# Patient Record
Sex: FEMALE | Race: BLACK OR AFRICAN AMERICAN | Employment: FULL TIME | URBAN - METROPOLITAN AREA
[De-identification: names, ages, dates, MRNs, and addresses within clinical notes are randomized per-mention and may not be internally consistent; named-entity substitution may affect disease eponyms.]

---

## 2017-01-15 ENCOUNTER — TRANSFERRED RECORDS (OUTPATIENT)
Dept: HEALTH INFORMATION MANAGEMENT | Facility: CLINIC | Age: 52
End: 2017-01-15

## 2017-01-23 ENCOUNTER — OFFICE VISIT (OUTPATIENT)
Dept: ORTHOPEDICS | Facility: CLINIC | Age: 52
End: 2017-01-23
Payer: COMMERCIAL

## 2017-01-23 VITALS — DIASTOLIC BLOOD PRESSURE: 62 MMHG | OXYGEN SATURATION: 98 % | SYSTOLIC BLOOD PRESSURE: 104 MMHG | HEART RATE: 68 BPM

## 2017-01-23 DIAGNOSIS — S06.0X0A CONCUSSION, WITHOUT LOSS OF CONSCIOUSNESS, INITIAL ENCOUNTER: Primary | ICD-10-CM

## 2017-01-23 PROCEDURE — 99203 OFFICE O/P NEW LOW 30 MIN: CPT | Performed by: FAMILY MEDICINE

## 2017-01-23 ASSESSMENT — PAIN SCALES - GENERAL: PAINLEVEL: SEVERE PAIN (7)

## 2017-01-23 NOTE — PATIENT INSTRUCTIONS
Thanks for coming today.  Ortho[facility] Medicine Clinic  [address] [place] [address]    To schedule future appointments in Ortho Clinic, you may call [phone].    To schedule ordered imaging by your provider:   Call Central Imaging [phone]    To schedule an injection ordered by your provider:  Call Central Imaging Injection scheduling line: [phone]  [employer] available online at:  [email or web address]    Please call if any further questions or concerns ([phone]).  Clinic hours 8 am to 5 pm.    Return to clinic (call) if symptoms worsen or fail to improve.
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
## 2017-01-23 NOTE — Clinical Note
January 23, 2017      RE: Katie Aponte  [address] [place] [address]       To whom it may concern:    Katie Aponte was seen in our clinic today. She is under my professional care for a concussion.  Please allow her to remain off of work until next Monday, January 30.    Please call with any questions or concerns.          Wade Mendoza, DO CAQSM
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
## 2017-01-30 ENCOUNTER — OFFICE VISIT (OUTPATIENT)
Dept: ORTHOPEDICS | Facility: CLINIC | Age: 52
End: 2017-01-30
Payer: COMMERCIAL

## 2017-01-30 VITALS — HEART RATE: 67 BPM | OXYGEN SATURATION: 100 % | SYSTOLIC BLOOD PRESSURE: 128 MMHG | DIASTOLIC BLOOD PRESSURE: 60 MMHG

## 2017-01-30 DIAGNOSIS — M54.12 CERVICAL RADICULOPATHY: ICD-10-CM

## 2017-01-30 DIAGNOSIS — F40.240 CLAUSTROPHOBIA: Primary | ICD-10-CM

## 2017-01-30 PROCEDURE — 99213 OFFICE O/P EST LOW 20 MIN: CPT | Performed by: FAMILY MEDICINE

## 2017-01-30 RX ORDER — ALPRAZOLAM 0.5 MG
0.5 TABLET ORAL PRN
Qty: 2 TABLET | Refills: 0 | Status: SHIPPED | OUTPATIENT
Start: 2017-01-30 | End: 2017-02-16

## 2017-01-30 ASSESSMENT — PAIN SCALES - GENERAL: PAINLEVEL: SEVERE PAIN (7)

## 2017-01-30 NOTE — Clinical Note
January 30, 2017      RE: Katie Aponte  [address] [place] [facility] [address]       To whom it may concern:    Katie Aponte was seen in our clinic today. She's under my professional care for a concussion.  Please allow her to modify her work schedule to promote recovery.  Please allow her to remain in the office for approximately 2 to 2.5 hours a day and, if possible, to work the remainder of a work day at home.    Please let these restrictions be in effect until February 15, or until further notice.    Please call with any questions or concerns.        Sincerely,              Wade Mendoza, DO CAQSM
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
## 2017-01-30 NOTE — PROGRESS NOTES
HISTORY OF PRESENT ILLNESS  Ms. Aponte is a pleasant 51 year old year old female who presents to clinic today for follow up of her concussion symptoms.  Katie explains that she feels slightly better, although went back in to work this morning and had re-exacerbation of her symptoms.  Light is most bothersome to her.  Still having left sided shoulder symptoms, now noticed shooting symptoms down her left shoulder.  Trouble with all range of motion of her neck.  Some weakness in her left arm.  Additional history: as documented      REVIEW OF SYSTEMS (1/30/2017)  10 point ROS of systems including Constitutional, Eyes, Respiratory, Cardiovascular, Gastroenterology, Genitourinary, Integumentary, Musculoskeletal, Psychiatric were all negative except for pertinent positives noted in my HPI.     PHYSICAL EXAM  Filed Vitals:    01/30/17 1108   BP: 128/60   Pulse: 67   SpO2: 100%     General  - normal appearance, in no obvious distress  CV  - normal peripheral perfusion  Pulm  - normal respiratory pattern, non-labored  Musculoskeletal - cervical spine  - inspection: normal bone and joint alignment, no obvious kyphosis  - palpation: no paravertebral or bony tenderness  - ROM: pain with bilateral rotation and sidebending, flexion, extension  - strength: upper extremities 5/5 in all planes, although objectively weaker in left arm in flexion, extension,   Neuro  - C5-7 DTRs 2+ bilaterally, no sensory or motor deficit, grossly normal coordination, normal muscle tone  Skin  - no ecchymosis, erythema, warmth, or induration, no obvious rash  Psych  - interactive, appropriate, normal mood and affect    Concussion Severity Score:  - see scanned symptom rating form        ASSESSMENT & PLAN  Ms. Aponte is a 51 year old year old female who is in the office today for follow up of her concussion.    She also has signs and symptoms of cervical radiculopathy.    For her concussion, we again discussed the importance of mental and physical  rest.  Avoid exacerbating activities as these may prolong recovery.  Limit screen time, especially if symptom provoking.  I wrote a note for her to work from home, when possible.    I'm also ordering an MR of Katie's c-spine.  This has to be an open MR.  I prescribed her 2 pills of Xanax to pre-medicate with.  She'll need a .    I recommend that Katie return to my office for a re-examination in 2 weeks.  She should follow up sooner if the condition worsens or if other problems arise.    It was a pleasure taking care of Katie.        Wade Mendoza DO, CAQSM

## 2017-01-30 NOTE — PATIENT INSTRUCTIONS
Thanks for coming today.  Ortho/Sports Medicine Clinic  [address] [place] [address]    To schedule future appointments in Ortho Clinic, you may call [phone].    To schedule ordered imaging by your provider:   Call Central Imaging [phone]    To schedule an injection ordered by your provider:  Call Central Imaging Injection scheduling line: [phone]  [email or web address] available online at:  [email or web address]    Please call if any further questions or concerns ([phone]).  Clinic hours 8 am to 5 pm.    Return to clinic (call) if symptoms worsen or fail to improve.

## 2017-01-31 ENCOUNTER — TRANSFERRED RECORDS (OUTPATIENT)
Dept: HEALTH INFORMATION MANAGEMENT | Facility: CLINIC | Age: 52
End: 2017-01-31

## 2017-02-09 ENCOUNTER — TRANSFERRED RECORDS (OUTPATIENT)
Dept: HEALTH INFORMATION MANAGEMENT | Facility: CLINIC | Age: 52
End: 2017-02-09

## 2017-02-09 ENCOUNTER — RADIANT APPOINTMENT (OUTPATIENT)
Dept: GENERAL RADIOLOGY | Facility: CLINIC | Age: 52
End: 2017-02-09
Payer: COMMERCIAL

## 2017-02-09 ENCOUNTER — OFFICE VISIT (OUTPATIENT)
Dept: FAMILY MEDICINE | Facility: CLINIC | Age: 52
End: 2017-02-09
Payer: COMMERCIAL

## 2017-02-09 ENCOUNTER — TELEPHONE (OUTPATIENT)
Dept: ORTHOPEDICS | Facility: CLINIC | Age: 52
End: 2017-02-09

## 2017-02-09 VITALS
BODY MASS INDEX: 31.18 KG/M2 | OXYGEN SATURATION: 98 % | DIASTOLIC BLOOD PRESSURE: 85 MMHG | HEIGHT: 66 IN | WEIGHT: 194 LBS | SYSTOLIC BLOOD PRESSURE: 121 MMHG | HEART RATE: 78 BPM | TEMPERATURE: 97.9 F

## 2017-02-09 DIAGNOSIS — J20.9 ACUTE BRONCHITIS WITH SYMPTOMS > 10 DAYS: ICD-10-CM

## 2017-02-09 DIAGNOSIS — J20.9 ACUTE BRONCHITIS WITH SYMPTOMS > 10 DAYS: Primary | ICD-10-CM

## 2017-02-09 DIAGNOSIS — J45.40: ICD-10-CM

## 2017-02-09 PROCEDURE — 71020 XR CHEST 2 VW: CPT

## 2017-02-09 PROCEDURE — 99214 OFFICE O/P EST MOD 30 MIN: CPT | Performed by: PREVENTIVE MEDICINE

## 2017-02-09 RX ORDER — DOXYCYCLINE 100 MG/1
100 CAPSULE ORAL 2 TIMES DAILY
Qty: 14 CAPSULE | Refills: 0 | Status: SHIPPED | OUTPATIENT
Start: 2017-02-09 | End: 2017-02-16

## 2017-02-09 RX ORDER — PREDNISONE 20 MG/1
20 TABLET ORAL 2 TIMES DAILY
Qty: 10 TABLET | Refills: 0 | Status: SHIPPED | OUTPATIENT
Start: 2017-02-09 | End: 2017-02-16

## 2017-02-09 ASSESSMENT — PAIN SCALES - GENERAL: PAINLEVEL: NO PAIN (0)

## 2017-02-09 NOTE — PROGRESS NOTES
Quick Note:    Please send a letter:    Dear Katie Aponte,    Chest X ray did not show any pneumonias. Plan of care and follow up as discussed in clinic. Please let me know if you have any questions and thank you for choosing [facility].    Regards,    Susan Neves MD MPH    ______

## 2017-02-09 NOTE — MR AVS SNAPSHOT
After Visit Summary   2/9/2017    Katie Aponte    MRN: [id number]           Patient Information     Date Of Birth          1965        Visit Information        Provider Department      2/9/2017 8:00 AM Susan Neves MD [facility]        Today's Diagnoses     Acute bronchitis with symptoms > 10 days    -  1     Allergic bronchitis, moderate persistent, uncomplicated           Care Instructions      Bronchitis, Antibiotic Treatment (Adult)    Bronchitis is an infection of the air passages (bronchial tubes) in your lungs. It often occurs when you have a cold. This illness is contagious during the first few days and is spread through the air by coughing and sneezing, or by direct contact (touching the sick person and then touching your own eyes, nose, or mouth).  Symptoms of bronchitis include cough with mucus (phlegm) and low-grade fever. Bronchitis usually lasts 7 to 14 days. Mild cases can be treated with simple home remedies. More severe infection is treated with an antibiotic.  Home care  Follow these guidelines when caring for yourself at home:    If your symptoms are severe, rest at home for the first 2 to 3 days. When you go back to your usual activities, don't let yourself get too tired.    Do not smoke. Also avoid being exposed to secondhand smoke.    You may use over-the-counter medicines to control fever or pain, unless another medicine was prescribed. (Note: If you have chronic liver or kidney disease or have ever had a stomach ulcer or gastrointestinal bleeding, talk with your healthcare provider before using these medicines. Also talk to your provider if you are taking medicine to prevent blood clots.) Aspirin should never be given to anyone younger than 18 years of age who is ill with a viral infection or fever. It may cause severe liver or brain damage.    Your appetite may be poor, so a light diet is fine. Avoid dehydration by drinking 6 to 8 glasses of  fluids per day (such as water, soft drinks, sports drinks, juices, tea, or soup). Extra fluids will help loosen secretions in the nose and lungs.    Over-the-counter cough, cold, and sore-throat medicines will not shorten the length of the illness, but they may be helpful to reduce symptoms. (Note: Do not use decongestants if you have high blood pressure.)    Finish all antibiotic medicine. Do this even if you are feeling better after only a few days.  Follow-up care  Follow up with your healthcare provider, or as advised. If you had an X-ray or ECG (electrocardiogram), a specialist will review it. You will be notified of any new findings that may affect your care.  Note: If you are age 65 or older, or if you have a chronic lung disease or condition that affects your immune system, or you smoke, talk to your healthcare provider about having pneumococcal vaccinations and a yearly influenza vaccination (flu shot).  When to seek medical advice  Call your healthcare provider right away if any of these occur:    Fever of 100.4 F (38 C) or higher    Coughing up increased amounts of colored sputum    Weakness, drowsiness, headache, facial pain, ear pain, or a stiff neck   Call 911, or get immediate medical care  Contact emergency services right away if any of these occur.    Coughing up blood    Worsening weakness, drowsiness, headache, or stiff neck    Trouble breathing, wheezing, or pain with breathing    6977-1818 [employer]. [address] [place] [address]. All rights reserved. This information is not intended as a substitute for professional medical care. Always follow your healthcare professional's instructions.              Follow-ups after your visit        Your next 10 appointments already scheduled     Feb 13, 2017  9:25 AM   MA SCREENING DIGITAL BILATERAL with MGMA1, MG MA TECH   [facility]    [address] [address] [place] [address]    [phone]           Do not use any powder, lotion or deodorant under your arms or on your breast. If you do, we will ask you to remove it before your exam.  Wear comfortable, two-piece clothing.  If you have any allergies, tell your care team.  Bring any previous mammograms from other facilities or have them mailed to the breast center. This mammogram location, [facility], now offers 3D mammography. It doesn't replace a screening mammogram and can be done with a regular screening mammogram. It is optional and not all insurances will pay for it. 3D mammography is a special kind of mammogram that produces a three-dimensional image of the breast by using low dose-xrays. 3D allows the radiologist to see the breast tissue differently from 2D, which reduces the chance of repeat testing due to overlapping breast tissue. If you are interested in have a 3D mammogram, please check with your insurance before you arrive for your exam. 3D mammography is offered to all patients. On the day of your exam you will be asked to sign a form stating yes, you wish to have 3D imaging or, no, you decline.            Apr 20, 2017   Procedure with Jett Montes MD   [facility] (--)    [address]  [facility] [address] [phone]              Future tests that were ordered for you today     Open Future Orders        Priority Expected Expires Ordered    XR Chest 2 Views Routine 2/9/2017 2/9/2018 2/9/2017    MA Screening Digital Bilateral Routine  2/8/2018 2/8/2017            Who to contact     If you have questions or need follow up information about today's clinic visit or your schedule please contact [facility] [place] directly at [phone].  Normal or non-critical lab and imaging results will be communicated to you by MyChart, letter or phone within 4 business days after the clinic has received the results. If you do not hear from us within 7 days, please contact the  "clinic through [employer] or phone. If you have a critical or abnormal lab result, we will notify you by phone as soon as possible.  Submit refill requests through [employer]Later or call your pharmacy and they will forward the refill request to us. Please allow 3 business days for your refill to be completed.          Additional Information About Your Visit        [employer] Information     1DayLater lets you send messages to your doctor, view your test results, renew your prescriptions, schedule appointments and more. To sign up, go to [email or web address] . Click on \"Log in\" on the left side of the screen, which will take you to the Welcome page. Then click on \"Sign up Now\" on the right side of the page.     You will be asked to enter the access code listed below, as well as some personal information. Please follow the directions to create your username and password.     Your access code is: [email or web address]  Expires: 5/10/2017  8:40 AM     Your access code will  in 90 days. If you need help or a new code, please call your [facility] clinic or [phone].        Care EveryWhere ID     This is your Care EveryWhere ID. This could be used by other organizations to access your [facility] medical records  [id number]        Your Vitals Were     Pulse Temperature Height BMI (Body Mass Index) Pulse Oximetry Last Period    78 97.9  F (36.6  C) (Oral) 5' 6\" (1.676 m) 31.33 kg/m2 98% 2014    Breastfeeding?                   No            Blood Pressure from Last 3 Encounters:   17 121/85   17 128/60   17 104/62    Weight from Last 3 Encounters:   17 194 lb (87.998 kg)   16 185 lb 8 oz (84.142 kg)   16 183 lb 9.6 oz (83.28 kg)                 Today's Medication Changes          These changes are accurate as of: 17  8:40 AM.  If you have any questions, ask your nurse or doctor.               Start taking these medicines.        Dose/Directions    doxycycline 100 MG capsule   Commonly known " as:  VIBRAMYCIN   Used for:  Acute bronchitis with symptoms > 10 days   Started by:  Susan Neves MD        Dose:  100 mg   Take 1 capsule (100 mg) by mouth 2 times daily   Quantity:  14 capsule   Refills:  0       predniSONE 20 MG tablet   Commonly known as:  DELTASONE   Used for:  Acute bronchitis with symptoms > 10 days   Started by:  Susan Neves MD        Dose:  20 mg   Take 1 tablet (20 mg) by mouth 2 times daily   Quantity:  10 tablet   Refills:  0            Where to get your medicines      These medications were sent to [facility] [address] [facility]  [address] [facility] [address]    Hours:  24-hours Phone:  [phone]    - doxycycline 100 MG capsule  - predniSONE 20 MG tablet             Primary Care Provider    [facility]       No address on file        Thank you!     Thank you for choosing [facility]  for your care. Our goal is always to provide you with excellent care. Hearing back from our patients is one way we can continue to improve our services. Please take a few minutes to complete the written survey that you may receive in the mail after your visit with us. Thank you!             Your Updated Medication List - Protect others around you: Learn how to safely use, store and throw away your medicines at www.disposemymeds.org.          This list is accurate as of: 2/9/17  8:40 AM.  Always use your most recent med list.                   Brand Name Dispense Instructions for use    albuterol 108 (90 BASE) MCG/ACT Inhaler    PROAIR HFA/PROVENTIL HFA/VENTOLIN HFA    1 Inhaler    Inhale 2 puffs into the lungs every 6 hours as needed for shortness of breath / dyspnea or wheezing       ALPRAZolam 0.5 MG tablet    XANAX    2 tablet    Take 1 tablet (0.5 mg) by mouth as needed for anxiety (take 30 minutes prior to MRI, can take 2 if needed)       cyclobenzaprine 10 MG tablet     FLEXERIL    60 tablet    Take 1 tablet (10 mg) by mouth 2 times daily as needed for muscle spasms       desonide 0.05 % ointment    DESOWEN    60 g    Apply topically 2 times daily       diclofenac 75 MG EC tablet    VOLTAREN    30 tablet    Take 1 tablet (75 mg) by mouth 2 times daily as needed for moderate pain       doxycycline 100 MG capsule    VIBRAMYCIN    14 capsule    Take 1 capsule (100 mg) by mouth 2 times daily       fluocinolone 0.01 % external oil    DERMA-SMOOTHE/FS SCALP    118 mL    To dry and itchy areas of scalp as needed one to two times a day.       fluticasone 110 MCG/ACT Inhaler    FLOVENT HFA    1 Inhaler    Inhale 2 puffs into the lungs 2 times daily       ibuprofen 600 MG tablet    ADVIL/MOTRIN     Take 600 mg by mouth every 6 hours as needed       metroNIDAZOLE 0.75 % topical gel    METROGEL    45 g    Apply topically daily To entire face for rosacea.       montelukast 10 MG tablet    SINGULAIR    90 tablet    Take 1 tablet (10 mg) by mouth At Bedtime       order for DME     1 Units    Equipment being ordered: TENS       order for DME     1 Device    Equipment being ordered: [employer] Bed Buddy- heated neck roll       predniSONE 20 MG tablet    DELTASONE    10 tablet    Take 1 tablet (20 mg) by mouth 2 times daily       PREMARIN 0.9 MG Tabs tablet   Generic drug:  estrogens conjugated      Take 0.3 mg by mouth daily       VALTREX 500 MG tablet   Generic drug:  valACYclovir      Take 500 mg by mouth as needed

## 2017-02-09 NOTE — Clinical Note
[address] [address]  [phone]  Dept: [phone]      2/9/2017    Re: Katie Aponte      TO WHOM IT MAY CONCERN:    Katie Aponte  was seen on 2/9/17.  Please excuse her  until 2/10/17 due to illness.    Cordially,        Susan Neves MD MPH    [facility]

## 2017-02-09 NOTE — NURSING NOTE
"Chief Complaint   Patient presents with     URI       Initial /85 mmHg  Pulse 78  Temp(Src) 97.9  F (36.6  C) (Oral)  Ht 5' 6\" (1.676 m)  Wt 194 lb (87.998 kg)  BMI 31.33 kg/m2  SpO2 98%  LMP 03/30/2014  Breastfeeding? No Estimated body mass index is 31.33 kg/(m^2) as calculated from the following:    Height as of this encounter: 5' 6\" (1.676 m).    Weight as of this encounter: 194 lb (87.998 kg).  Medication Reconciliation: complete   Julisa ARELLANO        "

## 2017-02-09 NOTE — Clinical Note
[address] [address]  [phone]      February 9, 2017      Katie Aponte  [address] [facility] [address]            Dear Katie Aponte,    Chest X ray did not show any pneumonias. Plan of care and follow up as discussed in clinic.  Enclosed is your result.     Results for orders placed or performed in visit on 02/09/17   XR Chest 2 Views    Narrative    CHEST TWO VIEWS  2/9/2017 8:48 AM     COMPARISON: None.    HISTORY: Acute bronchitis, unspecified.    FINDINGS: The cardiac silhouette, pulmonary vasculature, lungs and  pleural spaces are within normal limits.      Impression    IMPRESSION: Clear lungs.     JUVENAL GRIJALVA MD        Please let me know if you have any questions and thank you for choosing [facility].    Regards,    Susan Neves MD MPH/kb

## 2017-02-09 NOTE — PROGRESS NOTES
SUBJECTIVE:                                                    Katie Aponte is a 51 year old female who presents to clinic today for the following health issues:    I have reviewed and agree with the documentation by the MA. I updated the history as indicated.  Susan Neves MD MPH    RESPIRATORY SYMPTOMS      Duration: x 3 weeks    Description  nasal congestion, sore throat, facial pain/pressure, cough, wheezing, ear pain right, headache, fatigue/malaise and hoarse voice    Severity: severe    Accompanying signs and symptoms: None    History (predisposing factors):  asthma    Precipitating or alleviating factors: None    Therapies tried and outcome:  OTC cold and flu     Symptoms started with nasal congestion and sore throat. No rash, no emesis, no diarrhea.       Problem list and histories reviewed & adjusted, as indicated.  Additional history: as documented    Patient Active Problem List   Diagnosis     Knee pain     Abnormal uterine bleeding     CARDIOVASCULAR SCREENING; LDL GOAL LESS THAN 160     Post-operative pain     Acne rosacea     Dermatitis     Thrombocytopenia (H)     Leukopenia     Allergic bronchitis, moderate persistent, uncomplicated     Neck pain     Right shoulder pain, unspecified chronicity     Lumbago     Past Surgical History   Procedure Laterality Date     Orthopedic surgery       L KNEE MENISCUS      section       Colonoscopy       Dilation and curettage, hysteroscopy, ablate endometrium novasure, combined  4/10/2014     Procedure: COMBINED DILATION AND CURETTAGE, HYSTEROSCOPY, ABLATE ENDOMETRIUM NOVASURE;  HYSTEROSCOPY, DILATION AND CURETTAGE, NOVASURE ABLATION ATTEMPTED, THERMACHOICE ABLATION ATTEMPTED;  Surgeon: Jett Montes MD;  Location: [facility]     Dilate cervix, ablate endometrium thermachoice, combined  4/10/2014     Procedure: COMBINED DILATE CERVIX, ABLATE ENDOMETRIUM THERMACHOICE;;  Surgeon: Jett Montes MD;  Location: [facility]     Laparoscopic assisted hysterectomy  vaginal, bilateral salpingo-oophorectomy, combined  7/31/2014     Procedure: COMBINED LAPAROSCOPIC ASSISTED HYSTERECTOMY VAGINAL, SALPINGO-OOPHORECTOMY;  Surgeon: Jett Montes MD;  Location: [facility]       Social History   Substance Use Topics     Smoking status: Never Smoker      Smokeless tobacco: Never Used     Alcohol Use: 0.0 oz/week     0 Standard drinks or equivalent per week      Comment: SOCIAL - 1 glass of wine twice a week; 2 drinks on the weekend     Family History   Problem Relation Age of Onset     CANCER Mother      patient is unsure of what kind         Current Outpatient Prescriptions   Medication Sig Dispense Refill     predniSONE (DELTASONE) 20 MG tablet Take 1 tablet (20 mg) by mouth 2 times daily 10 tablet 0     doxycycline (VIBRAMYCIN) 100 MG capsule Take 1 capsule (100 mg) by mouth 2 times daily 14 capsule 0     ALPRAZolam (XANAX) 0.5 MG tablet Take 1 tablet (0.5 mg) by mouth as needed for anxiety (take 30 minutes prior to MRI, can take 2 if needed) 2 tablet 0     order for DME Equipment being ordered: [employer] Bed Buddy- heated neck roll 1 Device 0     cyclobenzaprine (FLEXERIL) 10 MG tablet Take 1 tablet (10 mg) by mouth 2 times daily as needed for muscle spasms 60 tablet 1     order for DME Equipment being ordered: TENS 1 Units 0     montelukast (SINGULAIR) 10 MG tablet Take 1 tablet (10 mg) by mouth At Bedtime 90 tablet 1     ibuprofen (ADVIL,MOTRIN) 600 MG tablet Take 600 mg by mouth every 6 hours as needed       diclofenac (VOLTAREN) 75 MG EC tablet Take 1 tablet (75 mg) by mouth 2 times daily as needed for moderate pain 30 tablet 0     albuterol (PROAIR HFA, PROVENTIL HFA, VENTOLIN HFA) 108 (90 BASE) MCG/ACT inhaler Inhale 2 puffs into the lungs every 6 hours as needed for shortness of breath / dyspnea or wheezing 1 Inhaler 2     fluticasone (FLOVENT HFA) 110 MCG/ACT inhaler Inhale 2 puffs into the lungs 2 times daily 1 Inhaler 2     desonide (DESOWEN) 0.05 % ointment Apply topically 2  "times daily 60 g 0     metroNIDAZOLE (METROGEL) 0.75 % gel Apply topically daily To entire face for rosacea. 45 g 11     valACYclovir (VALTREX) 500 MG tablet Take 500 mg by mouth as needed       fluocinolone (DERMA-SMOOTHE/FS SCALP) 0.01 % external oil To dry and itchy areas of scalp as needed one to two times a day. 118 mL 5     estrogens conjugated (PREMARIN) 0.9 MG TABS Take 0.3 mg by mouth daily       Allergies   Allergen Reactions     Droperidol Itching     Feels jumpy     Percocet [Oxycodone-Acetaminophen] GI Disturbance     BP Readings from Last 3 Encounters:   02/09/17 121/85   01/30/17 128/60   01/23/17 104/62    Wt Readings from Last 3 Encounters:   02/09/17 194 lb (87.998 kg)   09/12/16 185 lb 8 oz (84.142 kg)   09/08/16 183 lb 9.6 oz (83.28 kg)                    ROS:  Constitutional, HEENT, cardiovascular, pulmonary, gi and gu systems are negative, except as otherwise noted.    OBJECTIVE:                                                    /85 mmHg  Pulse 78  Temp(Src) 97.9  F (36.6  C) (Oral)  Ht 5' 6\" (1.676 m)  Wt 194 lb (87.998 kg)  BMI 31.33 kg/m2  SpO2 98%  LMP 03/30/2014  Breastfeeding? No  Body mass index is 31.33 kg/(m^2).  GENERAL APPEARANCE: healthy, alert and no distress  EYES: Eyes grossly normal to inspection and conjunctivae and sclerae normal  HENT: ear canals and TM's normal, nose and mouth without ulcers or lesions and no pharyngeal exudates or pus points, no uvular deviation   NECK: no adenopathy  RESP: Scattered rhonchi bilateral lung fields   CV: regular rates and rhythm and normal S1 S2, no S3 or S4  ABDOMEN: soft, non-tender  MS: extremities normal- no gross deformities noted  SKIN: no suspicious lesions or rashes  NEURO: Normal strength and tone, mentation intact and speech normal  PSYCH: mentation appears normal    Diagnostic test results:  Diagnostic Test Results:  No results found for this or any previous visit (from the past 24 hour(s)).     Chest X ray:  My " independent review of the images does not show any infiltrates or pleural effusions. Final radiology reading is pending.  Susan Neves MD MPH       ASSESSMENT/PLAN:                                                    1. Acute bronchitis with symptoms > 10 days  -Hydration and monitor temperature  - predniSONE (DELTASONE) 20 MG tablet; Take 1 tablet (20 mg) by mouth 2 times daily  Dispense: 10 tablet; Refill: 0  - doxycycline (VIBRAMYCIN) 100 MG capsule; Take 1 capsule (100 mg) by mouth 2 times daily  Dispense: 14 capsule; Refill: 0  - XR Chest 2 Views; Future  -Home care information provided   -Work note provided     2. Allergic bronchitis, moderate persistent, uncomplicated  -Continue Albuterol and Flovent HFA  -ACT is low at 9, expected secondary to acute illness.     I ended our visit today by discussing the patient's diagnoses and recommended treatment. Please refer to today's diagnoses and orders for further details. I briefly discussed the pathophysiology of these conditions and outlined their expected course. I discussed the warning symptoms and signs that indicate an atypical course that would need urgent or emergent care. I also discussed self care strategies for symptom relief.  Patient voiced complete understanding of plan of care and was in full agreement to proceed. After visit summary discussed and handed to patient.    Common side effects of medications prescribed at this visit were discussed with the patient. Severe side effects, including current applicable black box warnings, were discussed.     Follow up with Provider - If not improving in 5 days or fever over 101 F  Has mammogram scheduled next week  Will discuss colon cancer screening with PCP Dr. Montes     See Patient Instructions    Susan Neves MD MPH    [facility]

## 2017-02-09 NOTE — TELEPHONE ENCOUNTER
Received disability paperwork as well as FMLA paperwork from patient. Paperwork has been started and has been left for Dr. Mendoza to review. Dr. Mendoza is out of town until Monday 2/13/17 and will be completed by then.

## 2017-02-09 NOTE — PATIENT INSTRUCTIONS
Bronchitis, Antibiotic Treatment (Adult)    Bronchitis is an infection of the air passages (bronchial tubes) in your lungs. It often occurs when you have a cold. This illness is contagious during the first few days and is spread through the air by coughing and sneezing, or by direct contact (touching the sick person and then touching your own eyes, nose, or mouth).  Symptoms of bronchitis include cough with mucus (phlegm) and low-grade fever. Bronchitis usually lasts 7 to 14 days. Mild cases can be treated with simple home remedies. More severe infection is treated with an antibiotic.  Home care  Follow these guidelines when caring for yourself at home:    If your symptoms are severe, rest at home for the first 2 to 3 days. When you go back to your usual activities, don't let yourself get too tired.    Do not smoke. Also avoid being exposed to secondhand smoke.    You may use over-the-counter medicines to control fever or pain, unless another medicine was prescribed. (Note: If you have chronic liver or kidney disease or have ever had a stomach ulcer or gastrointestinal bleeding, talk with your healthcare provider before using these medicines. Also talk to your provider if you are taking medicine to prevent blood clots.) Aspirin should never be given to anyone younger than 18 years of age who is ill with a viral infection or fever. It may cause severe liver or brain damage.    Your appetite may be poor, so a light diet is fine. Avoid dehydration by drinking 6 to 8 glasses of fluids per day (such as water, soft drinks, sports drinks, juices, tea, or soup). Extra fluids will help loosen secretions in the nose and lungs.    Over-the-counter cough, cold, and sore-throat medicines will not shorten the length of the illness, but they may be helpful to reduce symptoms. (Note: Do not use decongestants if you have high blood pressure.)    Finish all antibiotic medicine. Do this even if you are feeling better after only a  few days.  Follow-up care  Follow up with your healthcare provider, or as advised. If you had an X-ray or ECG (electrocardiogram), a specialist will review it. You will be notified of any new findings that may affect your care.  Note: If you are age 65 or older, or if you have a chronic lung disease or condition that affects your immune system, or you smoke, talk to your healthcare provider about having pneumococcal vaccinations and a yearly influenza vaccination (flu shot).  When to seek medical advice  Call your healthcare provider right away if any of these occur:    Fever of 100.4 F (38 C) or higher    Coughing up increased amounts of colored sputum    Weakness, drowsiness, headache, facial pain, ear pain, or a stiff neck   Call 911, or get immediate medical care  Contact emergency services right away if any of these occur.    Coughing up blood    Worsening weakness, drowsiness, headache, or stiff neck    Trouble breathing, wheezing, or pain with breathing    7404-1752 [employer]. [address] [place] [address], [place] [address]. All rights reserved. This information is not intended as a substitute for professional medical care. Always follow your healthcare professional's instructions.

## 2017-02-13 ENCOUNTER — RADIANT APPOINTMENT (OUTPATIENT)
Dept: MAMMOGRAPHY | Facility: CLINIC | Age: 52
End: 2017-02-13
Attending: OBSTETRICS & GYNECOLOGY
Payer: COMMERCIAL

## 2017-02-13 DIAGNOSIS — Z12.39 SCREENING BREAST EXAMINATION: ICD-10-CM

## 2017-02-13 PROCEDURE — G0202 SCR MAMMO BI INCL CAD: HCPCS

## 2017-02-13 PROCEDURE — 77063 BREAST TOMOSYNTHESIS BI: CPT

## 2017-02-16 ENCOUNTER — OFFICE VISIT (OUTPATIENT)
Dept: ORTHOPEDICS | Facility: CLINIC | Age: 52
End: 2017-02-16
Payer: COMMERCIAL

## 2017-02-16 VITALS — DIASTOLIC BLOOD PRESSURE: 72 MMHG | SYSTOLIC BLOOD PRESSURE: 111 MMHG | HEART RATE: 73 BPM

## 2017-02-16 DIAGNOSIS — M54.12 CERVICAL RADICULOPATHY: Primary | ICD-10-CM

## 2017-02-16 PROCEDURE — 99213 OFFICE O/P EST LOW 20 MIN: CPT | Performed by: FAMILY MEDICINE

## 2017-02-16 NOTE — MR AVS SNAPSHOT
After Visit Summary   2/16/2017    Katie Aponte    MRN: [id number]           Patient Information     Date Of Birth          1965        Visit Information        Provider Department      2/16/2017 2:40 PM Wade Mendoza,  [facility]        Today's Diagnoses     Cervical radiculopathy    -  1       Follow-ups after your visit        Additional Services     PHYSICAL THERAPY REFERRAL       Please eval and treat for cervical radiculopathy                  Your next 10 appointments already scheduled     Apr 20, 2017   Procedure with Jett Montes MD   [facility] (--)    [address]  [facility] [address] [phone]              Who to contact     If you have questions or need follow up information about today's clinic visit or your schedule please contact [facility] directly at [phone].  Normal or non-critical lab and imaging results will be communicated to you by MyChart, letter or phone within 4 business days after the clinic has received the results. If you do not hear from us within 7 days, please contact the clinic through MyChart or phone. If you have a critical or abnormal lab result, we will notify you by phone as soon as possible.  Submit refill requests through [employer] or call your pharmacy and they will forward the refill request to us. Please allow 3 business days for your refill to be completed.          Additional Information About Your Visit        MyChart Information     [employer] is an electronic gateway that provides easy, online access to your medical records. With [employer], you can request a clinic appointment, read your test results, renew a prescription or communicate with your care team.     To sign up for [employer] visit the website at [email or web address]   You will be asked to enter the access code listed below, as well as some personal information. Please follow the directions to  create your username and password.     Your access code is: [email or web address]  Expires: 5/10/2017  8:40 AM     Your access code will  in 90 days. If you need help or a new code, please contact your [facility] or call [phone] for assistance.        Care EveryWhere ID     This is your Care EveryWhere ID. This could be used by other organizations to access your [facility] medical records  [id number]        Your Vitals Were     Pulse Last Period                73 2014           Blood Pressure from Last 3 Encounters:   17 111/72   17 121/85   17 128/60    Weight from Last 3 Encounters:   17 88 kg (194 lb)   16 84.1 kg (185 lb 8 oz)   16 83.3 kg (183 lb 9.6 oz)              We Performed the Following     PHYSICAL THERAPY REFERRAL          Today's Medication Changes          These changes are accurate as of: 17  4:17 PM.  If you have any questions, ask your nurse or doctor.               Start taking these medicines.        Dose/Directions    tiZANidine 4 MG tablet   Commonly known as:  ZANAFLEX   Used for:  Cervical radiculopathy   Started by:  Wade Mendoza, DO        Dose:  4 mg   Take 1 tablet (4 mg) by mouth nightly as needed for muscle spasms   Quantity:  30 tablet   Refills:  0            Where to get your medicines      [facility] [address] [place] [address] [address] [place] [address]  [address] [place] [id number]    Hours:  24-hours Phone:  [phone]     tiZANidine 4 MG tablet                Primary Care Provider    [facility]       No address on file        Thank you!     Thank you for choosing [facility]  for your care. Our goal is always to provide you with excellent care. Hearing back from our patients is one way we can continue to improve our services. Please take a few minutes to complete the  written survey that you may receive in the mail after your visit with us. Thank you!             Your Updated Medication List - Protect others around you: Learn how to safely use, store and throw away your medicines at www.disposemymeds.org.          This list is accurate as of: 2/16/17  4:17 PM.  Always use your most recent med list.                   Brand Name Dispense Instructions for use    albuterol 108 (90 BASE) MCG/ACT Inhaler    PROAIR HFA/PROVENTIL HFA/VENTOLIN HFA    1 Inhaler    Inhale 2 puffs into the lungs every 6 hours as needed for shortness of breath / dyspnea or wheezing       cyclobenzaprine 10 MG tablet    FLEXERIL    60 tablet    Take 1 tablet (10 mg) by mouth 2 times daily as needed for muscle spasms       desonide 0.05 % ointment    DESOWEN    60 g    Apply topically 2 times daily       diclofenac 75 MG EC tablet    VOLTAREN    30 tablet    Take 1 tablet (75 mg) by mouth 2 times daily as needed for moderate pain       fluocinolone 0.01 % external oil    DERMA-SMOOTHE/FS SCALP    118 mL    To dry and itchy areas of scalp as needed one to two times a day.       fluticasone 110 MCG/ACT Inhaler    FLOVENT HFA    1 Inhaler    Inhale 2 puffs into the lungs 2 times daily       ibuprofen 600 MG tablet    ADVIL/MOTRIN     Take 600 mg by mouth every 6 hours as needed Reported on 2/16/2017       metroNIDAZOLE 0.75 % topical gel    METROGEL    45 g    Apply topically daily To entire face for rosacea.       montelukast 10 MG tablet    SINGULAIR    90 tablet    Take 1 tablet (10 mg) by mouth At Bedtime       order for DME     1 Units    Equipment being ordered: TENS       order for DME     1 Device    Equipment being ordered: [employer] Bed Buddy- heated neck roll       PREMARIN 0.9 MG Tabs tablet   Generic drug:  estrogens conjugated      Take 0.3 mg by mouth daily       tiZANidine 4 MG tablet    ZANAFLEX    30 tablet    Take 1 tablet (4 mg) by mouth nightly as needed for muscle spasms       VALTREX 500 MG  tablet   Generic drug:  valACYclovir      Take 500 mg by mouth as needed Reported on 2/16/2017

## 2017-02-16 NOTE — PROGRESS NOTES
HISTORY OF PRESENT ILLNESS  Ms. Aponte is a pleasant 51 year old year old female who presents to clinic today for follow up of her neck pain.  She's here to review her MR. Wilson feels nearly recovered from her concussion.  Additional history: as documented      REVIEW OF SYSTEMS (2/16/2017)  10 point ROS of systems including Constitutional, Eyes, Respiratory, Cardiovascular, Gastroenterology, Genitourinary, Integumentary, Musculoskeletal, Psychiatric were all negative except for pertinent positives noted in my HPI.     PHYSICAL EXAM  Vitals:    02/16/17 1445   BP: 111/72   BP Location: Right arm   Patient Position: Chair   Cuff Size: Adult Large   Pulse: 73     General  - normal appearance, in no obvious distress  CV  - normal peripheral perfusion  Pulm  - normal respiratory pattern, non-labored  Musculoskeletal - cervical spine  - inspection: normal bone and joint alignment, no obvious kyphosis  - palpation: bilateral trapezius rigidity, L > R  - ROM: pain with rotation and sidebending to each side  - strength: upper extremities 5/5 in all planes  Neuro  - grossly normal coordination, normal muscle tone  Skin  - no ecchymosis, erythema, warmth, or induration, no obvious rash  Psych  - interactive, appropriate, normal mood and affect        ASSESSMENT & PLAN  Ms. Aponte is a 51 year old year old female who is in the office today following up with cervical radiculopathy.    I reviewed Katie's MR in the room with her which shows disc bulges at C4-5 and C5-6 with narrowing of the left C5-6 foramen without nerve impingement.    I prescribed Katie a short course of zanaflex for nighttime symptoms.  She can also take melatonin for sleep, as sleep is an issue for her.    I gave Katie a referral for physical therapy.  She should refrain from activities that are too painful or not tolerable.    I recommend that Katie return to my office for a re-examination in 4-6 weeks.  She should follow up sooner if the condition  worsens or if other problems arise.    It was a pleasure taking care of Katie.        Wade Mendoza DO, MOIZM

## 2017-02-16 NOTE — NURSING NOTE
"Katie Aponte's goals for this visit include:   Chief Complaint   Patient presents with     RECHECK     results MRI       She requests these members of her care team be copied on today's visit information: [facility]      PCP: [facility]    Referring Provider:  ESTABLISHED PATIENT  No address on file    Chief Complaint   Patient presents with     RECHECK     results MRI       Initial /72 (BP Location: Right arm, Patient Position: Chair, Cuff Size: Adult Large)  Pulse 73  LMP 03/30/2014 Estimated body mass index is 31.31 kg/(m^2) as calculated from the following:    Height as of 2/9/17: 1.676 m (5' 6\").    Weight as of 2/9/17: 88 kg (194 lb).  Medication Reconciliation: complete    Do you need any medication refills at today's visit? No    Diandra Ralph LPN      "

## 2017-03-02 ENCOUNTER — THERAPY VISIT (OUTPATIENT)
Dept: PHYSICAL THERAPY | Facility: CLINIC | Age: 52
End: 2017-03-02
Payer: COMMERCIAL

## 2017-03-02 DIAGNOSIS — M54.12 CERVICAL RADICULOPATHY: Primary | ICD-10-CM

## 2017-03-02 PROCEDURE — 97161 PT EVAL LOW COMPLEX 20 MIN: CPT | Mod: GP | Performed by: PHYSICAL THERAPIST

## 2017-03-02 PROCEDURE — 97112 NEUROMUSCULAR REEDUCATION: CPT | Mod: GP | Performed by: PHYSICAL THERAPIST

## 2017-03-02 PROCEDURE — 97110 THERAPEUTIC EXERCISES: CPT | Mod: GP | Performed by: PHYSICAL THERAPIST
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
## 2017-03-02 NOTE — MR AVS SNAPSHOT
After Visit Summary   3/2/2017    Katie Aponte    MRN: [id number]           Patient Information     Date Of Birth          1965        Visit Information        Provider Department      3/2/2017 1:20 PM Jerome Kramer PT [facility]        Today's Diagnoses     Cervical radiculopathy    -  1       Follow-ups after your visit        Your next 10 appointments already scheduled     Mar 09, 2017  1:20 PM CST   GREG Spine with Margo Velazquez PTA   [facility] ([facility] [place]  )    [address]  [facility] [address]   [phone]            Mar 16, 2017  1:20 PM CDT   GREG Spine with Margo Velazquez PTA   [facility] ([facility] [place]  )    [address] [facility] [address]   [phone]            Apr 20, 2017   Procedure with Jett Montes MD   [facility] (--)    [address]  [facility] [address] [phone]              Who to contact     If you have questions or need follow up information about today's clinic visit or your schedule please contact [facility] directly at [phone].  Normal or non-critical lab and imaging results will be communicated to you by MyChart, letter or phone within 4 business days after the clinic has received the results. If you do not hear from us within 7 days, please contact the clinic through MyChart or phone. If you have a critical or abnormal lab result, we will notify you by phone as soon as possible.  Submit refill requests through [employer] or call your pharmacy and they will forward the refill request to us. Please allow 3 business days for your refill to be completed.          Additional Information About Your Visit        [employer] My Items Information     [employer] lets you send messages to your doctor, view your test results, renew your prescriptions, schedule  "appointments and more. To sign up, go to [email or web address] . Click on \"Log in\" on the left side of the screen, which will take you to the Welcome page. Then click on \"Sign up Now\" on the right side of the page.     You will be asked to enter the access code listed below, as well as some personal information. Please follow the directions to create your username and password.     Your access code is: [email or web address]  Expires: 5/10/2017  8:40 AM     Your access code will  in 90 days. If you need help or a new code, please call your [place] clinic or [phone].        Care EveryWhere ID     This is your Care EveryWhere ID. This could be used by other organizations to access your [place] medical records  [id number]        Your Vitals Were     Last Period                   2014            Blood Pressure from Last 3 Encounters:   17 111/72   17 121/85   17 128/60    Weight from Last 3 Encounters:   17 88 kg (194 lb)   16 84.1 kg (185 lb 8 oz)   16 83.3 kg (183 lb 9.6 oz)              We Performed the Following     GREG Inital Eval Report     Neuromuscular Re-Education     PT Eval, Low Complexity (89304)     Therapeutic Exercises        Primary Care Provider    [facility]       No address on file        Thank you!     Thank you for choosing [facility]  for your care. Our goal is always to provide you with excellent care. Hearing back from our patients is one way we can continue to improve our services. Please take a few minutes to complete the written survey that you may receive in the mail after your visit with us. Thank you!             Your Updated Medication List - Protect others around you: Learn how to safely use, store and throw away your medicines at www.disposemymeds.org.          This list is accurate as of: 3/2/17 11:59 PM.  Always use your most recent med list.                   Brand Name " Dispense Instructions for use    albuterol 108 (90 BASE) MCG/ACT Inhaler    PROAIR HFA/PROVENTIL HFA/VENTOLIN HFA    1 Inhaler    Inhale 2 puffs into the lungs every 6 hours as needed for shortness of breath / dyspnea or wheezing       cyclobenzaprine 10 MG tablet    FLEXERIL    60 tablet    Take 1 tablet (10 mg) by mouth 2 times daily as needed for muscle spasms       desonide 0.05 % ointment    DESOWEN    60 g    Apply topically 2 times daily       diclofenac 75 MG EC tablet    VOLTAREN    30 tablet    Take 1 tablet (75 mg) by mouth 2 times daily as needed for moderate pain       fluocinolone 0.01 % external oil    DERMA-SMOOTHE/FS SCALP    118 mL    To dry and itchy areas of scalp as needed one to two times a day.       fluticasone 110 MCG/ACT Inhaler    FLOVENT HFA    1 Inhaler    Inhale 2 puffs into the lungs 2 times daily       ibuprofen 600 MG tablet    ADVIL/MOTRIN     Take 600 mg by mouth every 6 hours as needed Reported on 2/16/2017       metroNIDAZOLE 0.75 % topical gel    METROGEL    45 g    Apply topically daily To entire face for rosacea.       montelukast 10 MG tablet    SINGULAIR    90 tablet    Take 1 tablet (10 mg) by mouth At Bedtime       order for DME     1 Units    Equipment being ordered: TENS       order for DME     1 Device    Equipment being ordered: [employer] Bed Buddy- heated neck roll       PREMARIN 0.9 MG Tabs tablet   Generic drug:  estrogens conjugated      Take 0.3 mg by mouth daily       tiZANidine 4 MG tablet    ZANAFLEX    30 tablet    Take 1 tablet (4 mg) by mouth nightly as needed for muscle spasms       VALTREX 500 MG tablet   Generic drug:  valACYclovir      Take 500 mg by mouth as needed Reported on 2/16/2017

## 2017-03-04 PROBLEM — M54.12 CERVICAL RADICULOPATHY: Status: ACTIVE | Noted: 2017-03-04

## 2017-03-09 ENCOUNTER — THERAPY VISIT (OUTPATIENT)
Dept: PHYSICAL THERAPY | Facility: CLINIC | Age: 52
End: 2017-03-09
Payer: COMMERCIAL

## 2017-03-09 DIAGNOSIS — M54.12 CERVICAL RADICULOPATHY: ICD-10-CM

## 2017-03-09 PROCEDURE — 97110 THERAPEUTIC EXERCISES: CPT | Mod: GP

## 2017-03-09 PROCEDURE — 97140 MANUAL THERAPY 1/> REGIONS: CPT | Mod: GP

## 2017-03-15 ENCOUNTER — THERAPY VISIT (OUTPATIENT)
Dept: PHYSICAL THERAPY | Facility: CLINIC | Age: 52
End: 2017-03-15
Payer: COMMERCIAL

## 2017-03-15 DIAGNOSIS — M54.12 CERVICAL RADICULOPATHY: ICD-10-CM

## 2017-03-15 PROCEDURE — 97110 THERAPEUTIC EXERCISES: CPT | Mod: GP

## 2017-03-15 PROCEDURE — 97140 MANUAL THERAPY 1/> REGIONS: CPT | Mod: GP

## 2017-03-15 PROCEDURE — 97035 APP MDLTY 1+ULTRASOUND EA 15: CPT | Mod: GP

## 2017-03-23 ENCOUNTER — THERAPY VISIT (OUTPATIENT)
Dept: PHYSICAL THERAPY | Facility: CLINIC | Age: 52
End: 2017-03-23
Payer: COMMERCIAL

## 2017-03-23 DIAGNOSIS — M54.12 CERVICAL RADICULOPATHY: ICD-10-CM

## 2017-03-23 PROCEDURE — 97012 MECHANICAL TRACTION THERAPY: CPT | Mod: GP | Performed by: PHYSICAL THERAPIST

## 2017-03-23 PROCEDURE — 97530 THERAPEUTIC ACTIVITIES: CPT | Mod: GP | Performed by: PHYSICAL THERAPIST

## 2017-03-31 ENCOUNTER — THERAPY VISIT (OUTPATIENT)
Dept: PHYSICAL THERAPY | Facility: CLINIC | Age: 52
End: 2017-03-31
Payer: COMMERCIAL

## 2017-03-31 DIAGNOSIS — M54.12 CERVICAL RADICULOPATHY: ICD-10-CM

## 2017-03-31 PROCEDURE — 97110 THERAPEUTIC EXERCISES: CPT | Mod: GP | Performed by: PHYSICAL THERAPIST

## 2017-03-31 PROCEDURE — 97012 MECHANICAL TRACTION THERAPY: CPT | Mod: GP | Performed by: PHYSICAL THERAPIST

## 2017-03-31 PROCEDURE — 97140 MANUAL THERAPY 1/> REGIONS: CPT | Mod: GP | Performed by: PHYSICAL THERAPIST

## 2017-04-06 ENCOUNTER — OFFICE VISIT (OUTPATIENT)
Dept: ORTHOPEDICS | Facility: CLINIC | Age: 52
End: 2017-04-06
Payer: COMMERCIAL

## 2017-04-06 ENCOUNTER — THERAPY VISIT (OUTPATIENT)
Dept: PHYSICAL THERAPY | Facility: CLINIC | Age: 52
End: 2017-04-06
Payer: COMMERCIAL

## 2017-04-06 VITALS — DIASTOLIC BLOOD PRESSURE: 70 MMHG | HEART RATE: 71 BPM | OXYGEN SATURATION: 98 % | SYSTOLIC BLOOD PRESSURE: 112 MMHG

## 2017-04-06 DIAGNOSIS — F07.81 POST CONCUSSION SYNDROME: Primary | ICD-10-CM

## 2017-04-06 DIAGNOSIS — M54.12 CERVICAL RADICULOPATHY: ICD-10-CM

## 2017-04-06 PROCEDURE — 97012 MECHANICAL TRACTION THERAPY: CPT | Mod: GP | Performed by: PHYSICAL THERAPIST

## 2017-04-06 PROCEDURE — 97110 THERAPEUTIC EXERCISES: CPT | Mod: GP | Performed by: PHYSICAL THERAPIST

## 2017-04-06 PROCEDURE — 99213 OFFICE O/P EST LOW 20 MIN: CPT | Performed by: FAMILY MEDICINE

## 2017-04-06 ASSESSMENT — PAIN SCALES - GENERAL: PAINLEVEL: MODERATE PAIN (5)
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
## 2017-04-06 NOTE — PATIENT INSTRUCTIONS
Thanks for coming today.  Ortho/Sports Medicine Clinic  [address] [place] [address]    To schedule future appointments in Ortho Clinic, you may call [phone].    To schedule ordered imaging by your provider:   Call [facility] [phone]    To schedule an injection ordered by your provider:  Call [facility] Injection scheduling line: [phone]  [employer] available online at:  [email or web address]    Please call if any further questions or concerns ([phone]).  Clinic hours 8 am to 5 pm.    Return to clinic (call) if symptoms worsen or fail to improve.
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
## 2017-04-06 NOTE — MR AVS SNAPSHOT
After Visit Summary   2017    Katie Aponte    MRN: [id number]           Patient Information     Date Of Birth          1965        Visit Information        Provider Department      2017 8:00 AM Wade Mendoza DO [facility]        Today's Diagnoses     Post concussion syndrome    -  1    Cervical radiculopathy          Care Instructions    Thanks for coming today.  Ortho[facility]Sports Medicine Clinic  [address] [place] [address]    To schedule future appointments in Ortho Clinic, you may call [phone].    To schedule ordered imaging by your provider:   Call Central [facility] [phone]    To schedule an injection ordered by your provider:  Call [facility] Injection scheduling line: [phone]  [email or web address] available online at:  [email or web address]    Please call if any further questions or concerns ([phone]).  Clinic hours 8 am to 5 pm.    Return to clinic (call) if symptoms worsen or fail to improve.          Follow-ups after your visit        Your next 10 appointments already scheduled     2017  1:20 PM CDT   GREG Spine with Jerome Kramer, PT   [facility] [place] ([facility]  )    [address] [facility] [id number] [phone]            2017  1:20 PM CDT   GREG Spine with Jerome Kramer PT   [facility] ([facility]  )    [address] [facility] [address]   [phone]            2017   Procedure with Jett Montes MD   [facility] (--)    [address]  [facility] [address]   [phone]            May 18, 2017  9:00 AM CDT   Return Concussion with DO RADHA Olivares [facility] ([facility])    [address] [place] [id number] [phone]              Who to contact     If you have questions or need follow  up information about today's clinic visit or your schedule please contact [facility] directly at [phone].  Normal or non-critical lab and imaging results will be communicated to you by MyChart, letter or phone within 4 business days after the clinic has received the results. If you do not hear from us within 7 days, please contact the clinic through MyChart or phone. If you have a critical or abnormal lab result, we will notify you by phone as soon as possible.  Submit refill requests through [employer] or call your pharmacy and they will forward the refill request to us. Please allow 3 business days for your refill to be completed.          Additional Information About Your Visit        Care EveryWhere ID     This is your Care EveryWhere ID. This could be used by other organizations to access your [place] medical records  [id number]        Your Vitals Were     Pulse Last Period Pulse Oximetry             71 03/30/2014 98%          Blood Pressure from Last 3 Encounters:   04/06/17 112/70   02/16/17 111/72   02/09/17 121/85    Weight from Last 3 Encounters:   02/09/17 88 kg (194 lb)   09/12/16 84.1 kg (185 lb 8 oz)   09/08/16 83.3 kg (183 lb 9.6 oz)              Today, you had the following     No orders found for display       Primary Care Provider    [facility]       No address on file        Thank you!     Thank you for choosing [facility]  for your care. Our goal is always to provide you with excellent care. Hearing back from our patients is one way we can continue to improve our services. Please take a few minutes to complete the written survey that you may receive in the mail after your visit with us. Thank you!             Your Updated Medication List - Protect others around you: Learn how to safely use, store and throw away your medicines at www.disposemymeds.org.          This list is accurate as of: 4/6/17  8:40 AM.  Always use your most  recent med list.                   Brand Name Dispense Instructions for use    albuterol 108 (90 BASE) MCG/ACT Inhaler    PROAIR HFA/PROVENTIL HFA/VENTOLIN HFA    1 Inhaler    Inhale 2 puffs into the lungs every 6 hours as needed for shortness of breath / dyspnea or wheezing       cyclobenzaprine 10 MG tablet    FLEXERIL    60 tablet    Take 1 tablet (10 mg) by mouth 2 times daily as needed for muscle spasms       desonide 0.05 % ointment    DESOWEN    60 g    Apply topically 2 times daily       diclofenac 75 MG EC tablet    VOLTAREN    30 tablet    Take 1 tablet (75 mg) by mouth 2 times daily as needed for moderate pain       fluocinolone 0.01 % external oil    DERMA-SMOOTHE/FS SCALP    118 mL    To dry and itchy areas of scalp as needed one to two times a day.       fluticasone 110 MCG/ACT Inhaler    FLOVENT HFA    1 Inhaler    Inhale 2 puffs into the lungs 2 times daily       ibuprofen 600 MG tablet    ADVIL/MOTRIN     Take 600 mg by mouth every 6 hours as needed Reported on 2/16/2017       metroNIDAZOLE 0.75 % topical gel    METROGEL    45 g    Apply topically daily To entire face for rosacea.       montelukast 10 MG tablet    SINGULAIR    90 tablet    Take 1 tablet (10 mg) by mouth At Bedtime       order for DME     1 Units    Equipment being ordered: TENS       order for DME     1 Device    Equipment being ordered: [employer] Bed Buddy- heated neck roll       PREMARIN 0.9 MG Tabs tablet   Generic drug:  estrogens conjugated      Take 0.3 mg by mouth daily       tiZANidine 4 MG tablet    ZANAFLEX    30 tablet    Take 1 tablet (4 mg) by mouth nightly as needed for muscle spasms       VALTREX 500 MG tablet   Generic drug:  valACYclovir      Take 500 mg by mouth as needed Reported on 2/16/2017

## 2017-04-06 NOTE — PROGRESS NOTES
HISTORY OF PRESENT ILLNESS  Ms. Aponte is a pleasant 51 year old year old female who presents to clinic today for follow up of her concussion and cervical symptoms.  Katie suffered a concussion about 3 months ago and during the course of treatment was also seen for cervical radicular symptoms.  I last saw her on February 16 and referred her to PT after MR of her cervical spine revealed bulging discs at C4-5 and C5-6.  Katie's been in PT for her neck which has been helping quite a bit, especially traction.  Her concussion symptoms are still present.  Still having photophobia that brings on concentration issues.  Modifying her work day to accommodate.  She has good days and bad days but feels better overall.  About 35% better.  Additional history: as documented      REVIEW OF SYSTEMS (4/6/2017)  10 point ROS of systems including Constitutional, Eyes, Respiratory, Cardiovascular, Gastroenterology, Genitourinary, Integumentary, Musculoskeletal, Psychiatric were all negative except for pertinent positives noted in my HPI.    MEDICAL HISTORY  Patient Active Problem List   Diagnosis     Knee pain     Abnormal uterine bleeding     CARDIOVASCULAR SCREENING; LDL GOAL LESS THAN 160     Post-operative pain     Acne rosacea     Dermatitis     Thrombocytopenia (H)     Leukopenia     Allergic bronchitis, moderate persistent, uncomplicated     Neck pain     Right shoulder pain, unspecified chronicity     Lumbago     Cervical radiculopathy       Current Outpatient Prescriptions   Medication Sig Dispense Refill     tiZANidine (ZANAFLEX) 4 MG tablet Take 1 tablet (4 mg) by mouth nightly as needed for muscle spasms 30 tablet 0     order for DME Equipment being ordered: Carex Bed Buddy- heated neck roll 1 Device 0     order for DME Equipment being ordered: TENS 1 Units 0     montelukast (SINGULAIR) 10 MG tablet Take 1 tablet (10 mg) by mouth At Bedtime 90 tablet 1     ibuprofen (ADVIL,MOTRIN) 600 MG tablet Take 600 mg by mouth every 6  hours as needed Reported on 2/16/2017       diclofenac (VOLTAREN) 75 MG EC tablet Take 1 tablet (75 mg) by mouth 2 times daily as needed for moderate pain 30 tablet 0     fluticasone (FLOVENT HFA) 110 MCG/ACT inhaler Inhale 2 puffs into the lungs 2 times daily 1 Inhaler 2     desonide (DESOWEN) 0.05 % ointment Apply topically 2 times daily 60 g 0     metroNIDAZOLE (METROGEL) 0.75 % gel Apply topically daily To entire face for rosacea. 45 g 11     valACYclovir (VALTREX) 500 MG tablet Take 500 mg by mouth as needed Reported on 2/16/2017       fluocinolone (DERMA-SMOOTHE/FS SCALP) 0.01 % external oil To dry and itchy areas of scalp as needed one to two times a day. 118 mL 5     estrogens conjugated (PREMARIN) 0.9 MG TABS Take 0.3 mg by mouth daily       cyclobenzaprine (FLEXERIL) 10 MG tablet Take 1 tablet (10 mg) by mouth 2 times daily as needed for muscle spasms (Patient not taking: Reported on 2/16/2017) 60 tablet 1     albuterol (PROAIR HFA, PROVENTIL HFA, VENTOLIN HFA) 108 (90 BASE) MCG/ACT inhaler Inhale 2 puffs into the lungs every 6 hours as needed for shortness of breath / dyspnea or wheezing (Patient not taking: Reported on 2/16/2017) 1 Inhaler 2       Allergies   Allergen Reactions     Droperidol Itching     Feels jumpy     Percocet [Oxycodone-Acetaminophen] GI Disturbance       Family History   Problem Relation Age of Onset     CANCER Mother      patient is unsure of what kind       Additional medical/Social/Surgical histories reviewed in [facility] and updated as appropriate.     REVIEW OF SYSTEMS (4/6/2017)  10 point ROS of systems including Constitutional, Eyes, Respiratory, Cardiovascular, Gastroenterology, Genitourinary, Integumentary, Musculoskeletal, Psychiatric were all negative except for pertinent positives noted in my HPI.     PHYSICAL EXAM  Vitals:    04/06/17 0814   BP: 112/70   BP Location: Right arm   Patient Position: Chair   Cuff Size: Adult Regular   Pulse: 71   SpO2: 98%     General  - normal  appearance, in no obvious distress  CV  - normal peripheral perfusion  Pulm  - normal respiratory pattern, non-labored  Musculoskeletal - cervical spine  - inspection: normal bone and joint alignment  - palpation: no paravertebral or bony tenderness  - ROM: normal, painless rotation, sidebending  - strength: upper extremities 5/5 in all planes  Neuro  - C5-7 DTRs 2+ bilaterally, no sensory or motor deficit, grossly normal coordination, normal muscle tone  Skin  - no ecchymosis, erythema, warmth, or induration, no obvious rash  Psych  - interactive, appropriate, normal mood and affect          ASSESSMENT & PLAN  Katie is a 51 year old year old female who is in the office today following up with cervical radiculopathy and concussion related issues.    I'm happy that Katie is improving overall.  She does have post-concussion syndrome and is improving, albeit slowly.  At this point I do think it's advisable to start controlled exercise.  She should start at 65% of her max heart rate for 10-15 minutes on a bike or elliptical.    She should continue her PT for her neck as helpful.    I recommend that Katie return to my office for a re-examination in 4-6 weeks.  She should follow up sooner if the condition worsens or if other problems arise.    It was a pleasure taking care of Katie.        Wade Mendoza, DO, CAQSM

## 2017-04-10 ENCOUNTER — TELEPHONE (OUTPATIENT)
Dept: FAMILY MEDICINE | Facility: CLINIC | Age: 52
End: 2017-04-10

## 2017-04-10 NOTE — TELEPHONE ENCOUNTER
Reason for Call:  Medication or medication refill:    Do you use a [facility]?  Name of the pharmacy and phone number for the current request:    [email or web address] [address] [place] [address] [address] [place] [address] AT [facility]    Name of the medication requested: predniSONE (DELTASONE) 20 MG tablet     Other request: The patient has been having Headaches, ringing in her ears and coughing up yellowish green mucous  For the past 5 days    Can we leave a detailed message on this number? YES    Phone number patient can be reached at: Home number on file [phone] (home)    Best Time: anytime    Call taken on 4/10/2017 at 2:14 PM by Millie Odom

## 2017-04-10 NOTE — TELEPHONE ENCOUNTER
"Call to patient.  Patient has no established PCP here or at any other clinic.  Patient reporting sinus symptoms x5 days including cough(heard on phone), and fever per patient.  Patient reports gets sinus infections frequently, requesting medication.  She states she has been seen at [facility] for frequent infections and told \"there's nothing wrong with them, I just get a lot of infections.\"  Advised patient that she needs to be evaluated for medications to be prescribed.  Also encouraged her to schedule an appointment to establish care, noting Katie Pierre is unable to be her PCP.  She verbalizes understanding, has no further questions and will come into [facility].  Anabela Blanton RN     "

## 2017-04-11 ENCOUNTER — OFFICE VISIT (OUTPATIENT)
Dept: FAMILY MEDICINE | Facility: CLINIC | Age: 52
End: 2017-04-11
Payer: COMMERCIAL

## 2017-04-11 VITALS
SYSTOLIC BLOOD PRESSURE: 129 MMHG | WEIGHT: 201 LBS | OXYGEN SATURATION: 100 % | BODY MASS INDEX: 32.3 KG/M2 | HEART RATE: 64 BPM | TEMPERATURE: 97 F | DIASTOLIC BLOOD PRESSURE: 75 MMHG | HEIGHT: 66 IN

## 2017-04-11 DIAGNOSIS — J98.01 ACUTE BRONCHOSPASM: ICD-10-CM

## 2017-04-11 DIAGNOSIS — J30.89 SEASONAL ALLERGIC RHINITIS DUE TO OTHER ALLERGIC TRIGGER: ICD-10-CM

## 2017-04-11 DIAGNOSIS — J45.31 MILD PERSISTENT ASTHMA WITH ACUTE EXACERBATION: Primary | ICD-10-CM

## 2017-04-11 PROCEDURE — 99214 OFFICE O/P EST MOD 30 MIN: CPT | Performed by: PHYSICIAN ASSISTANT

## 2017-04-11 RX ORDER — FLUTICASONE PROPIONATE 110 UG/1
2 AEROSOL, METERED RESPIRATORY (INHALATION) 2 TIMES DAILY
Qty: 1 INHALER | Refills: 2 | Status: SHIPPED | OUTPATIENT
Start: 2017-04-11 | End: 2018-06-13

## 2017-04-11 RX ORDER — PREDNISONE 20 MG/1
40 TABLET ORAL DAILY
Qty: 10 TABLET | Refills: 0 | Status: SHIPPED | OUTPATIENT
Start: 2017-04-11 | End: 2017-04-16

## 2017-04-11 RX ORDER — MONTELUKAST SODIUM 10 MG/1
10 TABLET ORAL AT BEDTIME
Qty: 90 TABLET | Refills: 3 | Status: SHIPPED | OUTPATIENT
Start: 2017-04-11 | End: 2017-08-07

## 2017-04-11 RX ORDER — ALBUTEROL SULFATE 90 UG/1
2 AEROSOL, METERED RESPIRATORY (INHALATION) EVERY 6 HOURS PRN
Qty: 1 INHALER | Refills: 2 | Status: SHIPPED | OUTPATIENT
Start: 2017-04-11 | End: 2019-06-26

## 2017-04-11 RX ORDER — AZITHROMYCIN 250 MG/1
TABLET, FILM COATED ORAL
Qty: 6 TABLET | Refills: 1 | Status: ON HOLD | OUTPATIENT
Start: 2017-04-11 | End: 2017-04-20

## 2017-04-11 NOTE — MR AVS SNAPSHOT
After Visit Summary   4/11/2017    Katie Aponte    MRN: [id number]           Patient Information     Date Of Birth          1965        Visit Information        Provider Department      4/11/2017 11:20 AM Mandy Zapata PA-C [facility]        Today's Diagnoses     Mild persistent asthma with acute exacerbation    -  1    Acute bronchospasm        Seasonal allergic rhinitis due to other allergic trigger          Care Instructions    Singular 10 daily   Zpack with refill  Prednisone 40 mg daily for 5 days  Continue inhaler   Follow up in 5 days if not better        Follow-ups after your visit        Your next 10 appointments already scheduled     Apr 13, 2017  1:20 PM CDT   [facility] Spine with Jerome Kramer PT   [facility] ([facility]  )    [address]  [facility] [id number] [phone]            Apr 20, 2017   Procedure with Jett Montes MD   [facility] (--)    [address]  [facility] [id number] [phone]            May 18, 2017  9:00 AM CDT   Return Concussion with Wade Mendoza DO   [facility]    [address] [place] [address] [phone]              Who to contact     If you have questions or need follow up information about today's clinic visit or your schedule please contact [facility] directly at [phone].  Normal or non-critical lab and imaging results will be communicated to you by MyChart, letter or phone within 4 business days after the clinic has received the results. If you do not hear from us within 7 days, please contact the clinic through MyChart or phone. If you have a critical or abnormal lab result, we will notify you by phone as soon as possible.  Submit refill requests through [employer] or call your pharmacy and they will forward the refill request  "to us. Please allow 3 business days for your refill to be completed.          Additional Information About Your Visit        Care EveryWhere ID     This is your Care EveryWhere ID. This could be used by other organizations to access your [place] medical records  [id number]        Your Vitals Were     Pulse Temperature Height Last Period Pulse Oximetry BMI (Body Mass Index)    64 97  F (36.1  C) (Oral) 5' 6\" (1.676 m) 03/30/2014 100% 32.44 kg/m2       Blood Pressure from Last 3 Encounters:   04/11/17 129/75   04/06/17 112/70   02/16/17 111/72    Weight from Last 3 Encounters:   04/11/17 201 lb (91.2 kg)   02/09/17 194 lb (88 kg)   09/12/16 185 lb 8 oz (84.1 kg)              We Performed the Following     Asthma Action Plan (AAP)          Today's Medication Changes          These changes are accurate as of: 4/11/17 11:46 AM.  If you have any questions, ask your nurse or doctor.               Start taking these medicines.        Dose/Directions    azithromycin 250 MG tablet   Commonly known as:  ZITHROMAX   Used for:  Mild persistent asthma with acute exacerbation   Started by:  Mandy Zapata PA-C        Two tablets first day, then one tablet daily for four days.   Quantity:  6 tablet   Refills:  1       predniSONE 20 MG tablet   Commonly known as:  DELTASONE   Used for:  Mild persistent asthma with acute exacerbation   Started by:  Mandy Zapata PA-C        Dose:  40 mg   Take 2 tablets (40 mg) by mouth daily for 5 days   Quantity:  10 tablet   Refills:  0            Where to get your medicines      These medications were sent to [id number] - [facility] [address] [facility] AT [facility]  [address] [facility] [id number]    Hours:  24-hours Phone:  [phone]     albuterol 108 (90 BASE) MCG/ACT Inhaler    azithromycin 250 MG tablet    fluticasone 110 MCG/ACT Inhaler    montelukast 10 MG tablet    predniSONE 20 MG tablet       "          Primary Care Provider    [facility]       No address on file        Thank you!     Thank you for choosing [facility]  for your care. Our goal is always to provide you with excellent care. Hearing back from our patients is one way we can continue to improve our services. Please take a few minutes to complete the written survey that you may receive in the mail after your visit with us. Thank you!             Your Updated Medication List - Protect others around you: Learn how to safely use, store and throw away your medicines at www.disposemymeds.org.          This list is accurate as of: 4/11/17 11:46 AM.  Always use your most recent med list.                   Brand Name Dispense Instructions for use    albuterol 108 (90 BASE) MCG/ACT Inhaler    PROAIR HFA/PROVENTIL HFA/VENTOLIN HFA    1 Inhaler    Inhale 2 puffs into the lungs every 6 hours as needed for shortness of breath / dyspnea or wheezing       azithromycin 250 MG tablet    ZITHROMAX    6 tablet    Two tablets first day, then one tablet daily for four days.       cyclobenzaprine 10 MG tablet    FLEXERIL    60 tablet    Take 1 tablet (10 mg) by mouth 2 times daily as needed for muscle spasms       desonide 0.05 % ointment    DESOWEN    60 g    Apply topically 2 times daily       diclofenac 75 MG EC tablet    VOLTAREN    30 tablet    Take 1 tablet (75 mg) by mouth 2 times daily as needed for moderate pain       fluocinolone 0.01 % external oil    DERMA-SMOOTHE/FS SCALP    118 mL    To dry and itchy areas of scalp as needed one to two times a day.       fluticasone 110 MCG/ACT Inhaler    FLOVENT HFA    1 Inhaler    Inhale 2 puffs into the lungs 2 times daily       ibuprofen 600 MG tablet    ADVIL/MOTRIN     Take 600 mg by mouth every 6 hours as needed Reported on 4/11/2017       metroNIDAZOLE 0.75 % topical gel    METROGEL    45 g    Apply topically daily To entire face for rosacea.       montelukast 10 MG  tablet    SINGULAIR    90 tablet    Take 1 tablet (10 mg) by mouth At Bedtime       order for DME     1 Units    Equipment being ordered: TENS       order for DME     1 Device    Equipment being ordered: Carex Bed Buddy- heated neck roll       predniSONE 20 MG tablet    DELTASONE    10 tablet    Take 2 tablets (40 mg) by mouth daily for 5 days       PREMARIN 0.9 MG Tabs tablet   Generic drug:  estrogens conjugated      Take 0.3 mg by mouth daily       tiZANidine 4 MG tablet    ZANAFLEX    30 tablet    Take 1 tablet (4 mg) by mouth nightly as needed for muscle spasms       VALTREX 500 MG tablet   Generic drug:  valACYclovir      Take 500 mg by mouth as needed Reported on 4/11/2017

## 2017-04-11 NOTE — NURSING NOTE
"Chief Complaint   Patient presents with     URI       Initial /75  Pulse 64  Temp 97  F (36.1  C) (Oral)  Ht 5' 6\" (1.676 m)  Wt 201 lb (91.2 kg)  LMP 03/30/2014  SpO2 100%  BMI 32.44 kg/m2 Estimated body mass index is 32.44 kg/(m^2) as calculated from the following:    Height as of this encounter: 5' 6\" (1.676 m).    Weight as of this encounter: 201 lb (91.2 kg).  Medication Reconciliation: complete     Yessi Issa CMA      "

## 2017-04-11 NOTE — LETTER
My Asthma Action Plan  Name: Katie Aponte   YOB: 1965  Date: 4/11/2017   My doctor: Mandy Zapata PA-C   My clinic: [facility]        My Control Medicine: Fluticasone propionate (Flovent) -  Diskus 100 mcg 2 puffs bid   Montelukast (Singulair) -  10 mg qd   My Rescue Medicine: Albuterol (Proair/Ventolin/Proventil) inhaler 2 puffs q 4-6 hrs prn   My Oral Steroid Medicine: Prednisone 40 mg daily for 5 days My Asthma Severity: mild persistent  Avoid your asthma triggers: upper respiratory infections, pollens, animal dander and mold               GREEN ZONE     Good Control    I feel good    No cough or wheeze    Can work, sleep and play without asthma symptoms       Take your asthma control medicine every day.     1. If exercise triggers your asthma, take your rescue medication    15 minutes before exercise or sports, and    During exercise if you have asthma symptoms  2. Spacer to use with inhaler: If you have a spacer, make sure to use it with your inhaler             YELLOW ZONE     Getting Worse  I have ANY of these:    I do not feel good    Cough or wheeze    Chest feels tight    Wake up at night   1. Keep taking your Green Zone medications  2. Start taking your rescue medicine:    every 20 minutes for up to 1 hour. Then every 4 hours for 24-48 hours.  3. If you stay in the Yellow Zone for more than 12-24 hours, contact your doctor.  4. If you do not return to the Green Zone in 12-24 hours or you get worse, start taking your oral steroid medicine if prescribed by your provider.           RED ZONE     Medical Alert - Get Help  I have ANY of these:    I feel awful    Medicine is not helping    Breathing getting harder    Trouble walking or talking    Nose opens wide to breathe       1. Take your rescue medicine NOW  2. If your provider has prescribed an oral steroid medicine, start taking it NOW  3. Call your doctor NOW  4. If you are still in the Red Zone  after 20 minutes and you have not reached your doctor:    Take your rescue medicine again and    Call 911 or go to the emergency room right away    See your regular doctor within 2 weeks of an Emergency Room or Urgent Care visit for follow-up treatment.        Electronically signed by: Mandy Zapata, April 11, 2017    Annual Reminders:  Meet with Asthma Educator,  Flu Shot in the Fall, consider Pneumonia Vaccination for patients with asthma (aged 19 and older).    Pharmacy: [facility] [address] [place] [address] AT [facility]                    Asthma Triggers  How To Control Things That Make Your Asthma Worse    Triggers are things that make your asthma worse.  Look at the list below to help you find your triggers and what you can do about them.  You can help prevent asthma flare-ups by staying away from your triggers.      Trigger                                                          What you can do   Cigarette Smoke  Tobacco smoke can make asthma worse. Do not allow smoking in your home, car or around you.  Be sure no one smokes at a child s day care or school.  If you smoke, ask your health care provider for ways to help you quit.  Ask family members to quit too.  Ask your health care provider for a referral to Quit Plan to help you quit smoking, or call 6-531-525-PLAN.     Colds, Flu, Bronchitis  These are common triggers of asthma. Wash your hands often.  Don t touch your eyes, nose or mouth.  Get a flu shot every year.     Dust Mites  These are tiny bugs that live in cloth or carpet. They are too small to see. Wash sheets and blankets in hot water every week.   Encase pillows and mattress in dust mite proof covers.  Avoid having carpet if you can. If you have carpet, vacuum weekly.   Use a dust mask and HEPA vacuum.   Pollen and Outdoor Mold  Some people are allergic to trees, grass, or weed pollen, or molds. Try to keep your windows closed.  Limit  time out doors when pollen count is high.   Ask you health care provider about taking medicine during allergy season.     Animal Dander  Some people are allergic to skin flakes, urine or saliva from pets with fur or feathers. Keep pets with fur or feathers out of your home.    If you can t keep the pet outdoors, then keep the pet out of your bedroom.  Keep the bedroom door closed.  Keep pets off cloth furniture and away from stuffed toys.     Mice, Rats, and Cockroaches  Some people are allergic to the waste from these pests.   Cover food and garbage.  Clean up spills and food crumbs.  Store grease in the refrigerator.   Keep food out of the bedroom.   Indoor Mold  This can be a trigger if your home has high moisture. Fix leaking faucets, pipes, or other sources of water.   Clean moldy surfaces.  Dehumidify basement if it is damp and smelly.   Smoke, Strong Odors, and Sprays  These can reduce air quality. Stay away from strong odors and sprays, such as perfume, powder, hair spray, paints, smoke incense, paint, cleaning products, candles and new carpet.   Exercise or Sports  Some people with asthma have this trigger. Be active!  Ask your doctor about taking medicine before sports or exercise to prevent symptoms.    Warm up for 5-10 minutes before and after sports or exercise.     Other Triggers of Asthma  Cold air:  Cover your nose and mouth with a scarf.  Sometimes laughing or crying can be a trigger.  Some medicines and food can trigger asthma.

## 2017-04-11 NOTE — Clinical Note
Please abstract the following data from this visit with this patient into the appropriate field in Epic:  Colonoscopy done on this date: 4/2012 (approximately), by this group: [facility] in [place] , results were Normal.

## 2017-04-11 NOTE — PATIENT INSTRUCTIONS
Singular 10 daily   Zpack with refill  Prednisone 40 mg daily for 5 days  Continue inhaler   Follow up in 5 days if not better

## 2017-04-11 NOTE — PROGRESS NOTES
SUBJECTIVE:                                                    Katie Aponte is a 51 year old female who presents to clinic today for the following health issues:    Acute Illness   Acute illness concerns: sinus   Onset: 1 week ago     Fever: no    Chills/Sweats: YES- chills     Headache (location?): YES    Sinus Pressure:YES- tender, post-nasal drainage and facial pain    Conjunctivitis:  no    Ear Pain: YES: both    Rhinorrhea: YES    Congestion: YES    Sore Throat: no     Cough: YES-non-productive    Wheeze: yes    Decreased Appetite: no    Nausea: no    Vomiting: no    Diarrhea:  no    Dysuria/Freq.: no    Fatigue/Achiness: YES- fatigue     Sick/Strep Exposure: YES- boss     Therapies Tried and outcome: nedi pot, thera flu, mucinex, vics          Problem list and histories reviewed & adjusted, as indicated.  Additional history: as documented    Patient Active Problem List   Diagnosis     Knee pain     Abnormal uterine bleeding     CARDIOVASCULAR SCREENING; LDL GOAL LESS THAN 160     Post-operative pain     Acne rosacea     Dermatitis     Thrombocytopenia (H)     Leukopenia     Allergic bronchitis, moderate persistent, uncomplicated     Neck pain     Right shoulder pain, unspecified chronicity     Lumbago     Cervical radiculopathy     Mild persistent asthma with acute exacerbation     Past Surgical History:   Procedure Laterality Date      SECTION       COLONOSCOPY       DILATE CERVIX, ABLATE ENDOMETRIUM THERMACHOICE, COMBINED  4/10/2014    Procedure: COMBINED DILATE CERVIX, ABLATE ENDOMETRIUM THERMACHOICE;;  Surgeon: Jett Montes MD;  Location: [facility]     DILATION AND CURETTAGE, HYSTEROSCOPY, ABLATE ENDOMETRIUM NOVASURE, COMBINED  4/10/2014    Procedure: COMBINED DILATION AND CURETTAGE, HYSTEROSCOPY, ABLATE ENDOMETRIUM NOVASURE;  HYSTEROSCOPY, DILATION AND CURETTAGE, NOVASURE ABLATION ATTEMPTED, THERMACHOICE ABLATION ATTEMPTED;  Surgeon: Jtet Montes MD;  Location: [facility]     LAPAROSCOPIC  ASSISTED HYSTERECTOMY VAGINAL, BILATERAL SALPINGO-OOPHORECTOMY, COMBINED  7/31/2014    Procedure: COMBINED LAPAROSCOPIC ASSISTED HYSTERECTOMY VAGINAL, SALPINGO-OOPHORECTOMY;  Surgeon: Jett Montes MD;  Location: [facility]     ORTHOPEDIC SURGERY      L KNEE MENISCUS       Social History   Substance Use Topics     Smoking status: Never Smoker     Smokeless tobacco: Never Used     Alcohol use 0.0 oz/week     0 Standard drinks or equivalent per week      Comment: SOCIAL - 1 glass of wine twice a week; 2 drinks on the weekend     Family History   Problem Relation Age of Onset     CANCER Mother      patient is unsure of what kind         Current Outpatient Prescriptions   Medication Sig Dispense Refill     fluticasone (FLOVENT HFA) 110 MCG/ACT Inhaler Inhale 2 puffs into the lungs 2 times daily 1 Inhaler 2     albuterol (PROAIR HFA/PROVENTIL HFA/VENTOLIN HFA) 108 (90 BASE) MCG/ACT Inhaler Inhale 2 puffs into the lungs every 6 hours as needed for shortness of breath / dyspnea or wheezing 1 Inhaler 2     montelukast (SINGULAIR) 10 MG tablet Take 1 tablet (10 mg) by mouth At Bedtime 90 tablet 3     azithromycin (ZITHROMAX) 250 MG tablet Two tablets first day, then one tablet daily for four days. 6 tablet 1     predniSONE (DELTASONE) 20 MG tablet Take 2 tablets (40 mg) by mouth daily for 5 days 10 tablet 0     order for DME Equipment being ordered: [employer] Bed Buddy- heated neck roll 1 Device 0     order for DME Equipment being ordered: TENS 1 Units 0     estrogens conjugated (PREMARIN) 0.9 MG TABS Take 0.3 mg by mouth daily       tiZANidine (ZANAFLEX) 4 MG tablet Take 1 tablet (4 mg) by mouth nightly as needed for muscle spasms (Patient not taking: Reported on 4/11/2017) 30 tablet 0     cyclobenzaprine (FLEXERIL) 10 MG tablet Take 1 tablet (10 mg) by mouth 2 times daily as needed for muscle spasms (Patient not taking: Reported on 2/16/2017) 60 tablet 1     [DISCONTINUED] montelukast (SINGULAIR) 10 MG tablet Take 1 tablet (10  "mg) by mouth At Bedtime 90 tablet 1     ibuprofen (ADVIL,MOTRIN) 600 MG tablet Take 600 mg by mouth every 6 hours as needed Reported on 4/11/2017       diclofenac (VOLTAREN) 75 MG EC tablet Take 1 tablet (75 mg) by mouth 2 times daily as needed for moderate pain (Patient not taking: Reported on 4/11/2017) 30 tablet 0     [DISCONTINUED] albuterol (PROAIR HFA, PROVENTIL HFA, VENTOLIN HFA) 108 (90 BASE) MCG/ACT inhaler Inhale 2 puffs into the lungs every 6 hours as needed for shortness of breath / dyspnea or wheezing 1 Inhaler 2     [DISCONTINUED] fluticasone (FLOVENT HFA) 110 MCG/ACT inhaler Inhale 2 puffs into the lungs 2 times daily (Patient not taking: Reported on 4/11/2017) 1 Inhaler 2     desonide (DESOWEN) 0.05 % ointment Apply topically 2 times daily 60 g 0     metroNIDAZOLE (METROGEL) 0.75 % gel Apply topically daily To entire face for rosacea. (Patient not taking: Reported on 4/11/2017) 45 g 11     valACYclovir (VALTREX) 500 MG tablet Take 500 mg by mouth as needed Reported on 4/11/2017       fluocinolone (DERMA-SMOOTHE/FS SCALP) 0.01 % external oil To dry and itchy areas of scalp as needed one to two times a day. (Patient not taking: Reported on 4/11/2017) 118 mL 5     Allergies   Allergen Reactions     Droperidol Itching     Feels jumpy     Percocet [Oxycodone-Acetaminophen] GI Disturbance       Reviewed and updated as needed this visit by clinical staff       Reviewed and updated as needed this visit by Provider         ROS:  Constitutional, HEENT, cardiovascular, pulmonary, gi and gu systems are negative, except as otherwise noted.    OBJECTIVE:                                                    /75  Pulse 64  Temp 97  F (36.1  C) (Oral)  Ht 5' 6\" (1.676 m)  Wt 201 lb (91.2 kg)  LMP 03/30/2014  SpO2 100%  BMI 32.44 kg/m2  Body mass index is 32.44 kg/(m^2).  GENERAL: healthy, alert and no distress  EYES: Eyes grossly normal to inspection, PERRL and conjunctivae and sclerae normal  HENT: ear " canals and TM's normal, nose and mouth without ulcers or lesions  NECK: no adenopathy, no asymmetry, masses, or scars and thyroid normal to palpation  RESP: no rales , no rhonchi and inspiratory wheezes throughout  CV: regular rate and rhythm, normal S1 S2, no S3 or S4, no murmur, click or rub, no peripheral edema and peripheral pulses strong  ABDOMEN: soft, nontender, no hepatosplenomegaly, no masses and bowel sounds normal  MS: no gross musculoskeletal defects noted, no edema    Diagnostic Test Results:  none      ASSESSMENT/PLAN:                                                        ICD-10-CM    1. Mild persistent asthma with acute exacerbation J45.31 montelukast (SINGULAIR) 10 MG tablet     azithromycin (ZITHROMAX) 250 MG tablet     predniSONE (DELTASONE) 20 MG tablet   2. Acute bronchospasm J98.01 fluticasone (FLOVENT HFA) 110 MCG/ACT Inhaler     albuterol (PROAIR HFA/PROVENTIL HFA/VENTOLIN HFA) 108 (90 BASE) MCG/ACT Inhaler   3. Seasonal allergic rhinitis due to other allergic trigger J30.89 montelukast (SINGULAIR) 10 MG tablet     Singular 10 daily   Zpack with refill  Prednisone 40 mg daily for 5 days  Continue inhaler   Follow up in 5 days if not better      Mandy Zapata PA-C  [facility]

## 2017-04-12 ASSESSMENT — ASTHMA QUESTIONNAIRES: ACT_TOTALSCORE: 13

## 2017-04-13 ENCOUNTER — THERAPY VISIT (OUTPATIENT)
Dept: PHYSICAL THERAPY | Facility: CLINIC | Age: 52
End: 2017-04-13
Payer: COMMERCIAL

## 2017-04-13 DIAGNOSIS — M54.12 CERVICAL RADICULOPATHY: ICD-10-CM

## 2017-04-13 PROCEDURE — 97012 MECHANICAL TRACTION THERAPY: CPT | Mod: GP | Performed by: PHYSICAL THERAPIST

## 2017-04-13 PROCEDURE — 97110 THERAPEUTIC EXERCISES: CPT | Mod: GP | Performed by: PHYSICAL THERAPIST

## 2017-04-19 ENCOUNTER — THERAPY VISIT (OUTPATIENT)
Dept: PHYSICAL THERAPY | Facility: CLINIC | Age: 52
End: 2017-04-19
Payer: COMMERCIAL

## 2017-04-19 DIAGNOSIS — M54.12 CERVICAL RADICULOPATHY: ICD-10-CM

## 2017-04-19 PROBLEM — N39.3 SUI (STRESS URINARY INCONTINENCE, FEMALE): Chronic | Status: ACTIVE | Noted: 2017-04-19

## 2017-04-19 PROCEDURE — 97012 MECHANICAL TRACTION THERAPY: CPT | Mod: GP

## 2017-04-19 PROCEDURE — 97140 MANUAL THERAPY 1/> REGIONS: CPT | Mod: GP

## 2017-04-19 RX ORDER — MULTIPLE VITAMINS W/ MINERALS TAB 9MG-400MCG
1 TAB ORAL DAILY
COMMUNITY
End: 2021-11-21

## 2017-04-20 ENCOUNTER — ANESTHESIA (OUTPATIENT)
Dept: SURGERY | Facility: CLINIC | Age: 52
End: 2017-04-20
Payer: COMMERCIAL

## 2017-04-20 ENCOUNTER — HOSPITAL ENCOUNTER (OUTPATIENT)
Facility: CLINIC | Age: 52
Discharge: HOME | End: 2017-04-20
Attending: OBSTETRICS & GYNECOLOGY | Admitting: OBSTETRICS & GYNECOLOGY
Payer: COMMERCIAL

## 2017-04-20 ENCOUNTER — ANESTHESIA EVENT (OUTPATIENT)
Dept: SURGERY | Facility: CLINIC | Age: 52
End: 2017-04-20
Payer: COMMERCIAL

## 2017-04-20 VITALS
SYSTOLIC BLOOD PRESSURE: 144 MMHG | DIASTOLIC BLOOD PRESSURE: 81 MMHG | OXYGEN SATURATION: 98 % | TEMPERATURE: 97.9 F | BODY MASS INDEX: 30.84 KG/M2 | HEIGHT: 67 IN | RESPIRATION RATE: 14 BRPM | WEIGHT: 196.5 LBS

## 2017-04-20 DIAGNOSIS — N39.3 SUI (STRESS URINARY INCONTINENCE, FEMALE): Primary | Chronic | ICD-10-CM

## 2017-04-20 PROCEDURE — C1771 REP DEV, URINARY, W/SLING: HCPCS | Performed by: OBSTETRICS & GYNECOLOGY

## 2017-04-20 PROCEDURE — 71000027 ZZH RECOVERY PHASE 2 EACH 15 MINS: Performed by: OBSTETRICS & GYNECOLOGY

## 2017-04-20 PROCEDURE — 25000125 ZZHC RX 250: Performed by: ANESTHESIOLOGY

## 2017-04-20 PROCEDURE — 25000128 H RX IP 250 OP 636: Performed by: OBSTETRICS & GYNECOLOGY

## 2017-04-20 PROCEDURE — 27210794 ZZH OR GENERAL SUPPLY STERILE: Performed by: OBSTETRICS & GYNECOLOGY

## 2017-04-20 PROCEDURE — 27210995 ZZH RX 272: Performed by: OBSTETRICS & GYNECOLOGY

## 2017-04-20 PROCEDURE — 37000008 ZZH ANESTHESIA TECHNICAL FEE, 1ST 30 MIN: Performed by: OBSTETRICS & GYNECOLOGY

## 2017-04-20 PROCEDURE — 36000056 ZZH SURGERY LEVEL 3 1ST 30 MIN: Performed by: OBSTETRICS & GYNECOLOGY

## 2017-04-20 PROCEDURE — 36000058 ZZH SURGERY LEVEL 3 EA 15 ADDTL MIN: Performed by: OBSTETRICS & GYNECOLOGY

## 2017-04-20 PROCEDURE — 25000125 ZZHC RX 250: Performed by: NURSE ANESTHETIST, CERTIFIED REGISTERED

## 2017-04-20 PROCEDURE — 40000170 ZZH STATISTIC PRE-PROCEDURE ASSESSMENT II: Performed by: OBSTETRICS & GYNECOLOGY

## 2017-04-20 PROCEDURE — 71000012 ZZH RECOVERY PHASE 1 LEVEL 1 FIRST HR: Performed by: OBSTETRICS & GYNECOLOGY

## 2017-04-20 PROCEDURE — 25000566 ZZH SEVOFLURANE, EA 15 MIN: Performed by: OBSTETRICS & GYNECOLOGY

## 2017-04-20 PROCEDURE — 25000128 H RX IP 250 OP 636: Performed by: ANESTHESIOLOGY

## 2017-04-20 PROCEDURE — 25800025 ZZH RX 258: Performed by: ANESTHESIOLOGY

## 2017-04-20 PROCEDURE — 25000128 H RX IP 250 OP 636: Performed by: NURSE ANESTHETIST, CERTIFIED REGISTERED

## 2017-04-20 PROCEDURE — 37000009 ZZH ANESTHESIA TECHNICAL FEE, EACH ADDTL 15 MIN: Performed by: OBSTETRICS & GYNECOLOGY

## 2017-04-20 PROCEDURE — 71000013 ZZH RECOVERY PHASE 1 LEVEL 1 EA ADDTL HR: Performed by: OBSTETRICS & GYNECOLOGY

## 2017-04-20 PROCEDURE — 25000132 ZZH RX MED GY IP 250 OP 250 PS 637: Performed by: OBSTETRICS & GYNECOLOGY

## 2017-04-20 PROCEDURE — 25800025 ZZH RX 258: Performed by: OBSTETRICS & GYNECOLOGY

## 2017-04-20 DEVICE — MESH SLING ADVANTAGE FIT SYSTEM M0068502110: Type: IMPLANTABLE DEVICE | Site: VAGINA | Status: FUNCTIONAL

## 2017-04-20 RX ORDER — ALBUTEROL SULFATE 0.83 MG/ML
2.5 SOLUTION RESPIRATORY (INHALATION) EVERY 4 HOURS PRN
Status: DISCONTINUED | OUTPATIENT
Start: 2017-04-20 | End: 2017-04-20 | Stop reason: HOSPADM

## 2017-04-20 RX ORDER — PROPOFOL 10 MG/ML
INJECTION, EMULSION INTRAVENOUS CONTINUOUS PRN
Status: DISCONTINUED | OUTPATIENT
Start: 2017-04-20 | End: 2017-04-20

## 2017-04-20 RX ORDER — SODIUM CHLORIDE, SODIUM LACTATE, POTASSIUM CHLORIDE, CALCIUM CHLORIDE 600; 310; 30; 20 MG/100ML; MG/100ML; MG/100ML; MG/100ML
INJECTION, SOLUTION INTRAVENOUS CONTINUOUS PRN
Status: DISCONTINUED | OUTPATIENT
Start: 2017-04-20 | End: 2017-04-20

## 2017-04-20 RX ORDER — FENTANYL CITRATE 0.05 MG/ML
25-50 INJECTION, SOLUTION INTRAMUSCULAR; INTRAVENOUS
Status: DISCONTINUED | OUTPATIENT
Start: 2017-04-20 | End: 2017-04-20 | Stop reason: HOSPADM

## 2017-04-20 RX ORDER — ONDANSETRON 4 MG/1
4 TABLET, ORALLY DISINTEGRATING ORAL EVERY 30 MIN PRN
Status: DISCONTINUED | OUTPATIENT
Start: 2017-04-20 | End: 2017-04-20 | Stop reason: HOSPADM

## 2017-04-20 RX ORDER — MAGNESIUM HYDROXIDE 1200 MG/15ML
LIQUID ORAL PRN
Status: DISCONTINUED | OUTPATIENT
Start: 2017-04-20 | End: 2017-04-20 | Stop reason: HOSPADM

## 2017-04-20 RX ORDER — CEFAZOLIN SODIUM 1 G/3ML
1 INJECTION, POWDER, FOR SOLUTION INTRAMUSCULAR; INTRAVENOUS SEE ADMIN INSTRUCTIONS
Status: DISCONTINUED | OUTPATIENT
Start: 2017-04-20 | End: 2017-04-20 | Stop reason: HOSPADM

## 2017-04-20 RX ORDER — NALOXONE HYDROCHLORIDE 0.4 MG/ML
.1-.4 INJECTION, SOLUTION INTRAMUSCULAR; INTRAVENOUS; SUBCUTANEOUS
Status: DISCONTINUED | OUTPATIENT
Start: 2017-04-20 | End: 2017-04-20 | Stop reason: HOSPADM

## 2017-04-20 RX ORDER — CEFAZOLIN SODIUM 2 G/100ML
2 INJECTION, SOLUTION INTRAVENOUS
Status: COMPLETED | OUTPATIENT
Start: 2017-04-20 | End: 2017-04-20

## 2017-04-20 RX ORDER — LABETALOL HYDROCHLORIDE 5 MG/ML
10 INJECTION, SOLUTION INTRAVENOUS
Status: DISCONTINUED | OUTPATIENT
Start: 2017-04-20 | End: 2017-04-20 | Stop reason: HOSPADM

## 2017-04-20 RX ORDER — MEPERIDINE HYDROCHLORIDE 25 MG/ML
12.5 INJECTION INTRAMUSCULAR; INTRAVENOUS; SUBCUTANEOUS
Status: DISCONTINUED | OUTPATIENT
Start: 2017-04-20 | End: 2017-04-20 | Stop reason: HOSPADM

## 2017-04-20 RX ORDER — PROPOFOL 10 MG/ML
INJECTION, EMULSION INTRAVENOUS PRN
Status: DISCONTINUED | OUTPATIENT
Start: 2017-04-20 | End: 2017-04-20

## 2017-04-20 RX ORDER — LIDOCAINE HYDROCHLORIDE 20 MG/ML
INJECTION, SOLUTION INFILTRATION; PERINEURAL PRN
Status: DISCONTINUED | OUTPATIENT
Start: 2017-04-20 | End: 2017-04-20

## 2017-04-20 RX ORDER — DEXAMETHASONE SODIUM PHOSPHATE 4 MG/ML
INJECTION, SOLUTION INTRA-ARTICULAR; INTRALESIONAL; INTRAMUSCULAR; INTRAVENOUS; SOFT TISSUE PRN
Status: DISCONTINUED | OUTPATIENT
Start: 2017-04-20 | End: 2017-04-20

## 2017-04-20 RX ORDER — HYDROCODONE BITARTRATE AND ACETAMINOPHEN 5; 325 MG/1; MG/1
1-2 TABLET ORAL
Status: COMPLETED | OUTPATIENT
Start: 2017-04-20 | End: 2017-04-20

## 2017-04-20 RX ORDER — ONDANSETRON 2 MG/ML
INJECTION INTRAMUSCULAR; INTRAVENOUS PRN
Status: DISCONTINUED | OUTPATIENT
Start: 2017-04-20 | End: 2017-04-20

## 2017-04-20 RX ORDER — FENTANYL CITRATE 50 UG/ML
INJECTION, SOLUTION INTRAMUSCULAR; INTRAVENOUS PRN
Status: DISCONTINUED | OUTPATIENT
Start: 2017-04-20 | End: 2017-04-20

## 2017-04-20 RX ORDER — HYDROCODONE BITARTRATE AND ACETAMINOPHEN 5; 325 MG/1; MG/1
1-2 TABLET ORAL EVERY 4 HOURS PRN
Qty: 15 TABLET | Refills: 0 | Status: SHIPPED | OUTPATIENT
Start: 2017-04-20 | End: 2017-07-14

## 2017-04-20 RX ORDER — HYDRALAZINE HYDROCHLORIDE 20 MG/ML
2.5-5 INJECTION INTRAMUSCULAR; INTRAVENOUS EVERY 10 MIN PRN
Status: DISCONTINUED | OUTPATIENT
Start: 2017-04-20 | End: 2017-04-20 | Stop reason: HOSPADM

## 2017-04-20 RX ORDER — ONDANSETRON 2 MG/ML
4 INJECTION INTRAMUSCULAR; INTRAVENOUS EVERY 30 MIN PRN
Status: DISCONTINUED | OUTPATIENT
Start: 2017-04-20 | End: 2017-04-20 | Stop reason: HOSPADM

## 2017-04-20 RX ORDER — IBUPROFEN 600 MG/1
600 TABLET, FILM COATED ORAL
Status: DISCONTINUED | OUTPATIENT
Start: 2017-04-20 | End: 2017-04-20 | Stop reason: HOSPADM

## 2017-04-20 RX ORDER — KETOROLAC TROMETHAMINE 30 MG/ML
30 INJECTION, SOLUTION INTRAMUSCULAR; INTRAVENOUS EVERY 6 HOURS PRN
Status: DISCONTINUED | OUTPATIENT
Start: 2017-04-20 | End: 2017-04-20 | Stop reason: HOSPADM

## 2017-04-20 RX ORDER — SODIUM CHLORIDE, SODIUM LACTATE, POTASSIUM CHLORIDE, CALCIUM CHLORIDE 600; 310; 30; 20 MG/100ML; MG/100ML; MG/100ML; MG/100ML
INJECTION, SOLUTION INTRAVENOUS CONTINUOUS
Status: DISCONTINUED | OUTPATIENT
Start: 2017-04-20 | End: 2017-04-20 | Stop reason: HOSPADM

## 2017-04-20 RX ADMIN — MIDAZOLAM HYDROCHLORIDE 2 MG: 1 INJECTION, SOLUTION INTRAMUSCULAR; INTRAVENOUS at 12:53

## 2017-04-20 RX ADMIN — FENTANYL CITRATE 25 MCG: 50 INJECTION, SOLUTION INTRAMUSCULAR; INTRAVENOUS at 13:25

## 2017-04-20 RX ADMIN — DEXAMETHASONE SODIUM PHOSPHATE 4 MG: 4 INJECTION, SOLUTION INTRA-ARTICULAR; INTRALESIONAL; INTRAMUSCULAR; INTRAVENOUS; SOFT TISSUE at 12:57

## 2017-04-20 RX ADMIN — PROPOFOL 200 MCG/KG/MIN: 10 INJECTION, EMULSION INTRAVENOUS at 13:05

## 2017-04-20 RX ADMIN — CEFAZOLIN SODIUM 2 G: 2 INJECTION, SOLUTION INTRAVENOUS at 13:08

## 2017-04-20 RX ADMIN — HYDROCODONE BITARTRATE AND ACETAMINOPHEN 1 TABLET: 5; 325 TABLET ORAL at 15:39

## 2017-04-20 RX ADMIN — FENTANYL CITRATE 50 MCG: 50 INJECTION, SOLUTION INTRAMUSCULAR; INTRAVENOUS at 14:35

## 2017-04-20 RX ADMIN — FENTANYL CITRATE 50 MCG: 50 INJECTION, SOLUTION INTRAMUSCULAR; INTRAVENOUS at 12:57

## 2017-04-20 RX ADMIN — SODIUM CHLORIDE, POTASSIUM CHLORIDE, SODIUM LACTATE AND CALCIUM CHLORIDE: 600; 310; 30; 20 INJECTION, SOLUTION INTRAVENOUS at 12:19

## 2017-04-20 RX ADMIN — PROPOFOL 200 MG: 10 INJECTION, EMULSION INTRAVENOUS at 12:53

## 2017-04-20 RX ADMIN — LIDOCAINE HYDROCHLORIDE 80 MG: 20 INJECTION, SOLUTION INFILTRATION; PERINEURAL at 13:02

## 2017-04-20 RX ADMIN — ONDANSETRON 4 MG: 2 INJECTION INTRAMUSCULAR; INTRAVENOUS at 13:32

## 2017-04-20 RX ADMIN — FENTANYL CITRATE 25 MCG: 50 INJECTION, SOLUTION INTRAMUSCULAR; INTRAVENOUS at 13:45

## 2017-04-20 ASSESSMENT — LIFESTYLE VARIABLES: TOBACCO_USE: 0

## 2017-04-20 ASSESSMENT — COPD QUESTIONNAIRES: COPD: 0

## 2017-04-20 ASSESSMENT — ENCOUNTER SYMPTOMS
SEIZURES: 0
DYSRHYTHMIAS: 0

## 2017-04-20 NOTE — ANESTHESIA POSTPROCEDURE EVALUATION
Patient: Katie Aponte    Procedure(s):  TRANSVAGINAL TAPING, CYSTOSCOPY, MID URETHRAL SLING - Wound Class: II-Clean Contaminated    Diagnosis:URINARY INCONTINENCE   Diagnosis Additional Information: No value filed.    Anesthesia Type:  General, ETT    Note:  Anesthesia Post Evaluation    Patient location during evaluation: PACU  Patient participation: Able to fully participate in evaluation  Level of consciousness: awake  Pain management: adequate  Airway patency: patent  Cardiovascular status: acceptable  Respiratory status: acceptable  Hydration status: acceptable  PONV: controlled     Anesthetic complications: None          Last vitals:  Vitals:    04/20/17 1500 04/20/17 1515 04/20/17 1530   BP: 118/74 126/79 129/78   Resp: 11 15 14   Temp: 36.6  C (97.9  F)     SpO2: 100% 99% 100%         Electronically Signed By: Saray Banks MD  April 20, 2017  3:45 PM

## 2017-04-20 NOTE — OP NOTE
Gynecology Brief Operative Note    Pre-operative diagnosis: URINARY INCONTINENCE    Post-operative diagnosis Same   Procedure: Procedure(s):  CYSTOSCOPY, SLING TRANSVAGINAL---TVT   Surgeon: Jett Montes MD   Assistants(s): DR MONTIEL   Anesthesia: General    Estimated blood loss:    30 CC    Total IV fluids: (See anesthesia record)   Blood transfusion: No transfusion was given during surgery   Total urine output: (See anesthesia record)   Drains: None   Specimens: None   Implants: TVT SLING   Findings: DROP OF VESICAL NECK   Complications: None   Condition: Stable   Comments: See dictated operative report for full details        VAGINAL PACK AND REBOLLEDO

## 2017-04-20 NOTE — OR NURSING
250 ml sterile saline instilled through bowman catheter into bladder.  Bowman catheter and vaginal packing removed intact.  Mrs. Aponte then voided in restroom.  Bladder then scanned for residual volume of 0 ml; confirmed with 4 scans.  PNDS met, po per I&O sheet. Pt dressed, up in recliner and transported to Phase 2.

## 2017-04-20 NOTE — IP AVS SNAPSHOT
[address]    [facility] [address]    Phone:  [phone]                                       After Visit Summary   4/20/2017    Katie Aponte    MRN: [id number]           After Visit Summary Signature Page     I have received my discharge instructions, and my questions have been answered. I have discussed any challenges I see with this plan with the nurse or doctor.    ..........................................................................................................................................  Patient/Patient Representative Signature      ..........................................................................................................................................  Patient Representative Print Name and Relationship to Patient    ..................................................               ................................................  Date                                            Time    ..........................................................................................................................................  Reviewed by Signature/Title    ...................................................              ..............................................  Date                                                            Time

## 2017-04-20 NOTE — ANESTHESIA CARE TRANSFER NOTE
Patient: Katie Aponte    Procedure(s):  TRANSVAGINAL TAPING, CYSTOSCOPY, MID URETHRAL SLING - Wound Class: II-Clean Contaminated    Diagnosis: URINARY INCONTINENCE   Diagnosis Additional Information: No value filed.    Anesthesia Type:   General, ETT     Note:  Airway :Nasal Cannula  Patient transferred to:PACU  Comments: Spontaneous respirations, tidal volume > 350, oxygen saturation > 97%, follows commands.  Suctioned and LMA removed.  Exchanging well. To PACU, monitors on, report to RN.      Vitals: (Last set prior to Anesthesia Care Transfer)    CRNA VITALS  4/20/2017 1342 - 4/20/2017 1412      4/20/2017             Resp Rate (set): 10                Electronically Signed By: TREVER Ware CRNA  April 20, 2017  2:12 PM
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
## 2017-04-20 NOTE — DISCHARGE INSTRUCTIONS
Same Day Surgery Discharge Instructions for  Sedation and General Anesthesia       It's not unusual to feel dizzy, light-headed or faint for up to 24 hours after surgery or while taking pain medication.  If you have these symptoms: sit for a few minutes before standing and have someone assist you when you get up to walk or use the bathroom.      You should rest and relax for the next 24 hours. We recommend you make arrangements to have an adult stay with you for at least 24 hours after your discharge.  Avoid hazardous and strenuous activity.      DO NOT DRIVE any vehicle or operate mechanical equipment for 24 hours following the end of your surgery.  Even though you may feel normal, your reactions may be affected by the medication you have received.      Do not drink alcoholic beverages for 24 hours following surgery.       Slowly progress to your regular diet as you feel able. It's not unusual to feel nauseated and/or vomit after receiving anesthesia.  If you develop these symptoms, drink clear liquids (apple juice, ginger ale, broth, 7-up, etc. ) until you feel better.  If your nausea and vomiting persists for 24 hours, please notify your surgeon.        All narcotic pain medications, along with inactivity and anesthesia, can cause constipation. Drinking plenty of liquids and increasing fiber intake will help.      For any questions of a medical nature, call your surgeon.      Do not make important decisions for 24 hours.      If you had general anesthesia, you may have a sore throat for a couple of days related to the breathing tube used during surgery.  You may use Cepacol lozenges to help with this discomfort.  If it worsens or if you develop a fever, contact your surgeon.       If you feel your pain is not well managed with the pain medications prescribed by your surgeon, please contact your surgeon's office to let them know so they can address your concerns.         Post Bladder Sling Instructions     For  pain you may take a narcotic/acetaminophen combination prescription for pain relief. The most common pain is in the upper thighs. This usually lasts 2-3 days. You may use cold packs to this area as needed to reduce pain and bruising. Generally over-the-counter medicines such as ibuprofen, 3-4 tablets every 6 to 8 hours as needed or acetaminophen (Tylenol) as prescribed on the bottle. If you have pain unrelieved by these measures, call the office.    Activity: no lifting more than 10 lbs for 6-8 weeks; limit strenuous activity including intercourse for 6 weeks minimum but 8 weeks is better. Gradually increase your activity as tolerated. Plan on not driving for a couple of days after surgery, though you may feel like doing so sooner.     The incisions in the upper thigh area will be covered with Dermabond which will wear off. The small incision in your vagina will be closed with sutures which will dissolve automatically. If your doctor prescribed premarin cream/estrace cream apply it to the vagina using your fingertip once a day (at bedtime) for one month after the surgery. That helps expedite the vaginal healing and absorption of the vaginal sutures.     Ok to shower 24 hours after surgery but should avoid tub baths for up to 2 months after the surgery.    Bleeding: Depending on your procedure you may have a small amount of vaginal bleeding (like a period) which may last 1-3 weeks. If it is heavier than your menstrual period call the office.     Call Dr Montes' office if you have:    A fever greater than 101 F    Difficulty/painful urination    Problems with nausea/vomiting    Large amount of vaginal drainage.               **If you have questions or concerns about your procedure,  call Dr. Montes at [phone]**

## 2017-04-21 NOTE — OP NOTE
DATE OF PROCEDURE:  04/20/2017       PREOPERATIVE DIAGNOSIS:  Stress urinary incontinence.      POSTOPERATIVE DIAGNOSIS:  Stress urinary incontinence.      PROCEDURE:  Mid urethral transvaginal taping sling and cystoscopy.      ASSISTANT:  Meghan Crooks MD        ANESTHESIA:  General.      FINDINGS:  Drop of the vesical neck consistent with stress urinary incontinence.  A small cystocele was noted.      DESCRIPTION OF PROCEDURE:  Katie Aponte was prepped and draped in a dorsal lithotomy position with femurs at right angles to the pelvic girdle.  Exam under anesthesia was performed, showing a drop of the vesical neck consistent with stress incontinence.  The mid urethra was identified, and the vaginal mucosa over the mid urethra was injected with a Pitressin/saline solution of 1:5.  A vertical incision of approximately 2 cm was made over the mid urethra.  Mid urethra was then  sharply from the vaginal mucosa.  A tunnel was developed just lateral to the urethra, going to the pubic ramus on both sides without problem.  With the mid urethra mobilized, the bladder was emptied.  A stiff introducing Harris was placed in the catheter.  We moved the catheters to the opposite side of the needle placement.  The TVT needles were then placed without problem, coming through that tunnel and going through the suprapubic area that was marked previously for guidance.  This was done on both sides without problem.  The cystoscopy was then performed, expanding the bladder to 300 mL of sterile water with a 30-degree scope.  Visualization was excellent.  The ureteral orifices were noted, and there was no damage to the bladder.  There was no bleeding.  There was no punctures.  All appeared normal.  After careful inspection, the bladder was then emptied, and the TVT sling was pulled into place over a #10 Hegar.  The TVT sling was then cut at the suprapubic area, and 5-0 Vicryl interrupted sutures were placed for those 2 small  incisions.  The vaginal mucosa was then approximated with 3-0 Vicryl running suture.  A vaginal pack was placed and a catheter was placed.  The patient tolerated the procedure well and left the operating room in good condition.        SPONGE AND NEEDLE COUNT:  Reported correct x3.        ESTIMATED BLOOD LOSS:  30 mL.         MARISELA CRAWFORD MD             D: 2017 14:16   T: 2017 10:11   MT: [id number]      Name:     LARS ARIZMENDI   MRN:      [id number]        Account:        [id number]   :      1965           Procedure Date: 2017      Document: [id number]

## 2017-04-27 ENCOUNTER — THERAPY VISIT (OUTPATIENT)
Dept: PHYSICAL THERAPY | Facility: CLINIC | Age: 52
End: 2017-04-27
Payer: COMMERCIAL

## 2017-04-27 DIAGNOSIS — M54.12 CERVICAL RADICULOPATHY: ICD-10-CM

## 2017-04-27 PROCEDURE — 97110 THERAPEUTIC EXERCISES: CPT | Mod: GP

## 2017-04-27 PROCEDURE — 97140 MANUAL THERAPY 1/> REGIONS: CPT | Mod: GP

## 2017-04-27 PROCEDURE — 97112 NEUROMUSCULAR REEDUCATION: CPT | Mod: GP

## 2017-04-27 PROCEDURE — 97012 MECHANICAL TRACTION THERAPY: CPT | Mod: GP

## 2017-05-02 ENCOUNTER — TELEPHONE (OUTPATIENT)
Dept: FAMILY MEDICINE | Facility: CLINIC | Age: 52
End: 2017-05-02

## 2017-05-02 NOTE — TELEPHONE ENCOUNTER
----- Message from Mandy Zapata PA-C sent at 4/11/2017 11:50 AM CDT -----  Regarding: ACT  Please call patient to renew ACT. Patient was given a copy at her office visit on 4/11/17 and she was notified that she will be called by  staff to do ACT.     Thank you  Denise Zapata PAC

## 2017-05-04 ENCOUNTER — THERAPY VISIT (OUTPATIENT)
Dept: PHYSICAL THERAPY | Facility: CLINIC | Age: 52
End: 2017-05-04
Payer: COMMERCIAL

## 2017-05-04 ENCOUNTER — OFFICE VISIT (OUTPATIENT)
Dept: FAMILY MEDICINE | Facility: CLINIC | Age: 52
End: 2017-05-04
Payer: COMMERCIAL

## 2017-05-04 VITALS
DIASTOLIC BLOOD PRESSURE: 84 MMHG | OXYGEN SATURATION: 100 % | TEMPERATURE: 96.8 F | HEART RATE: 68 BPM | SYSTOLIC BLOOD PRESSURE: 122 MMHG | BODY MASS INDEX: 31.48 KG/M2 | WEIGHT: 201 LBS

## 2017-05-04 DIAGNOSIS — L21.9 DERMATITIS, SEBORRHEIC: ICD-10-CM

## 2017-05-04 DIAGNOSIS — M54.12 CERVICAL RADICULOPATHY: ICD-10-CM

## 2017-05-04 DIAGNOSIS — L71.9 ROSACEA: ICD-10-CM

## 2017-05-04 DIAGNOSIS — L30.0 NUMMULAR DERMATITIS: Primary | ICD-10-CM

## 2017-05-04 DIAGNOSIS — L30.9 DERMATITIS: ICD-10-CM

## 2017-05-04 DIAGNOSIS — J45.30 MILD PERSISTENT ASTHMA WITHOUT COMPLICATION: ICD-10-CM

## 2017-05-04 LAB
KOH PREP SPEC: NORMAL
MICRO REPORT STATUS: NORMAL
SPECIMEN SOURCE: NORMAL

## 2017-05-04 PROCEDURE — 87220 TISSUE EXAM FOR FUNGI: CPT | Performed by: PREVENTIVE MEDICINE

## 2017-05-04 PROCEDURE — 97012 MECHANICAL TRACTION THERAPY: CPT | Mod: GP | Performed by: PHYSICAL THERAPIST

## 2017-05-04 PROCEDURE — 99214 OFFICE O/P EST MOD 30 MIN: CPT | Performed by: PREVENTIVE MEDICINE

## 2017-05-04 PROCEDURE — 97112 NEUROMUSCULAR REEDUCATION: CPT | Mod: GP | Performed by: PHYSICAL THERAPIST

## 2017-05-04 RX ORDER — METRONIDAZOLE 7.5 MG/G
GEL TOPICAL DAILY
Qty: 45 G | Refills: 3 | Status: SHIPPED | OUTPATIENT
Start: 2017-05-04 | End: 2017-08-07

## 2017-05-04 RX ORDER — DESONIDE 0.5 MG/G
OINTMENT TOPICAL 2 TIMES DAILY
Qty: 30 G | Refills: 0 | Status: SHIPPED | OUTPATIENT
Start: 2017-05-04 | End: 2024-04-18

## 2017-05-04 RX ORDER — FLUOCINOLONE ACETONIDE 0.11 MG/ML
OIL TOPICAL 2 TIMES DAILY PRN
Qty: 118 ML | Refills: 0 | Status: SHIPPED | OUTPATIENT
Start: 2017-05-04 | End: 2019-06-26

## 2017-05-04 RX ORDER — TRIAMCINOLONE ACETONIDE 5 MG/G
CREAM TOPICAL
Qty: 30 G | Refills: 0 | Status: SHIPPED | OUTPATIENT
Start: 2017-05-04 | End: 2017-06-30

## 2017-05-04 ASSESSMENT — PAIN SCALES - GENERAL: PAINLEVEL: NO PAIN (0)
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
## 2017-05-04 NOTE — MR AVS SNAPSHOT
After Visit Summary   5/4/2017    Katie Aponte    MRN: [id number]           Patient Information     Date Of Birth          1965        Visit Information        Provider Department      5/4/2017 1:20 PM Jerome Kramer PT [facility]        Today's Diagnoses     Cervical radiculopathy           Follow-ups after your visit        Your next 10 appointments already scheduled     May 18, 2017  9:00 AM CDT   Return Concussion with Wade Mendoza DO   [facility]    [id number] [place] [id number] [phone]              Who to contact     If you have questions or need follow up information about today's clinic visit or your schedule please contact [facility] directly at [phone].  Normal or non-critical lab and imaging results will be communicated to you by MyChart, letter or phone within 4 business days after the clinic has received the results. If you do not hear from us within 7 days, please contact the clinic through MyChart or phone. If you have a critical or abnormal lab result, we will notify you by phone as soon as possible.  Submit refill requests through [employer] or call your pharmacy and they will forward the refill request to us. Please allow 3 business days for your refill to be completed.          Additional Information About Your Visit        Care EveryWhere ID     This is your Care EveryWhere ID. This could be used by other organizations to access your [facility] medical records  [id number]        Your Vitals Were     Last Period                   03/30/2014            Blood Pressure from Last 3 Encounters:   05/04/17 122/84   04/20/17 144/81   04/11/17 129/75    Weight from Last 3 Encounters:   05/04/17 91.2 kg (201 lb)   04/20/17 89.1 kg (196 lb 8 oz)   04/11/17 91.2 kg (201 lb)              We Performed the Following     GREG Progress Notes  Report     Mechanical Traction Therapy     Neuromuscular Re-Education          Today's Medication Changes          These changes are accurate as of: 5/4/17  2:03 PM.  If you have any questions, ask your nurse or doctor.               Start taking these medicines.        Dose/Directions    FLUOCINOLONE ACETONIDE SCALP 0.01 % Oil oil   Used for:  Dermatitis, seborrheic   Replaces:  fluocinolone 0.01 % external oil   Started by:  Susan Neves MD        Apply topically 2 times daily as needed   Quantity:  118 mL   Refills:  0       triamcinolone 0.5 % cream   Commonly known as:  KENALOG   Used for:  Nummular dermatitis   Started by:  Susan Neves MD        Apply sparingly to affected area three times daily.   Quantity:  30 g   Refills:  0         Stop taking these medicines if you haven't already. Please contact your care team if you have questions.     fluocinolone 0.01 % external oil   Commonly known as:  DERMA-SMOOTHE/FS SCALP   Replaced by:  FLUOCINOLONE ACETONIDE SCALP 0.01 % Oil oil   Stopped by:  Susan Neves MD                Where to get your medicines      These medications were sent to [id number] - [facility] [address] [facility]  [address] [address]    Hours:  24-hours Phone:  [phone]     desonide 0.05 % ointment    FLUOCINOLONE ACETONIDE SCALP 0.01 % Oil oil    metroNIDAZOLE 0.75 % topical gel    triamcinolone 0.5 % cream                Primary Care Provider Office Phone # Fax #    Mandy Zapata PA-C [phone]       [facility] [address]  [place] [address]        Thank you!     Thank you for choosing [facility]  for your care. Our goal is always to provide you with excellent care. Hearing back from our patients is one way we can continue to improve our services. Please take a few minutes to complete the written survey  that you may receive in the mail after your visit with us. Thank you!             Your Updated Medication List - Protect others around you: Learn how to safely use, store and throw away your medicines at www.disposemymeds.org.          This list is accurate as of: 5/4/17  2:03 PM.  Always use your most recent med list.                   Brand Name Dispense Instructions for use    albuterol 108 (90 BASE) MCG/ACT Inhaler    PROAIR HFA/PROVENTIL HFA/VENTOLIN HFA    1 Inhaler    Inhale 2 puffs into the lungs every 6 hours as needed for shortness of breath / dyspnea or wheezing       cyclobenzaprine 10 MG tablet    FLEXERIL    60 tablet    Take 1 tablet (10 mg) by mouth 2 times daily as needed for muscle spasms       desonide 0.05 % ointment    DESOWEN    30 g    Apply topically 2 times daily       FLUOCINOLONE ACETONIDE SCALP 0.01 % Oil oil     118 mL    Apply topically 2 times daily as needed       fluticasone 110 MCG/ACT Inhaler    FLOVENT HFA    1 Inhaler    Inhale 2 puffs into the lungs 2 times daily       HYDROcodone-acetaminophen 5-325 MG per tablet    NORCO    15 tablet    Take 1-2 tablets by mouth every 4 hours as needed for other (Moderate to Severe Pain)       metroNIDAZOLE 0.75 % topical gel    METROGEL    45 g    Apply topically daily To entire face for rosacea.       montelukast 10 MG tablet    SINGULAIR    90 tablet    Take 1 tablet (10 mg) by mouth At Bedtime       Multi-vitamin Tabs tablet      Take 1 tablet by mouth daily       order for DME     1 Units    Equipment being ordered: TENS       order for DME     1 Device    Equipment being ordered: [employer] Bed Buddy- heated neck roll       PREMARIN 0.9 MG Tabs tablet   Generic drug:  estrogens conjugated      Take 0.3 mg by mouth daily       PREMARIN PO      Take 0.9 mg by mouth daily       triamcinolone 0.5 % cream    KENALOG    30 g    Apply sparingly to affected area three times daily.       VALTREX 500 MG tablet   Generic drug:  valACYclovir      Take  500 mg by mouth as needed Reported on 4/11/2017

## 2017-05-04 NOTE — PROGRESS NOTES
SUBJECTIVE:                                                    Katie Aponte is a 51 year old female who presents to clinic today for the following health issues:    I have reviewed and agree with the documentation by the MA. I updated the history as indicated.  Susan Neves MD MPH    Rash      Duration: x 3 days    Description  Location: all over  Itching: mild    Intensity:  moderate    Accompanying signs and symptoms: None    History (similar episodes/previous evaluation): None    Precipitating or alleviating factors:  New exposures:   medication Singular  Recent travel: no      Therapies tried and outcome: none     No changes in soaps or detergents. No family members with a similar rash. No fever or chills, no joint pains. No past history of a similar rash.     Also requesting refills on medication for rosacea.     Asthma Follow-Up    Was ACT completed today?    Yes    ACT Total Scores 2017   ACT TOTAL SCORE (Goal Greater than or Equal to 20) 20   In the past 12 months, how many times did you visit the emergency room for your asthma without being admitted to the hospital? 0   In the past 12 months, how many times were you hospitalized overnight because of your asthma? 0       Recent asthma triggers that patient is dealing with: None        Problem list and histories reviewed & adjusted, as indicated.  Additional history: as documented    Patient Active Problem List   Diagnosis     Knee pain     Abnormal uterine bleeding     CARDIOVASCULAR SCREENING; LDL GOAL LESS THAN 160     Post-operative pain     Acne rosacea     Dermatitis     Thrombocytopenia (H)     Leukopenia     Allergic bronchitis, moderate persistent, uncomplicated     Neck pain     Right shoulder pain, unspecified chronicity     Lumbago     Cervical radiculopathy     Mild persistent asthma with acute exacerbation     JESENIA (stress urinary incontinence, female)     Past Surgical History:   Procedure Laterality Date      SECTION        COLONOSCOPY       CYSTOSCOPY, SLING TRANSVAGINAL N/A 4/20/2017    Procedure: CYSTOSCOPY, SLING TRANSVAGINAL;  TRANSVAGINAL TAPING, CYSTOSCOPY, MID URETHRAL SLING;  Surgeon: Jett Montes MD;  Location:  OR     DILATE CERVIX, ABLATE ENDOMETRIUM THERMACHOICE, COMBINED  4/10/2014    Procedure: COMBINED DILATE CERVIX, ABLATE ENDOMETRIUM THERMACHOICE;;  Surgeon: Jett Montes MD;  Location: [facility]     DILATION AND CURETTAGE, HYSTEROSCOPY, ABLATE ENDOMETRIUM NOVASURE, COMBINED  4/10/2014    Procedure: COMBINED DILATION AND CURETTAGE, HYSTEROSCOPY, ABLATE ENDOMETRIUM NOVASURE;  HYSTEROSCOPY, DILATION AND CURETTAGE, NOVASURE ABLATION ATTEMPTED, THERMACHOICE ABLATION ATTEMPTED;  Surgeon: Jett Montes MD;  Location: [facility]     LAPAROSCOPIC ASSISTED HYSTERECTOMY VAGINAL, BILATERAL SALPINGO-OOPHORECTOMY, COMBINED  7/31/2014    Procedure: COMBINED LAPAROSCOPIC ASSISTED HYSTERECTOMY VAGINAL, SALPINGO-OOPHORECTOMY;  Surgeon: Jett Montes MD;  Location: [facility]     ORTHOPEDIC SURGERY      L KNEE MENISCUS,ankle surgery, left knee replacement       Social History   Substance Use Topics     Smoking status: Never Smoker     Smokeless tobacco: Never Used     Alcohol use 0.0 oz/week     0 Standard drinks or equivalent per week      Comment: SOCIAL - 1 glass of wine twice a week; 2 drinks on the weekend     Family History   Problem Relation Age of Onset     CANCER Mother      patient is unsure of what kind         Current Outpatient Prescriptions   Medication Sig Dispense Refill     desonide (DESOWEN) 0.05 % ointment Apply topically 2 times daily 30 g 0     metroNIDAZOLE (METROGEL) 0.75 % topical gel Apply topically daily To entire face for rosacea. 45 g 3     FLUOCINOLONE ACETONIDE SCALP 0.01 % OIL oil Apply topically 2 times daily as needed 118 mL 0     triamcinolone (KENALOG) 0.5 % cream Apply sparingly to affected area three times daily. 30 g 0     HYDROcodone-acetaminophen (NORCO) 5-325 MG per tablet Take 1-2 tablets by mouth  every 4 hours as needed for other (Moderate to Severe Pain) 15 tablet 0     multivitamin, therapeutic with minerals (MULTI-VITAMIN) TABS tablet Take 1 tablet by mouth daily       Estrogens Conjugated (PREMARIN PO) Take 0.9 mg by mouth daily       fluticasone (FLOVENT HFA) 110 MCG/ACT Inhaler Inhale 2 puffs into the lungs 2 times daily 1 Inhaler 2     albuterol (PROAIR HFA/PROVENTIL HFA/VENTOLIN HFA) 108 (90 BASE) MCG/ACT Inhaler Inhale 2 puffs into the lungs every 6 hours as needed for shortness of breath / dyspnea or wheezing 1 Inhaler 2     montelukast (SINGULAIR) 10 MG tablet Take 1 tablet (10 mg) by mouth At Bedtime 90 tablet 3     order for DME Equipment being ordered: [employer] Bed Buddy- heated neck roll 1 Device 0     cyclobenzaprine (FLEXERIL) 10 MG tablet Take 1 tablet (10 mg) by mouth 2 times daily as needed for muscle spasms 60 tablet 1     order for DME Equipment being ordered: TENS 1 Units 0     valACYclovir (VALTREX) 500 MG tablet Take 500 mg by mouth as needed Reported on 4/11/2017       estrogens conjugated (PREMARIN) 0.9 MG TABS Take 0.3 mg by mouth daily       Allergies   Allergen Reactions     Droperidol Itching     Feels jumpy     Percocet [Oxycodone-Acetaminophen] Nausea and Vomiting and GI Disturbance     BP Readings from Last 3 Encounters:   05/04/17 122/84   04/20/17 144/81   04/11/17 129/75    Wt Readings from Last 3 Encounters:   05/04/17 201 lb (91.2 kg)   04/20/17 196 lb 8 oz (89.1 kg)   04/11/17 201 lb (91.2 kg)                    Reviewed and updated as needed this visit by clinical staff  Tobacco  Allergies  Meds       Reviewed and updated as needed this visit by Provider         ROS:  Constitutional, neuro, ENT, endocrine, pulmonary, cardiac, gastrointestinal, genitourinary, musculoskeletal, integument and psychiatric systems are negative, except as otherwise noted.    OBJECTIVE:                                                    /84  Pulse 68  Temp 96.8  F (36  C) (Oral)  Wt  201 lb (91.2 kg)  LMP 03/30/2014  SpO2 100%  Breastfeeding? No  BMI 31.48 kg/m2  Body mass index is 31.48 kg/(m^2).  GENERAL APPEARANCE: healthy, alert and no distress  EYES: Eyes grossly normal to inspection and conjunctivae and sclerae normal  RESP: lungs clear to auscultation - no rales, rhonchi or wheezes  CV: regular rates and rhythm and normal S1 S2, no S3 or S4  MS: extremities normal- no gross deformities noted and peripheral pulses normal  SKIN: Annular lesion on right lower extremity, raised margins, scaly with some central clearing. Few scaly papules on hands.   NEURO: Normal strength and tone, mentation intact and speech normal  PSYCH: mentation appears normal and affect normal/bright    Diagnostic test results:  Diagnostic Test Results:  Results for orders placed or performed in visit on 05/04/17 (from the past 24 hour(s))   KOH prep (skin, hair or nails only)   Result Value Ref Range    Specimen Description Right Leg     KOH Skin Hair Nails Test No fungal elements seen     Micro Report Status FINAL 05/04/2017         ASSESSMENT/PLAN:                                                    1. Nummular dermatitis  -KOH is negative, hence defer anti fungal   - triamcinolone (KENALOG) 0.5 % cream; Apply sparingly to affected area three times daily.  Dispense: 30 g; Refill: 0  -I had talked to the patient about how this looked a lot like a fungal infection based on clinical appearance. Since KOH is negative, explained low risk of fungus.     2. Mild persistent asthma without complication  -Stable  -ACT at 20  -Continue current medication     3. Dermatitis  -Refill provided per patient request   - desonide (DESOWEN) 0.05 % ointment; Apply topically 2 times daily  Dispense: 30 g; Refill: 0    4. Rosacea  - metroNIDAZOLE (METROGEL) 0.75 % topical gel; Apply topically daily To entire face for rosacea.  Dispense: 45 g; Refill: 3    5. Dermatitis, seborrheic  - FLUOCINOLONE ACETONIDE SCALP 0.01 % OIL oil; Apply  topically 2 times daily as needed  Dispense: 118 mL; Refill: 0    I ended our visit today by discussing the patient's diagnoses and recommended treatment. Please refer to today's diagnoses and orders for further details. I briefly discussed the pathophysiology of these conditions and outlined their expected course. I discussed the warning symptoms and signs that indicate an atypical course that would need urgent or emergent care. I also discussed self care strategies for symptom relief.  Patient voiced complete understanding of plan of care and was in full agreement to proceed. After visit summary discussed and handed to patient.    Common side effects of medications prescribed at this visit were discussed with the patient. Severe side effects, including current applicable black box warnings, were discussed.       Follow up with Provider - If not better in 2 weeks      Susan Neves MD MPH    [facility]

## 2017-05-04 NOTE — PATIENT INSTRUCTIONS
At [facility], we strive to deliver an exceptional experience to you, every time we see you.    If you receive a survey in the mail, please send us back your thoughts. We really do value your feedback.    Thank you for visiting [facility]    Normal or non-critical lab and imaging results will be communicated to you by MyChart, letter or phone within 7 days.  If you do not hear from us within 10 days, please call the clinic. If you have a critical or abnormal lab result, we will notify you by phone as soon as possible.     If you have any questions regarding your visit please contact:     Team Stephanie/Spirit  Clinic Hours Telephone Number   Dr. Ira Pal   7am-7pm  Monday through Thursday  7am-5pm Friday [phone]  Nemo LILLY RN   Pharmacy 8:30am-9pm Monday-Friday    9am-5pm Saturday-Sunday [phone]   Urgent Care 11am-9pm Monday-Friday        9am-5pm Saturday-Sunday [phone]     After hours, weekend or if you need to make an appointment with your primary provider please call [phone].   After Hours nurse advise: call [facility]: [phone]    Use [employer] (secure email communication and access to your chart) to send your primary care provider a message or make an appointment. Ask someone on your Team how to sign up for [employer]. To log on to [employer] or for more information in [employer] please visit the website at [email or web address].   As of October 8, 2013, all password changes, disabled accounts, or ID changes in [employer]/MyHealth will be done by our Access Services Department.   If you need help with your account or password, call: [phone]. Clinic staff no longer has the ability to change passwords.     Fungal Skin Infection (Tinea)  A fungal infection is when too much fungus grows on or in the body. Fungus normally lives on  the skin in small amounts and does not cause harm. But when too much grows on the skin, it causes an infection. This is also known as tinea. Fungal skin infections are common and not often serious.  The infection often starts as a small red area the size of a pea. The skin may turn dry and flaky. The area may itch. As the fungus grows, it spreads out in a red [place]. Because of how it looks, fungal skin infection is often called ringworm, but it is not caused by a worm. Fungal skin infections can occur on many parts of the body. They can grow on the head, chest, arms, or legs. They can occur on the buttocks. On the feet, fungal infection is known as  athlete s foot.  It causes itchy, sometimes painful sores between the toes and the bottom or sides of the feet. In the groin, the rash is called  jock itch.   People with weakened immune systems can get a fungal infection more easily. This includes people with diabetes or HIV, or who are being treated for cancer. In these cases, the fungal infection can spread and cause severe illness. Fungal infections are also more common in people who are obese.  In most cases, treatment is done with antifungal cream or ointment. If the infection is on your scalp, you may take oral medication. In some cases, a tiny piece of the skin (biopsy) may be taken. This is so it can be tested in a lab.  Common fungal infections are treated with creams on the skin or oral medicine.  Home care  Follow all instructions when using antifungal cream or ointment on your skin. The health care provider may advise using cornstarch powder to keep your skin dry or petroleum jelly to provide a barrier.  General care:    If you were prescribed an oral medicine, read the patient information. Talk with the health care provider about the risks and side effects.    Let your skin dry completely after bathing. Carefully dry your feet and between your toes.    Dress in loose cotton clothing.    Don t scratch the  affected area. This can delay healing and may spread the infection. It can also cause a bacterial infection.    Keep your skin clean, but don t wash the skin too much. This can irritate your skin.    Keep in mind that it may take a week before the fungus starts to go away. It can take 2 to 4 weeks to fully clear. To prevent it from coming back, use the medicine until the rash is all gone.  Follow-up care  Follow up with your health care provider if the rash does not get better after 10 days of treatment. Also follow up if the rash spreads to other parts of your body.  When to seek medical advice  Call your health care provider right away if any of these occur:    Fever of 100.4 F (38 C) or higher    Redness or swelling that gets worse    Pain that gets worse    Foul-smelling fluid leaking from the skin       3766-4421 [employer] [address] [place] [address]84. All rights reserved. This information is not intended as a substitute for professional medical care. Always follow your healthcare professional's instructions.

## 2017-05-04 NOTE — MR AVS SNAPSHOT
After Visit Summary   5/4/2017    Katie Aponte    MRN: [id number]           Patient Information     Date Of Birth          1965        Visit Information        Provider Department      5/4/2017 8:20 AM Susan Neves MD [facility]        Today's Diagnoses     Tinea corporis    -  1      Care Instructions    At [facility], we strive to deliver an exceptional experience to you, every time we see you.    If you receive a survey in the mail, please send us back your thoughts. We really do value your feedback.    Thank you for visiting [facility]    Normal or non-critical lab and imaging results will be communicated to you by MyChart, letter or phone within 7 days.  If you do not hear from us within 10 days, please call the clinic. If you have a critical or abnormal lab result, we will notify you by phone as soon as possible.     If you have any questions regarding your visit please contact:     Team Stephanie/Spirit  Clinic Hours Telephone Number   Dr. Ira Pal   7am-7pm  Monday through Thursday  7am-5pm Friday [phone]  Nemo LILLY RN   Pharmacy 8:30am-9pm Monday-Friday    9am-5pm Saturday-Sunday [phone]   Urgent Care 11am-9pm Monday-Friday        9am-5pm Saturday-Sunday [phone]     After hours, weekend or if you need to make an appointment with your primary provider please call [phone].   After Hours nurse advise: call [facility]: [phone]    Use [employer]mate Product[employer]art (secure email communication and access to your chart) to send your primary care provider a message or make an appointment. Ask someone on your Team how to sign up for [employer]. To log on to WebCurfew or for more information in [facility] please visit the website at [email or web address].   As of October 8, 2013, all password changes, disabled  accounts, or ID changes in [employer]/MyHealth will be done by our Access Services Department.   If you need help with your account or password, call: [phone]. Clinic staff no longer has the ability to change passwords.     Fungal Skin Infection (Tinea)  A fungal infection is when too much fungus grows on or in the body. Fungus normally lives on the skin in small amounts and does not cause harm. But when too much grows on the skin, it causes an infection. This is also known as tinea. Fungal skin infections are common and not often serious.  The infection often starts as a small red area the size of a pea. The skin may turn dry and flaky. The area may itch. As the fungus grows, it spreads out in a red [place]. Because of how it looks, fungal skin infection is often called ringworm, but it is not caused by a worm. Fungal skin infections can occur on many parts of the body. They can grow on the head, chest, arms, or legs. They can occur on the buttocks. On the feet, fungal infection is known as  athlete s foot.  It causes itchy, sometimes painful sores between the toes and the bottom or sides of the feet. In the groin, the rash is called  jock itch.   People with weakened immune systems can get a fungal infection more easily. This includes people with diabetes or HIV, or who are being treated for cancer. In these cases, the fungal infection can spread and cause severe illness. Fungal infections are also more common in people who are obese.  In most cases, treatment is done with antifungal cream or ointment. If the infection is on your scalp, you may take oral medication. In some cases, a tiny piece of the skin (biopsy) may be taken. This is so it can be tested in a lab.  Common fungal infections are treated with creams on the skin or oral medicine.  Home care  Follow all instructions when using antifungal cream or ointment on your skin. The health care provider may advise using cornstarch powder to keep your skin  dry or petroleum jelly to provide a barrier.  General care:    If you were prescribed an oral medicine, read the patient information. Talk with the health care provider about the risks and side effects.    Let your skin dry completely after bathing. Carefully dry your feet and between your toes.    Dress in loose cotton clothing.    Don t scratch the affected area. This can delay healing and may spread the infection. It can also cause a bacterial infection.    Keep your skin clean, but don t wash the skin too much. This can irritate your skin.    Keep in mind that it may take a week before the fungus starts to go away. It can take 2 to 4 weeks to fully clear. To prevent it from coming back, use the medicine until the rash is all gone.  Follow-up care  Follow up with your health care provider if the rash does not get better after 10 days of treatment. Also follow up if the rash spreads to other parts of your body.  When to seek medical advice  Call your health care provider right away if any of these occur:    Fever of 100.4 F (38 C) or higher    Redness or swelling that gets worse    Pain that gets worse    Foul-smelling fluid leaking from the skin       1799-0913 [employer]. [address] [place] [address]. All rights reserved. This information is not intended as a substitute for professional medical care. Always follow your healthcare professional's instructions.              Follow-ups after your visit        Your next 10 appointments already scheduled     May 04, 2017  1:20 PM CDT   GREG Spine with Jerome Kramer, PT   [facility] ([facility]  )    [address]  [facility] [address] [phone]            May 18, 2017  9:00 AM CDT   Return Concussion with Wade Mendoza DO   [facility]    [address] [place] [address] [phone]              Who to contact      If you have questions or need follow up information about today's clinic visit or your schedule please contact [facility] directly at [phone].  Normal or non-critical lab and imaging results will be communicated to you by MyChart, letter or phone within 4 business days after the clinic has received the results. If you do not hear from us within 7 days, please contact the clinic through MyChart or phone. If you have a critical or abnormal lab result, we will notify you by phone as soon as possible.  Submit refill requests through [employer] or call your pharmacy and they will forward the refill request to us. Please allow 3 business days for your refill to be completed.          Additional Information About Your Visit        Care EveryWhere ID     This is your Care EveryWhere ID. This could be used by other organizations to access your [place] medical records  [id number]        Your Vitals Were     Pulse Temperature Last Period Pulse Oximetry Breastfeeding? BMI (Body Mass Index)    68 96.8  F (36  C) (Oral) 03/30/2014 100% No 31.48 kg/m2       Blood Pressure from Last 3 Encounters:   05/04/17 122/84   04/20/17 144/81   04/11/17 129/75    Weight from Last 3 Encounters:   05/04/17 201 lb (91.2 kg)   04/20/17 196 lb 8 oz (89.1 kg)   04/11/17 201 lb (91.2 kg)              Today, you had the following     No orders found for display       Primary Care Provider Office Phone # Fax #    Mandy Zapata PA-C [phone]       [facility] [address]  [facility] [address]        Thank you!     Thank you for choosing [facility]  for your care. Our goal is always to provide you with excellent care. Hearing back from our patients is one way we can continue to improve our services. Please take a few minutes to complete the written survey that you may receive in the mail after your visit with us. Thank you!             Your Updated  Medication List - Protect others around you: Learn how to safely use, store and throw away your medicines at www.disposemymeds.org.          This list is accurate as of: 5/4/17  8:49 AM.  Always use your most recent med list.                   Brand Name Dispense Instructions for use    albuterol 108 (90 BASE) MCG/ACT Inhaler    PROAIR HFA/PROVENTIL HFA/VENTOLIN HFA    1 Inhaler    Inhale 2 puffs into the lungs every 6 hours as needed for shortness of breath / dyspnea or wheezing       cyclobenzaprine 10 MG tablet    FLEXERIL    60 tablet    Take 1 tablet (10 mg) by mouth 2 times daily as needed for muscle spasms       desonide 0.05 % ointment    DESOWEN    60 g    Apply topically 2 times daily       fluocinolone 0.01 % external oil    DERMA-SMOOTHE/FS SCALP    118 mL    To dry and itchy areas of scalp as needed one to two times a day.       fluticasone 110 MCG/ACT Inhaler    FLOVENT HFA    1 Inhaler    Inhale 2 puffs into the lungs 2 times daily       HYDROcodone-acetaminophen 5-325 MG per tablet    NORCO    15 tablet    Take 1-2 tablets by mouth every 4 hours as needed for other (Moderate to Severe Pain)       metroNIDAZOLE 0.75 % topical gel    METROGEL    45 g    Apply topically daily To entire face for rosacea.       montelukast 10 MG tablet    SINGULAIR    90 tablet    Take 1 tablet (10 mg) by mouth At Bedtime       Multi-vitamin Tabs tablet      Take 1 tablet by mouth daily       order for DME     1 Units    Equipment being ordered: TENS       order for DME     1 Device    Equipment being ordered: Carex Bed Buddy- heated neck roll       PREMARIN 0.9 MG Tabs tablet   Generic drug:  estrogens conjugated      Take 0.3 mg by mouth daily       PREMARIN PO      Take 0.9 mg by mouth daily       VALTREX 500 MG tablet   Generic drug:  valACYclovir      Take 500 mg by mouth as needed Reported on 4/11/2017

## 2017-05-04 NOTE — NURSING NOTE
"Chief Complaint   Patient presents with     Derm Problem     rash       Initial /84  Pulse 68  Temp 96.8  F (36  C) (Oral)  Wt 201 lb (91.2 kg)  LMP 03/30/2014  SpO2 100%  Breastfeeding? No  BMI 31.48 kg/m2 Estimated body mass index is 31.48 kg/(m^2) as calculated from the following:    Height as of 4/20/17: 5' 7\" (1.702 m).    Weight as of this encounter: 201 lb (91.2 kg).  Medication Reconciliation: complete   Julisa ARELLANO        "

## 2017-05-04 NOTE — PROGRESS NOTES
Results discussed directly with patient while patient was present. Any further details documented in the note.   Susan Neves MD

## 2017-05-05 ASSESSMENT — ASTHMA QUESTIONNAIRES: ACT_TOTALSCORE: 20

## 2017-05-10 PROBLEM — M54.12 CERVICAL RADICULOPATHY: Status: RESOLVED | Noted: 2017-03-04 | Resolved: 2017-05-10

## 2017-05-18 ENCOUNTER — OFFICE VISIT (OUTPATIENT)
Dept: ORTHOPEDICS | Facility: CLINIC | Age: 52
End: 2017-05-18
Payer: COMMERCIAL

## 2017-05-18 VITALS — HEART RATE: 65 BPM | DIASTOLIC BLOOD PRESSURE: 60 MMHG | OXYGEN SATURATION: 98 % | SYSTOLIC BLOOD PRESSURE: 118 MMHG

## 2017-05-18 DIAGNOSIS — F07.81 POST CONCUSSION SYNDROME: ICD-10-CM

## 2017-05-18 DIAGNOSIS — M54.12 CERVICAL RADICULOPATHY: Primary | ICD-10-CM

## 2017-05-18 DIAGNOSIS — M54.2 TRIGGER POINT WITH NECK PAIN: ICD-10-CM

## 2017-05-18 PROCEDURE — 99213 OFFICE O/P EST LOW 20 MIN: CPT | Mod: 25 | Performed by: FAMILY MEDICINE

## 2017-05-18 PROCEDURE — 20552 NJX 1/MLT TRIGGER POINT 1/2: CPT | Performed by: FAMILY MEDICINE

## 2017-05-18 ASSESSMENT — PAIN SCALES - GENERAL: PAINLEVEL: MODERATE PAIN (4)

## 2017-05-18 NOTE — LETTER
May 18, 2017      Katie Aponte  [address]  [facility] [address]          To whom it may concern:    Katie Aponte is under my professional care for a concussion and cervicalgia.  She's also suffering from post-concussion disorder.  While she is showing signs of improvement, her recovery is gradual.  She received trigger point injections today and should continue physical therapy.  Part of her treatment plan is to continue physical therapy, therapeutic exercise, and to modify her work day and work hours as dictated by her symptoms and as needed.    Please call with any questions or concerns.        Sincerely,              Wade Mendoza, DO CAQSM
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
## 2017-05-18 NOTE — PATIENT INSTRUCTIONS
Thanks for coming today.  Ortho/Sports Medicine Clinic  [address] [place] [address]    To schedule future appointments in Ortho Clinic, you may call [phone].    To schedule ordered imaging by your provider:   Call Central Imaging [phone]    To schedule an injection ordered by your provider:  Call Central Imaging Injection scheduling line: [phone]  [employer] Material[employer] available online at:  [email or web address]    Please call if any further questions or concerns ([phone]).  Clinic hours 8 am to 5 pm.    Return to clinic (call) if symptoms worsen or fail to improve.

## 2017-05-18 NOTE — MR AVS SNAPSHOT
After Visit Summary   2017    Katie Aponte    MRN: [id number]           Patient Information     Date Of Birth          1965        Visit Information        Provider Department      2017 9:00 AM Wade Mendoza,  [facility]        Today's Diagnoses     Cervical radiculopathy    -  1    Post concussion syndrome        Trigger point with neck pain          Care Instructions    Thanks for coming today.  Ortho[facility] Medicine Clinic  [address] [place] [address]    To schedule future appointments in Ortho Clinic, you may call [phone].    To schedule ordered imaging by your provider:   Call Central Imaging [phone]    To schedule an injection ordered by your provider:  Call Central Imaging Injection scheduling line: [phone]  MyBuilder available online at:  [employer]    Please call if any further questions or concerns ([phone]).  Clinic hours 8 am to 5 pm.    Return to clinic (call) if symptoms worsen or fail to improve.          Follow-ups after your visit        Additional Services     PHYSICAL THERAPY REFERRAL       Please continue treatment for cervical radiculopathy and neck pain                  Who to contact     If you have questions or need follow up information about today's clinic visit or your schedule please contact [facility] directly at [phone].  Normal or non-critical lab and imaging results will be communicated to you by MyChart, letter or phone within 4 business days after the clinic has received the results. If you do not hear from us within 7 days, please contact the clinic through MyChart or phone. If you have a critical or abnormal lab result, we will notify you by phone as soon as possible.  Submit refill requests through MyBuilder or call your pharmacy and they will forward the refill request to us. Please allow 3 business days for your refill to be completed.           Additional Information About Your Visit        "[employer]" Information     "[employer]" is an electronic gateway that provides easy, online access to your medical records. With "[employer]", you can request a clinic appointment, read your test results, renew a prescription or communicate with your care team.     To sign up for "[employer]" visit the website at [email or web address]   You will be asked to enter the access code listed below, as well as some personal information. Please follow the directions to create your username and password.     Your access code is: [email or web address]  Expires: 2017 10:05 AM     Your access code will  in 90 days. If you need help or a new code, please contact your [facility] or call [phone] for assistance.        Care EveryWhere ID     This is your Care EveryWhere ID. This could be used by other organizations to access your [place] medical records  [id number]        Your Vitals Were     Pulse Last Period Pulse Oximetry             65 2014 98%          Blood Pressure from Last 3 Encounters:   17 118/60   17 122/84   17 144/81    Weight from Last 3 Encounters:   17 91.2 kg (201 lb)   17 89.1 kg (196 lb 8 oz)   17 91.2 kg (201 lb)              We Performed the Following     INJECTION SNGL/MULT TRIGGER POINT, 1 OR 2 MUSCLES     PHYSICAL THERAPY REFERRAL        Primary Care Provider Office Phone # Fax #    Mandy Zapata PA-C [phone]       [facility] [address] [facility] [address]        Thank you!     Thank you for choosing [facility]  for your care. Our goal is always to provide you with excellent care. Hearing back from our patients is one way we can continue to improve our services. Please take a few minutes to complete the written survey that you may receive in the mail after your visit with us. Thank you!             Your  Updated Medication List - Protect others around you: Learn how to safely use, store and throw away your medicines at www.disposemymeds.org.          This list is accurate as of: 5/18/17 11:35 AM.  Always use your most recent med list.                   Brand Name Dispense Instructions for use    albuterol 108 (90 BASE) MCG/ACT Inhaler    PROAIR HFA/PROVENTIL HFA/VENTOLIN HFA    1 Inhaler    Inhale 2 puffs into the lungs every 6 hours as needed for shortness of breath / dyspnea or wheezing       cyclobenzaprine 10 MG tablet    FLEXERIL    60 tablet    Take 1 tablet (10 mg) by mouth 2 times daily as needed for muscle spasms       desonide 0.05 % ointment    DESOWEN    30 g    Apply topically 2 times daily       FLUOCINOLONE ACETONIDE SCALP 0.01 % Oil oil     118 mL    Apply topically 2 times daily as needed       fluticasone 110 MCG/ACT Inhaler    FLOVENT HFA    1 Inhaler    Inhale 2 puffs into the lungs 2 times daily       HYDROcodone-acetaminophen 5-325 MG per tablet    NORCO    15 tablet    Take 1-2 tablets by mouth every 4 hours as needed for other (Moderate to Severe Pain)       metroNIDAZOLE 0.75 % topical gel    METROGEL    45 g    Apply topically daily To entire face for rosacea.       montelukast 10 MG tablet    SINGULAIR    90 tablet    Take 1 tablet (10 mg) by mouth At Bedtime       Multi-vitamin Tabs tablet      Take 1 tablet by mouth daily       order for DME     1 Units    Equipment being ordered: TENS       order for DME     1 Device    Equipment being ordered: Carex Bed Buddy- heated neck roll       PREMARIN 0.9 MG Tabs tablet   Generic drug:  estrogens conjugated      Take 0.3 mg by mouth daily       PREMARIN PO      Take 0.9 mg by mouth daily       triamcinolone 0.5 % cream    KENALOG    30 g    Apply sparingly to affected area three times daily.       VALTREX 500 MG tablet   Generic drug:  valACYclovir      Take 500 mg by mouth as needed Reported on 4/11/2017
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
## 2017-06-01 ENCOUNTER — THERAPY VISIT (OUTPATIENT)
Dept: PHYSICAL THERAPY | Facility: CLINIC | Age: 52
End: 2017-06-01
Payer: COMMERCIAL

## 2017-06-01 DIAGNOSIS — M54.12 CERVICAL RADICULOPATHY: Primary | ICD-10-CM

## 2017-06-01 PROCEDURE — 97012 MECHANICAL TRACTION THERAPY: CPT | Mod: GP | Performed by: PHYSICAL THERAPIST

## 2017-06-01 PROCEDURE — 97140 MANUAL THERAPY 1/> REGIONS: CPT | Mod: GP | Performed by: PHYSICAL THERAPIST

## 2017-06-02 PROBLEM — M54.12 CERVICAL RADICULOPATHY: Status: ACTIVE | Noted: 2017-06-02

## 2017-06-08 ENCOUNTER — THERAPY VISIT (OUTPATIENT)
Dept: PHYSICAL THERAPY | Facility: CLINIC | Age: 52
End: 2017-06-08
Payer: COMMERCIAL

## 2017-06-08 DIAGNOSIS — M54.12 CERVICAL RADICULOPATHY: ICD-10-CM

## 2017-06-08 PROCEDURE — 97110 THERAPEUTIC EXERCISES: CPT | Mod: GP

## 2017-06-08 PROCEDURE — 97140 MANUAL THERAPY 1/> REGIONS: CPT | Mod: GP

## 2017-06-15 ENCOUNTER — THERAPY VISIT (OUTPATIENT)
Dept: PHYSICAL THERAPY | Facility: CLINIC | Age: 52
End: 2017-06-15
Payer: COMMERCIAL

## 2017-06-15 DIAGNOSIS — M54.12 CERVICAL RADICULOPATHY: ICD-10-CM

## 2017-06-15 PROCEDURE — 97110 THERAPEUTIC EXERCISES: CPT | Mod: GP | Performed by: PHYSICAL THERAPIST

## 2017-06-15 PROCEDURE — 97140 MANUAL THERAPY 1/> REGIONS: CPT | Mod: GP | Performed by: PHYSICAL THERAPIST

## 2017-06-15 PROCEDURE — 97012 MECHANICAL TRACTION THERAPY: CPT | Mod: GP | Performed by: PHYSICAL THERAPIST

## 2017-06-21 ENCOUNTER — OFFICE VISIT (OUTPATIENT)
Dept: ORTHOPEDICS | Facility: CLINIC | Age: 52
End: 2017-06-21
Payer: COMMERCIAL

## 2017-06-21 VITALS — SYSTOLIC BLOOD PRESSURE: 124 MMHG | DIASTOLIC BLOOD PRESSURE: 70 MMHG | HEART RATE: 72 BPM

## 2017-06-21 DIAGNOSIS — F07.81 POST CONCUSSION SYNDROME: Primary | ICD-10-CM

## 2017-06-21 PROCEDURE — 99213 OFFICE O/P EST LOW 20 MIN: CPT | Performed by: FAMILY MEDICINE

## 2017-06-21 ASSESSMENT — PAIN SCALES - GENERAL: PAINLEVEL: MODERATE PAIN (5)
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
## 2017-06-21 NOTE — LETTER
June 21, 2017      RE: Katie Aponte  [address]  [address]  [facility] [address]        To whom it may concern:    Katie Aponte is under my professional care for post-concussion syndrome.  She is having persistent cognitive difficulties and physical symptoms, mainly headache, fatigue, difficulty concentrating.  Part of her ongoing treatment will be via a referral to a post-traumatic brain specialist.  She may need medication moving forward.  She is also going to continue physical therapy.  She will need the ability to adjust her work schedule as dictated by her symptoms.    Please call with any questions or concerns.      Sincerely,              Wade Mendoza, DO CAQSM

## 2017-06-21 NOTE — NURSING NOTE
"Katie Aponte's goals for this visit include: Follow up with neck pain  She requests these members of her care team be copied on today's visit information: yes    PCP: Mandy Zapata    Referring Provider:  No referring provider defined for this encounter.    Chief Complaint   Patient presents with     RECHECK     Neck Pain     right side neck pain. Patient states that the pain is still the same.        Initial /70 (BP Location: Right arm, Patient Position: Chair, Cuff Size: Adult Regular)  Pulse 72  LMP 03/30/2014 Estimated body mass index is 31.48 kg/(m^2) as calculated from the following:    Height as of 4/20/17: 1.702 m (5' 7\").    Weight as of 5/4/17: 91.2 kg (201 lb).  Medication Reconciliation: complete    "

## 2017-06-21 NOTE — PATIENT INSTRUCTIONS
Thanks for coming today.  Ortho[facility] Clinic  [address] [place] [address]    To schedule future appointments in Ortho Clinic, you may call [phone].    To schedule ordered imaging by your provider:   Call Central Imaging [phone]    To schedule an injection ordered by your provider:  Call Central Imaging Injection scheduling line: [phone]  [employer] available online at:  [email or web address]    Please call if any further questions or concerns ([phone]).  Clinic hours 8 am to 5 pm.    Return to clinic (call) if symptoms worsen or fail to improve.
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
## 2017-06-23 ENCOUNTER — THERAPY VISIT (OUTPATIENT)
Dept: PHYSICAL THERAPY | Facility: CLINIC | Age: 52
End: 2017-06-23
Payer: COMMERCIAL

## 2017-06-23 DIAGNOSIS — M54.12 CERVICAL RADICULOPATHY: ICD-10-CM

## 2017-06-23 PROCEDURE — 97530 THERAPEUTIC ACTIVITIES: CPT | Mod: GP

## 2017-06-23 PROCEDURE — 97140 MANUAL THERAPY 1/> REGIONS: CPT | Mod: GP

## 2017-06-29 ENCOUNTER — THERAPY VISIT (OUTPATIENT)
Dept: PHYSICAL THERAPY | Facility: CLINIC | Age: 52
End: 2017-06-29
Payer: COMMERCIAL

## 2017-06-29 DIAGNOSIS — M54.12 CERVICAL RADICULOPATHY: ICD-10-CM

## 2017-06-29 PROCEDURE — 97140 MANUAL THERAPY 1/> REGIONS: CPT | Mod: GP | Performed by: PHYSICAL THERAPIST

## 2017-06-29 PROCEDURE — 97012 MECHANICAL TRACTION THERAPY: CPT | Mod: GP | Performed by: PHYSICAL THERAPIST

## 2017-06-29 PROCEDURE — 97110 THERAPEUTIC EXERCISES: CPT | Mod: GP | Performed by: PHYSICAL THERAPIST
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
## 2017-06-30 ENCOUNTER — OFFICE VISIT (OUTPATIENT)
Dept: FAMILY MEDICINE | Facility: CLINIC | Age: 52
End: 2017-06-30
Payer: COMMERCIAL

## 2017-06-30 VITALS
TEMPERATURE: 98.9 F | WEIGHT: 197 LBS | OXYGEN SATURATION: 100 % | HEART RATE: 69 BPM | DIASTOLIC BLOOD PRESSURE: 74 MMHG | SYSTOLIC BLOOD PRESSURE: 122 MMHG | BODY MASS INDEX: 30.85 KG/M2

## 2017-06-30 DIAGNOSIS — L30.0 NUMMULAR ECZEMA: Primary | ICD-10-CM

## 2017-06-30 PROCEDURE — 99213 OFFICE O/P EST LOW 20 MIN: CPT | Performed by: PHYSICIAN ASSISTANT

## 2017-06-30 RX ORDER — TRIAMCINOLONE ACETONIDE 1 MG/G
CREAM TOPICAL
Qty: 80 G | Refills: 1 | Status: SHIPPED | OUTPATIENT
Start: 2017-06-30 | End: 2021-05-26
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
## 2017-06-30 NOTE — PATIENT INSTRUCTIONS
Managing Atopic Dermatitis (Eczema)     After bathing, gently pat your skin dry (don t rub). Apply moisturizer while your skin is still damp.   To manage your symptoms and help reduce the severity and frequency, try these self-care tips:  Caring for your skin    Use a gentle, fragrance-free cleanser (or nonsoap cleanser) for bathing. Rinse well. Pat skin dry.    Take warm, not hot, baths or showers. Try to limit them to no more that 10 to 15 minutes.     Use moisturizer liberally right after you bathe, while your skin is still damp.    Avoid scratching because it will cause more damage to your skin.     Topical, over-the-counter hydrocortisone cream may help control mild symptoms.   Controlling your environment    Avoid extreme heat or cold.    Avoid very humid or very dry air.    If your home or office air is very dry, use a humidifier.    Avoid allergens, such as dust, that may be present in bedding, carpets, plush toys, or rugs.    Know that pet hair and dander can cause flare-ups.  Seeking medical treatment  Another way to keep symptoms under control is to seek medical treatment. Talk with your healthcare provider about the type of treatment that may work best for you. Your provider may prescribe treatments such as the following:    Topical treatments to put on the skin daily    Medicines taken by mouth (oral medicines), such as antihistamines, antibiotics, or corticosteroids    In severe cases shots (injections) may be needed to control the symptoms. You may even need antibiotics if skin infections occur.  Treatments don t work the same way for every person. So if your symptoms continue or get worse, ask your healthcare provider about other treatments.  Making lifestyle choices    Manage the stress in your life.    Wear loose-fitting cotton clothing that does not bind or rub your skin.    Avoid contact with wool or other scratchy fabrics.    Use fragrance-free products.  Getting good results  Now that you  know more about atopic dermatitis, the next step is up to you. Follow your healthcare provider s treatment plan and your self-care routine. This will help bring atopic dermatitis under control. If your symptoms persist, be sure to let your health care provider know.   Date Last Reviewed: 2/1/2017 2000-2017 [employer]. [address] [place] [address], [place] [address]. All rights reserved. This information is not intended as a substitute for professional medical care. Always follow your healthcare professional's instructions.

## 2017-06-30 NOTE — NURSING NOTE
"Chief Complaint   Patient presents with     Derm Problem       Initial /74 (BP Location: Right arm, Patient Position: Chair, Cuff Size: Adult Regular)  Pulse 69  Temp 98.9  F (37.2  C) (Oral)  Wt 197 lb (89.4 kg)  LMP 03/30/2014  SpO2 100%  Breastfeeding? No  BMI 30.85 kg/m2 Estimated body mass index is 30.85 kg/(m^2) as calculated from the following:    Height as of 4/20/17: 5' 7\" (1.702 m).    Weight as of this encounter: 197 lb (89.4 kg).  Medication Reconciliation: complete   An,CMA (AMAA)      "

## 2017-06-30 NOTE — PROGRESS NOTES
SUBJECTIVE:                                                    Katie Aponte is a 51 year old female who presents to clinic today for the following health issues:      Concern - Derm problem   Onset: was seen before here 2017 same problem    Description:   Pt was seen here for the same problem a few months ago, now the rash move to the left legs    Intensity: mild    Progression of Symptoms:  worsening and constant    Accompanying Signs & Symptoms:    See above    Previous history of similar problem:   yes    Precipitating factors:   Worsened by: dry skin    Alleviating factors:  Improved by: Triamcinolone cream, patient want larger tube     Therapies Tried and outcome: na        Problem list and histories reviewed & adjusted, as indicated.  Additional history: as documented    Patient Active Problem List   Diagnosis     Knee pain     Abnormal uterine bleeding     CARDIOVASCULAR SCREENING; LDL GOAL LESS THAN 160     Post-operative pain     Acne rosacea     Dermatitis     Thrombocytopenia (H)     Leukopenia     Allergic bronchitis, moderate persistent, uncomplicated     Mild persistent asthma with acute exacerbation     JESENIA (stress urinary incontinence, female)     Cervical radiculopathy     Past Surgical History:   Procedure Laterality Date      SECTION       COLONOSCOPY       CYSTOSCOPY, SLING TRANSVAGINAL N/A 2017    Procedure: CYSTOSCOPY, SLING TRANSVAGINAL;  TRANSVAGINAL TAPING, CYSTOSCOPY, MID URETHRAL SLING;  Surgeon: Jett Montes MD;  Location:  OR     DILATE CERVIX, ABLATE ENDOMETRIUM THERMACHOICE, COMBINED  4/10/2014    Procedure: COMBINED DILATE CERVIX, ABLATE ENDOMETRIUM THERMACHOICE;;  Surgeon: Jett Montes MD;  Location:  SD     DILATION AND CURETTAGE, HYSTEROSCOPY, ABLATE ENDOMETRIUM NOVASURE, COMBINED  4/10/2014    Procedure: COMBINED DILATION AND CURETTAGE, HYSTEROSCOPY, ABLATE ENDOMETRIUM NOVASURE;  HYSTEROSCOPY, DILATION AND CURETTAGE, NOVASURE ABLATION ATTEMPTED,  THERMACHOICE ABLATION ATTEMPTED;  Surgeon: Jett Montes MD;  Location: [facility]     LAPAROSCOPIC ASSISTED HYSTERECTOMY VAGINAL, BILATERAL SALPINGO-OOPHORECTOMY, COMBINED  7/31/2014    Procedure: COMBINED LAPAROSCOPIC ASSISTED HYSTERECTOMY VAGINAL, SALPINGO-OOPHORECTOMY;  Surgeon: Jett Montes MD;  Location: [facility]     ORTHOPEDIC SURGERY      L KNEE MENISCUS,ankle surgery, left knee replacement       Social History   Substance Use Topics     Smoking status: Never Smoker     Smokeless tobacco: Never Used     Alcohol use 0.0 oz/week     0 Standard drinks or equivalent per week      Comment: SOCIAL - 1 glass of wine twice a week; 2 drinks on the weekend     Family History   Problem Relation Age of Onset     CANCER Mother      patient is unsure of what kind         Current Outpatient Prescriptions   Medication Sig Dispense Refill     triamcinolone (KENALOG) 0.1 % cream Apply sparingly to affected area three times daily as needed 80 g 1     desonide (DESOWEN) 0.05 % ointment Apply topically 2 times daily 30 g 0     metroNIDAZOLE (METROGEL) 0.75 % topical gel Apply topically daily To entire face for rosacea. 45 g 3     FLUOCINOLONE ACETONIDE SCALP 0.01 % OIL oil Apply topically 2 times daily as needed 118 mL 0     HYDROcodone-acetaminophen (NORCO) 5-325 MG per tablet Take 1-2 tablets by mouth every 4 hours as needed for other (Moderate to Severe Pain) 15 tablet 0     multivitamin, therapeutic with minerals (MULTI-VITAMIN) TABS tablet Take 1 tablet by mouth daily       Estrogens Conjugated (PREMARIN PO) Take 0.9 mg by mouth daily       fluticasone (FLOVENT HFA) 110 MCG/ACT Inhaler Inhale 2 puffs into the lungs 2 times daily 1 Inhaler 2     albuterol (PROAIR HFA/PROVENTIL HFA/VENTOLIN HFA) 108 (90 BASE) MCG/ACT Inhaler Inhale 2 puffs into the lungs every 6 hours as needed for shortness of breath / dyspnea or wheezing 1 Inhaler 2     montelukast (SINGULAIR) 10 MG tablet Take 1 tablet (10 mg) by mouth At Bedtime 90 tablet 3      order for DME Equipment being ordered: [employer] Bed Buddy- heated neck roll 1 Device 0     cyclobenzaprine (FLEXERIL) 10 MG tablet Take 1 tablet (10 mg) by mouth 2 times daily as needed for muscle spasms 60 tablet 1     order for DME Equipment being ordered: TENS 1 Units 0     valACYclovir (VALTREX) 500 MG tablet Take 500 mg by mouth as needed Reported on 4/11/2017       estrogens conjugated (PREMARIN) 0.9 MG TABS Take 0.3 mg by mouth daily       Allergies   Allergen Reactions     Droperidol Itching     Feels jumpy     Percocet [Oxycodone-Acetaminophen] Nausea and Vomiting and GI Disturbance       Reviewed and updated as needed this visit by clinical staff  Tobacco  Allergies  Meds  Med Hx  Surg Hx  Fam Hx  Soc Hx      Reviewed and updated as needed this visit by Provider         ROS:  Constitutional, HEENT, cardiovascular, pulmonary, gi and gu systems are negative, except as otherwise noted.    OBJECTIVE:     /74 (BP Location: Right arm, Patient Position: Chair, Cuff Size: Adult Regular)  Pulse 69  Temp 98.9  F (37.2  C) (Oral)  Wt 197 lb (89.4 kg)  LMP 03/30/2014  SpO2 100%  Breastfeeding? No  BMI 30.85 kg/m2  Body mass index is 30.85 kg/(m^2).  GENERAL: healthy, alert and no distress  SKIN: round erythematous patches on the bilateral lower legs.    Diagnostic Test Results:  none     ASSESSMENT/PLAN:       ICD-10-CM    1. Nummular eczema L30.0 triamcinolone (KENALOG) 0.1 % cream     Switched patient to Triamcinolone 0.1 % since 0.5% is too strong and may cause atrophy.   Apply three times a day as needed   Use moisturizing lotions daily to prevent skin dryness.     Mandy Zapata PA-C  [facility]

## 2017-07-14 ENCOUNTER — OFFICE VISIT (OUTPATIENT)
Dept: SURGERY | Facility: CLINIC | Age: 52
End: 2017-07-14

## 2017-07-14 VITALS
HEIGHT: 67 IN | TEMPERATURE: 98.4 F | WEIGHT: 199.6 LBS | DIASTOLIC BLOOD PRESSURE: 80 MMHG | HEART RATE: 61 BPM | OXYGEN SATURATION: 100 % | BODY MASS INDEX: 31.33 KG/M2 | SYSTOLIC BLOOD PRESSURE: 123 MMHG

## 2017-07-14 DIAGNOSIS — F07.81 POSTCONCUSSION SYNDROME: Primary | ICD-10-CM

## 2017-07-14 ASSESSMENT — PAIN SCALES - GENERAL: PAINLEVEL: SEVERE PAIN (6)

## 2017-07-14 NOTE — NURSING NOTE
"Chief Complaint   Patient presents with     Consult     concussion       Vitals:    07/14/17 1407   BP: 123/80   Pulse: 61   Temp: 98.4  F (36.9  C)   SpO2: 100%   Weight: 199 lb 9.6 oz   Height: 5' 7\"       Body mass index is 31.26 kg/(m^2).    Ml Gutierrez MA                          "
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
## 2017-07-14 NOTE — LETTER
July 14, 2017        RE: Katie Aponte  [address]  [place] [address]           To whom it may concern:     Katie Aponte is under my professional care for a concussion.  She's also suffering from post-concussion disorder.  Part of her treatment plan is to continue physical therapy, therapeutic exercise, and to modify her work day and work hours as dictated by her symptoms and as needed. Please provided a screen filter for her computer and allow her to wear sunglasses inside if needed.      Please call with any questions or concerns.           Sincerely,        Srinivas Ibanez, NP-C   Concussion Clinic   [facility]  Office [phone]  Fax [phone]
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
## 2017-07-14 NOTE — PATIENT INSTRUCTIONS
GENERAL ADVICE  ~~ Avoid activities that trigger your symptoms.  Stop your activity  as soon as you feel the symptoms coming on, or earlier if you feel your symptoms coming on.  ~~ Do not try to push through symptoms or they may get worse or take longer to go away.  ~~ Allow yourself more time for activity   ~~ Drink plenty of water throughout the day (8-10 glasses per day)  ~~ Avoid alcohol and caffeine  ~~ Write things down.  At home, at work, whenever there is something that you should remember, even it is simple.    SLEEP  ~~ You need to sleep.   ~~ If your headaches are keeping you awake take your ibuprofen and tylenol right before bedtime~  ~~ Attempt for 8 hours of sleep a night. If you are having issues sleeping at night, avoid napping during the day.  ~~ Rest (eyes closed, dark room)  as frequently as needed to let your symptoms go away  ~~ melatonin 3-10 mg before bed      - Sleep hygiene/good sleep habits.          Follow the tips below to sleep better at night:    Get on a schedule. Go to bed and get up at about the same time every day.    Listen to your body. Only try to sleep when you actually feel tired or sleepy.    Be patient. If you haven't been able to get to sleep after about 20 minutes or more, get up and do something calming or boring until you feel sleepy. Then, return to bed and try again.      Avoid caffeine (coffee, tea, cola drinks, chocolate and some medicines) for at least 4 to 6 hours before going to bed. We also suggest you don't use alcohol or nicotine (cigarettes) during this time. Both can make it harder for you to fall asleep and stay asleep.    Use your bed for sleeping only. That means no TV, computer or homework in bed!    Don't nap during the day. If you do nap, make sure it is for less than an hour and before 3 p.m.    Create sleep rituals that remind your body that it is time to sleep. Examples include breathing exercises, stretching, or reading a book.     Try a bath or  "shower before bed. Having a hot bath 1 to 2 hours before bedtime can help you feel sleepy.    Don't watch the clock. Checking the clock during the night can wake you up. It can also lead to negative thoughts such as \"I will never fall asleep.\"    Use a sleep diary. Track your sleep schedule to know your sleep patterns and to see where you can improve.    Get regular exercise. But try not to do heavy exercise in the 4 hours before bedtime.      Eat a healthy, balanced diet. Try eating a light, healthy snack before bed, but avoid eating a heavy meal.    Create the right sleeping area. A cool, dark, quiet room is best. If needed, try earplugs, fans and blackout curtains.      Keep your daytime routine the same even if you have a bad night sleep. Avoiding activities the next day can make it harder to sleep.      SCREEN  ~ Look into justgetflux.com to adjust your screen resolutions~  ~ Increase font~  ~ limit screen activities including computer, TV, e-readers and phones.  ~ Avoid reading if it increases your symptoms.  Audio- books are a great option often available at your public library.    HEADACHE  Light sensitivities:   ~~ Avoid florescent lighting when possible  ~~ Always wear sunglasses outside and even wear them indoors if it is helpful.  ~~ No night driving  Sound sensitivities:  ~ Avoid crowded areas  ~ Avoid multiple conversations at the same time around you  ~ Avoid loud music or loud sounds      FATIGUE  ~ Go on walks, clean the house, gentle activities, but when you get fatigued, stop and rest.     ANXIETY OR MOOD SWINGS:  ~~ If you are irritable or anxious, take a break in a quiet room.                       "

## 2017-07-15 ASSESSMENT — PATIENT HEALTH QUESTIONNAIRE - PHQ9: SUM OF ALL RESPONSES TO PHQ QUESTIONS 1-9: 8

## 2017-07-17 ENCOUNTER — THERAPY VISIT (OUTPATIENT)
Dept: PHYSICAL THERAPY | Facility: CLINIC | Age: 52
End: 2017-07-17
Payer: COMMERCIAL

## 2017-07-17 DIAGNOSIS — M54.12 CERVICAL RADICULOPATHY: ICD-10-CM

## 2017-07-17 PROCEDURE — 97140 MANUAL THERAPY 1/> REGIONS: CPT | Mod: GP

## 2017-07-17 PROCEDURE — 97110 THERAPEUTIC EXERCISES: CPT | Mod: GP

## 2017-07-17 PROCEDURE — 97012 MECHANICAL TRACTION THERAPY: CPT | Mod: GP

## 2017-07-20 ENCOUNTER — HOSPITAL ENCOUNTER (OUTPATIENT)
Dept: OCCUPATIONAL THERAPY | Facility: CLINIC | Age: 52
Setting detail: THERAPIES SERIES
End: 2017-07-20
Attending: NURSE PRACTITIONER
Payer: COMMERCIAL

## 2017-07-20 PROCEDURE — 97535 SELF CARE MNGMENT TRAINING: CPT | Mod: GO | Performed by: OCCUPATIONAL THERAPIST

## 2017-07-20 PROCEDURE — 40000768 ZZHC STATISTIC OT CONCUSSION VISIT: Performed by: OCCUPATIONAL THERAPIST

## 2017-07-20 PROCEDURE — 97165 OT EVAL LOW COMPLEX 30 MIN: CPT | Mod: GO | Performed by: OCCUPATIONAL THERAPIST

## 2017-07-27 ENCOUNTER — HOSPITAL ENCOUNTER (OUTPATIENT)
Dept: OCCUPATIONAL THERAPY | Facility: CLINIC | Age: 52
Setting detail: THERAPIES SERIES
End: 2017-07-27
Attending: NURSE PRACTITIONER
Payer: COMMERCIAL

## 2017-07-27 PROCEDURE — 97532 ZZHC OT COGNITIVE SKILLS EA 15 MIN: CPT | Mod: GO | Performed by: OCCUPATIONAL THERAPIST

## 2017-07-27 PROCEDURE — 40000768 ZZHC STATISTIC OT CONCUSSION VISIT: Performed by: OCCUPATIONAL THERAPIST

## 2017-07-27 PROCEDURE — 97535 SELF CARE MNGMENT TRAINING: CPT | Mod: GO | Performed by: OCCUPATIONAL THERAPIST

## 2017-07-27 PROCEDURE — 40000125 ZZHC STATISTIC OT OUTPT VISIT: Performed by: OCCUPATIONAL THERAPIST

## 2017-07-28 ENCOUNTER — THERAPY VISIT (OUTPATIENT)
Dept: PHYSICAL THERAPY | Facility: CLINIC | Age: 52
End: 2017-07-28
Payer: COMMERCIAL

## 2017-07-28 DIAGNOSIS — M54.12 CERVICAL RADICULOPATHY: ICD-10-CM

## 2017-07-28 PROCEDURE — 97140 MANUAL THERAPY 1/> REGIONS: CPT | Mod: GP | Performed by: PHYSICAL THERAPIST

## 2017-07-28 PROCEDURE — 97110 THERAPEUTIC EXERCISES: CPT | Mod: GP | Performed by: PHYSICAL THERAPIST
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
## 2017-08-01 NOTE — ADDENDUM NOTE
Encounter addended by: Shannan Elena OT on: 7/31/2017 10:15 PM<BR>     Actions taken: Sign clinical note, Flowsheet accepted

## 2017-08-01 NOTE — ADDENDUM NOTE
Encounter addended by: Shannan Elena OT on: 7/31/2017 10:18 PM<BR>     Actions taken: Flowsheet data copied forward, Flowsheet accepted

## 2017-08-03 ENCOUNTER — THERAPY VISIT (OUTPATIENT)
Dept: PHYSICAL THERAPY | Facility: CLINIC | Age: 52
End: 2017-08-03
Payer: COMMERCIAL

## 2017-08-03 DIAGNOSIS — M54.12 CERVICAL RADICULOPATHY: ICD-10-CM

## 2017-08-03 PROCEDURE — 97140 MANUAL THERAPY 1/> REGIONS: CPT | Mod: GP

## 2017-08-03 PROCEDURE — 97110 THERAPEUTIC EXERCISES: CPT | Mod: GP

## 2017-08-07 ENCOUNTER — OFFICE VISIT (OUTPATIENT)
Dept: FAMILY MEDICINE | Facility: CLINIC | Age: 52
End: 2017-08-07
Payer: COMMERCIAL

## 2017-08-07 VITALS
BODY MASS INDEX: 31.42 KG/M2 | RESPIRATION RATE: 16 BRPM | WEIGHT: 200.2 LBS | OXYGEN SATURATION: 100 % | SYSTOLIC BLOOD PRESSURE: 125 MMHG | TEMPERATURE: 98.7 F | HEART RATE: 85 BPM | HEIGHT: 67 IN | DIASTOLIC BLOOD PRESSURE: 73 MMHG

## 2017-08-07 DIAGNOSIS — R07.0 THROAT PAIN: ICD-10-CM

## 2017-08-07 DIAGNOSIS — J20.9 ACUTE BRONCHITIS WITH COEXISTING CONDITION REQUIRING PROPHYLACTIC TREATMENT: Primary | ICD-10-CM

## 2017-08-07 DIAGNOSIS — J30.9 CHRONIC ALLERGIC RHINITIS, UNSPECIFIED SEASONALITY, UNSPECIFIED TRIGGER: ICD-10-CM

## 2017-08-07 LAB
DEPRECATED S PYO AG THROAT QL EIA: NORMAL
MICRO REPORT STATUS: NORMAL
SPECIMEN SOURCE: NORMAL

## 2017-08-07 PROCEDURE — 99213 OFFICE O/P EST LOW 20 MIN: CPT | Performed by: PHYSICIAN ASSISTANT

## 2017-08-07 PROCEDURE — 87880 STREP A ASSAY W/OPTIC: CPT | Performed by: PHYSICIAN ASSISTANT

## 2017-08-07 PROCEDURE — 87081 CULTURE SCREEN ONLY: CPT | Performed by: PHYSICIAN ASSISTANT

## 2017-08-07 RX ORDER — FLUTICASONE PROPIONATE 50 MCG
1-2 SPRAY, SUSPENSION (ML) NASAL DAILY
Qty: 1 BOTTLE | Refills: 11 | Status: SHIPPED | OUTPATIENT
Start: 2017-08-07 | End: 2021-05-18

## 2017-08-07 RX ORDER — OXYMETAZOLINE HYDROCHLORIDE 0.05 G/100ML
2-3 SPRAY NASAL 2 TIMES DAILY PRN
Qty: 1 BOTTLE | Refills: 1 | Status: SHIPPED | OUTPATIENT
Start: 2017-08-07 | End: 2018-06-11

## 2017-08-07 RX ORDER — AZITHROMYCIN 500 MG/1
500 TABLET, FILM COATED ORAL DAILY
Qty: 3 TABLET | Refills: 0 | Status: SHIPPED | OUTPATIENT
Start: 2017-08-07 | End: 2017-11-29

## 2017-08-07 ASSESSMENT — PAIN SCALES - GENERAL: PAINLEVEL: EXTREME PAIN (8)

## 2017-08-07 NOTE — PROGRESS NOTES
"  SUBJECTIVE:                                                    Katie Aponte is a 51 year old female who presents to clinic today for the following health issues:      Acute Illness   Acute illness concerns: sore throat, sinus sx's  Onset: 4 days    Fever: no    Chills/Sweats: YES    Headache (location?): YES    Sinus Pressure:YES    Conjunctivitis:  no    Ear Pain: YES- pressure, bilateral    Rhinorrhea: YES    Congestion: YES    Sore Throat: YES     Cough: YES-productive of green sputum    Wheeze: YES    Decreased Appetite: yes    Nausea: no    Vomiting: no    Diarrhea:  no    Dysuria/Freq.: no    Fatigue/Achiness: YES    Sick/Strep Exposure: YES- co-worker last week     Therapies Tried and outcome: Theraflu, Advil - not a lot of help noted          Problem list and histories reviewed & adjusted, as indicated.  Additional history: Patient says she gets sinusitis 4 x per year.  Cannot recall ENT consultation.  Cannot use Nettipot. PMHx + for allergies and asthma.       Reviewed and updated as needed this visit by clinical staffTobacco  Allergies  Meds  Med Hx  Surg Hx  Fam Hx  Soc Hx        ROS:  Constitutional, HEENT, cardiovascular, pulmonary, gi and gu systems are negative, except as otherwise noted.      OBJECTIVE:   /73 (BP Location: Right arm, Patient Position: Right side, Cuff Size: Adult Regular)  Pulse 85  Temp 98.7  F (37.1  C) (Oral)  Resp 16  Ht 1.702 m (5' 7\")  Wt 90.8 kg (200 lb 3.2 oz)  LMP 03/30/2014  SpO2 100%  BMI 31.36 kg/m2  Body mass index is 31.36 kg/(m^2).  GEN: Well developed, well nourished in NAD.  HEENT: Normocephalic. Eyes: + conjunctival flushing noted. EARS: TMs WNL, Canals clear.  Nose: Marked, pale, edematous mucosa without lesion. No rhinorrhea. Mouth/Pharynx: sl cobblestoning and telangiectasia.   NECK: Supple with no anterior cervical lymphadenopathy.  No Thyromegaly  RESP: Bronchial BS throughout with wheezing and prolonged exp phase.   SKIN: No Exanthem " noted.      Diagnostic Test Results:  Results for orders placed or performed in visit on 08/07/17 (from the past 24 hour(s))   Strep, Rapid Screen   Result Value Ref Range    Specimen Description Throat     Rapid Strep A Screen       NEGATIVE: No Group A streptococcal antigen detected by immunoassay, await   culture report.      Micro Report Status FINAL 08/07/2017        ASSESSMENT/PLAN:   1. Acute bronchitis with coexisting condition requiring prophylactic treatment  - oxymetazoline (AFRIN NASAL SPRAY) 0.05 % spray; Spray 2-3 sprays into both nostrils 2 times daily as needed for congestion  Dispense: 1 Bottle; Refill: 1  - azithromycin (ZITHROMAX) 500 MG tablet; Take 1 tablet (500 mg) by mouth daily  Dispense: 3 tablet; Refill: 0    2. Throat pain  - Strep, Rapid Screen  - Beta strep group A culture    3. Chronic allergic rhinitis, unspecified seasonality, unspecified trigger  - fluticasone (FLONASE) 50 MCG/ACT spray; Spray 1-2 sprays into both nostrils daily  Dispense: 1 Bottle; Refill: 11    Use medication as directed.  Continue supportive OTC measures.  Follow up if symptoms should persist, change or worsen.  Consider ENT consult then if warranted  Patient amenable to this follow up plan.     Chantale Olmos PA-C  [facility]
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
## 2017-08-08 LAB
BACTERIA SPEC CULT: NORMAL
MICRO REPORT STATUS: NORMAL
SPECIMEN SOURCE: NORMAL

## 2017-08-10 ENCOUNTER — HOSPITAL ENCOUNTER (OUTPATIENT)
Dept: OCCUPATIONAL THERAPY | Facility: CLINIC | Age: 52
Setting detail: THERAPIES SERIES
End: 2017-08-10
Attending: NURSE PRACTITIONER
Payer: COMMERCIAL

## 2017-08-10 PROCEDURE — 97530 THERAPEUTIC ACTIVITIES: CPT | Mod: GO | Performed by: OCCUPATIONAL THERAPIST

## 2017-08-10 PROCEDURE — 40000768 ZZHC STATISTIC OT CONCUSSION VISIT: Performed by: OCCUPATIONAL THERAPIST

## 2017-08-10 PROCEDURE — 97532 ZZHC OT COGNITIVE SKILLS EA 15 MIN: CPT | Mod: GO,59 | Performed by: OCCUPATIONAL THERAPIST

## 2017-08-15 NOTE — ADDENDUM NOTE
Encounter addended by: Shannan Elena OT on: 8/15/2017  7:34 AM<BR>     Actions taken: Flowsheet accepted

## 2017-08-17 ENCOUNTER — HOSPITAL ENCOUNTER (OUTPATIENT)
Dept: OCCUPATIONAL THERAPY | Facility: CLINIC | Age: 52
Setting detail: THERAPIES SERIES
End: 2017-08-17
Attending: NURSE PRACTITIONER
Payer: COMMERCIAL

## 2017-08-17 PROCEDURE — 97535 SELF CARE MNGMENT TRAINING: CPT | Mod: GO | Performed by: OCCUPATIONAL THERAPIST

## 2017-08-17 PROCEDURE — 40000768 ZZHC STATISTIC OT CONCUSSION VISIT: Performed by: OCCUPATIONAL THERAPIST

## 2017-08-24 ENCOUNTER — HOSPITAL ENCOUNTER (OUTPATIENT)
Dept: OCCUPATIONAL THERAPY | Facility: CLINIC | Age: 52
Setting detail: THERAPIES SERIES
End: 2017-08-24
Attending: NURSE PRACTITIONER
Payer: COMMERCIAL

## 2017-08-24 PROCEDURE — 97535 SELF CARE MNGMENT TRAINING: CPT | Mod: GO | Performed by: OCCUPATIONAL THERAPIST

## 2017-08-24 PROCEDURE — 40000768 ZZHC STATISTIC OT CONCUSSION VISIT: Performed by: OCCUPATIONAL THERAPIST

## 2017-08-30 DIAGNOSIS — S06.0X0A CONCUSSION, WITHOUT LOC, INITIAL ENCOUNTER: Primary | ICD-10-CM

## 2017-08-30 NOTE — ADDENDUM NOTE
Encounter addended by: Shannan Elena OT on: 8/30/2017 11:48 AM<BR>     Actions taken: Flowsheet accepted

## 2017-08-30 NOTE — ADDENDUM NOTE
Encounter addended by: Shannan Elena OT on: 8/30/2017 11:46 AM<BR>     Actions taken: Flowsheet accepted

## 2017-08-30 NOTE — ADDENDUM NOTE
Encounter addended by: Shannan Elena OT on: 8/30/2017 11:57 AM<BR>     Actions taken: Flowsheet accepted

## 2017-08-31 ENCOUNTER — HOSPITAL ENCOUNTER (OUTPATIENT)
Dept: OCCUPATIONAL THERAPY | Facility: CLINIC | Age: 52
Setting detail: THERAPIES SERIES
End: 2017-08-31
Attending: NURSE PRACTITIONER
Payer: COMMERCIAL

## 2017-08-31 PROCEDURE — 97535 SELF CARE MNGMENT TRAINING: CPT | Mod: GO | Performed by: OCCUPATIONAL THERAPIST

## 2017-08-31 PROCEDURE — 40000768 ZZHC STATISTIC OT CONCUSSION VISIT: Performed by: OCCUPATIONAL THERAPIST

## 2017-09-07 ENCOUNTER — HOSPITAL ENCOUNTER (OUTPATIENT)
Dept: OCCUPATIONAL THERAPY | Facility: CLINIC | Age: 52
Setting detail: THERAPIES SERIES
End: 2017-09-07
Attending: NURSE PRACTITIONER
Payer: COMMERCIAL

## 2017-09-07 PROCEDURE — 97535 SELF CARE MNGMENT TRAINING: CPT | Mod: GO | Performed by: OCCUPATIONAL THERAPIST

## 2017-09-07 PROCEDURE — 40000768 ZZHC STATISTIC OT CONCUSSION VISIT: Performed by: OCCUPATIONAL THERAPIST

## 2017-09-07 NOTE — DISCHARGE INSTRUCTIONS
"Recommendations for week of 9/7/17:     1. Try this week to only work in the office for 4 hour shifts, with at least an hour break in between. During this breaks, avoid any screens (including phones) and try to rest in a low-stimulation environment (avoiding busy traffic or noisy areas).  If this works well over the next week, we can have Dr. Ibanez write this as a more formal work restriction for your work.   2. When you are at work:                  a. Move your computer screen further back,                B. And try either computer glasses or a \"blue light blocking screen cover\"  3. Keep working on going to bed at a consistent time, and keep using the lavender spray if you feel that this helps.  4. Try your best to limit any work over the weekend. Send me an email on Monday morning to let me know how you feel after a weekend with less screen use.  [email or web address]    Shannan Elena  Occupational Therapist  [facility]  [address]  [facility] [address]  [email or web address]  Phone: [phone]        "

## 2017-09-07 NOTE — IP AVS SNAPSHOT
"                  MRN:[id number]                      After Visit Summary   9/7/2017    Katie Aponte    MRN: [id number]           Visit Information        Provider Department      9/7/2017  1:00 PM Shannan Elena, J[facility]        Your next 10 appointments already scheduled     Sep 11, 2017  1:00 PM CDT   Concussion Eval with Effie Wilson, PT   [facility] [place]    [address] [facility] [address]   [phone]            Sep 14, 2017  1:00 PM CDT   Concussion Treatment with Shannan Elena, OT   [facility] [place]    [address] [facility] [address]   [phone]            Sep 15, 2017 12:45 PM CDT   (Arrive by 12:30 PM)   RETURN CONCUSSION with Abel Ibanez NP   [facility] ([facility])    [address] [facility]  [address] [facility] [id number] [phone]                Further instructions from your care team       Recommendations for week of 9/7/17:     1. Try this week to only work in the office for 4 hour shifts, with at least an hour break in between. During this breaks, avoid any screens (including phones) and try to rest in a low-stimulation environment (avoiding busy traffic or noisy areas).  If this works well over the next week, we can have Dr. Ibanez write this as a more formal work restriction for your work.   2. When you are at work:                  a. Move your computer screen further back,                B. And try either computer glasses or a \"blue light blocking screen cover\"  3. Keep working on going to bed at a consistent time, and keep using the lavender spray if you feel that this helps.  4. Try your best to limit any work over the weekend. Send me an email on Monday morning to let me know how you feel after a weekend with less screen use.  [email or web address]    Shannan Elena  Occupational " Therapist  [facility]  [address]  [facility] [place] [address]  [email or web address]  Phone: [phone]          [facility] Information     [employer] gives you secure access to your electronic health record. If you see a primary care provider, you can also send messages to your care team and make appointments. If you have questions, please call your primary care clinic.  If you do not have a primary care provider, please call [phone] and they will assist you.        Care EveryWhere ID     This is your Care EveryWhere ID. This could be used by other organizations to access your [facility] medical records  [id number]        Equal Access to Services     LENI GARCÍA : [address] [place], christina alexander, hiwot moore, aldo umaña. So [facility] [phone].    ATENCIÓN: Si habla español, tiene a mcginnis disposición servicios gratuitos de asistencia lingüística. Llame al [phone].    We comply with applicable federal civil rights laws and [place] laws. We do not discriminate on the basis of race, color, national origin, age, disability sex, sexual orientation or gender identity.

## 2017-09-11 ENCOUNTER — HOSPITAL ENCOUNTER (OUTPATIENT)
Dept: PHYSICAL THERAPY | Facility: CLINIC | Age: 52
Setting detail: THERAPIES SERIES
End: 2017-09-11
Attending: NURSE PRACTITIONER
Payer: COMMERCIAL

## 2017-09-11 PROCEDURE — 97162 PT EVAL MOD COMPLEX 30 MIN: CPT | Mod: GP | Performed by: PHYSICAL THERAPIST

## 2017-09-11 PROCEDURE — 97112 NEUROMUSCULAR REEDUCATION: CPT | Mod: GP | Performed by: PHYSICAL THERAPIST

## 2017-09-11 PROCEDURE — 40000767 ZZHC STATISTIC PT CONCUSSION VISIT: Performed by: PHYSICAL THERAPIST
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
## 2017-09-14 ENCOUNTER — HOSPITAL ENCOUNTER (OUTPATIENT)
Dept: OCCUPATIONAL THERAPY | Facility: CLINIC | Age: 52
Setting detail: THERAPIES SERIES
End: 2017-09-14
Attending: NURSE PRACTITIONER
Payer: COMMERCIAL

## 2017-09-14 PROCEDURE — 40000768 ZZHC STATISTIC OT CONCUSSION VISIT: Performed by: OCCUPATIONAL THERAPIST

## 2017-09-14 PROCEDURE — 97535 SELF CARE MNGMENT TRAINING: CPT | Mod: GO | Performed by: OCCUPATIONAL THERAPIST

## 2017-09-15 ENCOUNTER — OFFICE VISIT (OUTPATIENT)
Dept: SURGERY | Facility: CLINIC | Age: 52
End: 2017-09-15

## 2017-09-15 VITALS
OXYGEN SATURATION: 100 % | HEART RATE: 85 BPM | SYSTOLIC BLOOD PRESSURE: 125 MMHG | DIASTOLIC BLOOD PRESSURE: 73 MMHG | TEMPERATURE: 97.5 F | BODY MASS INDEX: 31.32 KG/M2 | WEIGHT: 200 LBS

## 2017-09-15 DIAGNOSIS — G44.309 POST-TRAUMATIC HEADACHE, NOT INTRACTABLE, UNSPECIFIED CHRONICITY PATTERN: Primary | ICD-10-CM

## 2017-09-15 DIAGNOSIS — R42 DIZZINESS: ICD-10-CM

## 2017-09-15 RX ORDER — AMITRIPTYLINE HYDROCHLORIDE 10 MG/1
TABLET ORAL
Qty: 90 TABLET | Refills: 0 | Status: SHIPPED | OUTPATIENT
Start: 2017-09-15 | End: 2018-06-11

## 2017-09-15 RX ORDER — MECLIZINE HYDROCHLORIDE 25 MG/1
25 TABLET ORAL EVERY 6 HOURS PRN
Qty: 30 TABLET | Refills: 1 | Status: SHIPPED | OUTPATIENT
Start: 2017-09-15 | End: 2018-06-11

## 2017-09-15 ASSESSMENT — PAIN SCALES - GENERAL: PAINLEVEL: NO PAIN (0)
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
## 2017-09-15 NOTE — PATIENT INSTRUCTIONS
(G44.309) Post-traumatic headache, not intractable, unspecified chronicity pattern  (primary encounter diagnosis)    Plan: amitriptyline (ELAVIL) 10 MG tablet, meclizine         (ANTIVERT) 25 MG tablet,  Take at night before bed. Call in two weeks for an update      (R42) Dizziness  Comment:  Plan: meclizine (ANTIVERT) 25 MG tablet, Take when the dizziness is at its worst

## 2017-09-15 NOTE — NURSING NOTE
Chief Complaint   Patient presents with     Head Injury     concussion f/u       Vitals:    09/15/17 1243   BP: 125/73   Pulse: 85   Temp: 97.5  F (36.4  C)   TempSrc: Oral   SpO2: 100%   Weight: 90.7 kg (200 lb)       Body mass index is 31.32 kg/(m^2).      Augusto BRADLEY

## 2017-09-21 ENCOUNTER — HOSPITAL ENCOUNTER (OUTPATIENT)
Dept: PHYSICAL THERAPY | Facility: CLINIC | Age: 52
Setting detail: THERAPIES SERIES
End: 2017-09-21
Attending: NURSE PRACTITIONER
Payer: COMMERCIAL

## 2017-09-21 PROCEDURE — 97112 NEUROMUSCULAR REEDUCATION: CPT | Mod: GP | Performed by: PHYSICAL THERAPIST

## 2017-09-21 PROCEDURE — 40000767 ZZHC STATISTIC PT CONCUSSION VISIT: Performed by: PHYSICAL THERAPIST

## 2017-09-21 PROCEDURE — 97750 PHYSICAL PERFORMANCE TEST: CPT | Mod: GP | Performed by: PHYSICAL THERAPIST

## 2017-09-22 NOTE — PROGRESS NOTES
09/21/17 0800   Signing Clinician's Name / Credentials   Signing clinician's name / credentials Ana Cristina Wilson DPT NCS   Functional Gait Assessment (JONATHAN Mendoza, MARIA LUISA Carson, et al. (2004))   1. GAIT LEVEL SURFACE 2  (5.93)   2. CHANGE IN GAIT SPEED 3  (dizziness gets worse betsy faster she goes)   3. GAIT WITH HORIZONTAL HEAD TURNS 2  (head to right feels like she will fall)   4. GAIT WITH VERTICAL HEAD TURNS 1  (looking up really slows down)   5. GAIT AND PIVOT TURN 3   6. STEP OVER OBSTACLE 3   7. GAIT WITH NARROW BASE OF SUPPORT 1   8. GAIT WITH EYES CLOSED 2   9. AMBULATING BACKWARDS 2   10. STEPS 3   Total Functional Gait Assessment Score   TOTAL SCORE: (MAXIMUM SCORE 30) 22   Functional Gait Assessment (FGA): The FGA assesses postural stability during various walking tasks.     Patient Score: 22/30  Scores of <22/30 have been correlated with predicting falls in community-dwelling older adults according to Reji & Waylon 2010.   Scores of <18/30 have been correlated with increased risk for falls in patients with Parkinsons Disease according to Orlando Boss Zhou et al 2014.  Minimal Detectable Change for patients with acute/chronic stroke = 4.2 according to Thieme & Ritschel 2009  Minimal Detectable Change for patients with vestibular disorder = 8 according to Reji & Waylon 2010    Assessment (rationale for performing, application to patient s function & care plan): high level mobility  Minutes billed as physical performance test: 12    Ana Cristina Wilson DPT, MPT, NCS

## 2017-09-28 ENCOUNTER — HOSPITAL ENCOUNTER (OUTPATIENT)
Dept: OCCUPATIONAL THERAPY | Facility: CLINIC | Age: 52
Setting detail: THERAPIES SERIES
End: 2017-09-28
Attending: NURSE PRACTITIONER
Payer: COMMERCIAL

## 2017-09-28 PROCEDURE — 97535 SELF CARE MNGMENT TRAINING: CPT | Mod: GO,59 | Performed by: OCCUPATIONAL THERAPIST

## 2017-09-28 PROCEDURE — 40000768 ZZHC STATISTIC OT CONCUSSION VISIT: Performed by: OCCUPATIONAL THERAPIST

## 2017-09-28 PROCEDURE — 97530 THERAPEUTIC ACTIVITIES: CPT | Mod: GO | Performed by: OCCUPATIONAL THERAPIST

## 2017-10-02 NOTE — ADDENDUM NOTE
Encounter addended by: Shannan Elena, OT on: 10/2/2017  4:17 PM<BR>     Actions taken: Flowsheet data copied forward, Charge Capture section accepted, Flowsheet accepted

## 2017-10-12 ENCOUNTER — HOSPITAL ENCOUNTER (OUTPATIENT)
Dept: PHYSICAL THERAPY | Facility: CLINIC | Age: 52
Setting detail: THERAPIES SERIES
End: 2017-10-12
Attending: NURSE PRACTITIONER
Payer: COMMERCIAL

## 2017-10-12 ENCOUNTER — HOSPITAL ENCOUNTER (OUTPATIENT)
Dept: OCCUPATIONAL THERAPY | Facility: CLINIC | Age: 52
Setting detail: THERAPIES SERIES
End: 2017-10-12
Attending: NURSE PRACTITIONER
Payer: COMMERCIAL

## 2017-10-12 PROCEDURE — 97112 NEUROMUSCULAR REEDUCATION: CPT | Mod: GP | Performed by: PHYSICAL THERAPIST

## 2017-10-12 PROCEDURE — 40000767 ZZHC STATISTIC PT CONCUSSION VISIT: Performed by: PHYSICAL THERAPIST

## 2017-10-12 PROCEDURE — 40000768 ZZHC STATISTIC OT CONCUSSION VISIT: Performed by: OCCUPATIONAL THERAPIST

## 2017-10-12 PROCEDURE — 97535 SELF CARE MNGMENT TRAINING: CPT | Mod: GO | Performed by: OCCUPATIONAL THERAPIST

## 2017-10-19 ENCOUNTER — OFFICE VISIT (OUTPATIENT)
Dept: NEUROPSYCHOLOGY | Facility: CLINIC | Age: 52
End: 2017-10-19
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
## 2017-10-19 DIAGNOSIS — F09 COGNITIVE DISORDER: Primary | ICD-10-CM
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
## 2017-10-19 NOTE — PROGRESS NOTES
The patient was seen for neuropsychological evaluation at the request of Abel Ibanez NP for the purpose of diagnostic clarification and treatment planning.  Two hours of face to face testing were provided by this writer.  Please see Dr. Laura Mendoza's report for a full interpretation of the findings.    Kenna Padron  Psychometrist

## 2017-10-21 NOTE — ADDENDUM NOTE
Encounter addended by: Shannan Elena OT on: 10/20/2017 11:40 PM<BR>     Actions taken: Flowsheet accepted
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
## 2017-10-27 NOTE — PROGRESS NOTES
WECHSLER ADULT INTELLIGENCE SCALE-IV CONTROLLED WORD ASSOCIATION TEST        Age-Scaled Score Score  27    Vocabulary          10       Digit Span           6      WISCONSIN CARD SORTING TEST - 1 deck   Block Design           7         Coding           7        # of categories   2    Symbol Search                                               9        TRAIL MAKING TEST   WECHSLER MEMORY SCALES      Time Errors   Logical Mem Immediate             12/11   A   33     0    Logical Mem 30 Minute              6/10    B  64      1    Visual Repr Immediate                10       Visual Repr 30                 10      PSYCHOMOTOR TESTS         30 Minute Recognition                  4            Right Left   TEST OF SUSTAINED ATTENTION & TRACKING Finger Tapping    51    50       Grooved Pegboard   84         100        Total Time     93    Drops: 1         Drops: 1      Total Errors    2          MAKE A FIGURE TEST   BOSTON NAMING TEST         Score    34     Raw score  53      LETTER CANCELLATION TEST   SEMANTIC FLUENCY      Time  112   Errors     4    Raw score  51      TOMM   LOPEZ VERBAL LEARNING TEST     Trial 1    46      Trial 1                              5           Trial 2  50    Trial 2                              9              Trial 3                            10                                                                                             25  Recall                     10                 25  Recognition                12

## 2017-11-03 ENCOUNTER — OFFICE VISIT (OUTPATIENT)
Dept: SURGERY | Facility: CLINIC | Age: 52
End: 2017-11-03

## 2017-11-03 VITALS
OXYGEN SATURATION: 100 % | HEIGHT: 67 IN | HEART RATE: 85 BPM | DIASTOLIC BLOOD PRESSURE: 73 MMHG | BODY MASS INDEX: 31.39 KG/M2 | SYSTOLIC BLOOD PRESSURE: 125 MMHG | WEIGHT: 200 LBS

## 2017-11-03 DIAGNOSIS — H53.149 PHOTOPHOBIA: Primary | ICD-10-CM

## 2017-11-03 ASSESSMENT — ANXIETY QUESTIONNAIRES
GAD7 TOTAL SCORE: 11
2. NOT BEING ABLE TO STOP OR CONTROL WORRYING: NEARLY EVERY DAY
4. TROUBLE RELAXING: SEVERAL DAYS
3. WORRYING TOO MUCH ABOUT DIFFERENT THINGS: NEARLY EVERY DAY
7. FEELING AFRAID AS IF SOMETHING AWFUL MIGHT HAPPEN: NOT AT ALL
1. FEELING NERVOUS, ANXIOUS, OR ON EDGE: MORE THAN HALF THE DAYS
5. BEING SO RESTLESS THAT IT IS HARD TO SIT STILL: SEVERAL DAYS
6. BECOMING EASILY ANNOYED OR IRRITABLE: SEVERAL DAYS
7. FEELING AFRAID AS IF SOMETHING AWFUL MIGHT HAPPEN: NOT AT ALL

## 2017-11-03 ASSESSMENT — PATIENT HEALTH QUESTIONNAIRE - PHQ9
SUM OF ALL RESPONSES TO PHQ QUESTIONS 1-9: 10
10. IF YOU CHECKED OFF ANY PROBLEMS, HOW DIFFICULT HAVE THESE PROBLEMS MADE IT FOR YOU TO DO YOUR WORK, TAKE CARE OF THINGS AT HOME, OR GET ALONG WITH OTHER PEOPLE: VERY DIFFICULT
SUM OF ALL RESPONSES TO PHQ QUESTIONS 1-9: 10

## 2017-11-03 ASSESSMENT — PAIN SCALES - GENERAL: PAINLEVEL: NO PAIN (0)

## 2017-11-03 NOTE — LETTER
Katie Aponte  [address]  [place] [facility] [address]      November 3, 2017    Date of Exam: [unfilled]      Dear Katie:    Thank you for your recent visit.     At this time we are awaiting the arrival of your previous breast imaging. We anticipate the arrival within the next 7 to 14 days. While we are waiting for the films, your breast imaging is considered incomplete.    When the images arrive, the Radiologist will compare them to your new breast imaging and will issue a final report. A copy of the final report will be sent to your health care provider.    Your breast imaging will become part of your medical file here at [facility] for at least 10 years. You are responsible for informing any new health care provider or mammography facility of the date and location of this examination.    We appreciate the opportunity to participate in your health care.    Sincerely,    [unfilled]  Interpreting Radiologist
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
## 2017-11-03 NOTE — NURSING NOTE
"Chief Complaint   Patient presents with     RECHECK     concussion f/u        Vitals:    11/03/17 1323   BP: 125/73   Pulse: 85   SpO2: 100%   Weight: 200 lb   Height: 5' 7\"       Body mass index is 31.32 kg/(m^2).      Augusto BRADLEY         "
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
## 2017-11-03 NOTE — LETTER
November 3, 2017         To Whom It May Concern:    Katie Aponte, 1965, is under my care for a concussion. She is going to continue on with treatment with us.  Part of her treatment plan is to continue physical therapy, therapeutic exercise, and to modify her work day and work hours as dictated by her symptoms and as needed.       Full or partial days missed due to post-concussion symptoms and follow up appointments should be medically excused.  Follow up evaluation and revision of recommendations to occur in 3 months.  Please feel free to contact me at the number below with any questions or concerns.    Sincerely,            Srinivas Ibanez, NP-C  Dept of Critical Care and Acute Care Surgery   [facility]  Administration office: [phone]  Clinic nurse line: [phone]  Fax: [phone]

## 2017-11-04 ASSESSMENT — ANXIETY QUESTIONNAIRES: GAD7 TOTAL SCORE: 11

## 2017-11-04 ASSESSMENT — PATIENT HEALTH QUESTIONNAIRE - PHQ9: SUM OF ALL RESPONSES TO PHQ QUESTIONS 1-9: 10

## 2017-11-29 ENCOUNTER — OFFICE VISIT (OUTPATIENT)
Dept: FAMILY MEDICINE | Facility: CLINIC | Age: 52
End: 2017-11-29
Payer: COMMERCIAL

## 2017-11-29 VITALS
BODY MASS INDEX: 31.04 KG/M2 | SYSTOLIC BLOOD PRESSURE: 113 MMHG | DIASTOLIC BLOOD PRESSURE: 78 MMHG | WEIGHT: 197.8 LBS | HEART RATE: 70 BPM | OXYGEN SATURATION: 100 % | HEIGHT: 67 IN | TEMPERATURE: 98.1 F

## 2017-11-29 DIAGNOSIS — Z11.59 NEED FOR HEPATITIS C SCREENING TEST: ICD-10-CM

## 2017-11-29 DIAGNOSIS — Z13.6 CARDIOVASCULAR SCREENING; LDL GOAL LESS THAN 160: ICD-10-CM

## 2017-11-29 DIAGNOSIS — J20.9 ACUTE BRONCHITIS WITH COEXISTING CONDITION REQUIRING PROPHYLACTIC TREATMENT: Primary | ICD-10-CM

## 2017-11-29 DIAGNOSIS — J45.31 MILD PERSISTENT ASTHMA WITH ACUTE EXACERBATION: ICD-10-CM

## 2017-11-29 DIAGNOSIS — Z28.21 INFLUENZA VACCINATION DECLINED BY PATIENT: ICD-10-CM

## 2017-11-29 PROCEDURE — 99214 OFFICE O/P EST MOD 30 MIN: CPT | Mod: 25 | Performed by: NURSE PRACTITIONER

## 2017-11-29 PROCEDURE — 94640 AIRWAY INHALATION TREATMENT: CPT | Performed by: NURSE PRACTITIONER

## 2017-11-29 RX ORDER — BENZONATATE 200 MG/1
200 CAPSULE ORAL 3 TIMES DAILY PRN
Qty: 21 CAPSULE | Refills: 0 | Status: SHIPPED | OUTPATIENT
Start: 2017-11-29 | End: 2018-02-26

## 2017-11-29 RX ORDER — IPRATROPIUM BROMIDE AND ALBUTEROL SULFATE 2.5; .5 MG/3ML; MG/3ML
SOLUTION RESPIRATORY (INHALATION)
Qty: 1 VIAL | Refills: 0
Start: 2017-11-29 | End: 2018-02-26

## 2017-11-29 RX ORDER — AZITHROMYCIN 250 MG/1
TABLET, FILM COATED ORAL
Qty: 6 TABLET | Refills: 0 | Status: SHIPPED | OUTPATIENT
Start: 2017-11-29 | End: 2018-02-26

## 2017-11-29 RX ORDER — PREDNISONE 20 MG/1
40 TABLET ORAL DAILY
Qty: 10 TABLET | Refills: 0 | Status: SHIPPED | OUTPATIENT
Start: 2017-11-29 | End: 2017-12-04

## 2017-11-29 NOTE — NURSING NOTE
"Chief Complaint   Patient presents with     URI       Initial /78 (BP Location: Left arm, Patient Position: Sitting, Cuff Size: Adult Regular)  Pulse 70  Temp 98.1  F (36.7  C) (Oral)  Ht 5' 7\" (1.702 m)  Wt 197 lb 12.8 oz (89.7 kg)  LMP 03/30/2014  SpO2 100%  BMI 30.98 kg/m2 Estimated body mass index is 30.98 kg/(m^2) as calculated from the following:    Height as of this encounter: 5' 7\" (1.702 m).    Weight as of this encounter: 197 lb 12.8 oz (89.7 kg).  Medication Reconciliation: complete   Mago Zuluaga MA      "
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
## 2017-11-29 NOTE — PATIENT INSTRUCTIONS
At [facility], we strive to deliver an exceptional experience to you, every time we see you.  If you receive a survey in the mail, please send us back your thoughts. We really do value your feedback.    Based on your medical history, these are the current health maintenance/preventive care services that you are due for (some may have been done at this visit.)  Health Maintenance Due   Topic Date Due     HEPATITIS C SCREENING  11/12/1983     LIPID SCREEN Q5 YR FEMALE (SYSTEM ASSIGNED)  11/12/2010     INFLUENZA VACCINE (SYSTEM ASSIGNED)  09/01/2017     ASTHMA CONTROL TEST Q6 MOS  11/04/2017         Suggested websites for health information:  Www.[facility].org : Up to date and easily searchable information on multiple topics.  Www.medlineplus.gov : medication info, interactive tutorials, watch real surgeries online  Www.familydoctor.org : good info from the Academy of Family Physicians  Www.cdc.gov : public health info, travel advisories, epidemics (H1N1)  Www.aap.org : children's health info, normal development, vaccinations  Www.health.[facility].[place].us : [place] dept of health, public health issues in [place], N1N1    Your care team:                            Family Medicine Internal Medicine   MD Adan Moreno MD Shantel Branch-Fleming, MD Katya Georgiev PA-C Nam Ho, MD Pediatrics   HAROON Gallo, ÁNGELA Pal APRSHANIKA CNP   MD Nai Palumbo MD Deborah Mielke, MD Kim Thein, APRN [facility]      Clinic hours: Monday - Thursday 7 am-7 pm; Fridays 7 am-5 pm.   Urgent care: Monday - Friday 11 am-9 pm; Saturday and Sunday 9 am-5 pm.  Pharmacy : Monday -Thursday 8 am-8 pm; Friday 8 am-6 pm; Saturday and Sunday 9 am-5 pm.     Clinic: [phone]   Pharmacy: [phone]    Bronchitis, Antibiotic Treatment (Adult)    Bronchitis is an infection of the air passages (bronchial tubes) in your lungs. It often occurs when you have a cold. This illness is  contagious during the first few days and is spread through the air by coughing and sneezing, or by direct contact (touching the sick person and then touching your own eyes, nose, or mouth).  Symptoms of bronchitis include cough with mucus (phlegm) and low-grade fever. Bronchitis usually lasts 7 to 14 days. Mild cases can be treated with simple home remedies. More severe infection is treated with an antibiotic.  Home care  Follow these guidelines when caring for yourself at home:    If your symptoms are severe, rest at home for the first 2 to 3 days. When you go back to your usual activities, don't let yourself get too tired.    Do not smoke. Also avoid being exposed to secondhand smoke.    You may use over-the-counter medicines to control fever or pain, unless another medicine was prescribed. (Note: If you have chronic liver or kidney disease or have ever had a stomach ulcer or gastrointestinal bleeding, talk with your healthcare provider before using these medicines. Also talk to your provider if you are taking medicine to prevent blood clots.) Aspirin should never be given to anyone younger than 18 years of age who is ill with a viral infection or fever. It may cause severe liver or brain damage.    Your appetite may be poor, so a light diet is fine. Avoid dehydration by drinking 6 to 8 glasses of fluids per day (such as water, soft drinks, sports drinks, juices, tea, or soup). Extra fluids will help loosen secretions in the nose and lungs.    Over-the-counter cough, cold, and sore-throat medicines will not shorten the length of the illness, but they may be helpful to reduce symptoms. (Note: Do not use decongestants if you have high blood pressure.)    Finish all antibiotic medicine. Do this even if you are feeling better after only a few days.  Follow-up care  Follow up with your healthcare provider, or as advised. If you had an X-ray or ECG (electrocardiogram), a specialist will review it. You will be notified  of any new findings that may affect your care.  Note: If you are age 65 or older, or if you have a chronic lung disease or condition that affects your immune system, or you smoke, talk to your healthcare provider about having pneumococcal vaccinations and a yearly influenza vaccination (flu shot).  When to seek medical advice  Call your healthcare provider right away if any of these occur:    Fever of 100.4 F (38 C) or higher    Coughing up increased amounts of colored sputum    Weakness, drowsiness, headache, facial pain, ear pain, or a stiff neck  Call 911, or get immediate medical care  Contact emergency services right away if any of these occur.    Coughing up blood    Worsening weakness, drowsiness, headache, or stiff neck    Trouble breathing, wheezing, or pain with breathing  Date Last Reviewed: 9/13/2015 2000-2017 [employer]. [address] [place] [address], [place] [address]. All rights reserved. This information is not intended as a substitute for professional medical care. Always follow your healthcare professional's instructions.

## 2017-11-29 NOTE — PROGRESS NOTES
SUBJECTIVE:   Katie Aponte is a 52 year old female who presents to clinic today for the following health issues:      ENT Symptoms             Symptoms: cc Present Absent Comment   Fever/Chills   x    Fatigue  x     Muscle Aches  x     Eye Irritation   x    Sneezing  x     Nasal Jl/Drg  x  Yellow/green   Sinus Pressure/Pain  x     Loss of smell  x     Dental pain   x    Sore Throat  x     Swollen Glands  x     Ear Pain/Fullness  x  Fullness   Cough  x     Wheeze  x     Chest Pain  x  From coughing   Shortness of breath  x     Rash   x    Other         Symptom duration:  1+ week    Symptom severity:  Severe    Treatments tried:  Theraflu, Mucinex   Contacts:  Yes         Problem list and histories reviewed & adjusted, as indicated.  Additional history: as documented    Patient Active Problem List   Diagnosis     Knee pain     Abnormal uterine bleeding     CARDIOVASCULAR SCREENING; LDL GOAL LESS THAN 160     Acne rosacea     Dermatitis     Thrombocytopenia (H)     Leukopenia     Allergic bronchitis, moderate persistent, uncomplicated     Mild persistent asthma with acute exacerbation     JESENIA (stress urinary incontinence, female)     Cervical radiculopathy     Past Surgical History:   Procedure Laterality Date      SECTION       COLONOSCOPY       CYSTOSCOPY, SLING TRANSVAGINAL N/A 2017    Procedure: CYSTOSCOPY, SLING TRANSVAGINAL;  TRANSVAGINAL TAPING, CYSTOSCOPY, MID URETHRAL SLING;  Surgeon: Jett Montes MD;  Location:  OR     DILATE CERVIX, ABLATE ENDOMETRIUM THERMACHOICE, COMBINED  4/10/2014    Procedure: COMBINED DILATE CERVIX, ABLATE ENDOMETRIUM THERMACHOICE;;  Surgeon: Jett Montes MD;  Location:  SD     DILATION AND CURETTAGE, HYSTEROSCOPY, ABLATE ENDOMETRIUM NOVASURE, COMBINED  4/10/2014    Procedure: COMBINED DILATION AND CURETTAGE, HYSTEROSCOPY, ABLATE ENDOMETRIUM NOVASURE;  HYSTEROSCOPY, DILATION AND CURETTAGE, NOVASURE ABLATION ATTEMPTED, THERMACHOICE ABLATION ATTEMPTED;   "Surgeon: Jett Montes MD;  Location: [facility]     LAPAROSCOPIC ASSISTED HYSTERECTOMY VAGINAL, BILATERAL SALPINGO-OOPHORECTOMY, COMBINED  7/31/2014    Procedure: COMBINED LAPAROSCOPIC ASSISTED HYSTERECTOMY VAGINAL, SALPINGO-OOPHORECTOMY;  Surgeon: Jett Montes MD;  Location: [facility]     ORTHOPEDIC SURGERY      L KNEE MENISCUS,ankle surgery, left knee replacement       Social History   Substance Use Topics     Smoking status: Never Smoker     Smokeless tobacco: Never Used     Alcohol use 0.0 oz/week     0 Standard drinks or equivalent per week      Comment: SOCIAL - 1 glass of wine twice a week; 2 drinks on the weekend     Family History   Problem Relation Age of Onset     CANCER Mother      patient is unsure of what kind         BP Readings from Last 3 Encounters:   11/29/17 113/78   11/03/17 125/73   09/15/17 125/73    Wt Readings from Last 3 Encounters:   11/29/17 197 lb 12.8 oz (89.7 kg)   11/03/17 200 lb (90.7 kg)   09/15/17 200 lb (90.7 kg)                  Labs reviewed in EPIC        Reviewed and updated as needed this visit by clinical staff       Reviewed and updated as needed this visit by Provider         ROS:  Constitutional, HEENT, cardiovascular, pulmonary, gi and gu systems are negative, except as otherwise noted.      OBJECTIVE:   /78 (BP Location: Left arm, Patient Position: Sitting, Cuff Size: Adult Regular)  Pulse 70  Temp 98.1  F (36.7  C) (Oral)  Ht 5' 7\" (1.702 m)  Wt 197 lb 12.8 oz (89.7 kg)  LMP 03/30/2014  SpO2 100%  BMI 30.98 kg/m2  Body mass index is 30.98 kg/(m^2).  GENERAL: healthy, alert and no distress  EYES: Eyes grossly normal to inspection, PERRL and conjunctivae and sclerae normal  HENT: ear canals and TM's normal, nose and mouth without ulcers or lesions  NECK: no adenopathy, no asymmetry, masses, or scars and thyroid normal to palpation  RESP: lungs clear to auscultation after neb treatment given for exp wheezing throughout - no rales, rhonchi or wheezes  CV: regular " "rate and rhythm, normal S1 S2, no S3 or S4, no murmur, click or rub, no peripheral edema and peripheral pulses strong  ABDOMEN: soft, nontender, no hepatosplenomegaly, no masses and bowel sounds normal  MS: no gross musculoskeletal defects noted, no edema  SKIN: no suspicious lesions or rashes  NEURO: Normal strength and tone, mentation intact and speech normal  PSYCH: mentation appears normal, affect normal/bright    Diagnostic Test Results:  none     ASSESSMENT/PLAN:           BMI:   Estimated body mass index is 30.98 kg/(m^2) as calculated from the following:    Height as of this encounter: 5' 7\" (1.702 m).    Weight as of this encounter: 197 lb 12.8 oz (89.7 kg).   Weight management plan: Discussed healthy diet and exercise guidelines and patient will follow up in 12 months in clinic to re-evaluate.        1. Acute bronchitis with coexisting condition requiring prophylactic treatment  Treating with zithromax  We  discussed the pathophysiology of bronchitis.  I reviewed the risks and benefits of various over the counter and prescription medications.  Additionally, we reviewed the infectious nature of this condition and techniques to minimize transmission and future infections.    Patient advised to follow up if symptoms worsen or fail to improve as anticipated.     - azithromycin (ZITHROMAX) 250 MG tablet; Two tablets first day, then one tablet daily for four days.  Dispense: 6 tablet; Refill: 0  - benzonatate (TESSALON) 200 MG capsule; Take 1 capsule (200 mg) by mouth 3 times daily as needed for cough  Dispense: 21 capsule; Refill: 0  2. Mild persistent asthma with acute exacerbation  Repeat ACT 1 month  - ipratropium - albuterol 0.5 mg/2.5 mg/3 mL (DUONEB) 0.5-2.5 (3) MG/3ML neb solution; Inhale 1 vial by nebulizer now, for clinic use only  Dispense: 1 vial; Refill: 0  - INHALATION/NEBULIZER TREATMENT, INITIAL  - ALBUTEROL/IPRATROPIUM 3ML NEB  - predniSONE (DELTASONE) 20 MG tablet; Take 2 tablets (40 mg) by " mouth daily for 5 days  Dispense: 10 tablet; Refill: 0      3. Need for hepatitis C screening test    - Hepatitis C Screen Reflex to RNA FUTURE anytime; Future    4. CARDIOVASCULAR SCREENING; LDL GOAL LESS THAN 160    - Lipid panel reflex to direct LDL Fasting; Future    5. Influenza vaccination declined by patient      Approximately 25 minutes were spent face-to-face with patient and greater than 50% of that time was spent on counseling and coordination of care.     See Patient Instructions    TREVER Kim [facility]

## 2017-11-29 NOTE — NURSING NOTE
The following nebulizer treatment was given:     MEDICATION: Duoneb  : [employer]  LOT #: [id number]  EXPIRATION DATE:  02/01/2019  NDC # 1167-2246-62    Given at 9:37AM.  Mago Zuluaga MA

## 2017-11-30 ASSESSMENT — ASTHMA QUESTIONNAIRES: ACT_TOTALSCORE: 9

## 2017-12-01 ENCOUNTER — OFFICE VISIT (OUTPATIENT)
Dept: OPHTHALMOLOGY | Facility: CLINIC | Age: 52
End: 2017-12-01

## 2017-12-01 DIAGNOSIS — S06.9X0S TRAUMATIC BRAIN INJURY WITHOUT LOSS OF CONSCIOUSNESS, SEQUELA (H): Primary | ICD-10-CM

## 2017-12-01 DIAGNOSIS — H52.4 PRESBYOPIA: ICD-10-CM

## 2017-12-01 ASSESSMENT — REFRACTION_MANIFEST
OS_ADD: +1.75
OS_SPHERE: -0.25
OS_AXIS: 180
OD_SPHERE: -0.25
OD_AXIS: 180
OD_CYLINDER: +0.25
OD_ADD: +1.75
OS_CYLINDER: +0.25

## 2017-12-01 ASSESSMENT — CONF VISUAL FIELD
OD_NORMAL: 1
OS_NORMAL: 1

## 2017-12-01 ASSESSMENT — VISUAL ACUITY
OD_SC: 20/20
OS_SC+: -3
METHOD: SNELLEN - LINEAR
OS_SC: 20/20

## 2017-12-01 ASSESSMENT — EXTERNAL EXAM - LEFT EYE: OS_EXAM: NORMAL

## 2017-12-01 ASSESSMENT — SLIT LAMP EXAM - LIDS
COMMENTS: NORMAL
COMMENTS: NORMAL

## 2017-12-01 ASSESSMENT — TONOMETRY
IOP_METHOD: APPLANATION
OS_IOP_MMHG: 15
OD_IOP_MMHG: 16

## 2017-12-01 ASSESSMENT — CUP TO DISC RATIO
OD_RATIO: 0.45
OS_RATIO: 0.45

## 2017-12-01 ASSESSMENT — EXTERNAL EXAM - RIGHT EYE: OD_EXAM: NORMAL
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
## 2017-12-11 ENCOUNTER — TELEPHONE (OUTPATIENT)
Dept: FAMILY MEDICINE | Facility: CLINIC | Age: 52
End: 2017-12-11

## 2017-12-11 NOTE — TELEPHONE ENCOUNTER
Called and spoke with patient. Patient still having symptoms of coughing, yellowish/green mucus, and slight wheezing in chest. Was seen 11/29/17 by Vidya Birmingham CNP for bronchitis. Patient wanting another round of the Z-cookie. Patient denies fever, sore throat, chest pain, nasal congestion or facial pain and feels has gotten somewhat better, but cough and mucus is still lingering. Advised patient that she was to follow up with Vidya if not improved or worsening symptoms, per chart review of last OV note. Patient not wanting to come in to clinic and wanting writer to ask provider if she can be prescribed one more round of medication and if second round does not clear things up, then she will come in. Advised that patient should be seen due to no improvement and last OV was almost 2 weeks ago, but pt requesting message be sent to provider to inquire about medication request first.    Routing to provider to review and advise.    Zunilda Demarco RN  [facility] Triage

## 2017-12-11 NOTE — TELEPHONE ENCOUNTER
Reason for Call:  Medication or medication refill:    Do you use a [facility]?  Name of the pharmacy and phone number for the current request:  [facility]    Name of the medication requested: Patient wants another z-cookie because symptoms have not resolved.    Other request: Please call when approved so I can pickup prescription.    Can we leave a detailed message on this number? YES    Phone number patient can be reached at: Cell number on file:    Telephone Information:   Mobile [phone]       Best Time: any    Call taken on 12/11/2017 at 11:51 AM by Rachel Maki

## 2017-12-12 NOTE — TELEPHONE ENCOUNTER
This writer attempted to contact Patient on 12/12/17      Reason for call Patient to schedule a Telephone or E visit through My Chart and unable to leave message, busy signal. Sent  A My Chart message.      If patient calls back:   Schedule Telephone Visit appointment within 2 business days with primary care .        Kristen Ruiz MA

## 2017-12-12 NOTE — TELEPHONE ENCOUNTER
Spoke to patient and explained the telephone visit, she declined  And wants a regular office visit. Scheduled an appt for tomorrow  12/13/17 with Vidya Birmingham.  Kristen Ruiz MA/  For Teams Mp

## 2018-02-26 ENCOUNTER — OFFICE VISIT (OUTPATIENT)
Dept: FAMILY MEDICINE | Facility: CLINIC | Age: 53
End: 2018-02-26
Payer: COMMERCIAL

## 2018-02-26 VITALS
SYSTOLIC BLOOD PRESSURE: 124 MMHG | WEIGHT: 193.4 LBS | HEART RATE: 75 BPM | BODY MASS INDEX: 30.35 KG/M2 | HEIGHT: 67 IN | TEMPERATURE: 98.7 F | DIASTOLIC BLOOD PRESSURE: 82 MMHG | OXYGEN SATURATION: 97 %

## 2018-02-26 DIAGNOSIS — Z11.59 NEED FOR HEPATITIS C SCREENING TEST: ICD-10-CM

## 2018-02-26 DIAGNOSIS — E66.811 CLASS 1 OBESITY DUE TO EXCESS CALORIES WITHOUT SERIOUS COMORBIDITY WITH BODY MASS INDEX (BMI) OF 30.0 TO 30.9 IN ADULT: ICD-10-CM

## 2018-02-26 DIAGNOSIS — Z28.21 INFLUENZA VACCINATION DECLINED BY PATIENT: ICD-10-CM

## 2018-02-26 DIAGNOSIS — Z13.6 CARDIOVASCULAR SCREENING; LDL GOAL LESS THAN 160: ICD-10-CM

## 2018-02-26 DIAGNOSIS — J45.31 MILD PERSISTENT ASTHMA WITH ACUTE EXACERBATION: ICD-10-CM

## 2018-02-26 DIAGNOSIS — J01.90 ACUTE SINUSITIS WITH SYMPTOMS > 10 DAYS: Primary | ICD-10-CM

## 2018-02-26 DIAGNOSIS — E66.09 CLASS 1 OBESITY DUE TO EXCESS CALORIES WITHOUT SERIOUS COMORBIDITY WITH BODY MASS INDEX (BMI) OF 30.0 TO 30.9 IN ADULT: ICD-10-CM

## 2018-02-26 PROCEDURE — 99214 OFFICE O/P EST MOD 30 MIN: CPT | Performed by: NURSE PRACTITIONER

## 2018-02-26 RX ORDER — ALBUTEROL SULFATE 0.83 MG/ML
1 SOLUTION RESPIRATORY (INHALATION) EVERY 6 HOURS PRN
Qty: 25 VIAL | Refills: 1 | Status: SHIPPED | OUTPATIENT
Start: 2018-02-26 | End: 2020-01-06

## 2018-02-26 RX ORDER — FLUCONAZOLE 150 MG/1
150 TABLET ORAL ONCE
Qty: 1 TABLET | Refills: 0 | Status: SHIPPED | OUTPATIENT
Start: 2018-02-26 | End: 2018-02-26

## 2018-02-26 RX ORDER — IPRATROPIUM BROMIDE AND ALBUTEROL SULFATE 2.5; .5 MG/3ML; MG/3ML
SOLUTION RESPIRATORY (INHALATION)
Qty: 1 VIAL | Refills: 0
Start: 2018-02-26 | End: 2018-06-13

## 2018-02-26 RX ORDER — PREDNISONE 20 MG/1
40 TABLET ORAL DAILY
Qty: 10 TABLET | Refills: 0 | Status: SHIPPED | OUTPATIENT
Start: 2018-02-26 | End: 2018-03-03
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
## 2018-02-27 ENCOUNTER — OFFICE VISIT (OUTPATIENT)
Dept: OPTOMETRY | Facility: CLINIC | Age: 53
End: 2018-02-27
Payer: COMMERCIAL

## 2018-02-27 DIAGNOSIS — H10.13 ALLERGIC CONJUNCTIVITIS OF BOTH EYES: Primary | ICD-10-CM

## 2018-02-27 PROCEDURE — 99203 OFFICE O/P NEW LOW 30 MIN: CPT | Performed by: OPTOMETRIST

## 2018-02-27 RX ORDER — AZELASTINE HYDROCHLORIDE 0.5 MG/ML
1 SOLUTION/ DROPS OPHTHALMIC 2 TIMES DAILY
Qty: 1 BOTTLE | Refills: 6 | Status: SHIPPED | OUTPATIENT
Start: 2018-02-27 | End: 2018-06-11

## 2018-02-27 ASSESSMENT — EXTERNAL EXAM - RIGHT EYE: OD_EXAM: NORMAL

## 2018-02-27 ASSESSMENT — TONOMETRY
OD_IOP_MMHG: 16
OS_IOP_MMHG: 17
IOP_METHOD: TONOPEN

## 2018-02-27 ASSESSMENT — VISUAL ACUITY
OS_SC+: -1
METHOD: SNELLEN - LINEAR
OS_SC: 20/25
OD_SC: 20/20
OD_SC+: -2

## 2018-02-27 ASSESSMENT — EXTERNAL EXAM - LEFT EYE: OS_EXAM: NORMAL

## 2018-02-27 ASSESSMENT — SLIT LAMP EXAM - LIDS
COMMENTS: NORMAL
COMMENTS: NORMAL
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
## 2018-02-27 NOTE — LETTER
2/27/2018         RE: Katie Aponte  [address]  [place] [facility] [address]        Dear Colleague,    Thank you for referring your patient, Katie Aponte, to the [facility]. Please see a copy of my visit note below.    Chief Complaint   Patient presents with     Eye Problem     os fb x 2 day       HPI    Affected eye(s):  Left   Symptoms:     Redness   Foreign body sensation   Tearing   Itching   Burning      Duration:  2 days   Frequency:  Constant       Do you have eye pain now?:  Yes   Location:  OS   Pain Level:  Moderate Pain (5)   Pain Frequency:  Constant   Pain Characteristics:  Burning      Comments:  Eye feels sticky, patient woke up this am with film over eye and super red,burning and itching. Patient cant stop blinking. Felt like something in os eye 2 days ago.           Used Clear Eyes drops.  Vision has cleared up a little bit.    Has a sinus infection.  Allergies and asthma have been acting up.  Has allergies to dust/ pet dander.  Has a number of dogs in the house.  They also sleep on the bed.    Medical, surgical and family histories reviewed and updated 2/27/2018.       OBJECTIVE: See Ophthalmology exam    ASSESSMENT:    ICD-10-CM    1. Allergic conjunctivitis of both eyes H10.13 azelastine (OPTIVAR) 0.05 % SOLN ophthalmic solution      PLAN:     Patient Instructions   You have allergies that are affecting your eyes.  This can cause itching and tearing of the eyes.  I recommend Optivar drops to be used 2 x a day for 2 weeks.  You can then use it when your eyes are bothering you or if there are certain times of the year when you know you will be affected.  Cold compresses can also make things more comfortable.  Try not to rub the eyes.  I recommend that you see your primary care doctor or an allergist if you have other symptoms such as a runny nose or sneezing which has not been evaluated before or if your current medications don't seem to be helping.    Systane Ultra 1  drop both eyes 3 x day- 4 x day daily.    Recommend annual eye exams.    Ned Sinclair, OD               Again, thank you for allowing me to participate in the care of your patient.        Sincerely,        Ned Sinclair, OD

## 2018-02-27 NOTE — PATIENT INSTRUCTIONS
At [facility], we strive to deliver an exceptional experience to you, every time we see you.  If you receive a survey in the mail, please send us back your thoughts. We really do value your feedback.    Based on your medical history, these are the current health maintenance/preventive care services that you are due for (some may have been done at this visit.)  Health Maintenance Due   Topic Date Due     HEPATITIS C SCREENING  11/12/1983     LIPID SCREEN Q5 YR FEMALE (SYSTEM ASSIGNED)  11/12/2010     INFLUENZA VACCINE (SYSTEM ASSIGNED)  09/01/2017         Suggested websites for health information:  Www.[employer]obby[employer] : Up to date and easily searchable information on multiple topics.  Www.YesPlz!.gov : medication info, interactive tutorials, watch real surgeries online  Www.familydoctor.org : good info from the Academy of Family Physicians  Www.cdc.gov : public health info, travel advisories, epidemics (H1N1)  Www.aap.org : children's health info, normal development, vaccinations  Www.health.[facility].[place].us : [place] dept of health, public health issues in [place], N1N1    Your care team:                            Family Medicine Internal Medicine   MD Adan Moreno MD Shantel Branch-Fleming, MD Katya Georgiev PA-C Nam Ho, MD Pediatrics   HAROON Gallo, MD Nai Rivera CNP, MD Deborah Mielke, MD Kim Thein, APRN [facility]      Clinic hours: Monday - Thursday 7 am-7 pm; Fridays 7 am-5 pm.   Urgent care: Monday - Friday 11 am-9 pm; Saturday and Sunday 9 am-5 pm.  Pharmacy : Monday -Thursday 8 am-8 pm; Friday 8 am-6 pm; Saturday and Sunday 9 am-5 pm.     Clinic: [phone]   Pharmacy: [phone]    Sinusitis (Antibiotic Treatment)    The sinuses are air-filled spaces within the bones of the face. They connect to the inside of the nose. Sinusitis is an inflammation of the tissue lining the sinus cavity. Sinus  inflammation can occur during a cold. It can also be due to allergies to pollens and other particles in the air. Sinusitis can cause symptoms of sinus congestion and fullness. A sinus infection causes fever, headache and facial pain. There is often green or yellow drainage from the nose or into the back of the throat (post-nasal drip). You have been given antibiotics to treat this condition.  Home care:    Take the full course of antibiotics as instructed. Do not stop taking them, even if you feel better.    Drink plenty of water, hot tea, and other liquids. This may help thin mucus. It also may promote sinus drainage.    Heat may help soothe painful areas of the face. Use a towel soaked in hot water. Or,  the shower and direct the hot spray onto your face. Using a vaporizer along with a menthol rub at night may also help.     An expectorant containing guaifenesin may help thin the mucus and promote drainage from the sinuses.    Over-the-counter decongestants may be used unless a similar medicine was prescribed. Nasal sprays work the fastest. Use one that contains phenylephrine or oxymetazoline. First blow the nose gently. Then use the spray. Do not use these medicines more often than directed on the label or symptoms may get worse. You may also use tablets containing pseudoephedrine. Avoid products that combine ingredients, because side effects may be increased. Read labels. You can also ask the pharmacist for help. (NOTE: Persons with high blood pressure should not use decongestants. They can raise blood pressure.)    Over-the-counter antihistamines may help if allergies contributed to your sinusitis.      Do not use nasal rinses or irrigation during an acute sinus infection, unless told to by your health care provider. Rinsing may spread the infection to other sinuses.    Use acetaminophen or ibuprofen to control pain, unless another pain medicine was prescribed. (If you have chronic liver or kidney  disease or ever had a stomach ulcer, talk with your doctor before using these medicines. Aspirin should never be used in anyone under 18 years of age who is ill with a fever. It may cause severe liver damage.)    Don't smoke. This can worsen symptoms.  Follow-up care  Follow up with your healthcare provider or our staff if you are not improving within the next week.  When to seek medical advice  Call your healthcare provider if any of these occur:    Facial pain or headache becoming more severe    Stiff neck    Unusual drowsiness or confusion    Swelling of the forehead or eyelids    Vision problems, including blurred or double vision    Fever of 100.4 F (38 C) or higher, or as directed by your healthcare provider    Seizure    Breathing problems    Symptoms not resolving within 10 days  Date Last Reviewed: 4/13/2015 2000-2017 [employer]. [address] [place] [address], [place] [address]. All rights reserved. This information is not intended as a substitute for professional medical care. Always follow your healthcare professional's instructions.

## 2018-02-27 NOTE — PROGRESS NOTES
SUBJECTIVE:   Katie Aponte is a 52 year old female who presents to clinic today for the following health issues:    RESPIRATORY SYMPTOMS      Duration: For two months    Description  nasal congestion, rhinorrhea, sore throat, facial pain/pressure, cough, wheezing, ear pain both, headache and fatigue/malaise    Severity: moderate    Accompanying signs and symptoms: None    History (predisposing factors):  asthma    Precipitating or alleviating factors: None    Therapies tried and outcome:  Theraflu,mucinex, ibuprofen        Problem list and histories reviewed & adjusted, as indicated.  Additional history: as documented    Patient Active Problem List   Diagnosis     Knee pain     Abnormal uterine bleeding     CARDIOVASCULAR SCREENING; LDL GOAL LESS THAN 160     Acne rosacea     Dermatitis     Thrombocytopenia (H)     Leukopenia     Allergic bronchitis, moderate persistent, uncomplicated     Mild persistent asthma with acute exacerbation     JESENIA (stress urinary incontinence, female)     Cervical radiculopathy     Past Surgical History:   Procedure Laterality Date      SECTION       COLONOSCOPY       CYSTOSCOPY, SLING TRANSVAGINAL N/A 2017    Procedure: CYSTOSCOPY, SLING TRANSVAGINAL;  TRANSVAGINAL TAPING, CYSTOSCOPY, MID URETHRAL SLING;  Surgeon: Jett Montes MD;  Location:  OR     DILATE CERVIX, ABLATE ENDOMETRIUM THERMACHOICE, COMBINED  4/10/2014    Procedure: COMBINED DILATE CERVIX, ABLATE ENDOMETRIUM THERMACHOICE;;  Surgeon: Jett Montes MD;  Location: [facility]     DILATION AND CURETTAGE, HYSTEROSCOPY, ABLATE ENDOMETRIUM NOVASURE, COMBINED  4/10/2014    Procedure: COMBINED DILATION AND CURETTAGE, HYSTEROSCOPY, ABLATE ENDOMETRIUM NOVASURE;  HYSTEROSCOPY, DILATION AND CURETTAGE, NOVASURE ABLATION ATTEMPTED, THERMACHOICE ABLATION ATTEMPTED;  Surgeon: Jett Montes MD;  Location: [facility]     LAPAROSCOPIC ASSISTED HYSTERECTOMY VAGINAL, BILATERAL SALPINGO-OOPHORECTOMY, COMBINED  2014     "Procedure: COMBINED LAPAROSCOPIC ASSISTED HYSTERECTOMY VAGINAL, SALPINGO-OOPHORECTOMY;  Surgeon: Jett Montes MD;  Location: [facility]     ORTHOPEDIC SURGERY      L KNEE MENISCUS,ankle surgery, left knee replacement       Social History   Substance Use Topics     Smoking status: Never Smoker     Smokeless tobacco: Never Used     Alcohol use 0.0 oz/week     0 Standard drinks or equivalent per week      Comment: SOCIAL - 1 glass of wine twice a week; 2 drinks on the weekend     Family History   Problem Relation Age of Onset     CANCER Mother      patient is unsure of what kind         BP Readings from Last 3 Encounters:   02/26/18 124/82   11/29/17 113/78   11/03/17 125/73    Wt Readings from Last 3 Encounters:   02/26/18 193 lb 6.4 oz (87.7 kg)   11/29/17 197 lb 12.8 oz (89.7 kg)   11/03/17 200 lb (90.7 kg)                  Labs reviewed in EPIC    Reviewed and updated as needed this visit by clinical staff  Tobacco  Allergies  Meds  Med Hx  Surg Hx  Fam Hx  Soc Hx      Reviewed and updated as needed this visit by Provider         ROS:  Constitutional, HEENT, cardiovascular, pulmonary, gi and gu systems are negative, except as otherwise noted.    OBJECTIVE:     /82 (BP Location: Left arm, Patient Position: Chair, Cuff Size: Adult Regular)  Pulse 75  Temp 98.7  F (37.1  C) (Oral)  Ht 5' 7\" (1.702 m)  Wt 193 lb 6.4 oz (87.7 kg)  LMP 03/30/2014  SpO2 97%  BMI 30.29 kg/m2  Body mass index is 30.29 kg/(m^2).  GENERAL: healthy, alert and no distress  EYES: Eyes grossly normal to inspection, PERRL and conjunctivae and sclerae normal  HENT: ear canals and TM's normal, nose and mouth without ulcers or lesions  NECK: no adenopathy, no asymmetry, masses, or scars and thyroid normal to palpation  RESP: no rales , no rhonchi and bibasilar expiratory wheezes improved after duoneb treatment but still present   CV: regular rate and rhythm, normal S1 S2, no S3 or S4, no murmur, click or rub, no peripheral edema and " "peripheral pulses strong  ABDOMEN: soft, nontender, no hepatosplenomegaly, no masses and bowel sounds normal  MS: no gross musculoskeletal defects noted, no edema  SKIN: no suspicious lesions or rashes  PSYCH: mentation appears normal, affect normal/bright  LYMPH: no cervical adenopathy    Diagnostic Test Results:  none     ASSESSMENT/PLAN:       BP Screening:   Last 3 BP Readings:    BP Readings from Last 3 Encounters:   02/26/18 124/82   11/29/17 113/78   11/03/17 125/73       The following was recommended to the patient:  Re-screen BP within a year and recommended lifestyle modifications  BMI:   Estimated body mass index is 30.29 kg/(m^2) as calculated from the following:    Height as of this encounter: 5' 7\" (1.702 m).    Weight as of this encounter: 193 lb 6.4 oz (87.7 kg).   Weight management plan: Discussed healthy diet and exercise guidelines and patient will follow up in 12 months in clinic to re-evaluate.      1. Acute sinusitis with symptoms > 10 days    Antibiotics indicated, see orders.  Wediscussed the pathophysiology of sinus pressure/sinusitis and treatment options with emphasis on healthy lifestyle and opening up natural drainage passages,  the risks and benefits of various over the counter and prescription treatments including short courses of decongestant nasal sprays.  If new, worsening or persistent symptoms, the patient is to call or return for a recheck.    - amoxicillin-clavulanate (AUGMENTIN) 875-125 MG per tablet; Take 1 tablet by mouth 2 times daily  Dispense: 20 tablet; Refill: 0  - fluconazole (DIFLUCAN) 150 MG tablet; Take 1 tablet (150 mg) by mouth once for 1 dose  Dispense: 1 tablet; Refill: 0    2. Mild persistent asthma with acute exacerbation  Giving Prednisone burst, reviewed Asthma Action Plan, home neb with albuterol prn wheezing, return to clinic if not improved, new, or worsening symptoms.     - predniSONE (DELTASONE) 20 MG tablet; Take 2 tablets (40 mg) by mouth daily for 5 " days  Dispense: 10 tablet; Refill: 0  - ipratropium - albuterol 0.5 mg/2.5 mg/3 mL (DUONEB) 0.5-2.5 (3) MG/3ML neb solution; Inhale 1 vial by nebulizer now, for clinic use only  Dispense: 1 vial; Refill: 0  - albuterol (2.5 MG/3ML) 0.083% neb solution; Take 1 vial (2.5 mg) by nebulization every 6 hours as needed for shortness of breath / dyspnea or wheezing  Dispense: 25 vial; Refill: 1  - order for DME; Equipment being ordered: Nebulizer with tubing and instructions  Dispense: 1 Device; Refill: 0    3. Need for hepatitis C screening test    - Hepatitis C Screen Reflex to HCV RNA Quant and Genotype; Future    4. Class 1 obesity due to excess calories without serious comorbidity with body mass index (BMI) of 30.0 to 30.9 in adult  Benefits of weight loss reviewed in detail, encouraged her to cut back on the carbohydrates in the diet, consume more fruits and vegetables, drink plenty of water, avoid fruit juices, sodas, get 150 min moderate exercise/week.  Recheck weight in 6 months.    5. Influenza vaccination declined by patient  Conscientious objector    6.  Cardiovascular Screening;  LDL goal less than 160  - Lipid panel reflex to direct LDL Fasting; Future  See Patient Instructions    TREVER Kim [facility]

## 2018-02-27 NOTE — PROGRESS NOTES
Chief Complaint   Patient presents with     Eye Problem     os fb x 2 day       HPI    Affected eye(s):  Left   Symptoms:     Redness   Foreign body sensation   Tearing   Itching   Burning      Duration:  2 days   Frequency:  Constant       Do you have eye pain now?:  Yes   Location:  OS   Pain Level:  Moderate Pain (5)   Pain Frequency:  Constant   Pain Characteristics:  Burning      Comments:  Eye feels sticky, patient woke up this am with film over eye and super red,burning and itching. Patient cant stop blinking. Felt like something in os eye 2 days ago.           Used Clear Eyes drops.  Vision has cleared up a little bit.    Has a sinus infection.  Allergies and asthma have been acting up.  Has allergies to dust/ pet dander.  Has a number of dogs in the house.  They also sleep on the bed.    Medical, surgical and family histories reviewed and updated 2/27/2018.       OBJECTIVE: See Ophthalmology exam    ASSESSMENT:    ICD-10-CM    1. Allergic conjunctivitis of both eyes H10.13 azelastine (OPTIVAR) 0.05 % SOLN ophthalmic solution      PLAN:     Patient Instructions   You have allergies that are affecting your eyes.  This can cause itching and tearing of the eyes.  I recommend Optivar drops to be used 2 x a day for 2 weeks.  You can then use it when your eyes are bothering you or if there are certain times of the year when you know you will be affected.  Cold compresses can also make things more comfortable.  Try not to rub the eyes.  I recommend that you see your primary care doctor or an allergist if you have other symptoms such as a runny nose or sneezing which has not been evaluated before or if your current medications don't seem to be helping.    Systane Ultra 1 drop both eyes 3 x day- 4 x day daily.    Recommend annual eye exams.    Ned Sinclair, OD

## 2018-02-27 NOTE — NURSING NOTE
The following nebulizer treatment was given:     MEDICATION: Duoneb  : [employer]  LOT #: [id number]  EXPIRATION DATE:  7/2019  NDC # 4825-5216-54    Ann Marie Burt MA

## 2018-02-27 NOTE — PATIENT INSTRUCTIONS
You have allergies that are affecting your eyes.  This can cause itching and tearing of the eyes.  I recommend Optivar drops to be used 2 x a day for 2 weeks.  You can then use it when your eyes are bothering you or if there are certain times of the year when you know you will be affected.  Cold compresses can also make things more comfortable.  Try not to rub the eyes.  I recommend that you see your primary care doctor or an allergist if you have other symptoms such as a runny nose or sneezing which has not been evaluated before or if your current medications don't seem to be helping.    Systane Ultra 1 drop both eyes 3 x day- 4 x day daily.    Recommend annual eye exams.    Ned Sinclair, OD

## 2018-03-29 ENCOUNTER — OFFICE VISIT (OUTPATIENT)
Dept: FAMILY MEDICINE | Facility: CLINIC | Age: 53
End: 2018-03-29
Payer: COMMERCIAL

## 2018-03-29 ENCOUNTER — RADIANT APPOINTMENT (OUTPATIENT)
Dept: GENERAL RADIOLOGY | Facility: CLINIC | Age: 53
End: 2018-03-29
Attending: NURSE PRACTITIONER
Payer: COMMERCIAL

## 2018-03-29 VITALS
WEIGHT: 196 LBS | TEMPERATURE: 96.9 F | DIASTOLIC BLOOD PRESSURE: 78 MMHG | OXYGEN SATURATION: 100 % | BODY MASS INDEX: 30.76 KG/M2 | SYSTOLIC BLOOD PRESSURE: 120 MMHG | HEART RATE: 66 BPM | HEIGHT: 67 IN

## 2018-03-29 DIAGNOSIS — J98.01 ACUTE BRONCHOSPASM: Primary | ICD-10-CM

## 2018-03-29 DIAGNOSIS — J20.9 ACUTE BRONCHITIS WITH SYMPTOMS > 10 DAYS: ICD-10-CM

## 2018-03-29 DIAGNOSIS — Z11.59 NEED FOR HEPATITIS C SCREENING TEST: ICD-10-CM

## 2018-03-29 DIAGNOSIS — J98.01 ACUTE BRONCHOSPASM: ICD-10-CM

## 2018-03-29 DIAGNOSIS — J45.31 MILD PERSISTENT ASTHMA WITH ACUTE EXACERBATION: ICD-10-CM

## 2018-03-29 LAB
CHOLEST SERPL-MCNC: 227 MG/DL
HCV AB SERPL QL IA: NONREACTIVE
HDLC SERPL-MCNC: 80 MG/DL
LDLC SERPL CALC-MCNC: 131 MG/DL
NONHDLC SERPL-MCNC: 147 MG/DL
TRIGL SERPL-MCNC: 82 MG/DL

## 2018-03-29 PROCEDURE — 99214 OFFICE O/P EST MOD 30 MIN: CPT | Mod: 25 | Performed by: NURSE PRACTITIONER

## 2018-03-29 PROCEDURE — 71046 X-RAY EXAM CHEST 2 VIEWS: CPT | Mod: FY

## 2018-03-29 PROCEDURE — 86803 HEPATITIS C AB TEST: CPT | Performed by: NURSE PRACTITIONER

## 2018-03-29 PROCEDURE — 94640 AIRWAY INHALATION TREATMENT: CPT | Performed by: NURSE PRACTITIONER

## 2018-03-29 PROCEDURE — 36415 COLL VENOUS BLD VENIPUNCTURE: CPT | Performed by: NURSE PRACTITIONER

## 2018-03-29 PROCEDURE — 80061 LIPID PANEL: CPT | Performed by: NURSE PRACTITIONER

## 2018-03-29 RX ORDER — PREDNISONE 20 MG/1
TABLET ORAL
Qty: 20 TABLET | Refills: 0 | Status: SHIPPED | OUTPATIENT
Start: 2018-03-29 | End: 2018-06-11

## 2018-03-29 RX ORDER — IPRATROPIUM BROMIDE AND ALBUTEROL SULFATE 2.5; .5 MG/3ML; MG/3ML
1 SOLUTION RESPIRATORY (INHALATION) EVERY 6 HOURS PRN
Qty: 1 VIAL | Refills: 0 | Status: SHIPPED | OUTPATIENT
Start: 2018-03-29 | End: 2024-06-12

## 2018-03-29 RX ORDER — DOXYCYCLINE 100 MG/1
100 CAPSULE ORAL 2 TIMES DAILY
Qty: 20 CAPSULE | Refills: 0 | Status: SHIPPED | OUTPATIENT
Start: 2018-03-29 | End: 2018-06-11

## 2018-03-29 RX ORDER — FLUTICASONE PROPIONATE 44 UG/1
2 AEROSOL, METERED RESPIRATORY (INHALATION) 2 TIMES DAILY
Qty: 1 INHALER | Refills: 1 | Status: SHIPPED | OUTPATIENT
Start: 2018-03-29 | End: 2019-06-26

## 2018-03-29 NOTE — PATIENT INSTRUCTIONS
At [facility], we strive to deliver an exceptional experience to you, every time we see you.  If you receive a survey in the mail, please send us back your thoughts. We really do value your feedback.    Based on your medical history, these are the current health maintenance/preventive care services that you are due for (some may have been done at this visit.)  Health Maintenance Due   Topic Date Due     HEPATITIS C SCREENING  11/12/1983     LIPID SCREEN Q5 YR FEMALE (SYSTEM ASSIGNED)  11/12/2010     ASTHMA ACTION PLAN Q1 YR  04/11/2018         Suggested websites for health information:  Www.Everyday Health.[employer] : Up to date and easily searchable information on multiple topics.  Www.medlineplus.gov : medication info, interactive tutorials, watch real surgeries online  Www.familydoctor.org : good info from the Academy of Family Physicians  Www.cdc.gov : public health info, travel advisories, epidemics (H1N1)  Www.aap.org : children's health info, normal development, vaccinations  Www.health.[facility].[place].us : [place] dept of health, public health issues in [place], N1N1    Your care team:                            Family Medicine Internal Medicine   MD Adan Moreno MD Shantel Branch-Fleming, MD Katya Georgiev PA-C Nam Ho, MD Pediatrics   HAROON Gallo, MD Nai Rivera CNP, MD Deborah Mielke, MD Kim Thein, APRN [facility]      Clinic hours: Monday - Thursday 7 am-7 pm; Fridays 7 am-5 pm.   Urgent care: Monday - Friday 11 am-9 pm; Saturday and Sunday 9 am-5 pm.  Pharmacy : Monday -Thursday 8 am-8 pm; Friday 8 am-6 pm; Saturday and Sunday 9 am-5 pm.     Clinic: [phone]   Pharmacy: [phone]    Asthma (Adult)  Asthma is a disease where the medium and  small air passages within the lung go into spasm and restrict the flow of air. Inflammation and swelling of the airways cause further blockage. During an acute asthma attack,  "these factors cause trouble breathing, wheezing, cough and chest tightness.    An asthma attack can be triggered by many things. Common triggers include infections such as the common cold, bronchitis, and pneumonia. Irritants such as smoke or pollutants in the air, very cold air, emotional upset, and exercise can also trigger an attack. In many adults with asthma, allergies to dust, mold, pollen and animal dander can cause an asthma attack. Skipping doses of daily asthma medicine can also bring on an asthma attack.  Asthma can be controlled using the proper medicines prescribed by your healthcare provider and avoiding exposure to known triggers including allergens and irritants.  Home care    Take prescribed medicine exactly at the times advised. If you need medicine such as from a hand held inhaler or aerosol breathing machine more than every 4 hours, contact your healthcare provider or seek immediate medical attention. If prescribed an antibiotic or prednisone, take all of the medicine as prescribed, even if you are feeling better after a few days.    Do not smoke. Avoid being exposed to the smoke of others.    Some people with asthma have worsening of their symptoms when they take aspirin and non-steroidal or fever-reducing medicines like ibuprofen and naproxen. Talk to your healthcare provider if you think this may apply to you.  Follow-up care  Follow up with your healthcare provider, or as advised. Always bring all of your current medicines to any appointments with your healthcare provider. Also bring a complete list of medicines even those not taken for asthma. If you do not already have one, talk to your healthcare provider about developing a personalized \"Asthma Action Plan.\"  A pneumococcal (pneumonia) vaccine and yearly flu shot (every fall) are recommended. Ask your doctor about this.  When to seek medical advice  Call your healthcare provider right away if any of these occur:     Increased wheezing or " shortness of breath    Need to use your inhalers more often than usual without relief    Fever of 100.4 F (38 C) or higher, or as directed by your healthcare provider    Coughing up lots of dark-colored or bloody sputum (mucus)    Chest pain with each breath    If you use a peak flow meter as part of an Asthma Action Plan, and you are still in the yellow zone (50% to 80%) 15 minutes after using inhaler medicine.  Call 911  Call 911 if any of the following occur    Trouble walking or talking because of shortness of breath    If you use a peak flow meter as part of an Asthma Action Plan and you are still in the red zone (less than 50%) 15 minutes after using inhaler medicine    Lips or fingernails turning gray or blue  Date Last Reviewed: 5/1/2017 2000-2017 [employer]. [address] [place] [address], [place] [address]. All rights reserved. This information is not intended as a substitute for professional medical care. Always follow your healthcare professional's instructions.

## 2018-03-29 NOTE — PROGRESS NOTES
SUBJECTIVE:   Katie Aponte is a 52 year old female who presents to clinic today for the following health issues:    Asthma Follow-Up    Was ACT completed today?    Yes    ACT Total Scores 2017   ACT TOTAL SCORE (Goal Greater than or Equal to 20) 9   In the past 12 months, how many times did you visit the emergency room for your asthma without being admitted to the hospital? 0   In the past 12 months, how many times were you hospitalized overnight because of your asthma? 0       Recent asthma triggers that patient is dealing with: pollens and animal dander    Patient has been using albuterol every 3-4 hours, cough.  She was treated for acute sinusitis with Augmentin on 18 but continues to have loose productive cough, expectorating\ yellow mucus. She feels tightness in her chest which is improved for about an hour after using Albuterol. No fever of chills.  She continues on zyrtec daily for allergies.      Amount of exercise or physical activity: 4-5 days/week for an average of 15-30 minutes    Problems taking medications regularly: No    Medication side effects: cough from neb machine    Diet: regular (no restrictions)      Problem list and histories reviewed & adjusted, as indicated.  Additional history: as documented    Patient Active Problem List   Diagnosis     Knee pain     Abnormal uterine bleeding     CARDIOVASCULAR SCREENING; LDL GOAL LESS THAN 160     Acne rosacea     Dermatitis     Thrombocytopenia (H)     Leukopenia     Allergic bronchitis, moderate persistent, uncomplicated     Mild persistent asthma with acute exacerbation     JESENIA (stress urinary incontinence, female)     Cervical radiculopathy     Class 1 obesity due to excess calories without serious comorbidity with body mass index (BMI) of 30.0 to 30.9 in adult     Medial meniscus tear     Pain in joint, ankle and foot     Tear of medial meniscus of knee     Past Surgical History:   Procedure Laterality Date      SECTION        COLONOSCOPY       CYSTOSCOPY, SLING TRANSVAGINAL N/A 4/20/2017    Procedure: CYSTOSCOPY, SLING TRANSVAGINAL;  TRANSVAGINAL TAPING, CYSTOSCOPY, MID URETHRAL SLING;  Surgeon: Jett Montes MD;  Location:  OR     DILATE CERVIX, ABLATE ENDOMETRIUM THERMACHOICE, COMBINED  4/10/2014    Procedure: COMBINED DILATE CERVIX, ABLATE ENDOMETRIUM THERMACHOICE;;  Surgeon: Jett Montes MD;  Location: [facility]     DILATION AND CURETTAGE, HYSTEROSCOPY, ABLATE ENDOMETRIUM NOVASURE, COMBINED  4/10/2014    Procedure: COMBINED DILATION AND CURETTAGE, HYSTEROSCOPY, ABLATE ENDOMETRIUM NOVASURE;  HYSTEROSCOPY, DILATION AND CURETTAGE, NOVASURE ABLATION ATTEMPTED, THERMACHOICE ABLATION ATTEMPTED;  Surgeon: Jett Montes MD;  Location: [facility]     LAPAROSCOPIC ASSISTED HYSTERECTOMY VAGINAL, BILATERAL SALPINGO-OOPHORECTOMY, COMBINED  7/31/2014    Procedure: COMBINED LAPAROSCOPIC ASSISTED HYSTERECTOMY VAGINAL, SALPINGO-OOPHORECTOMY;  Surgeon: Jett Montes MD;  Location: [facility]     ORTHOPEDIC SURGERY      L KNEE MENISCUS,ankle surgery, left knee replacement       Social History   Substance Use Topics     Smoking status: Never Smoker     Smokeless tobacco: Never Used     Alcohol use 0.0 oz/week     0 Standard drinks or equivalent per week      Comment: SOCIAL - 1 glass of wine twice a week; 2 drinks on the weekend     Family History   Problem Relation Age of Onset     CANCER Mother      patient is unsure of what kind         Current Outpatient Prescriptions   Medication Sig Dispense Refill     ipratropium - albuterol 0.5 mg/2.5 mg/3 mL (DUONEB) 0.5-2.5 (3) MG/3ML neb solution Take 1 vial (3 mLs) by nebulization every 6 hours as needed for shortness of breath / dyspnea or wheezing 1 vial 0     predniSONE (DELTASONE) 20 MG tablet Take 3 tabs (60 mg) by mouth daily x 3 days, 2 tabs (40 mg) daily x 3 days, 1 tab (20 mg) daily x 3 days, then 1/2 tab (10 mg) x 3 days. 20 tablet 0     doxycycline (VIBRAMYCIN) 100 MG capsule Take 1 capsule (100 mg)  by mouth 2 times daily 20 capsule 0     fluticasone (FLOVENT HFA) 44 MCG/ACT Inhaler Inhale 2 puffs into the lungs 2 times daily 1 Inhaler 1     azelastine (OPTIVAR) 0.05 % SOLN ophthalmic solution Apply 1 drop to eye 2 times daily 1 Bottle 6     amoxicillin-clavulanate (AUGMENTIN) 875-125 MG per tablet Take 1 tablet by mouth 2 times daily 20 tablet 0     ipratropium - albuterol 0.5 mg/2.5 mg/3 mL (DUONEB) 0.5-2.5 (3) MG/3ML neb solution Inhale 1 vial by nebulizer now, for clinic use only 1 vial 0     albuterol (2.5 MG/3ML) 0.083% neb solution Take 1 vial (2.5 mg) by nebulization every 6 hours as needed for shortness of breath / dyspnea or wheezing 25 vial 1     order for DME Equipment being ordered: Nebulizer with tubing and instructions 1 Device 0     amitriptyline (ELAVIL) 10 MG tablet Start at 1 tab at bedtime for 2 weeks. Call to make adjustments thereafter 90 tablet 0     meclizine (ANTIVERT) 25 MG tablet Take 1 tablet (25 mg) by mouth every 6 hours as needed for dizziness 30 tablet 1     oxymetazoline (AFRIN NASAL SPRAY) 0.05 % spray Spray 2-3 sprays into both nostrils 2 times daily as needed for congestion 1 Bottle 1     fluticasone (FLONASE) 50 MCG/ACT spray Spray 1-2 sprays into both nostrils daily 1 Bottle 11     triamcinolone (KENALOG) 0.1 % cream Apply sparingly to affected area three times daily as needed 80 g 1     desonide (DESOWEN) 0.05 % ointment Apply topically 2 times daily 30 g 0     FLUOCINOLONE ACETONIDE SCALP 0.01 % OIL oil Apply topically 2 times daily as needed 118 mL 0     multivitamin, therapeutic with minerals (MULTI-VITAMIN) TABS tablet Take 1 tablet by mouth daily       Estrogens Conjugated (PREMARIN PO) Take 0.9 mg by mouth daily       fluticasone (FLOVENT HFA) 110 MCG/ACT Inhaler Inhale 2 puffs into the lungs 2 times daily 1 Inhaler 2     albuterol (PROAIR HFA/PROVENTIL HFA/VENTOLIN HFA) 108 (90 BASE) MCG/ACT Inhaler Inhale 2 puffs into the lungs every 6 hours as needed for shortness  "of breath / dyspnea or wheezing 1 Inhaler 2     order for DME Equipment being ordered: [employer] Bed Buddy- heated neck roll 1 Device 0     order for DME Equipment being ordered: TENS 1 Units 0     valACYclovir (VALTREX) 500 MG tablet Take 500 mg by mouth as needed Reported on 4/11/2017       estrogens conjugated (PREMARIN) 0.9 MG TABS Take 0.3 mg by mouth daily       BP Readings from Last 3 Encounters:   03/29/18 120/78   02/26/18 124/82   11/29/17 113/78    Wt Readings from Last 3 Encounters:   03/29/18 196 lb (88.9 kg)   02/26/18 193 lb 6.4 oz (87.7 kg)   11/29/17 197 lb 12.8 oz (89.7 kg)                    Reviewed and updated as needed this visit by clinical staff  Tobacco  Allergies  Meds  Med Hx  Surg Hx  Fam Hx  Soc Hx      Reviewed and updated as needed this visit by Provider         ROS:  Constitutional, HEENT, cardiovascular, pulmonary, gi and gu systems are negative, except as otherwise noted.    OBJECTIVE:     /78 (BP Location: Left arm, Patient Position: Chair, Cuff Size: Adult Large)  Pulse 66  Temp 96.9  F (36.1  C) (Tympanic)  Ht 5' 7\" (1.702 m)  Wt 196 lb (88.9 kg)  LMP 03/30/2014  SpO2 100%  BMI 30.7 kg/m2  Body mass index is 30.7 kg/(m^2).  GENERAL: healthy, alert and no distress  EYES: Eyes grossly normal to inspection, PERRL and conjunctivae and sclerae normal  HENT: ear canals and TM's normal, nose and mouth without ulcers or lesions  NECK: no adenopathy, no asymmetry, masses, or scars and thyroid normal to palpation  RESP: expiratory wheezes throughout and bronchial breath sounds -bibasilar  CV: regular rate and rhythm, normal S1 S2, no S3 or S4, no murmur, click or rub, no peripheral edema and peripheral pulses strong  ABDOMEN: soft, nontender, no hepatosplenomegaly, no masses and bowel sounds normal  MS: no gross musculoskeletal defects noted, no edema  SKIN: no suspicious lesions or rashes  NEURO: Normal strength and tone, mentation intact and speech normal  PSYCH: mentation " "appears normal, affect normal/bright  LYMPH: no cervical adenopathy    Diagnostic Test Results:  Xray - No acute changes by my independent read, await formal read by Radiology  XR Chest 2 Views   Status:  Final result   Visible to patient:  No (Not Released) Dx:  Acute bronchospasm Order: [id number]         Details        Reading Physician Reading Date Result Priority       Aakash Srinivasan MD 3/29/2018            Narrative             XR CHEST 2 VW 3/29/2018 9:23 AM    HISTORY: Bronchospasm.    COMPARISON: 2/9/2017    FINDINGS: No airspace consolidation, pleural effusion or pneumothorax.  Normal heart size.             Impression             IMPRESSION: No acute cardiopulmonary abnormality.    AAKASH SRINIVASAN MD              ASSESSMENT/PLAN:       BP Screening:   Last 3 BP Readings:    BP Readings from Last 3 Encounters:   03/29/18 120/78   02/26/18 124/82   11/29/17 113/78       The following was recommended to the patient:  Re-screen BP within a year and recommended lifestyle modifications  BMI:   Estimated body mass index is 30.7 kg/(m^2) as calculated from the following:    Height as of this encounter: 5' 7\" (1.702 m).    Weight as of this encounter: 196 lb (88.9 kg).   Weight management plan: Discussed healthy diet and exercise guidelines and patient will follow up in 12 months in clinic to re-evaluate.      1. Acute bronchospasm  Normal chest film by my read but will treat given prolonged symptoms.  - XR Chest 2 Views; Future  - Lipid panel reflex to direct LDL Fasting  - INHALATION/NEBULIZER TREATMENT, INITIAL    2. Acute bronchitis with symptoms > 10 days  Treating with doxycycline and steroid taper.  Advised frequent fluids to keep secretions thin, use of humidified air, rest, reviewed indications for return to clinic.  - doxycycline (VIBRAMYCIN) 100 MG capsule; Take 1 capsule (100 mg) by mouth 2 times daily  Dispense: 20 capsule; Refill: 0  3. Mild persistent asthma with acute exacerbation  Adding " Flovent for improved asthma control- suspect allergy component.  She is to continue on zyrtec for seasonal allergies.  - ipratropium - albuterol 0.5 mg/2.5 mg/3 mL (DUONEB) 0.5-2.5 (3) MG/3ML neb solution; Take 1 vial (3 mLs) by nebulization every 6 hours as needed for shortness of breath / dyspnea or wheezing  Dispense: 1 vial; Refill: 0  - predniSONE (DELTASONE) 20 MG tablet; Take 3 tabs (60 mg) by mouth daily x 3 days, 2 tabs (40 mg) daily x 3 days, 1 tab (20 mg) daily x 3 days, then 1/2 tab (10 mg) x 3 days.  Dispense: 20 tablet; Refill: 0  - fluticasone (FLOVENT HFA) 44 MCG/ACT Inhaler; Inhale 2 puffs into the lungs 2 times daily  Dispense: 1 Inhaler; Refill: 1    4. Need for hepatitis C screening test    - **Hepatitis C Screen Reflex to RNA FUTURE anytime    See Patient Instructions    TREVER Kim [facility]

## 2018-03-29 NOTE — NURSING NOTE
The following nebulizer treatment was given:     MEDICATION: Duoneb  : [employer]  LOT #: [id number]  EXPIRATION DATE:  7/19  NDC # 4192-6403-36  Ann Marie Burt MA
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
## 2018-03-30 ASSESSMENT — ASTHMA QUESTIONNAIRES: ACT_TOTALSCORE: 9

## 2018-06-11 ENCOUNTER — RADIANT APPOINTMENT (OUTPATIENT)
Dept: GENERAL RADIOLOGY | Facility: CLINIC | Age: 53
End: 2018-06-11
Payer: COMMERCIAL

## 2018-06-11 ENCOUNTER — OFFICE VISIT (OUTPATIENT)
Dept: FAMILY MEDICINE | Facility: CLINIC | Age: 53
End: 2018-06-11
Payer: COMMERCIAL

## 2018-06-11 VITALS
WEIGHT: 192.8 LBS | DIASTOLIC BLOOD PRESSURE: 77 MMHG | BODY MASS INDEX: 30.2 KG/M2 | HEART RATE: 60 BPM | SYSTOLIC BLOOD PRESSURE: 122 MMHG | OXYGEN SATURATION: 98 % | TEMPERATURE: 98.4 F

## 2018-06-11 DIAGNOSIS — S99.912A INJURY OF LEFT ANKLE, INITIAL ENCOUNTER: ICD-10-CM

## 2018-06-11 DIAGNOSIS — S99.912A INJURY OF LEFT ANKLE, INITIAL ENCOUNTER: Primary | ICD-10-CM

## 2018-06-11 PROCEDURE — 73610 X-RAY EXAM OF ANKLE: CPT | Mod: LT

## 2018-06-11 PROCEDURE — 99213 OFFICE O/P EST LOW 20 MIN: CPT | Performed by: PHYSICIAN ASSISTANT

## 2018-06-11 NOTE — LETTER
[address] [address]  Phone: [phone]    June 11, 2018        Katie Aponte  [address] [facility] [address]          To whom it may concern:    RE: Katie Aponte    Patient was seen and treated today at our clinic.  Patient may work from home until  6/18/18.   Patient may return to work without restrictions after that.          Please contact me for questions or concerns.      Sincerely,        CHAYITO Perez

## 2018-06-11 NOTE — PROGRESS NOTES
SUBJECTIVE:   Katie Aponte is a 52 year old female who presents to clinic today for the following health issues:  Joint Pain    Onset: 2-3 weeks ago    Description:   Location: left ankle  Character: Sharp    Intensity: 7/10    Progression of Symptoms: worse    Accompanying Signs & Symptoms:  Other symptoms: swelling, hurts to put weight on it, hurts to rotate    History:   Previous similar pain: YES- on right ankle      Precipitating factors:   Trauma or overuse: no     Alleviating factors:  Improved by: nothing  Therapies Tried and outcome: Advil, ice       no known trauma or twisting.      Does play basket ball.  Pain inferior to lateral malleolus.          Past Medical History:   Diagnosis Date     Herpes     Under eye     Seasonal allergies      Thrombocytopenia (H) 9/25/2015     Uncomplicated asthma     very mild     History   Smoking Status     Never Smoker   Smokeless Tobacco     Never Used       ROS:  GEN no fevers  SKIN no erythema  Musculoskeletal:  See HPI.      OBJECTIVE:  Blood pressure 122/77, pulse 60, temperature 98.4  F (36.9  C), temperature source Oral, weight 192 lb 12.8 oz (87.5 kg), last menstrual period 03/30/2014, SpO2 98 %, not currently breastfeeding.  Patient is alert and NAD.  EYES: conjunctiva clear  Ankle Exam (left):  Inspection:no swelling seen  Palpation:tender inferior and posteriro to lateral malleolus  Cap refill intact.    Good doralis pedis.  Neurovascularly Intact Distally.     My independent read of XR is no fx/fb      ASSESSMENT:    ICD-10-CM    1. Injury of left ankle, initial encounter S99.912A XR Ankle Left G/E 3 Views     ORTHO  REFERRAL     order for DME     order for DME         PLAN:    Ankle splint provided today.  OTC pain control as needed.  Ice, elevate, lowly increase activity level with active range of motion exercises encouraged.    Joo Ibrahim PA-C

## 2018-06-11 NOTE — NURSING NOTE
Pt received Crutches (JOSE, $67, ), and Ankle support splint ([id number], $62, ) in clinic. Left wearing them. Ryanne Magaña CMA

## 2018-06-11 NOTE — PATIENT INSTRUCTIONS
*SPRAIN:ANKLE [w/ x-ray]    A sprain is an injury to the ligaments or capsule that holds a joint together. There are no broken bones. Most sprains take from four to six weeks to heal. If the ligament is completely torn (severe sprain), it can take several months to recover.  Mild to Moderate sprains may be treated with an elastic wrap or an in-shoe splint to provide support and prevent re-injury. A mild sprain may not require any additional support. A severe sprain may require surgery to repair, but this is rare.  HOME CARE:  1. Stay off the injured leg as much as possible until you can walk on it without pain. If you have a lot of pain with walking, crutches or a walker may be prescribed. (These can be rented or purchased at many pharmacies and surgical or orthopedic supply stores). Follow your doctor's advice regarding when to begin bearing weight on that leg.  2. Keep your leg elevated to reduce pain and swelling. When sleeping, place a pillow under the injured leg. When sitting, support the injured leg so it is level with your waist. This is very important during the first 48 hours.  3. Apply an ice pack (ice cubes in a plastic bag, wrapped in a towel) over the injured area for 20 minutes every 1-2 hours the first day. You can place the ice pack directly over the splint/cast. If you were given a boot, open it to apply the ice pack. Continue with ice packs 3-4 times a day for the next two days, then as needed for the relief of pain and swelling.  4. You may use acetaminophen (Tylenol) 650 1000 mg every 6 hours or ibuprofen (Motrin, Advil) 600 mg every 6 8 hours with food to control pain, if you are able to take these medicines. [NOTE: If you have chronic liver or kidney disease or ever had a stomach ulcer or GI bleeding, talk with your doctor before using these medicines.]  5. You may return to sports after healing, when you can run without pain.  6. A sprained ankle is at risk for re-injury during the first  six weeks. During that time, protect your ankle with an in-shoe splint that prevents tilting of your ankle from side to side. This is very important if you do active work or play sports during that time.  FOLLOW UP with your doctor, or as advised, if you are not starting to improve within the next five days.     GET PROMPT MEDICAL ATTENTION if any of the following occur:    The plaster cast or splint gets wet or soft    The fiberglass cast or splint gets wet and does not dry for 24 hours    Pain or swelling increases, or redness appears    Toes become cold, blue, numb or tingly    [id number] [address], [place] [address]. All rights reserved. This information is not intended as a substitute for professional medical care. Always follow your healthcare professional's instructions.
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
## 2018-06-12 ENCOUNTER — RADIANT APPOINTMENT (OUTPATIENT)
Dept: MAMMOGRAPHY | Facility: CLINIC | Age: 53
End: 2018-06-12
Attending: OBSTETRICS & GYNECOLOGY
Payer: COMMERCIAL

## 2018-06-12 DIAGNOSIS — Z12.31 VISIT FOR SCREENING MAMMOGRAM: ICD-10-CM

## 2018-06-12 PROCEDURE — 77063 BREAST TOMOSYNTHESIS BI: CPT

## 2018-06-12 PROCEDURE — 77067 SCR MAMMO BI INCL CAD: CPT

## 2018-06-13 ENCOUNTER — OFFICE VISIT (OUTPATIENT)
Dept: PODIATRY | Facility: CLINIC | Age: 53
End: 2018-06-13
Payer: COMMERCIAL

## 2018-06-13 VITALS
DIASTOLIC BLOOD PRESSURE: 77 MMHG | HEART RATE: 60 BPM | SYSTOLIC BLOOD PRESSURE: 122 MMHG | BODY MASS INDEX: 30.07 KG/M2 | WEIGHT: 192 LBS

## 2018-06-13 DIAGNOSIS — M76.72 PERONEAL TENDINITIS OF LEFT LOWER EXTREMITY: ICD-10-CM

## 2018-06-13 DIAGNOSIS — M76.62 ACHILLES TENDINITIS OF LEFT LOWER EXTREMITY: Primary | ICD-10-CM

## 2018-06-13 PROCEDURE — 99203 OFFICE O/P NEW LOW 30 MIN: CPT | Performed by: PODIATRIST

## 2018-06-13 NOTE — LETTER
[address] [address]  Phone: [phone]    June 13, 2018        Katie Aponte  [address] [address]          To whom it may concern:    RE: Katie Aponte    Patient was seen and treated today at our clinic and missed work.  Please allow to work at home as needed.    Please contact me for questions or concerns.      Sincerely,      Dr. Francis Capps D.P.M FAC FAS

## 2018-06-13 NOTE — PATIENT INSTRUCTIONS
We wish you continued good healing. If you have any questions or concerns, please do not hesitate to contact us at [phone]    Please remember to call and schedule a follow up appointment if one was recommended at your earliest convenience.   PODIATRY CLINIC HOURS  TELEPHONE NUMBER    Dr. Francis Capps D.P.M [facility]    Clinics:  [facility]    [facility]   Tuesday 1PM-6PM  [place]  Wednesday 7AM-2PM  [facility]  Thursday 10AM-6PM  [place]  Friday 7AM-3PM  [place]  Specialty schedulers:   [phone] to make an appointment with any Specialty Provider.        Urgent Care locations:    [facility] Monday-Friday 5 pm - 9 pm. Saturday-Sunday 9 am -5pm    Monday-Friday 11 am - 9 pm Saturday 9 am - 5 pm     Monday-Sunday 12 noon-8PM [phone]     If you need a medication refill, please contact us you may need lab work and/or a follow up visit prior to your refill (i.e. Antifungal medications).    [employer] (secure e-mail communication and access to your chart) to send a message or to make an appointment.    If MRI needed please call [facility] at [phone]        Weight management plan: Patient was referred to their PCP to discuss a diet and exercise plan.     Patient to follow up with Primary Care provider regarding elevated blood pressure.
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
## 2018-06-13 NOTE — LETTER
6/13/2018         RE: Katie Aponte  [address]  [place] [address]        Dear Colleague,    Thank you for referring your patient, Katie Aponte, to the [facility]. Please see a copy of my visit note below.    Subjective:    Pt is seen today in consult form Joo Ibrahim with the c/c of painful left foot.  This did with pain in left Achilles insertion 3 months ago and now for the last month has pain lateral ankle.  Lateral ankle pain is now more bothersome and points to peroneal tendon course laterally.  Pt denies any hx of trauma.  Aggravated by activity and releaved by rest.  Describes as burning pain.  Is slowly eating worse and tried ankle brace which did not help.  Denies erythema, ecchymosis, snapping, weakness.     ROS:  A 10-point review of systems was performed and is positive for that noted in the HPI and as seen above.  All other areas are negative.         Allergies   Allergen Reactions     Droperidol Itching     Feels jumpy     Hydrocodone-Acetaminophen      PN: LW Reaction: Vomiting     Percocet [Oxycodone-Acetaminophen] Nausea and Vomiting and GI Disturbance       Current Outpatient Prescriptions   Medication Sig Dispense Refill     albuterol (2.5 MG/3ML) 0.083% neb solution Take 1 vial (2.5 mg) by nebulization every 6 hours as needed for shortness of breath / dyspnea or wheezing 25 vial 1     albuterol (PROAIR HFA/PROVENTIL HFA/VENTOLIN HFA) 108 (90 BASE) MCG/ACT Inhaler Inhale 2 puffs into the lungs every 6 hours as needed for shortness of breath / dyspnea or wheezing 1 Inhaler 2     desonide (DESOWEN) 0.05 % ointment Apply topically 2 times daily 30 g 0     Estrogens Conjugated (PREMARIN PO) Take 0.9 mg by mouth daily       estrogens conjugated (PREMARIN) 0.9 MG TABS Take 0.3 mg by mouth daily       FLUOCINOLONE ACETONIDE SCALP 0.01 % OIL oil Apply topically 2 times daily as needed 118 mL 0     fluticasone (FLONASE) 50 MCG/ACT spray Spray 1-2 sprays into both  nostrils daily 1 Bottle 11     fluticasone (FLOVENT HFA) 44 MCG/ACT Inhaler Inhale 2 puffs into the lungs 2 times daily 1 Inhaler 1     ipratropium - albuterol 0.5 mg/2.5 mg/3 mL (DUONEB) 0.5-2.5 (3) MG/3ML neb solution Take 1 vial (3 mLs) by nebulization every 6 hours as needed for shortness of breath / dyspnea or wheezing 1 vial 0     multivitamin, therapeutic with minerals (MULTI-VITAMIN) TABS tablet Take 1 tablet by mouth daily       order for DME Equipment being ordered: Knee walker  1 Device 0     order for DME Equipment being ordered: Nebulizer with tubing and instructions 1 Device 0     order for DME Equipment being ordered: Carex Bed Buddy- heated neck roll 1 Device 0     order for DME Equipment being ordered: TENS 1 Units 0     triamcinolone (KENALOG) 0.1 % cream Apply sparingly to affected area three times daily as needed 80 g 1     valACYclovir (VALTREX) 500 MG tablet Take 500 mg by mouth as needed Reported on 2017         Patient Active Problem List   Diagnosis     Knee pain     Abnormal uterine bleeding     CARDIOVASCULAR SCREENING; LDL GOAL LESS THAN 160     Acne rosacea     Dermatitis     Leukopenia     Allergic bronchitis, moderate persistent, uncomplicated     Mild persistent asthma with acute exacerbation     JESENIA (stress urinary incontinence, female)     Cervical radiculopathy     Class 1 obesity due to excess calories without serious comorbidity with body mass index (BMI) of 30.0 to 30.9 in adult     Medial meniscus tear     Pain in joint, ankle and foot     Tear of medial meniscus of knee       Past Medical History:   Diagnosis Date     Herpes     Under eye     Seasonal allergies      Thrombocytopenia (H) 2015     Uncomplicated asthma     very mild       Past Surgical History:   Procedure Laterality Date      SECTION       COLONOSCOPY       CYSTOSCOPY, SLING TRANSVAGINAL N/A 2017    Procedure: CYSTOSCOPY, SLING TRANSVAGINAL;  TRANSVAGINAL TAPING, CYSTOSCOPY, MID  URETHRAL SLING;  Surgeon: Jett Montes MD;  Location:  OR     DILATE CERVIX, ABLATE ENDOMETRIUM THERMACHOICE, COMBINED  4/10/2014    Procedure: COMBINED DILATE CERVIX, ABLATE ENDOMETRIUM THERMACHOICE;;  Surgeon: Jett Montes MD;  Location: [facility]     DILATION AND CURETTAGE, HYSTEROSCOPY, ABLATE ENDOMETRIUM NOVASURE, COMBINED  4/10/2014    Procedure: COMBINED DILATION AND CURETTAGE, HYSTEROSCOPY, ABLATE ENDOMETRIUM NOVASURE;  HYSTEROSCOPY, DILATION AND CURETTAGE, NOVASURE ABLATION ATTEMPTED, THERMACHOICE ABLATION ATTEMPTED;  Surgeon: Jett Montes MD;  Location: [facility]     LAPAROSCOPIC ASSISTED HYSTERECTOMY VAGINAL, BILATERAL SALPINGO-OOPHORECTOMY, COMBINED  7/31/2014    Procedure: COMBINED LAPAROSCOPIC ASSISTED HYSTERECTOMY VAGINAL, SALPINGO-OOPHORECTOMY;  Surgeon: Jett Montes MD;  Location: [facility]     ORTHOPEDIC SURGERY      L KNEE MENISCUS,ankle surgery, left knee replacement       Family History   Problem Relation Age of Onset     CANCER Mother      patient is unsure of what kind       Social History   Substance Use Topics     Smoking status: Never Smoker     Smokeless tobacco: Never Used     Alcohol use 0.0 oz/week     0 Standard drinks or equivalent per week      Comment: SOCIAL - 1 glass of wine twice a week; 2 drinks on the weekend             O:  /77 (BP Location: Left arm)  Pulse 60  Wt 192 lb (87.1 kg)  LMP 03/30/2014  BMI 30.07 kg/m2.     Constitutional/ general:  Pt is in no apparent distress, appears well-nourished.  Cooperative with history and physical exam.     Psych:  The patient answered questions appropriately.  Normal affect.  Seems to have reasonable expectations, in terms of treatment.     Eyes:  Visual scanning/ tracking without deficit.     Ears:  Response to auditory stimuli is normal.  No  hearing aid devices.  Auricles in proper alignment.     Lymphatic:  Popliteal lymph nodes not enlarged.     Lungs:  Non labored breathing, non labored speech. No cough.  No audible  wheezing. Even, quiet breathing.       Vascular:  Pedal pulses are palpable bilaterally for both the DP and PT arteries.  CFT < 3 sec.  No edema.  Pedal hair growth noted.     Neuro:  Alert and oriented x 3. Coordinated gait.  Light touch sensation is intact to the L4, L5, S1 distributions. No obvious deficits.  No evidence of neurological-based weakness, spasticity, or contracture in the lower extremities.     Derm: Normal texture and turgor.  No erythema, ecchymosis, or cyanosis.  No open lesions.     Musculoskeletal:    Lower extremity muscle strength is normal.  Patient is ambulatory without an assistive device or brace.     Normal arch foot foot with weightbearing bilateral.  No forefoot or rear foot deformities noted.  MS 5/5 all compartments.  Normal ROM all fore foot and rearfoot joints with no pain or crepitus.  No equinus.  Pain left foot over peroneal tendon course lateral calcaneus and lateral malleoli.  No subluxation.  Slight edema.  No masses.  No ecchymosis.  So some pain Achilles tendon insertion.  No edema here.    Radiographic Exam:  X-Ray Findings:  I personally reviewed the films, normal    A: Left Achilles tendinitis       Left peroneal tendonitis    Plan:  X-rays personally reviewed.  Discussed etiology and treatment options in detail w/ the pt.  Ice bid.  No relief with ankle brace will start cam walker today to immobilize tendons. Will dispense cam walker today.  I plantarflexed this device.  Patient to wear this at all times while walking.  When not walking patient will take this off and do ROM to prevent blood clot and joint stiffness.  Patient will not sleep with this on.  Avoid activities that bother this.  Rx for orthotics to lisha heel.  Warned of tendon tear if noncompliant.   Return to clinic 2 weeks.  If not better will consider MRI.  Thank you for allowing me participate in the care of this patient.        Francis Capps DPM, FACFAS         Again, thank you for allowing me to  participate in the care of your patient.        Sincerely,        Francis Capps DPM

## 2018-06-14 NOTE — PROGRESS NOTES
Subjective:    Pt is seen today in consult form Joo Ibrahim with the c/c of painful left foot.  This did with pain in left Achilles insertion 3 months ago and now for the last month has pain lateral ankle.  Lateral ankle pain is now more bothersome and points to peroneal tendon course laterally.  Pt denies any hx of trauma.  Aggravated by activity and releaved by rest.  Describes as burning pain.  Is slowly eating worse and tried ankle brace which did not help.  Denies erythema, ecchymosis, snapping, weakness.     ROS:  A 10-point review of systems was performed and is positive for that noted in the HPI and as seen above.  All other areas are negative.         Allergies   Allergen Reactions     Droperidol Itching     Feels jumpy     Hydrocodone-Acetaminophen      PN: LW Reaction: Vomiting     Percocet [Oxycodone-Acetaminophen] Nausea and Vomiting and GI Disturbance       Current Outpatient Prescriptions   Medication Sig Dispense Refill     albuterol (2.5 MG/3ML) 0.083% neb solution Take 1 vial (2.5 mg) by nebulization every 6 hours as needed for shortness of breath / dyspnea or wheezing 25 vial 1     albuterol (PROAIR HFA/PROVENTIL HFA/VENTOLIN HFA) 108 (90 BASE) MCG/ACT Inhaler Inhale 2 puffs into the lungs every 6 hours as needed for shortness of breath / dyspnea or wheezing 1 Inhaler 2     desonide (DESOWEN) 0.05 % ointment Apply topically 2 times daily 30 g 0     Estrogens Conjugated (PREMARIN PO) Take 0.9 mg by mouth daily       estrogens conjugated (PREMARIN) 0.9 MG TABS Take 0.3 mg by mouth daily       FLUOCINOLONE ACETONIDE SCALP 0.01 % OIL oil Apply topically 2 times daily as needed 118 mL 0     fluticasone (FLONASE) 50 MCG/ACT spray Spray 1-2 sprays into both nostrils daily 1 Bottle 11     fluticasone (FLOVENT HFA) 44 MCG/ACT Inhaler Inhale 2 puffs into the lungs 2 times daily 1 Inhaler 1     ipratropium - albuterol 0.5 mg/2.5 mg/3 mL (DUONEB) 0.5-2.5 (3) MG/3ML neb solution Take 1 vial (3 mLs) by  nebulization every 6 hours as needed for shortness of breath / dyspnea or wheezing 1 vial 0     multivitamin, therapeutic with minerals (MULTI-VITAMIN) TABS tablet Take 1 tablet by mouth daily       order for DME Equipment being ordered: Knee walker  1 Device 0     order for DME Equipment being ordered: Nebulizer with tubing and instructions 1 Device 0     order for DME Equipment being ordered: [employer] Bed Buddy- heated neck roll 1 Device 0     order for DME Equipment being ordered: TENS 1 Units 0     triamcinolone (KENALOG) 0.1 % cream Apply sparingly to affected area three times daily as needed 80 g 1     valACYclovir (VALTREX) 500 MG tablet Take 500 mg by mouth as needed Reported on 2017         Patient Active Problem List   Diagnosis     Knee pain     Abnormal uterine bleeding     CARDIOVASCULAR SCREENING; LDL GOAL LESS THAN 160     Acne rosacea     Dermatitis     Leukopenia     Allergic bronchitis, moderate persistent, uncomplicated     Mild persistent asthma with acute exacerbation     JESENIA (stress urinary incontinence, female)     Cervical radiculopathy     Class 1 obesity due to excess calories without serious comorbidity with body mass index (BMI) of 30.0 to 30.9 in adult     Medial meniscus tear     Pain in joint, ankle and foot     Tear of medial meniscus of knee       Past Medical History:   Diagnosis Date     Herpes     Under eye     Seasonal allergies      Thrombocytopenia (H) 2015     Uncomplicated asthma     very mild       Past Surgical History:   Procedure Laterality Date      SECTION       COLONOSCOPY       CYSTOSCOPY, SLING TRANSVAGINAL N/A 2017    Procedure: CYSTOSCOPY, SLING TRANSVAGINAL;  TRANSVAGINAL TAPING, CYSTOSCOPY, MID URETHRAL SLING;  Surgeon: Jett Montes MD;  Location:  OR     DILATE CERVIX, ABLATE ENDOMETRIUM THERMACHOICE, COMBINED  4/10/2014    Procedure: COMBINED DILATE CERVIX, ABLATE ENDOMETRIUM THERMACHOICE;;  Surgeon: Jett Montes MD;  Location:  [facility]     DILATION AND CURETTAGE, HYSTEROSCOPY, ABLATE ENDOMETRIUM NOVASURE, COMBINED  4/10/2014    Procedure: COMBINED DILATION AND CURETTAGE, HYSTEROSCOPY, ABLATE ENDOMETRIUM NOVASURE;  HYSTEROSCOPY, DILATION AND CURETTAGE, NOVASURE ABLATION ATTEMPTED, THERMACHOICE ABLATION ATTEMPTED;  Surgeon: Jett Montes MD;  Location: [facility]     LAPAROSCOPIC ASSISTED HYSTERECTOMY VAGINAL, BILATERAL SALPINGO-OOPHORECTOMY, COMBINED  7/31/2014    Procedure: COMBINED LAPAROSCOPIC ASSISTED HYSTERECTOMY VAGINAL, SALPINGO-OOPHORECTOMY;  Surgeon: Jett Montes MD;  Location: [facility]     ORTHOPEDIC SURGERY      L KNEE MENISCUS,ankle surgery, left knee replacement       Family History   Problem Relation Age of Onset     CANCER Mother      patient is unsure of what kind       Social History   Substance Use Topics     Smoking status: Never Smoker     Smokeless tobacco: Never Used     Alcohol use 0.0 oz/week     0 Standard drinks or equivalent per week      Comment: SOCIAL - 1 glass of wine twice a week; 2 drinks on the weekend             O:  /77 (BP Location: Left arm)  Pulse 60  Wt 192 lb (87.1 kg)  LMP 03/30/2014  BMI 30.07 kg/m2.     Constitutional/ general:  Pt is in no apparent distress, appears well-nourished.  Cooperative with history and physical exam.     Psych:  The patient answered questions appropriately.  Normal affect.  Seems to have reasonable expectations, in terms of treatment.     Eyes:  Visual scanning/ tracking without deficit.     Ears:  Response to auditory stimuli is normal.  No  hearing aid devices.  Auricles in proper alignment.     Lymphatic:  Popliteal lymph nodes not enlarged.     Lungs:  Non labored breathing, non labored speech. No cough.  No audible wheezing. Even, quiet breathing.       Vascular:  Pedal pulses are palpable bilaterally for both the DP and PT arteries.  CFT < 3 sec.  No edema.  Pedal hair growth noted.     Neuro:  Alert and oriented x 3. Coordinated gait.  Light touch sensation  is intact to the L4, L5, S1 distributions. No obvious deficits.  No evidence of neurological-based weakness, spasticity, or contracture in the lower extremities.     Derm: Normal texture and turgor.  No erythema, ecchymosis, or cyanosis.  No open lesions.     Musculoskeletal:    Lower extremity muscle strength is normal.  Patient is ambulatory without an assistive device or brace.     Normal arch foot foot with weightbearing bilateral.  No forefoot or rear foot deformities noted.  MS 5/5 all compartments.  Normal ROM all fore foot and rearfoot joints with no pain or crepitus.  No equinus.  Pain left foot over peroneal tendon course lateral calcaneus and lateral malleoli.  No subluxation.  Slight edema.  No masses.  No ecchymosis.  So some pain Achilles tendon insertion.  No edema here.    Radiographic Exam:  X-Ray Findings:  I personally reviewed the films, normal    A: Left Achilles tendinitis       Left peroneal tendonitis    Plan:  X-rays personally reviewed.  Discussed etiology and treatment options in detail w/ the pt.  Ice bid.  No relief with ankle brace will start cam walker today to immobilize tendons. Will dispense cam walker today.  I plantarflexed this device.  Patient to wear this at all times while walking.  When not walking patient will take this off and do ROM to prevent blood clot and joint stiffness.  Patient will not sleep with this on.  Avoid activities that bother this.  Rx for orthotics to lisha heel.  Warned of tendon tear if noncompliant.   Return to clinic 2 weeks.  If not better will consider MRI.  Thank you for allowing me participate in the care of this patient.        Francis Capps DPM, FACFAS

## 2018-06-18 ENCOUNTER — TELEPHONE (OUTPATIENT)
Dept: PODIATRY | Facility: CLINIC | Age: 53
End: 2018-06-18

## 2018-06-18 DIAGNOSIS — M76.72 PERONEAL TENDINITIS OF LEFT LOWER EXTREMITY: Primary | ICD-10-CM

## 2018-06-18 NOTE — LETTER
[address] [address]  Phone: [phone]    June 21, 2018        Katie Aponte  [address] [address]          To whom it may concern:    RE: Katie Celestinlps    Patient will need to work from home until she is re-evaluated by the doctor next week.    Please contact me for questions or concerns.      Sincerely,        Francis Capps DPM

## 2018-06-18 NOTE — TELEPHONE ENCOUNTER
Reason for Call:  Other     Detailed comments: Question about boot pain after wearing can I take off? Please call me back asap.     Phone Number Patient can be reached at: Home number on file [phone] (home)    Best Time: any    Can we leave a detailed message on this number? YES    Call taken on 6/18/2018 at 11:12 AM by Rachel Maki

## 2018-06-20 NOTE — TELEPHONE ENCOUNTER
Pt returned call to clinic, I explained to her that her paperwork will be at [place] 06/21/2018 as she did not want to come to [place] today.  Pt had a few questions re: pain while at work, and Dr Capps stated for her to use compressions, ace wrap , park closer to work, use knee walker to help with elevation and ice the area twice daily.  Message was relayed to Ms. Aponte, and she stated she really would like a note stating she can work from home permanently until this issue has resolved itself.  Please advise.    Chelly Caldwell CMA  6/20/2018  4:14 PM

## 2018-06-21 NOTE — TELEPHONE ENCOUNTER
Letter written patient is coming in to  her forms. Kristen [facility]        6- Katie picked up paperwork

## 2018-06-29 ENCOUNTER — OFFICE VISIT (OUTPATIENT)
Dept: PODIATRY | Facility: CLINIC | Age: 53
End: 2018-06-29
Payer: COMMERCIAL

## 2018-06-29 VITALS
BODY MASS INDEX: 30.54 KG/M2 | WEIGHT: 195 LBS | SYSTOLIC BLOOD PRESSURE: 112 MMHG | DIASTOLIC BLOOD PRESSURE: 68 MMHG | HEART RATE: 70 BPM

## 2018-06-29 DIAGNOSIS — M76.62 ACHILLES TENDINITIS OF LEFT LOWER EXTREMITY: ICD-10-CM

## 2018-06-29 DIAGNOSIS — M76.61 ACHILLES TENDINITIS OF RIGHT LOWER EXTREMITY: ICD-10-CM

## 2018-06-29 DIAGNOSIS — M76.72 PERONEAL TENDINITIS OF LEFT LOWER EXTREMITY: Primary | ICD-10-CM

## 2018-06-29 LAB
ERYTHROCYTE [SEDIMENTATION RATE] IN BLOOD BY WESTERGREN METHOD: 17 MM/H (ref 0–30)
URATE SERPL-MCNC: 4.8 MG/DL (ref 2.6–6)

## 2018-06-29 PROCEDURE — 86618 LYME DISEASE ANTIBODY: CPT | Performed by: PODIATRIST

## 2018-06-29 PROCEDURE — 85652 RBC SED RATE AUTOMATED: CPT | Performed by: PODIATRIST

## 2018-06-29 PROCEDURE — 84550 ASSAY OF BLOOD/URIC ACID: CPT | Performed by: PODIATRIST

## 2018-06-29 PROCEDURE — 99215 OFFICE O/P EST HI 40 MIN: CPT | Performed by: PODIATRIST

## 2018-06-29 PROCEDURE — 36415 COLL VENOUS BLD VENIPUNCTURE: CPT | Performed by: PODIATRIST

## 2018-06-29 PROCEDURE — 86039 ANTINUCLEAR ANTIBODIES (ANA): CPT | Performed by: PODIATRIST

## 2018-06-29 PROCEDURE — 86038 ANTINUCLEAR ANTIBODIES: CPT | Performed by: PODIATRIST

## 2018-06-29 PROCEDURE — 86431 RHEUMATOID FACTOR QUANT: CPT | Performed by: PODIATRIST

## 2018-06-29 RX ORDER — METHYLPREDNISOLONE 4 MG
4 TABLET, DOSE PACK ORAL SEE ADMIN INSTRUCTIONS
Qty: 21 TABLET | Refills: 0 | Status: SHIPPED | OUTPATIENT
Start: 2018-06-29 | End: 2018-08-09

## 2018-06-29 NOTE — LETTER
[address] [address]  Phone: [phone]    June 29, 2018        Katie Aponte  [address]  [place] [address]          To whom it may concern:    RE: Katie Aponte    Patient was seen and treated today at our clinic.    Katie can only work from home for the next two weeks. June 29th - July 14th, 2018    Please contact me for questions or concerns.      Sincerely,        Dr. Francis CASTILLOPMAO FAC FAS/lld

## 2018-06-29 NOTE — PROGRESS NOTES
Subjective:    6/13/18  Pt is seen today in consult form Joo Ibrahim with the c/c of painful left foot.  This did with pain in left Achilles insertion 3 months ago and now for the last month has pain lateral ankle.  Lateral ankle pain is now more bothersome and points to peroneal tendon course laterally.  Pt denies any hx of trauma.  Aggravated by activity and releaved by rest.  Describes as burning pain.  Is slowly eating worse and tried ankle brace which did not help.  Denies erythema, ecchymosis, snapping, weakness.  3 of right peroneal tendon repair in high school.    6/29/18 saw patient 16 days ago.  Worked 2 days after I saw her and then the following 2 weeks working at home.  Wearing the cam walker on left foot.  States left foot no better.  Having pain on the right foot now for 1 week.  Points to watershed area of Achilles tendon.  Aggravated by activity and relieved by rest.  She states her left foot hurts much more than her right.  She has taken a steroid in the past and tolerated this well.    ROS: Denies ecchymosis erythema popping weakness calf pain       Allergies   Allergen Reactions     Droperidol Itching     Feels jumpy     Hydrocodone-Acetaminophen      PN: LW Reaction: Vomiting     Percocet [Oxycodone-Acetaminophen] Nausea and Vomiting and GI Disturbance       Current Outpatient Prescriptions   Medication Sig Dispense Refill     albuterol (2.5 MG/3ML) 0.083% neb solution Take 1 vial (2.5 mg) by nebulization every 6 hours as needed for shortness of breath / dyspnea or wheezing 25 vial 1     albuterol (PROAIR HFA/PROVENTIL HFA/VENTOLIN HFA) 108 (90 BASE) MCG/ACT Inhaler Inhale 2 puffs into the lungs every 6 hours as needed for shortness of breath / dyspnea or wheezing 1 Inhaler 2     desonide (DESOWEN) 0.05 % ointment Apply topically 2 times daily 30 g 0     Estrogens Conjugated (PREMARIN PO) Take 0.9 mg by mouth daily       estrogens conjugated (PREMARIN) 0.9 MG TABS Take 0.3 mg by mouth daily        FLUOCINOLONE ACETONIDE SCALP 0.01 % OIL oil Apply topically 2 times daily as needed 118 mL 0     fluticasone (FLONASE) 50 MCG/ACT spray Spray 1-2 sprays into both nostrils daily 1 Bottle 11     fluticasone (FLOVENT HFA) 44 MCG/ACT Inhaler Inhale 2 puffs into the lungs 2 times daily 1 Inhaler 1     ipratropium - albuterol 0.5 mg/2.5 mg/3 mL (DUONEB) 0.5-2.5 (3) MG/3ML neb solution Take 1 vial (3 mLs) by nebulization every 6 hours as needed for shortness of breath / dyspnea or wheezing 1 vial 0     methylPREDNISolone (MEDROL DOSEPAK) 4 MG tablet Take 1 tablet (4 mg) by mouth See Admin Instructions follow package directions 21 tablet 0     multivitamin, therapeutic with minerals (MULTI-VITAMIN) TABS tablet Take 1 tablet by mouth daily       order for DME Equipment being ordered:  Knee walker 1 Units 0     order for DME Equipment being ordered: Knee walker  1 Device 0     order for DME Equipment being ordered: Nebulizer with tubing and instructions 1 Device 0     order for DME Equipment being ordered: Carex Bed Buddy- heated neck roll 1 Device 0     order for DME Equipment being ordered: TENS 1 Units 0     triamcinolone (KENALOG) 0.1 % cream Apply sparingly to affected area three times daily as needed 80 g 1     valACYclovir (VALTREX) 500 MG tablet Take 500 mg by mouth as needed Reported on 4/11/2017         Patient Active Problem List   Diagnosis     Knee pain     Abnormal uterine bleeding     CARDIOVASCULAR SCREENING; LDL GOAL LESS THAN 160     Acne rosacea     Dermatitis     Leukopenia     Allergic bronchitis, moderate persistent, uncomplicated     Mild persistent asthma with acute exacerbation     JESENIA (stress urinary incontinence, female)     Cervical radiculopathy     Class 1 obesity due to excess calories without serious comorbidity with body mass index (BMI) of 30.0 to 30.9 in adult     Medial meniscus tear     Pain in joint, ankle and foot     Tear of medial meniscus of knee       Past Medical History:    Diagnosis Date     Herpes     Under eye     Seasonal allergies      Thrombocytopenia (H) 2015     Uncomplicated asthma     very mild       Past Surgical History:   Procedure Laterality Date      SECTION       COLONOSCOPY       CYSTOSCOPY, SLING TRANSVAGINAL N/A 2017    Procedure: CYSTOSCOPY, SLING TRANSVAGINAL;  TRANSVAGINAL TAPING, CYSTOSCOPY, MID URETHRAL SLING;  Surgeon: Jett Montes MD;  Location:  OR     DILATE CERVIX, ABLATE ENDOMETRIUM THERMACHOICE, COMBINED  4/10/2014    Procedure: COMBINED DILATE CERVIX, ABLATE ENDOMETRIUM THERMACHOICE;;  Surgeon: Jett Montes MD;  Location: [facility]     DILATION AND CURETTAGE, HYSTEROSCOPY, ABLATE ENDOMETRIUM NOVASURE, COMBINED  4/10/2014    Procedure: COMBINED DILATION AND CURETTAGE, HYSTEROSCOPY, ABLATE ENDOMETRIUM NOVASURE;  HYSTEROSCOPY, DILATION AND CURETTAGE, NOVASURE ABLATION ATTEMPTED, THERMACHOICE ABLATION ATTEMPTED;  Surgeon: Jett Montes MD;  Location: [facility]     LAPAROSCOPIC ASSISTED HYSTERECTOMY VAGINAL, BILATERAL SALPINGO-OOPHORECTOMY, COMBINED  2014    Procedure: COMBINED LAPAROSCOPIC ASSISTED HYSTERECTOMY VAGINAL, SALPINGO-OOPHORECTOMY;  Surgeon: Jett Montes MD;  Location: [facility]     ORTHOPEDIC SURGERY      L KNEE MENISCUS,ankle surgery, left knee replacement       Family History   Problem Relation Age of Onset     Cancer Mother      patient is unsure of what kind       Social History   Substance Use Topics     Smoking status: Never Smoker     Smokeless tobacco: Never Used     Alcohol use 0.0 oz/week     0 Standard drinks or equivalent per week      Comment: SOCIAL - 1 glass of wine twice a week; 2 drinks on the weekend             O:  /68 (BP Location: Left arm)  Pulse 70  Wt 195 lb (88.5 kg)  LMP 2014  BMI 30.54 kg/m2.     Constitutional/ general:  Pt is in no apparent distress, appears well-nourished.  Cooperative with history and physical exam.     Psych:  The patient answered questions appropriately.   Normal affect.  Seems to have reasonable expectations, in terms of treatment.     Eyes:  Visual scanning/ tracking without deficit.     Ears:  Response to auditory stimuli is normal.  No  hearing aid devices.  Auricles in proper alignment.     Lymphatic:  Popliteal lymph nodes not enlarged.     Lungs:  Non labored breathing, non labored speech. No cough.  No audible wheezing. Even, quiet breathing.       Vascular:  Pedal pulses are palpable bilaterally for both the DP and PT arteries.  CFT < 3 sec.  No edema.  Pedal hair growth noted.     Neuro:  Alert and oriented x 3. Coordinated gait.  Light touch sensation is intact to the L4, L5, S1 distributions. No obvious deficits.  No evidence of neurological-based weakness, spasticity, or contracture in the lower extremities.     Derm: Normal texture and turgor.  No erythema, ecchymosis, or cyanosis.  No open lesions.     Musculoskeletal:    Lower extremity muscle strength is normal.  Patient is ambulatory without an assistive device or brace.     Normal arch foot foot with weightbearing bilateral.  No forefoot or rear foot deformities noted.  MS 5/5 all compartments.  Normal ROM all fore foot and rearfoot joints with no pain or crepitus.  No equinus.  Pain left foot over peroneal tendon course lateral calcaneus and lateral malleoli.  No subluxation.  Slight edema.  No masses.  No ecchymosis.      So some pain Achilles tendon insertion left foot especially lateral and somewhat central..  No edema here.    Pain right Achilles tendon at watershed area.  Edema noted here.  No deficit and tendon.  With stressing this tendon some discomfort.    Radiographic Exam:  X-Ray Findings:  I personally reviewed the films, normal    A: Left insertional Achilles tendinitis       Left peroneal tendonitis      Right Achilles tendinitis watershed area    Plan:  For both feet discussed aggressive icing.  She new cam walker on left foot.  Gave patient heel lift for right.  Prescription written  for Medrol dose pack.  Risks complications and efficacy discussed.  To lab to rule out inflammatory arthritis.  MRI right Achilles and left rear foot.  Will call patient with lab and MRI results.  Note for work only working at home for next 2 weeks.  40 minutes spent in total time caring for this patient and at least 25 minutes spent  face to face with patient regarding care.          Francis Capps DPM, FACFAS

## 2018-06-29 NOTE — PATIENT INSTRUCTIONS
We wish you continued good healing. If you have any questions or concerns, please do not hesitate to contact us at [phone]    Please remember to call and schedule a follow up appointment if one was recommended at your earliest convenience.   PODIATRY CLINIC HOURS  TELEPHONE NUMBER    Dr. Francis Capps D.P.M [facility]    Clinics:  [facility]    [facility]   Tuesday 1PM-6PM  [place]  Wednesday 7AM-2PM  [facility]  Thursday 10AM-6PM  [place]  Friday 7AM-3PM  [place]  Specialty schedulers:   [phone] to make an appointment with any Specialty Provider.        Urgent Care locations:    [facility] Monday-Friday 5 pm - 9 pm. Saturday-Sunday 9 am -5pm    Monday-Friday 11 am - 9 pm Saturday 9 am - 5 pm     Monday-Sunday 12 noon-8PM [phone]     If you need a medication refill, please contact us you may need lab work and/or a follow up visit prior to your refill (i.e. Antifungal medications).    [employer] (secure e-mail communication and access to your chart) to send a message or to make an appointment.    If MRI needed please call [facility] at [phone]        Weight management plan: Patient was referred to their PCP to discuss a diet and exercise plan.
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
## 2018-06-29 NOTE — LETTER
6/29/2018         RE: Katie Aponte  [address]  [place] [address]        Dear Colleague,    Thank you for referring your patient, Katie Aponte, to the [facility]. Please see a copy of my visit note below.    Subjective:    6/13/18  Pt is seen today in consult form Joo Ibrahim with the c/c of painful left foot.  This did with pain in left Achilles insertion 3 months ago and now for the last month has pain lateral ankle.  Lateral ankle pain is now more bothersome and points to peroneal tendon course laterally.  Pt denies any hx of trauma.  Aggravated by activity and releaved by rest.  Describes as burning pain.  Is slowly eating worse and tried ankle brace which did not help.  Denies erythema, ecchymosis, snapping, weakness.  3 of right peroneal tendon repair in high school.    6/29/18 saw patient 16 days ago.  Worked 2 days after I saw her and then the following 2 weeks working at home.  Wearing the cam walker on left foot.  States left foot no better.  Having pain on the right foot now for 1 week.  Points to watershed area of Achilles tendon.  Aggravated by activity and relieved by rest.  She states her left foot hurts much more than her right.  She has taken a steroid in the past and tolerated this well.    ROS: Denies ecchymosis erythema popping weakness calf pain       Allergies   Allergen Reactions     Droperidol Itching     Feels jumpy     Hydrocodone-Acetaminophen      PN: LW Reaction: Vomiting     Percocet [Oxycodone-Acetaminophen] Nausea and Vomiting and GI Disturbance       Current Outpatient Prescriptions   Medication Sig Dispense Refill     albuterol (2.5 MG/3ML) 0.083% neb solution Take 1 vial (2.5 mg) by nebulization every 6 hours as needed for shortness of breath / dyspnea or wheezing 25 vial 1     albuterol (PROAIR HFA/PROVENTIL HFA/VENTOLIN HFA) 108 (90 BASE) MCG/ACT Inhaler Inhale 2 puffs into the lungs every 6 hours as needed for shortness of breath /  dyspnea or wheezing 1 Inhaler 2     desonide (DESOWEN) 0.05 % ointment Apply topically 2 times daily 30 g 0     Estrogens Conjugated (PREMARIN PO) Take 0.9 mg by mouth daily       estrogens conjugated (PREMARIN) 0.9 MG TABS Take 0.3 mg by mouth daily       FLUOCINOLONE ACETONIDE SCALP 0.01 % OIL oil Apply topically 2 times daily as needed 118 mL 0     fluticasone (FLONASE) 50 MCG/ACT spray Spray 1-2 sprays into both nostrils daily 1 Bottle 11     fluticasone (FLOVENT HFA) 44 MCG/ACT Inhaler Inhale 2 puffs into the lungs 2 times daily 1 Inhaler 1     ipratropium - albuterol 0.5 mg/2.5 mg/3 mL (DUONEB) 0.5-2.5 (3) MG/3ML neb solution Take 1 vial (3 mLs) by nebulization every 6 hours as needed for shortness of breath / dyspnea or wheezing 1 vial 0     methylPREDNISolone (MEDROL DOSEPAK) 4 MG tablet Take 1 tablet (4 mg) by mouth See Admin Instructions follow package directions 21 tablet 0     multivitamin, therapeutic with minerals (MULTI-VITAMIN) TABS tablet Take 1 tablet by mouth daily       order for DME Equipment being ordered:  Knee walker 1 Units 0     order for DME Equipment being ordered: Knee walker  1 Device 0     order for DME Equipment being ordered: Nebulizer with tubing and instructions 1 Device 0     order for DME Equipment being ordered: Carex Bed Buddy- heated neck roll 1 Device 0     order for DME Equipment being ordered: TENS 1 Units 0     triamcinolone (KENALOG) 0.1 % cream Apply sparingly to affected area three times daily as needed 80 g 1     valACYclovir (VALTREX) 500 MG tablet Take 500 mg by mouth as needed Reported on 4/11/2017         Patient Active Problem List   Diagnosis     Knee pain     Abnormal uterine bleeding     CARDIOVASCULAR SCREENING; LDL GOAL LESS THAN 160     Acne rosacea     Dermatitis     Leukopenia     Allergic bronchitis, moderate persistent, uncomplicated     Mild persistent asthma with acute exacerbation     JESENIA (stress urinary incontinence, female)     Cervical  radiculopathy     Class 1 obesity due to excess calories without serious comorbidity with body mass index (BMI) of 30.0 to 30.9 in adult     Medial meniscus tear     Pain in joint, ankle and foot     Tear of medial meniscus of knee       Past Medical History:   Diagnosis Date     Herpes     Under eye     Seasonal allergies      Thrombocytopenia (H) 2015     Uncomplicated asthma     very mild       Past Surgical History:   Procedure Laterality Date      SECTION       COLONOSCOPY       CYSTOSCOPY, SLING TRANSVAGINAL N/A 2017    Procedure: CYSTOSCOPY, SLING TRANSVAGINAL;  TRANSVAGINAL TAPING, CYSTOSCOPY, MID URETHRAL SLING;  Surgeon: Jett Montes MD;  Location:  OR     DILATE CERVIX, ABLATE ENDOMETRIUM THERMACHOICE, COMBINED  4/10/2014    Procedure: COMBINED DILATE CERVIX, ABLATE ENDOMETRIUM THERMACHOICE;;  Surgeon: Jett Montes MD;  Location: [facility]     DILATION AND CURETTAGE, HYSTEROSCOPY, ABLATE ENDOMETRIUM NOVASURE, COMBINED  4/10/2014    Procedure: COMBINED DILATION AND CURETTAGE, HYSTEROSCOPY, ABLATE ENDOMETRIUM NOVASURE;  HYSTEROSCOPY, DILATION AND CURETTAGE, NOVASURE ABLATION ATTEMPTED, THERMACHOICE ABLATION ATTEMPTED;  Surgeon: Jett Montes MD;  Location: [facility]     LAPAROSCOPIC ASSISTED HYSTERECTOMY VAGINAL, BILATERAL SALPINGO-OOPHORECTOMY, COMBINED  2014    Procedure: COMBINED LAPAROSCOPIC ASSISTED HYSTERECTOMY VAGINAL, SALPINGO-OOPHORECTOMY;  Surgeon: Jett Montes MD;  Location: [facility]     ORTHOPEDIC SURGERY      L KNEE MENISCUS,ankle surgery, left knee replacement       Family History   Problem Relation Age of Onset     Cancer Mother      patient is unsure of what kind       Social History   Substance Use Topics     Smoking status: Never Smoker     Smokeless tobacco: Never Used     Alcohol use 0.0 oz/week     0 Standard drinks or equivalent per week      Comment: SOCIAL - 1 glass of wine twice a week; 2 drinks on the weekend             O:  /68 (BP Location: Left  arm)  Pulse 70  Wt 195 lb (88.5 kg)  LMP 03/30/2014  BMI 30.54 kg/m2.     Constitutional/ general:  Pt is in no apparent distress, appears well-nourished.  Cooperative with history and physical exam.     Psych:  The patient answered questions appropriately.  Normal affect.  Seems to have reasonable expectations, in terms of treatment.     Eyes:  Visual scanning/ tracking without deficit.     Ears:  Response to auditory stimuli is normal.  No  hearing aid devices.  Auricles in proper alignment.     Lymphatic:  Popliteal lymph nodes not enlarged.     Lungs:  Non labored breathing, non labored speech. No cough.  No audible wheezing. Even, quiet breathing.       Vascular:  Pedal pulses are palpable bilaterally for both the DP and PT arteries.  CFT < 3 sec.  No edema.  Pedal hair growth noted.     Neuro:  Alert and oriented x 3. Coordinated gait.  Light touch sensation is intact to the L4, L5, S1 distributions. No obvious deficits.  No evidence of neurological-based weakness, spasticity, or contracture in the lower extremities.     Derm: Normal texture and turgor.  No erythema, ecchymosis, or cyanosis.  No open lesions.     Musculoskeletal:    Lower extremity muscle strength is normal.  Patient is ambulatory without an assistive device or brace.     Normal arch foot foot with weightbearing bilateral.  No forefoot or rear foot deformities noted.  MS 5/5 all compartments.  Normal ROM all fore foot and rearfoot joints with no pain or crepitus.  No equinus.  Pain left foot over peroneal tendon course lateral calcaneus and lateral malleoli.  No subluxation.  Slight edema.  No masses.  No ecchymosis.      So some pain Achilles tendon insertion left foot especially lateral and somewhat central..  No edema here.    Pain right Achilles tendon at watershed area.  Edema noted here.  No deficit and tendon.  With stressing this tendon some discomfort.    Radiographic Exam:  X-Ray Findings:  I personally reviewed the films,  normal    A: Left insertional Achilles tendinitis       Left peroneal tendonitis      Right Achilles tendinitis watershed area    Plan:  For both feet discussed aggressive icing.  She new cam walker on left foot.  Gave patient heel lift for right.  Prescription written for Medrol dose pack.  Risks complications and efficacy discussed.  To lab to rule out inflammatory arthritis.  MRI right Achilles and left rear foot.  Will call patient with lab and MRI results.  Note for work only working at home for next 2 weeks.  40 minutes spent in total time caring for this patient and at least 25 minutes spent  face to face with patient regarding care.          Francis Capps DPM, FACFAS         Again, thank you for allowing me to participate in the care of your patient.        Sincerely,        Francis Capps DPM

## 2018-07-01 LAB — B BURGDOR IGG+IGM SER QL: 0.15 (ref 0–0.89)

## 2018-07-02 LAB
ANA PAT SER IF-IMP: ABNORMAL
ANA SER QL IF: ABNORMAL
ANA TITR SER IF: ABNORMAL {TITER}
RHEUMATOID FACT SER NEPH-ACNC: 84 IU/ML (ref 0–20)

## 2018-07-03 ENCOUNTER — RADIANT APPOINTMENT (OUTPATIENT)
Dept: MRI IMAGING | Facility: CLINIC | Age: 53
End: 2018-07-03
Attending: PODIATRIST
Payer: COMMERCIAL

## 2018-07-03 DIAGNOSIS — M76.61 ACHILLES TENDINITIS OF RIGHT LOWER EXTREMITY: ICD-10-CM

## 2018-07-03 DIAGNOSIS — M76.62 ACHILLES TENDINITIS OF LEFT LOWER EXTREMITY: ICD-10-CM

## 2018-07-03 DIAGNOSIS — M76.72 PERONEAL TENDINITIS OF LEFT LOWER EXTREMITY: ICD-10-CM

## 2018-07-03 PROCEDURE — 73721 MRI JNT OF LWR EXTRE W/O DYE: CPT | Mod: TC

## 2018-07-11 ENCOUNTER — TELEPHONE (OUTPATIENT)
Dept: PODIATRY | Facility: CLINIC | Age: 53
End: 2018-07-11

## 2018-07-11 NOTE — TELEPHONE ENCOUNTER
Reason for Call:  Other     Detailed comments: patient called and is not able to see dr read until November and needs up dated paper work about retuning to work, and is to go back 7/14/18 and cant get in to see Dr Medley in [place] untill 8/31/18 patient said that she is not able to go back to work yet please cristiano to discuss     Phone Number Patient can be reached at: Home number on file [phone] (home)    Best Time: any    Can we leave a detailed message on this number? YES    Call taken on 7/11/2018 at 9:04 AM by Carolyn Pendleton

## 2018-07-11 NOTE — LETTER
[address] [address]  Phone: [phone]    July 12, 2018        Katie Aponte  [address]  [place] [address]          To whom it may concern:    RE: Katie Aponte    Patient was seen and treated at our clinic.    Patient may return to work  with the following restrictions: please allow her her leave work as needed for her foot pain. If needed she may need to work from home if the pain is persistent. This restriction is until 8/31/18.      Please contact me for questions or concerns.      Sincerely,        Dr. Francis Capps D.P.M FAC FAS/lld

## 2018-07-11 NOTE — TELEPHONE ENCOUNTER
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   Ok to  at the [place] office when letter is complete    Cyn Mosley, CMA

## 2018-07-12 NOTE — TELEPHONE ENCOUNTER
Letter completed and placed at the   Cyn Mosley [facility]      Patient, Katie Aponte came in on 07/12/2018 and picked up letter.    Yasmeen MISHRA

## 2018-07-16 ENCOUNTER — TELEPHONE (OUTPATIENT)
Dept: PODIATRY | Facility: CLINIC | Age: 53
End: 2018-07-16

## 2018-07-16 NOTE — TELEPHONE ENCOUNTER
REASON FOR MESSAGE  Patient is requesting:        FORMS      Type of form needing completion:  FMLA    Date form needed:  7/28/2018    Once complete:  Fax form to:   [phone]    Has patient seen provider for appointment related to diagnosis or condition in form?  Yes      Name of provider:  Dr. Francis BROUSSARD      Date of visit:  06/29/18

## 2018-07-17 NOTE — TELEPHONE ENCOUNTER
Form completed for: Katie Aponte  What was done with form: Fax form to:   [phone]     patient informed  Cyn Mosley CMA

## 2018-07-20 ENCOUNTER — TELEPHONE (OUTPATIENT)
Dept: PODIATRY | Facility: CLINIC | Age: 53
End: 2018-07-20

## 2018-07-20 NOTE — TELEPHONE ENCOUNTER
Reason for Call:  Other     Detailed comments: patient said that she faxed in some FMLA forms 7/19/18 and would like to speak with Dr Capps patient stated that she had to have the forms back by the 24th of July     Phone Number Patient can be reached at: Home number on file [phone] (home)    Best Time: any    Can we leave a detailed message on this number? YES    Call taken on 7/20/2018 at 10:48 AM by Carolyn Pendleton

## 2018-08-06 ENCOUNTER — TELEPHONE (OUTPATIENT)
Dept: PODIATRY | Facility: CLINIC | Age: 53
End: 2018-08-06

## 2018-08-06 NOTE — TELEPHONE ENCOUNTER
Reason for call:  Patient reporting a symptom    Symptom or request: pain in the arch of her foot     Duration (how long have symptoms been present): couple days    Have you been treated for this before? Yes    Additional comments: Patient called wandering if this is normal, cause she is in a lot of pain when she tries to walk    Phone Number patient can be reached at:  Home number on file [phone] (home)    Best Time:  anytime    Can we leave a detailed message on this number:  YES    Call taken on 8/6/2018 at 10:38 AM by Martha Sam

## 2018-08-08 NOTE — TELEPHONE ENCOUNTER
Could be plantar fascitis, possibly tendonitis.  Patient would have to be seen in clinic to differentiate.  Make sure patient has appointment to see rheumatologist.         Per Dr. Capps-           patient informed. She has as appt set up on thurs at 515 to see  and Rheumatology on 8/31/18    Cyn Mosley CMA

## 2018-08-09 ENCOUNTER — OFFICE VISIT (OUTPATIENT)
Dept: PODIATRY | Facility: CLINIC | Age: 53
End: 2018-08-09
Payer: COMMERCIAL

## 2018-08-09 VITALS
DIASTOLIC BLOOD PRESSURE: 78 MMHG | WEIGHT: 194 LBS | HEART RATE: 86 BPM | SYSTOLIC BLOOD PRESSURE: 120 MMHG | BODY MASS INDEX: 30.38 KG/M2

## 2018-08-09 DIAGNOSIS — M76.72 PERONEAL TENDINITIS OF LEFT LOWER EXTREMITY: Primary | ICD-10-CM

## 2018-08-09 DIAGNOSIS — M76.62 ACHILLES TENDINITIS OF LEFT LOWER EXTREMITY: ICD-10-CM

## 2018-08-09 DIAGNOSIS — M76.61 ACHILLES TENDINITIS OF RIGHT LOWER EXTREMITY: ICD-10-CM

## 2018-08-09 PROCEDURE — 99213 OFFICE O/P EST LOW 20 MIN: CPT | Performed by: PODIATRIST
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
## 2018-08-09 NOTE — LETTER
8/9/2018         RE: Katie Aponte  [address]  [place] [address]        Dear Colleague,    Thank you for referring your patient, Katie Aponte, to the [facility]. Please see a copy of my visit note below.    Subjective:    6/13/18  Pt is seen today in consult form Joo Ibrahim with the c/c of painful left foot.  This did with pain in left Achilles insertion 3 months ago and now for the last month has pain lateral ankle.  Lateral ankle pain is now more bothersome and points to peroneal tendon course laterally.  Pt denies any hx of trauma.  Aggravated by activity and releaved by rest.  Describes as burning pain.  Is slowly eating worse and tried ankle brace which did not help.  Denies erythema, ecchymosis, snapping, weakness.  3 of right peroneal tendon repair in high school.    6/29/18 saw patient 16 days ago.  Worked 2 days after I saw her and then the following 2 weeks working at home.  Wearing the cam walker on left foot.  States left foot no better.  Having pain on the right foot now for 1 week.  Points to watershed area of Achilles tendon.  Aggravated by activity and relieved by rest.  She states her left foot hurts much more than her right.  She has taken a steroid in the past and tolerated this well.    8/9/18 patient states that steroid given last visit helped significantly.  Edema greatly reduced but still having some pain.  Just wearing sandals now.  Barefoot around the house.  She is decreasing her activities.  Seeing rheumatology the end of the month    ROS: Denies ecchymosis erythema popping weakness calf pain       Allergies   Allergen Reactions     Droperidol Itching     Feels jumpy     Hydrocodone-Acetaminophen      PN: LW Reaction: Vomiting     Percocet [Oxycodone-Acetaminophen] Nausea and Vomiting and GI Disturbance       Current Outpatient Prescriptions   Medication Sig Dispense Refill     albuterol (2.5 MG/3ML) 0.083% neb solution Take 1 vial (2.5 mg) by  nebulization every 6 hours as needed for shortness of breath / dyspnea or wheezing 25 vial 1     albuterol (PROAIR HFA/PROVENTIL HFA/VENTOLIN HFA) 108 (90 BASE) MCG/ACT Inhaler Inhale 2 puffs into the lungs every 6 hours as needed for shortness of breath / dyspnea or wheezing 1 Inhaler 2     desonide (DESOWEN) 0.05 % ointment Apply topically 2 times daily 30 g 0     Estrogens Conjugated (PREMARIN PO) Take 0.9 mg by mouth daily       estrogens conjugated (PREMARIN) 0.9 MG TABS Take 0.3 mg by mouth daily       FLUOCINOLONE ACETONIDE SCALP 0.01 % OIL oil Apply topically 2 times daily as needed 118 mL 0     fluticasone (FLONASE) 50 MCG/ACT spray Spray 1-2 sprays into both nostrils daily 1 Bottle 11     fluticasone (FLOVENT HFA) 44 MCG/ACT Inhaler Inhale 2 puffs into the lungs 2 times daily 1 Inhaler 1     ipratropium - albuterol 0.5 mg/2.5 mg/3 mL (DUONEB) 0.5-2.5 (3) MG/3ML neb solution Take 1 vial (3 mLs) by nebulization every 6 hours as needed for shortness of breath / dyspnea or wheezing 1 vial 0     methylPREDNISolone (MEDROL DOSEPAK) 4 MG tablet Take 1 tablet (4 mg) by mouth See Admin Instructions follow package directions 21 tablet 0     multivitamin, therapeutic with minerals (MULTI-VITAMIN) TABS tablet Take 1 tablet by mouth daily       order for DME Equipment being ordered:  Knee walker 1 Units 0     order for DME Equipment being ordered: Knee walker  1 Device 0     order for DME Equipment being ordered: Nebulizer with tubing and instructions 1 Device 0     order for DME Equipment being ordered: Carex Bed Buddy- heated neck roll 1 Device 0     order for DME Equipment being ordered: TENS 1 Units 0     triamcinolone (KENALOG) 0.1 % cream Apply sparingly to affected area three times daily as needed 80 g 1     valACYclovir (VALTREX) 500 MG tablet Take 500 mg by mouth as needed Reported on 4/11/2017         Patient Active Problem List   Diagnosis     Knee pain     Abnormal uterine bleeding     CARDIOVASCULAR  SCREENING; LDL GOAL LESS THAN 160     Acne rosacea     Dermatitis     Leukopenia     Allergic bronchitis, moderate persistent, uncomplicated     Mild persistent asthma with acute exacerbation     JESENIA (stress urinary incontinence, female)     Cervical radiculopathy     Class 1 obesity due to excess calories without serious comorbidity with body mass index (BMI) of 30.0 to 30.9 in adult     Medial meniscus tear     Pain in joint, ankle and foot     Tear of medial meniscus of knee       Past Medical History:   Diagnosis Date     Herpes     Under eye     Seasonal allergies      Thrombocytopenia (H) 2015     Uncomplicated asthma     very mild       Past Surgical History:   Procedure Laterality Date      SECTION       COLONOSCOPY       CYSTOSCOPY, SLING TRANSVAGINAL N/A 2017    Procedure: CYSTOSCOPY, SLING TRANSVAGINAL;  TRANSVAGINAL TAPING, CYSTOSCOPY, MID URETHRAL SLING;  Surgeon: Jett Montes MD;  Location:  OR     DILATE CERVIX, ABLATE ENDOMETRIUM THERMACHOICE, COMBINED  4/10/2014    Procedure: COMBINED DILATE CERVIX, ABLATE ENDOMETRIUM THERMACHOICE;;  Surgeon: Jett Montes MD;  Location: [facility]     DILATION AND CURETTAGE, HYSTEROSCOPY, ABLATE ENDOMETRIUM NOVASURE, COMBINED  4/10/2014    Procedure: COMBINED DILATION AND CURETTAGE, HYSTEROSCOPY, ABLATE ENDOMETRIUM NOVASURE;  HYSTEROSCOPY, DILATION AND CURETTAGE, NOVASURE ABLATION ATTEMPTED, THERMACHOICE ABLATION ATTEMPTED;  Surgeon: Jett Montes MD;  Location: [facility]     LAPAROSCOPIC ASSISTED HYSTERECTOMY VAGINAL, BILATERAL SALPINGO-OOPHORECTOMY, COMBINED  2014    Procedure: COMBINED LAPAROSCOPIC ASSISTED HYSTERECTOMY VAGINAL, SALPINGO-OOPHORECTOMY;  Surgeon: Jett Montes MD;  Location: [facility]     ORTHOPEDIC SURGERY      L KNEE MENISCUS,ankle surgery, left knee replacement       Family History   Problem Relation Age of Onset     Cancer Mother      patient is unsure of what kind       Social History   Substance Use Topics     Smoking  status: Never Smoker     Smokeless tobacco: Never Used     Alcohol use 0.0 oz/week     0 Standard drinks or equivalent per week      Comment: SOCIAL - 1 glass of wine twice a week; 2 drinks on the weekend             O:  LMP 03/30/2014.     Constitutional/ general:  Pt is in no apparent distress, appears well-nourished.  Cooperative with history and physical exam.     Psych:  The patient answered questions appropriately.  Normal affect.  Seems to have reasonable expectations, in terms of treatment.     Eyes:  Visual scanning/ tracking without deficit.     Ears:  Response to auditory stimuli is normal.  No  hearing aid devices.  Auricles in proper alignment.     Lymphatic:  Popliteal lymph nodes not enlarged.     Lungs:  Non labored breathing, non labored speech. No cough.  No audible wheezing. Even, quiet breathing.       Vascular:  Pedal pulses are palpable bilaterally for both the DP and PT arteries.  CFT < 3 sec.  No edema.  Pedal hair growth noted.     Neuro:  Alert and oriented x 3. Coordinated gait.  Light touch sensation is intact to the L4, L5, S1 distributions. No obvious deficits.  No evidence of neurological-based weakness, spasticity, or contracture in the lower extremities.     Derm: Normal texture and turgor.  No erythema, ecchymosis, or cyanosis.  No open lesions.     Musculoskeletal:    Lower extremity muscle strength is normal.  Patient is ambulatory without an assistive device or brace.     Normal arch foot foot with weightbearing bilateral.  No forefoot or rear foot deformities noted.  MS 5/5 all compartments.  Normal ROM all fore foot and rearfoot joints with no pain or crepitus.  No equinus.  Pain left foot over peroneal tendon course lateral calcaneus and lateral malleoli.  No subluxation.  Slight edema.  No masses.  No ecchymosis.      Pain Achilles tendon insertion left foot, but less than last visit.  No edema here.    Pain right Achilles tendon at watershed area, less than last visit.   Edema noted here, again decreased from last visit.  No deficit and tendon.  With stressing this tendon some discomfort.    Rheumatoid factor elevated and IRIS borderline    Radiographic Exam:  X-Ray Findings:  I personally reviewed the films, normal    Wearing malleable sandals    A: Left insertional Achilles tendinitis       Left peroneal tendonitis      Right Achilles tendinitis watershed area    Plan: Again encouraged patient to follow up with rheumatology regarding lab findings.  Discussed importance of good shoes at all times for all problems in her lower extremities and I made suggestions.  Should not go barefoot or wear socks or slippers around the house and made good suggestions on how shoes.  Will follow up as needed after seeing rheumatology.     Francis Capps DPM, FACFAS         Again, thank you for allowing me to participate in the care of your patient.        Sincerely,        Francis Capps DPM

## 2018-08-09 NOTE — PATIENT INSTRUCTIONS
We wish you continued good healing. If you have any questions or concerns, please do not hesitate to contact us at [phone]    Please remember to call and schedule a follow up appointment if one was recommended at your earliest convenience.   PODIATRY CLINIC HOURS  TELEPHONE NUMBER    Dr. Francis Capps D.P.M [facility]    Clinics:  [facility]    [facility]   Tuesday 1PM-6PM  [place]  Wednesday 7AM-2PM  [facility]  Thursday 10AM-6PM  [place]  Friday 7AM-3PM  [place]  Specialty schedulers:   [phone] to make an appointment with any Specialty Provider.        Urgent Care locations:    [facility] Monday-Friday 5 pm - 9 pm. Saturday-Sunday 9 am -5pm    Monday-Friday 11 am - 9 pm Saturday 9 am - 5 pm     Monday-Sunday 12 noon-8PM [phone]     If you need a medication refill, please contact us you may need lab work and/or a follow up visit prior to your refill (i.e. Antifungal medications).    [facility] (secure e-mail communication and access to your chart) to send a message or to make an appointment.    If MRI needed please call [facility] at [phone]        Weight management plan: Patient was referred to their PCP to discuss a diet and exercise plan.     Patient to follow up with Primary Care provider regarding elevated blood pressure.

## 2018-08-09 NOTE — PROGRESS NOTES
Subjective:    6/13/18  Pt is seen today in consult form Joo Ibrahim with the c/c of painful left foot.  This did with pain in left Achilles insertion 3 months ago and now for the last month has pain lateral ankle.  Lateral ankle pain is now more bothersome and points to peroneal tendon course laterally.  Pt denies any hx of trauma.  Aggravated by activity and releaved by rest.  Describes as burning pain.  Is slowly eating worse and tried ankle brace which did not help.  Denies erythema, ecchymosis, snapping, weakness.  3 of right peroneal tendon repair in high school.    6/29/18 saw patient 16 days ago.  Worked 2 days after I saw her and then the following 2 weeks working at home.  Wearing the cam walker on left foot.  States left foot no better.  Having pain on the right foot now for 1 week.  Points to watershed area of Achilles tendon.  Aggravated by activity and relieved by rest.  She states her left foot hurts much more than her right.  She has taken a steroid in the past and tolerated this well.    8/9/18 patient states that steroid given last visit helped significantly.  Edema greatly reduced but still having some pain.  Just wearing sandals now.  Barefoot around the house.  She is decreasing her activities.  Seeing rheumatology the end of the month    ROS: Denies ecchymosis erythema popping weakness calf pain       Allergies   Allergen Reactions     Droperidol Itching     Feels jumpy     Hydrocodone-Acetaminophen      PN: LW Reaction: Vomiting     Percocet [Oxycodone-Acetaminophen] Nausea and Vomiting and GI Disturbance       Current Outpatient Prescriptions   Medication Sig Dispense Refill     albuterol (2.5 MG/3ML) 0.083% neb solution Take 1 vial (2.5 mg) by nebulization every 6 hours as needed for shortness of breath / dyspnea or wheezing 25 vial 1     albuterol (PROAIR HFA/PROVENTIL HFA/VENTOLIN HFA) 108 (90 BASE) MCG/ACT Inhaler Inhale 2 puffs into the lungs every 6 hours as needed for shortness of  breath / dyspnea or wheezing 1 Inhaler 2     desonide (DESOWEN) 0.05 % ointment Apply topically 2 times daily 30 g 0     Estrogens Conjugated (PREMARIN PO) Take 0.9 mg by mouth daily       estrogens conjugated (PREMARIN) 0.9 MG TABS Take 0.3 mg by mouth daily       FLUOCINOLONE ACETONIDE SCALP 0.01 % OIL oil Apply topically 2 times daily as needed 118 mL 0     fluticasone (FLONASE) 50 MCG/ACT spray Spray 1-2 sprays into both nostrils daily 1 Bottle 11     fluticasone (FLOVENT HFA) 44 MCG/ACT Inhaler Inhale 2 puffs into the lungs 2 times daily 1 Inhaler 1     ipratropium - albuterol 0.5 mg/2.5 mg/3 mL (DUONEB) 0.5-2.5 (3) MG/3ML neb solution Take 1 vial (3 mLs) by nebulization every 6 hours as needed for shortness of breath / dyspnea or wheezing 1 vial 0     methylPREDNISolone (MEDROL DOSEPAK) 4 MG tablet Take 1 tablet (4 mg) by mouth See Admin Instructions follow package directions 21 tablet 0     multivitamin, therapeutic with minerals (MULTI-VITAMIN) TABS tablet Take 1 tablet by mouth daily       order for DME Equipment being ordered:  Knee walker 1 Units 0     order for DME Equipment being ordered: Knee walker  1 Device 0     order for DME Equipment being ordered: Nebulizer with tubing and instructions 1 Device 0     order for DME Equipment being ordered: Carex Bed Buddy- heated neck roll 1 Device 0     order for DME Equipment being ordered: TENS 1 Units 0     triamcinolone (KENALOG) 0.1 % cream Apply sparingly to affected area three times daily as needed 80 g 1     valACYclovir (VALTREX) 500 MG tablet Take 500 mg by mouth as needed Reported on 4/11/2017         Patient Active Problem List   Diagnosis     Knee pain     Abnormal uterine bleeding     CARDIOVASCULAR SCREENING; LDL GOAL LESS THAN 160     Acne rosacea     Dermatitis     Leukopenia     Allergic bronchitis, moderate persistent, uncomplicated     Mild persistent asthma with acute exacerbation     JESENIA (stress urinary incontinence, female)      Cervical radiculopathy     Class 1 obesity due to excess calories without serious comorbidity with body mass index (BMI) of 30.0 to 30.9 in adult     Medial meniscus tear     Pain in joint, ankle and foot     Tear of medial meniscus of knee       Past Medical History:   Diagnosis Date     Herpes     Under eye     Seasonal allergies      Thrombocytopenia (H) 2015     Uncomplicated asthma     very mild       Past Surgical History:   Procedure Laterality Date      SECTION       COLONOSCOPY       CYSTOSCOPY, SLING TRANSVAGINAL N/A 2017    Procedure: CYSTOSCOPY, SLING TRANSVAGINAL;  TRANSVAGINAL TAPING, CYSTOSCOPY, MID URETHRAL SLING;  Surgeon: Jett Montes MD;  Location:  OR     DILATE CERVIX, ABLATE ENDOMETRIUM THERMACHOICE, COMBINED  4/10/2014    Procedure: COMBINED DILATE CERVIX, ABLATE ENDOMETRIUM THERMACHOICE;;  Surgeon: Jett Montes MD;  Location: [facility]     DILATION AND CURETTAGE, HYSTEROSCOPY, ABLATE ENDOMETRIUM NOVASURE, COMBINED  4/10/2014    Procedure: COMBINED DILATION AND CURETTAGE, HYSTEROSCOPY, ABLATE ENDOMETRIUM NOVASURE;  HYSTEROSCOPY, DILATION AND CURETTAGE, NOVASURE ABLATION ATTEMPTED, THERMACHOICE ABLATION ATTEMPTED;  Surgeon: Jett Montes MD;  Location: [facility]     LAPAROSCOPIC ASSISTED HYSTERECTOMY VAGINAL, BILATERAL SALPINGO-OOPHORECTOMY, COMBINED  2014    Procedure: COMBINED LAPAROSCOPIC ASSISTED HYSTERECTOMY VAGINAL, SALPINGO-OOPHORECTOMY;  Surgeon: Jett Montes MD;  Location: [facility]     ORTHOPEDIC SURGERY      L KNEE MENISCUS,ankle surgery, left knee replacement       Family History   Problem Relation Age of Onset     Cancer Mother      patient is unsure of what kind       Social History   Substance Use Topics     Smoking status: Never Smoker     Smokeless tobacco: Never Used     Alcohol use 0.0 oz/week     0 Standard drinks or equivalent per week      Comment: SOCIAL - 1 glass of wine twice a week; 2 drinks on the weekend             O:  LMP 2014.      Constitutional/ general:  Pt is in no apparent distress, appears well-nourished.  Cooperative with history and physical exam.     Psych:  The patient answered questions appropriately.  Normal affect.  Seems to have reasonable expectations, in terms of treatment.     Eyes:  Visual scanning/ tracking without deficit.     Ears:  Response to auditory stimuli is normal.  No  hearing aid devices.  Auricles in proper alignment.     Lymphatic:  Popliteal lymph nodes not enlarged.     Lungs:  Non labored breathing, non labored speech. No cough.  No audible wheezing. Even, quiet breathing.       Vascular:  Pedal pulses are palpable bilaterally for both the DP and PT arteries.  CFT < 3 sec.  No edema.  Pedal hair growth noted.     Neuro:  Alert and oriented x 3. Coordinated gait.  Light touch sensation is intact to the L4, L5, S1 distributions. No obvious deficits.  No evidence of neurological-based weakness, spasticity, or contracture in the lower extremities.     Derm: Normal texture and turgor.  No erythema, ecchymosis, or cyanosis.  No open lesions.     Musculoskeletal:    Lower extremity muscle strength is normal.  Patient is ambulatory without an assistive device or brace.     Normal arch foot foot with weightbearing bilateral.  No forefoot or rear foot deformities noted.  MS 5/5 all compartments.  Normal ROM all fore foot and rearfoot joints with no pain or crepitus.  No equinus.  Pain left foot over peroneal tendon course lateral calcaneus and lateral malleoli.  No subluxation.  Slight edema.  No masses.  No ecchymosis.      Pain Achilles tendon insertion left foot, but less than last visit.  No edema here.    Pain right Achilles tendon at watershed area, less than last visit.  Edema noted here, again decreased from last visit.  No deficit and tendon.  With stressing this tendon some discomfort.    Rheumatoid factor elevated and IRIS borderline    Radiographic Exam:  X-Ray Findings:  I personally reviewed the films,  normal    Wearing malleable sandals    A: Left insertional Achilles tendinitis       Left peroneal tendonitis      Right Achilles tendinitis watershed area    Plan: Again encouraged patient to follow up with rheumatology regarding lab findings.  Discussed importance of good shoes at all times for all problems in her lower extremities and I made suggestions.  Should not go barefoot or wear socks or slippers around the house and made good suggestions on how shoes.  Will follow up as needed after seeing rheumatology.     Francis Capps DPM, FACFAS

## 2018-08-31 ENCOUNTER — OFFICE VISIT (OUTPATIENT)
Dept: RHEUMATOLOGY | Facility: CLINIC | Age: 53
End: 2018-08-31
Attending: PODIATRIST
Payer: COMMERCIAL

## 2018-08-31 VITALS
HEART RATE: 77 BPM | HEIGHT: 66 IN | SYSTOLIC BLOOD PRESSURE: 121 MMHG | BODY MASS INDEX: 30.12 KG/M2 | WEIGHT: 187.4 LBS | DIASTOLIC BLOOD PRESSURE: 81 MMHG

## 2018-08-31 DIAGNOSIS — M19.90 INFLAMMATORY ARTHRITIS: Primary | ICD-10-CM

## 2018-08-31 DIAGNOSIS — M25.50 ARTHRALGIA, UNSPECIFIED JOINT: ICD-10-CM

## 2018-08-31 LAB
CRP SERPL-MCNC: <2.9 MG/L (ref 0–8)
ERYTHROCYTE [SEDIMENTATION RATE] IN BLOOD BY WESTERGREN METHOD: 19 MM/H (ref 0–30)

## 2018-08-31 PROCEDURE — 86235 NUCLEAR ANTIGEN ANTIBODY: CPT | Performed by: STUDENT IN AN ORGANIZED HEALTH CARE EDUCATION/TRAINING PROGRAM

## 2018-08-31 PROCEDURE — 86803 HEPATITIS C AB TEST: CPT | Performed by: STUDENT IN AN ORGANIZED HEALTH CARE EDUCATION/TRAINING PROGRAM

## 2018-08-31 PROCEDURE — 86704 HEP B CORE ANTIBODY TOTAL: CPT | Performed by: STUDENT IN AN ORGANIZED HEALTH CARE EDUCATION/TRAINING PROGRAM

## 2018-08-31 PROCEDURE — 86200 CCP ANTIBODY: CPT | Performed by: STUDENT IN AN ORGANIZED HEALTH CARE EDUCATION/TRAINING PROGRAM

## 2018-08-31 PROCEDURE — 86334 IMMUNOFIX E-PHORESIS SERUM: CPT | Performed by: STUDENT IN AN ORGANIZED HEALTH CARE EDUCATION/TRAINING PROGRAM

## 2018-08-31 PROCEDURE — 82784 ASSAY IGA/IGD/IGG/IGM EACH: CPT | Performed by: STUDENT IN AN ORGANIZED HEALTH CARE EDUCATION/TRAINING PROGRAM

## 2018-08-31 PROCEDURE — 84165 PROTEIN E-PHORESIS SERUM: CPT | Performed by: STUDENT IN AN ORGANIZED HEALTH CARE EDUCATION/TRAINING PROGRAM

## 2018-08-31 PROCEDURE — 86160 COMPLEMENT ANTIGEN: CPT | Performed by: STUDENT IN AN ORGANIZED HEALTH CARE EDUCATION/TRAINING PROGRAM

## 2018-08-31 PROCEDURE — 86480 TB TEST CELL IMMUN MEASURE: CPT | Performed by: STUDENT IN AN ORGANIZED HEALTH CARE EDUCATION/TRAINING PROGRAM

## 2018-08-31 PROCEDURE — 00000402 ZZHCL STATISTIC TOTAL PROTEIN: Performed by: STUDENT IN AN ORGANIZED HEALTH CARE EDUCATION/TRAINING PROGRAM

## 2018-08-31 PROCEDURE — 85652 RBC SED RATE AUTOMATED: CPT | Performed by: STUDENT IN AN ORGANIZED HEALTH CARE EDUCATION/TRAINING PROGRAM

## 2018-08-31 PROCEDURE — 86140 C-REACTIVE PROTEIN: CPT | Performed by: STUDENT IN AN ORGANIZED HEALTH CARE EDUCATION/TRAINING PROGRAM

## 2018-08-31 PROCEDURE — 99204 OFFICE O/P NEW MOD 45 MIN: CPT | Performed by: STUDENT IN AN ORGANIZED HEALTH CARE EDUCATION/TRAINING PROGRAM

## 2018-08-31 PROCEDURE — 36415 COLL VENOUS BLD VENIPUNCTURE: CPT | Performed by: STUDENT IN AN ORGANIZED HEALTH CARE EDUCATION/TRAINING PROGRAM

## 2018-08-31 PROCEDURE — 87340 HEPATITIS B SURFACE AG IA: CPT | Performed by: STUDENT IN AN ORGANIZED HEALTH CARE EDUCATION/TRAINING PROGRAM

## 2018-08-31 PROCEDURE — 86225 DNA ANTIBODY NATIVE: CPT | Performed by: STUDENT IN AN ORGANIZED HEALTH CARE EDUCATION/TRAINING PROGRAM

## 2018-08-31 RX ORDER — PHENTERMINE HYDROCHLORIDE 30 MG/1
30 CAPSULE ORAL EVERY MORNING
COMMUNITY
End: 2021-05-26

## 2018-08-31 RX ORDER — PREDNISONE 5 MG/1
TABLET ORAL
Qty: 30 TABLET | Refills: 2 | Status: SHIPPED | OUTPATIENT
Start: 2018-08-31 | End: 2019-10-03

## 2018-08-31 ASSESSMENT — PAIN SCALES - GENERAL: PAINLEVEL: MODERATE PAIN (5)

## 2018-08-31 NOTE — NURSING NOTE
"Katie Aponte's goals for this visit include:   She requests these members of her care team be copied on today's visit information:     PCP: Francis Capps    Referring Provider:  Francis Capps DPM  [address] [place] [address]    /81  Pulse 77  Ht 1.676 m (5' 6\")  Wt 85 kg (187 lb 6.4 oz)  LMP 03/30/2014  BMI 30.25 kg/m2   "

## 2018-08-31 NOTE — PATIENT INSTRUCTIONS
-- Blood tests today     -- Rheumatoid arthritis seems likely.     -- RTC in 2.5 months   Methotrexate tablets  Brand Names: Rheumatrex, Trexall  What is this medicine?  METHOTREXATE (METH oh TREX ate) is a chemotherapy drug used to treat cancer including breast cancer, leukemia, and lymphoma. This medicine can also be used to treat psoriasis and certain kinds of arthritis.  How should I use this medicine?  Take this medicine by mouth with a glass of water. Follow the directions on the prescription label. Take your medicine at regular intervals. Do not take it more often than directed. Do not stop taking except on your doctor's advice.  Make sure you know why you are taking this medicine and how often you should take it. If this medicine is used for a condition that is not cancer, like arthritis or psoriasis, it should be taken weekly, NOT daily. Taking this medicine more often than directed can cause serious side effects, even death.  Talk to your healthcare provider about safe handling and disposal of this medicine. You may need to take special precautions.  Talk to your pediatrician regarding the use of this medicine in children. While this drug may be prescribed for selected conditions, precautions do apply.  What side effects may I notice from receiving this medicine?  Side effects that you should report to your doctor or health care professional as soon as possible:    allergic reactions like skin rash, itching or hives, swelling of the face, lips, or tongue    breathing problems or shortness of breath    diarrhea    dry, nonproductive cough    low blood counts - this medicine may decrease the number of white blood cells, red blood cells and platelets. You may be at increased risk for infections and bleeding.    mouth sores    redness, blistering, peeling or loosening of the skin, including inside the mouth    signs of infection - fever or chills, cough, sore throat, pain or trouble passing urine    signs and  symptoms of bleeding such as bloody or black, tarry stools; red or dark-brown urine; spitting up blood or brown material that looks like coffee grounds; red spots on the skin; unusual bruising or bleeding from the eye, gums, or nose    signs and symptoms of kidney injury like trouble passing urine or change in the amount of urine    signs and symptoms of liver injury like dark yellow or brown urine; general ill feeling or flu-like symptoms; light-colored stools; loss of appetite; nausea; right upper belly pain; unusually weak or tired; yellowing of the eyes or skin  Side effects that usually do not require medical attention (report to your doctor or health care professional if they continue or are bothersome):    dizziness    hair loss    tiredness    upset stomach    vomiting  What may interact with this medicine?  This medicine may interact with the following medication:    acitretin    aspirin and aspirin-like medicines including salicylates    azathioprine    certain antibiotics like penicillins, tetracycline, and chloramphenicol    cyclosporine    gold    hydroxychloroquine    live virus vaccines    NSAIDs, medicines for pain and inflammation, like ibuprofen or naproxen    other cytotoxic agents    penicillamine    phenylbutazone    phenytoin    probenecid    retinoids such as isotretinoin and tretinoin    steroid medicines like prednisone or cortisone    sulfonamides like sulfasalazine and trimethoprim/sulfamethoxazole    theophylline  What if I miss a dose?  If you miss a dose, talk with your doctor or health care professional. Do not take double or extra doses.  Where should I keep my medicine?  Keep out of the reach of children.  Store at room temperature between 20 and 25 degrees C (68 and 77 degrees F). Protect from light. Throw away any unused medicine after the expiration date.  What should I tell my health care provider before I take this medicine?  They need to know if you have any of these  conditions:    fluid in the stomach area or lungs    if you often drink alcohol    infection or immune system problems    kidney disease or on hemodialysis    liver disease    low blood counts, like low white cell, platelet, or red cell counts    lung disease    radiation therapy    stomach ulcers    ulcerative colitis    an unusual or allergic reaction to methotrexate, other medicines, foods, dyes, or preservatives    pregnant or trying to get pregnant    breast-feeding  What should I watch for while using this medicine?  Avoid alcoholic drinks.  This medicine can make you more sensitive to the sun. Keep out of the sun. If you cannot avoid being in the sun, wear protective clothing and use sunscreen. Do not use sun lamps or tanning beds/booths.  You may need blood work done while you are taking this medicine.  Call your doctor or health care professional for advice if you get a fever, chills or sore throat, or other symptoms of a cold or flu. Do not treat yourself. This drug decreases your body's ability to fight infections. Try to avoid being around people who are sick.  This medicine may increase your risk to bruise or bleed. Call your doctor or health care professional if you notice any unusual bleeding.  Check with your doctor or health care professional if you get an attack of severe diarrhea, nausea and vomiting, or if you sweat a lot. The loss of too much body fluid can make it dangerous for you to take this medicine.  Talk to your doctor about your risk of cancer. You may be more at risk for certain types of cancers if you take this medicine.  Both men and women must use effective birth control with this medicine. Do not become pregnant while taking this medicine or until at least 1 normal menstrual cycle has occurred after stopping it. Women should inform their doctor if they wish to become pregnant or think they might be pregnant. Men should not father a child while taking this medicine and for 3 months  after stopping it. There is a potential for serious side effects to an unborn child. Talk to your health care professional or pharmacist for more information. Do not breast-feed an infant while taking this medicine.  NOTE:This sheet is a summary. It may not cover all possible information. If you have questions about this medicine, talk to your doctor, pharmacist, or health care provider. Copyright  2018 [employer]        Methotrexate tablets  Brand Names: Rheumatrex, Trexall  What is this medicine?  METHOTREXATE (METH oh TREX ate) is a chemotherapy drug used to treat cancer including breast cancer, leukemia, and lymphoma. This medicine can also be used to treat psoriasis and certain kinds of arthritis.  How should I use this medicine?  Take this medicine by mouth with a glass of water. Follow the directions on the prescription label. Take your medicine at regular intervals. Do not take it more often than directed. Do not stop taking except on your doctor's advice.  Make sure you know why you are taking this medicine and how often you should take it. If this medicine is used for a condition that is not cancer, like arthritis or psoriasis, it should be taken weekly, NOT daily. Taking this medicine more often than directed can cause serious side effects, even death.  Talk to your healthcare provider about safe handling and disposal of this medicine. You may need to take special precautions.  Talk to your pediatrician regarding the use of this medicine in children. While this drug may be prescribed for selected conditions, precautions do apply.  What side effects may I notice from receiving this medicine?  Side effects that you should report to your doctor or health care professional as soon as possible:    allergic reactions like skin rash, itching or hives, swelling of the face, lips, or tongue    breathing problems or shortness of breath    diarrhea    dry, nonproductive cough    low blood counts - this medicine may  decrease the number of white blood cells, red blood cells and platelets. You may be at increased risk for infections and bleeding.    mouth sores    redness, blistering, peeling or loosening of the skin, including inside the mouth    signs of infection - fever or chills, cough, sore throat, pain or trouble passing urine    signs and symptoms of bleeding such as bloody or black, tarry stools; red or dark-brown urine; spitting up blood or brown material that looks like coffee grounds; red spots on the skin; unusual bruising or bleeding from the eye, gums, or nose    signs and symptoms of kidney injury like trouble passing urine or change in the amount of urine    signs and symptoms of liver injury like dark yellow or brown urine; general ill feeling or flu-like symptoms; light-colored stools; loss of appetite; nausea; right upper belly pain; unusually weak or tired; yellowing of the eyes or skin  Side effects that usually do not require medical attention (report to your doctor or health care professional if they continue or are bothersome):    dizziness    hair loss    tiredness    upset stomach    vomiting  What may interact with this medicine?  This medicine may interact with the following medication:    acitretin    aspirin and aspirin-like medicines including salicylates    azathioprine    certain antibiotics like penicillins, tetracycline, and chloramphenicol    cyclosporine    gold    hydroxychloroquine    live virus vaccines    NSAIDs, medicines for pain and inflammation, like ibuprofen or naproxen    other cytotoxic agents    penicillamine    phenylbutazone    phenytoin    probenecid    retinoids such as isotretinoin and tretinoin    steroid medicines like prednisone or cortisone    sulfonamides like sulfasalazine and trimethoprim/sulfamethoxazole    theophylline  What if I miss a dose?  If you miss a dose, talk with your doctor or health care professional. Do not take double or extra doses.  Where should I  keep my medicine?  Keep out of the reach of children.  Store at room temperature between 20 and 25 degrees C (68 and 77 degrees F). Protect from light. Throw away any unused medicine after the expiration date.  What should I tell my health care provider before I take this medicine?  They need to know if you have any of these conditions:    fluid in the stomach area or lungs    if you often drink alcohol    infection or immune system problems    kidney disease or on hemodialysis    liver disease    low blood counts, like low white cell, platelet, or red cell counts    lung disease    radiation therapy    stomach ulcers    ulcerative colitis    an unusual or allergic reaction to methotrexate, other medicines, foods, dyes, or preservatives    pregnant or trying to get pregnant    breast-feeding  What should I watch for while using this medicine?  Avoid alcoholic drinks.  This medicine can make you more sensitive to the sun. Keep out of the sun. If you cannot avoid being in the sun, wear protective clothing and use sunscreen. Do not use sun lamps or tanning beds/booths.  You may need blood work done while you are taking this medicine.  Call your doctor or health care professional for advice if you get a fever, chills or sore throat, or other symptoms of a cold or flu. Do not treat yourself. This drug decreases your body's ability to fight infections. Try to avoid being around people who are sick.  This medicine may increase your risk to bruise or bleed. Call your doctor or health care professional if you notice any unusual bleeding.  Check with your doctor or health care professional if you get an attack of severe diarrhea, nausea and vomiting, or if you sweat a lot. The loss of too much body fluid can make it dangerous for you to take this medicine.  Talk to your doctor about your risk of cancer. You may be more at risk for certain types of cancers if you take this medicine.  Both men and women must use effective birth  control with this medicine. Do not become pregnant while taking this medicine or until at least 1 normal menstrual cycle has occurred after stopping it. Women should inform their doctor if they wish to become pregnant or think they might be pregnant. Men should not father a child while taking this medicine and for 3 months after stopping it. There is a potential for serious side effects to an unborn child. Talk to your health care professional or pharmacist for more information. Do not breast-feed an infant while taking this medicine.  NOTE:This sheet is a summary. It may not cover all possible information. If you have questions about this medicine, talk to your doctor, pharmacist, or health care provider. Copyright  2018 [employer]
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
## 2018-08-31 NOTE — PROGRESS NOTES
Rheumatology Clinic Visit     Katie Aponte MRN# [id number]   YOB: 1965 Age: 52 year old     Date of Visit: August 31, 2018  Primary care provider: Francis Capps          Assessment and Plan:   Assessment     -- Bilateral ankle pain  -- Achillis tendinitis   -- Plantar fasciitis   -- Left hand pain   -- Raynaud's  -- Chronic Leukopenia  -- Morning stiffness   -- + RF - 84, + IRIS - 1:40    Ms Aponte is 53 yo female seen in clinic for evaluation of joint pains    Polyarthritis: Symptoms started in June of this year with pain in left heel.  She was seen by podiatrist and was in boot for some time. Later developed right ankle swelling. Her symptoms have worsened over time. Now both of her ankles and Achilles tendons hurt.  She has also noticed pain in second third fourth fingers of left hand.  She has stiffness in the morning which lasts for couple hours mostly in her feet and hands.  Blood tests showed positive rheumatoid factor of 84.    On physical exam she has tenderness over the left second third fourth PIP joints along with mild synovitis.  Tenderness present over bilateral ankles, Achilles tendon and plantar fascia.  No tenderness and synovitis present over the right hand, bilateral wrists, elbows and shoulders.  She has chronic knee pain from osteoarthritis.    Her physical exam, clinical history and positive rheumatoid factor increase the likelihood of rheumatoid arthritis.  She did respond to prednisone in the past.  Will get anti-CCP antibody, inflammatory markers today. I will give her a short prednisone taper.  Discussed with her briefly about DMARD therapies like methotrexate.    Positive IRIS: She has low titer positive IRIS 1: 40.  Has raynaud's, chronic leukopenia and new onset joint pains which can be seen in IRIS associated connective tissue disease.  She has new onset sicca symptoms which she attributes to phentermine use started a month ago.     To rule out possibility of lupus I  will get specific serologies including FEDERICA panel, complement levels, double-stranded DNA, centromere.  Positive rheumatoid factor can also be seen in patients with lymphoproliferative disorders and hep C. I will get hepatitis C serology and protein electrophoresis.    Plan    1.  Blood tests: Anti-CCP antibody, complements, FEDERICA panel, double-stranded DNA, protein electrophoresis, hepatitis B/C, TB QuantiFERON gold, ESR, CRP    2.  Start prednisone taper.  20 mg and taper by 5 mg every week to reach at 10 mg daily dose.    3.  Information about methotrexate provided.    4.  Return to clinic in 10 weeks with blood tests.           Active Problem List:     Patient Active Problem List    Diagnosis Date Noted     Class 1 obesity due to excess calories without serious comorbidity with body mass index (BMI) of 30.0 to 30.9 in adult 02/26/2018     Priority: Medium     Cervical radiculopathy 06/02/2017     Priority: Medium     JESENIA (stress urinary incontinence, female) 04/19/2017     Priority: Medium     Mild persistent asthma with acute exacerbation 04/11/2017     Priority: Medium     Allergic bronchitis, moderate persistent, uncomplicated 06/17/2016     Priority: Medium     Quality         Leukopenia 09/25/2015     Priority: Medium     Acne rosacea 08/16/2015     Priority: Medium     Dermatitis 08/16/2015     Priority: Medium     CARDIOVASCULAR SCREENING; LDL GOAL LESS THAN 160 06/03/2014     Priority: Medium     Abnormal uterine bleeding 04/09/2014     Priority: Medium     Knee pain 12/06/2013     Priority: Medium     Medial meniscus tear 09/03/2013     Priority: Medium     Tear of medial meniscus of knee 07/30/2012     Priority: Medium     Overview:   Tear of medial cartilage or meniscus of knee, current, left       Pain in joint, ankle and foot 04/16/2009     Priority: Medium     Overview:   LW Modifier:  s/p surgery - ligament  ; Pain Ankle Right              History of Present Illness:   Katie Aponte is a 52 year  old female with PMH of Asthma, obesity, meniscal tear left knee status post surgery seen in the clinic in consultation at request of Francis Capps DPM for evaluation of joint pains.    She reports that in June 2018 she developed severe pain in left heel.  She was seen by a podiatrist Dr Capps and was given a cam walker for left foot.  Left foot pain did not improve.  After a while she started having pain and swelling in the right ankle.  The right ankle was swollen up to grapefruit size.  In the morning she had severe pain in bilateral feet, Achilles tendon and ankles.  She has stiffness for couple hours.  Lately she has noticed pain in the left second third fourth fingers.  Denies significant pain in the wrists, elbows and shoulders.  She has chronic pain in her knees.    Her blood work revealed positive rheumatoid factor 84, low titer IRIS 1: 40, normal ESR, uric acid and negative Lyme serology.    She also complains of color changes in her middle and ring fingers of bilateral hands on exposure to cold.  They turn white and purple and has been going on for couple of years.  She has chronic leukopenia of unknown cause.  Also noticed sicca symptoms in the last few weeks which she attributes to phentermine use started a month ago.  Other than that she denies history of malar rash, photosensitivity, recurrent mouth/genital ulcers, sicca symptoms, pleuritic chest pains, recurrent sinusitis/rhinitis, swallowing difficulty, hearing or visual changes recently. She denies any history of psoriasis, ulcerative colitis or chron's disease. No h/o iritis, enthesitis, finger or toe swelling like dactylitis.      She was given short prednisone taper for a week by podiatrist which helped to bring right ankle swelling down.  She denies any side effects with the prednisone use.         Review of Systems:   Review Of Systems  Constitutional: denies fever, chills, night sweats and weight loss.  Skin: No skin rash.  Eyes: +  dryness or irritation in eyes. No episode of eye inflammation or redness.   Ears/Nose/Throat: no recurrent sinus infections.  Respiratory: No shortness of breath, dyspnea on exertion, cough, or hemoptysis  Cardiovascular: no chest pain or palpitations.  Gastrointestinal: no nausea, vomiting, abdominal pain.  Normal bowel movements.  Genitourinary: no dysuria, frequency  or hematuria.  Musculoskeletal: as in HPI  Neurologic: no numbness, tingling.  Psychiatric: no mood disorders.  Hematologic/Lymphatic/Immunologic: no history of easy bruising, petechia or purpura.  No abnormal bleeding.   Endocrine: no h/o thyroid disease or Diabetes.                  Past Medical History:     Past Medical History:   Diagnosis Date     Herpes     Under eye     Seasonal allergies      Thrombocytopenia (H) 2015     Uncomplicated asthma     very mild     Past Surgical History:   Procedure Laterality Date      SECTION       COLONOSCOPY       CYSTOSCOPY, SLING TRANSVAGINAL N/A 2017    Procedure: CYSTOSCOPY, SLING TRANSVAGINAL;  TRANSVAGINAL TAPING, CYSTOSCOPY, MID URETHRAL SLING;  Surgeon: Jett Montes MD;  Location:  OR     DILATE CERVIX, ABLATE ENDOMETRIUM THERMACHOICE, COMBINED  4/10/2014    Procedure: COMBINED DILATE CERVIX, ABLATE ENDOMETRIUM THERMACHOICE;;  Surgeon: Jett Montes MD;  Location: [facility]     DILATION AND CURETTAGE, HYSTEROSCOPY, ABLATE ENDOMETRIUM NOVASURE, COMBINED  4/10/2014    Procedure: COMBINED DILATION AND CURETTAGE, HYSTEROSCOPY, ABLATE ENDOMETRIUM NOVASURE;  HYSTEROSCOPY, DILATION AND CURETTAGE, NOVASURE ABLATION ATTEMPTED, THERMACHOICE ABLATION ATTEMPTED;  Surgeon: Jett Montes MD;  Location: [facility]     LAPAROSCOPIC ASSISTED HYSTERECTOMY VAGINAL, BILATERAL SALPINGO-OOPHORECTOMY, COMBINED  2014    Procedure: COMBINED LAPAROSCOPIC ASSISTED HYSTERECTOMY VAGINAL, SALPINGO-OOPHORECTOMY;  Surgeon: Jett Montes MD;  Location: [facility]     ORTHOPEDIC SURGERY      L KNEE MENISCUS,ankle  surgery, left knee replacement            Social History:     Social History     Occupational History     Not on file.     Social History Main Topics     Smoking status: Never Smoker     Smokeless tobacco: Never Used     Alcohol use 0.0 oz/week     0 Standard drinks or equivalent per week      Comment: SOCIAL - 1 glass of wine twice a week; 2 drinks on the weekend     Drug use: No     Sexual activity: Yes     Partners: Male            Family History:     Family History   Problem Relation Age of Onset     Cancer Mother      patient is unsure of what kind            Allergies:     Allergies   Allergen Reactions     Droperidol Itching     Feels jumpy     Hydrocodone-Acetaminophen      PN: LW Reaction: Vomiting     Percocet [Oxycodone-Acetaminophen] Nausea and Vomiting and GI Disturbance            Medications:     Current Outpatient Prescriptions   Medication Sig Dispense Refill     albuterol (2.5 MG/3ML) 0.083% neb solution Take 1 vial (2.5 mg) by nebulization every 6 hours as needed for shortness of breath / dyspnea or wheezing 25 vial 1     albuterol (PROAIR HFA/PROVENTIL HFA/VENTOLIN HFA) 108 (90 BASE) MCG/ACT Inhaler Inhale 2 puffs into the lungs every 6 hours as needed for shortness of breath / dyspnea or wheezing 1 Inhaler 2     Estrogens Conjugated (PREMARIN PO) Take 0.9 mg by mouth daily       estrogens conjugated (PREMARIN) 0.9 MG TABS Take 0.3 mg by mouth daily       FLUOCINOLONE ACETONIDE SCALP 0.01 % OIL oil Apply topically 2 times daily as needed 118 mL 0     fluticasone (FLOVENT HFA) 44 MCG/ACT Inhaler Inhale 2 puffs into the lungs 2 times daily 1 Inhaler 1     ipratropium - albuterol 0.5 mg/2.5 mg/3 mL (DUONEB) 0.5-2.5 (3) MG/3ML neb solution Take 1 vial (3 mLs) by nebulization every 6 hours as needed for shortness of breath / dyspnea or wheezing 1 vial 0     multivitamin, therapeutic with minerals (MULTI-VITAMIN) TABS tablet Take 1 tablet by mouth daily       order for DME Equipment being ordered:   "Knee walker 1 Units 0     order for DME Equipment being ordered: Knee walker  1 Device 0     order for DME Equipment being ordered: Nebulizer with tubing and instructions 1 Device 0     order for DME Equipment being ordered: [employer]x Bed Buddy- heated neck roll 1 Device 0     order for DME Equipment being ordered: TENS 1 Units 0     phentermine 30 MG capsule Take 30 mg by mouth every morning       triamcinolone (KENALOG) 0.1 % cream Apply sparingly to affected area three times daily as needed 80 g 1     valACYclovir (VALTREX) 500 MG tablet Take 500 mg by mouth as needed Reported on 4/11/2017       desonide (DESOWEN) 0.05 % ointment Apply topically 2 times daily (Patient not taking: Reported on 8/31/2018) 30 g 0     fluticasone (FLONASE) 50 MCG/ACT spray Spray 1-2 sprays into both nostrils daily (Patient not taking: Reported on 8/31/2018) 1 Bottle 11            Physical Exam:   Blood pressure 121/81, pulse 77, height 1.676 m (5' 6\"), weight 85 kg (187 lb 6.4 oz), last menstrual period 03/30/2014, not currently breastfeeding.  Wt Readings from Last 4 Encounters:   08/31/18 85 kg (187 lb 6.4 oz)   08/09/18 88 kg (194 lb)   06/29/18 88.5 kg (195 lb)   06/13/18 87.1 kg (192 lb)       Constitutional: well-developed, appearing stated age; cooperative  Eyes: nl EOM, PERRLA, vision, conjunctiva, sclera  ENT: nl external ears, nose, hearing, lips, teeth, gums, throat  No mucous membrane lesions, normal saliva pool  Neck: no mass or thyroid enlargement  Resp: lungs clear to auscultation, nl to palpation  CV: RRR, no murmurs, rubs or gallops, no edema  GI: no ABD mass or tenderness, no HSM  : not tested  Lymph: no cervical, supraclavicular, inguinal or epitrochlear nodes    MS: All TMJ, neck, shoulder, elbow, wrist, MCP/PIP/DIP, spine, hip, knee, ankle, and foot MTP/IP joints were examined.     -- she has tenderness over the left 2 - 4 PIP joints along with mild synovitis.  Tenderness present over bilateral ankles, Achilles " tendon and plantar fascia.  No tenderness and synovitis present over the right hand, bilateral wrists, elbows and shoulders.  She has chronic knee pain from osteoarthritis.    -- No dactylitis,  tenosynovitis, enthesopathy.    Skin: no nail pitting, alopecia, rash, nodules or lesions  Neuro: nl cranial nerves, strength, sensation, DTRs.   Psych: nl judgement, orientation, memory, affect.         Data:     Results for orders placed or performed in visit on 07/03/18   MR Ankle Right w/o Contrast    Narrative    MR ANKLE RIGHT WITHOUT CONTRAST  7/3/2018 8:38 AM    HISTORY: One month of right ankle pain. Achilles tendinitis.    COMPARISON: None.    TECHNIQUE: Multiplanar MR imaging was performed without contrast.    FINDINGS:    Osseous and Cartilaginous Structures: No fracture or osseous lesion is  demonstrated. There are mild degenerative changes of the talonavicular  joint with small subchondral cysts in the adjacent navicular. There  are moderate degenerative changes of the medial aspect of the  calcaneal cuboid joint with joint space narrowing and subchondral cyst  formation. There is mild grade II chondromalacia in the lateral aspect  of the ankle joint with minimal marrow edema in the lateral aspect of  the talus. This does not have the appearance of an osteochondral  lesion. The remaining cartilage surfaces are well preserved and no  other abnormal marrow signal intensity is seen.    Posterior Tibial and Flexor Tendons: No tear or significant tendinosis  of the posterior tibial tendon, flexor digitorum longus tendon, or  flexor hallucis longus tendon.     Peroneal Tendons: No tear or significant tendinosis of the peroneal  brevis tendon or peroneal longus tendon.     Achilles Tendon: No tear or significant tendinosis.     Extensor Tendons: No tear or significant tendinosis of the anterior  tibial tendon, extensor hallucis longus tendon, or extensor digitorum  longus tendon.     Lateral Ligaments: There is  diffuse thickening and irregularity of the  anterior talofibular ligament. There is no associated edema and this  is likely an old injury. The calcaneofibular, posterior talofibular,  and anterior tibiofibular ligaments are unremarkable.     Medial Deltoid Ligamentous Complex: Unremarkable.     Plantar Fascia: Unremarkable, with no findings to suggest active  plantar fasciitis.     Additional Findings: No significant tibiotalar joint effusion. No  retrocalcaneal bursitis. No mass within the tarsal tunnel. The sinus  tarsi is unremarkable. No soft tissue abnormality is seen.      Impression    IMPRESSION:   1. Old injury of the anterior talofibular ligament.  2. Degenerative changes, most prominent in the calcaneal cuboid  articulation.  3. No Achilles tendon pathology is demonstrated.    ADARSH RODRIGUEZ MD       Recent Labs   Lab Test  10/15/15   1506  09/25/15   1540  09/18/15   1001   WBC   --   3.2*  2.4*   RBC   --   4.74  4.98   HGB  13.6  13.6  14.1   HCT   --   38.9  42.3   MCV   --   82  85   RDW   --   12.9  13.0   PLT   --   161  98*   ALBUMIN   --   3.5   --    BUN   --   9  11      Recent Labs   Lab Test  09/25/15   1540   TSH  1.62     Hemoglobin   Date Value Ref Range Status   10/15/2015 13.6 11.7 - 15.7 g/dL Final   09/25/2015 13.6 11.7 - 15.7 g/dL Final   09/18/2015 14.1 11.7 - 15.7 g/dL Final     Urea Nitrogen   Date Value Ref Range Status   09/25/2015 9 7 - 30 mg/dL Final   09/18/2015 11 7 - 30 mg/dL Final     Sed Rate   Date Value Ref Range Status   06/29/2018 17 0 - 30 mm/h Final     AST   Date Value Ref Range Status   09/25/2015 14 0 - 45 U/L Final     Albumin   Date Value Ref Range Status   09/25/2015 3.5 3.4 - 5.0 g/dL Final     Alkaline Phosphatase   Date Value Ref Range Status   09/25/2015 63 40 - 150 U/L Final     ALT   Date Value Ref Range Status   09/25/2015 24 0 - 50 U/L Final     Rheumatoid Factor   Date Value Ref Range Status   06/29/2018 84 (H) <20 IU/mL Final     Recent Labs    Lab Test  10/15/15   1506  09/25/15   1540  09/18/15   1001   WBC   --   3.2*  2.4*   HGB  13.6  13.6  14.1   HCT   --   38.9  42.3   MCV   --   82  85   PLT   --   161  98*   BUN   --   9  11   TSH   --   1.62   --    AST   --   14   --    ALT   --   24   --    ALKPHOS   --   63   --      Other studies:   Component      Latest Ref Rng & Units 9/25/2015 3/29/2018 6/29/2018   IRIS interpretation      NEG:Negative   Borderline Positive (A)   IRIS pattern 1         SPECKLED   IRIS titer 1         1:40   Hepatitis B Core Marilin      NR Nonreactive     Hep B Surface Agn      NR Nonreactive     HIV Antigen Antibody Combo      NR Nonreactive . . .     Hepatitis C Antibody      NR:Nonreactive  Nonreactive    Rheumatoid Factor      <20 IU/mL   84 (H)       Reviewed Rheumatology lab flowsheet    Johnnie Medley MD  [facility]  Department of Rheumatology & Autoimmune Disorders  [facility]: [phone]   Pager - [phone]

## 2018-09-01 LAB
ENA RNP IGG SER IA-ACNC: <0.2 AI (ref 0–0.9)
ENA SCL70 IGG SER IA-ACNC: <0.2 AI (ref 0–0.9)
ENA SM IGG SER-ACNC: <0.2 AI (ref 0–0.9)
ENA SS-A IGG SER IA-ACNC: <0.2 AI (ref 0–0.9)
ENA SS-B IGG SER IA-ACNC: <0.2 AI (ref 0–0.9)

## 2018-09-02 LAB
C3 SERPL-MCNC: 126 MG/DL (ref 76–169)
C4 SERPL-MCNC: 32 MG/DL (ref 15–50)

## 2018-09-04 LAB
ALBUMIN SERPL ELPH-MCNC: 4.1 G/DL (ref 3.7–5.1)
ALPHA1 GLOB SERPL ELPH-MCNC: 0.3 G/DL (ref 0.2–0.4)
ALPHA2 GLOB SERPL ELPH-MCNC: 0.8 G/DL (ref 0.5–0.9)
B-GLOBULIN SERPL ELPH-MCNC: 1 G/DL (ref 0.6–1)
CCP AB SER IA-ACNC: >340 U/ML
CENTROMERE IGG SER-ACNC: <0.2 AI (ref 0–0.9)
DSDNA AB SER-ACNC: 1 IU/ML
GAMMA GLOB SERPL ELPH-MCNC: 1.5 G/DL (ref 0.7–1.6)
GAMMA INTERFERON BACKGROUND BLD IA-ACNC: 0.08 IU/ML
HBV CORE AB SERPL QL IA: NONREACTIVE
HBV SURFACE AG SERPL QL IA: NONREACTIVE
HCV AB SERPL QL IA: NONREACTIVE
IGA SERPL-MCNC: 267 MG/DL (ref 70–380)
IGG SERPL-MCNC: 1450 MG/DL (ref 695–1620)
IGM SERPL-MCNC: 94 MG/DL (ref 60–265)
M PROTEIN SERPL ELPH-MCNC: 0 G/DL
M TB IFN-G BLD-IMP: NEGATIVE
M TB IFN-G CD4+ BCKGRND COR BLD-ACNC: >10 IU/ML
MITOGEN IGNF BCKGRD COR BLD-ACNC: 0 IU/ML
MITOGEN IGNF BCKGRD COR BLD-ACNC: 0 IU/ML
PROT PATTERN SERPL ELPH-IMP: NORMAL
PROT PATTERN SERPL IFE-IMP: NORMAL

## 2018-09-08 ENCOUNTER — OFFICE VISIT (OUTPATIENT)
Dept: URGENT CARE | Facility: URGENT CARE | Age: 53
End: 2018-09-08
Payer: COMMERCIAL

## 2018-09-08 VITALS
HEART RATE: 81 BPM | OXYGEN SATURATION: 100 % | TEMPERATURE: 98.7 F | WEIGHT: 186.4 LBS | BODY MASS INDEX: 30.09 KG/M2 | SYSTOLIC BLOOD PRESSURE: 133 MMHG | DIASTOLIC BLOOD PRESSURE: 79 MMHG

## 2018-09-08 DIAGNOSIS — B35.4 TINEA CORPORIS: Primary | ICD-10-CM

## 2018-09-08 DIAGNOSIS — B96.89 BACTERIAL VAGINITIS: ICD-10-CM

## 2018-09-08 DIAGNOSIS — N76.0 BACTERIAL VAGINITIS: ICD-10-CM

## 2018-09-08 DIAGNOSIS — R30.0 DYSURIA: ICD-10-CM

## 2018-09-08 LAB
ALBUMIN UR-MCNC: NEGATIVE MG/DL
APPEARANCE UR: CLEAR
BACTERIA #/AREA URNS HPF: ABNORMAL /HPF
BILIRUB UR QL STRIP: NEGATIVE
COLOR UR AUTO: YELLOW
GLUCOSE UR STRIP-MCNC: NEGATIVE MG/DL
HGB UR QL STRIP: NEGATIVE
KETONES UR STRIP-MCNC: NEGATIVE MG/DL
LEUKOCYTE ESTERASE UR QL STRIP: NEGATIVE
MUCOUS THREADS #/AREA URNS LPF: PRESENT /LPF
NITRATE UR QL: NEGATIVE
NON-SQ EPI CELLS #/AREA URNS LPF: ABNORMAL /LPF
PH UR STRIP: 5.5 PH (ref 5–7)
RBC #/AREA URNS AUTO: ABNORMAL /HPF
SOURCE: ABNORMAL
SP GR UR STRIP: 1.02 (ref 1–1.03)
SPECIMEN SOURCE: ABNORMAL
UROBILINOGEN UR STRIP-ACNC: 0.2 EU/DL (ref 0.2–1)
WBC #/AREA URNS AUTO: ABNORMAL /HPF
WET PREP SPEC: ABNORMAL

## 2018-09-08 PROCEDURE — 81001 URINALYSIS AUTO W/SCOPE: CPT | Performed by: NURSE PRACTITIONER

## 2018-09-08 PROCEDURE — 99214 OFFICE O/P EST MOD 30 MIN: CPT | Performed by: NURSE PRACTITIONER

## 2018-09-08 PROCEDURE — 87210 SMEAR WET MOUNT SALINE/INK: CPT | Performed by: NURSE PRACTITIONER

## 2018-09-08 RX ORDER — KETOCONAZOLE 20 MG/G
CREAM TOPICAL 2 TIMES DAILY
Qty: 30 G | Refills: 0 | Status: SHIPPED | OUTPATIENT
Start: 2018-09-08 | End: 2018-09-22

## 2018-09-08 RX ORDER — METRONIDAZOLE 500 MG/1
500 TABLET ORAL 2 TIMES DAILY
Qty: 14 TABLET | Refills: 0 | Status: SHIPPED | OUTPATIENT
Start: 2018-09-08 | End: 2018-09-15

## 2018-09-08 ASSESSMENT — ENCOUNTER SYMPTOMS
NAUSEA: 0
RHINORRHEA: 0
FEVER: 0
SORE THROAT: 0
COUGH: 0
CHILLS: 0
HEADACHES: 0
SHORTNESS OF BREATH: 0
VOMITING: 0
DIARRHEA: 0
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
## 2018-09-08 NOTE — PATIENT INSTRUCTIONS
Bacterial Vaginosis    You have a vaginal infection called bacterial vaginosis (BV). Both good and bad bacteria are present in a healthy vagina. BV occurs when these bacteria get out of balance. The number of bad bacteria increase. And the number of good bacteria decrease. Although BV is associated with sexual activity, it is not a sexually transmitted disease.  BV may or may not cause symptoms. If symptoms do occur, they can include:    Thin, gray, milky-white, or sometimes green discharge    Unpleasant odor or  fishy  smell    Itching, burning, or pain in or around the vagina  It is not known what causes BV, but certain factors can make the problem more likely. This can include:    Douching    Having sex with a new partner    Having sex with more than one partner  BV will sometimes go away on its own. But treatment is usually recommended. This is because untreated BV can increase the risk of more serious health problems such as:    Pelvic inflammatory disease (PID)     delivery (giving birth to a baby early if you re pregnant)    HIV and certain other sexually transmitted diseases (STDs)    Infection after surgery on the reproductive organs  Home care  General care    BV is most often treated with medicines called antibiotics. These may be given as pills or as a vaginal cream. If antibiotics are prescribed, be sure to use them exactly as directed. Also, be sure to complete all of the medicine, even if your symptoms go away.    Don't douche or having sex during treatment.    If you have sex with a female partner, ask your healthcare provider if she should also be treated.  Prevention    Don't douche.    Don't have sex. If you do have sex, then take steps to lower your risk:  ? Use condoms when having sex.  ? Limit the number of sexual partners you have.  Follow-up care  Follow up with your healthcare provider, or as advised.  When to seek medical advice  Call your healthcare provider right away if:    You  have a fever of 100.4 F (38 C) or higher, or as directed by your provider.    Your symptoms worsen, or they don t go away within a few days of starting treatment.    You have new pain in the lower belly or pelvic region.    You have side effects that bother you or a reaction to the pills or cream you re prescribed.    You or any partners you have sex with have new symptoms, such as a rash, joint pain, or sores.  Date Last Reviewed: 10/1/2017    2730-1830 [employer]. [address] [place] [address]. All rights reserved. This information is not intended as a substitute for professional medical care. Always follow your healthcare professional's instructions.        Fungal Skin Infection (Tinea)  A fungal infection occurs when too much fungus grows on or in the body. Fungus normally lives on the skin in small amounts and does not cause harm. But when too much grows on the skin, it causes an infection. This is also known as tinea. Fungal skin infections are common and not usually serious.  The infection often starts as a small red area the size of a pea. The skin may turn dry and flaky. The area may itch. As the fungus grows, it spreads out in a red [place]. Because of how it looks, fungal skin infection is often called ringworm, but it is not caused by a worm. Fungal skin infections can occur on many parts of the body. They can grow on the head, chest, arms, or legs. They can occur on the buttocks. On the feet, fungal infection is known as  athlete s foot.  It causes itchy, sometimes painful sores between the toes and the bottom or sides of the feet. In the groin, the rash is called  jock itch.   People with weak immune systems can get a fungal infection more easily. This includes people with diabetes or HIV, or who are being treated for cancer. In these cases, the fungal infection can spread and cause severe illness. Fungal infections are also more common in people who are overweight.  In most  cases, treatment is done with antifungal cream or ointment. If the infection is on your scalp, you may take oral medicine. In some cases, a tiny piece of the skin (biopsy) may be taken. This is so it can be tested in a lab.  Common fungal infections are treated with creams on the skin or oral medicine.  Home care  Follow all instructions when using antifungal cream or ointment on your skin. Your healthcare provider may advise using cornstarch powder to keep your skin dry or petroleum jelly to provide a barrier.  General care:    If you were prescribed an oral medicine, read the patient information. Talk with your healthcare provider about the risks and side effects.    Let your skin dry completely after bathing. Carefully dry your feet and between your toes.    Dress in loose cotton clothing.    Don t scratch the affected area. This can delay healing and may spread the infection. It can also cause a bacterial infection.    Keep your skin clean, but don t wash the skin too much. This can irritate your skin.    Keep in mind that it may take a week before the fungus starts to go away. It can take 2 to 4 weeks to fully clear. To prevent it from coming back, use the medicine until the rash is all gone.  Follow-up care  Follow up with your healthcare provider if the rash does not get better after 10 days of treatment. Also follow up if the rash spreads to other parts of your body.  When to seek medical advice  Call your healthcare provider right away if any of these occur:    Fever of 100.4 F (38 C) or higher    Redness or swelling that gets worse    Pain that gets worse    Foul-smelling fluid leaking from the skin  Date Last Reviewed: 11/1/2016 2000-2017 [employer]. [address] [place] [address], [place] [address]. All rights reserved. This information is not intended as a substitute for professional medical care. Always follow your healthcare professional's instructions.

## 2018-09-08 NOTE — PROGRESS NOTES
SUBJECTIVE:   Katie Aponte is a 52 year old female presenting with a chief complaint of   Chief Complaint   Patient presents with     UTI     Patient complains of discharge and odor in urine. Also complains of rash on right ankle        She is an established patient of [facility].    Rash    Onset of rash was 1 week ago.   Course of illness is sudden onset and worsening.  Severity moderate  Current and Associated symptoms: itching, dry and red   Location of the rash: left leg.  Previous history of a similar rash? Yes  Recent exposure history: none known  Denies exposure to: none known  Associated symptoms include: nothing.  Treatment measures tried include: moisturizer      GYN Complaint    Onset of symptoms was 2 day(s) ago.  Course of illness is worsening.    Severity moderate  Current and Associated symptoms: vaginal discharge described as clear and odor  Treatment measures tried include:  None  Sexually active: yes, single partner, contraception - none  Predisposing factors: None  Hx of previous symptom: none        Review of Systems   Constitutional: Negative for chills and fever.   HENT: Negative for congestion, ear pain, rhinorrhea and sore throat.    Respiratory: Negative for cough and shortness of breath.    Gastrointestinal: Negative for diarrhea, nausea and vomiting.   Genitourinary: Positive for vaginal discharge.   Skin: Positive for rash.   Neurological: Negative for headaches.   All other systems reviewed and are negative.      Past Medical History:   Diagnosis Date     Herpes     Under eye     Joint pain      Seasonal allergies      Thrombocytopenia (H) 9/25/2015     Uncomplicated asthma     very mild     Family History   Problem Relation Age of Onset     Cancer Mother      patient is unsure of what kind     Current Outpatient Prescriptions   Medication Sig Dispense Refill     albuterol (2.5 MG/3ML) 0.083% neb solution Take 1 vial (2.5 mg) by nebulization every 6 hours as needed for shortness of  breath / dyspnea or wheezing 25 vial 1     albuterol (PROAIR HFA/PROVENTIL HFA/VENTOLIN HFA) 108 (90 BASE) MCG/ACT Inhaler Inhale 2 puffs into the lungs every 6 hours as needed for shortness of breath / dyspnea or wheezing 1 Inhaler 2     Estrogens Conjugated (PREMARIN PO) Take 0.9 mg by mouth daily       estrogens conjugated (PREMARIN) 0.9 MG TABS Take 0.3 mg by mouth daily       FLUOCINOLONE ACETONIDE SCALP 0.01 % OIL oil Apply topically 2 times daily as needed 118 mL 0     fluticasone (FLOVENT HFA) 44 MCG/ACT Inhaler Inhale 2 puffs into the lungs 2 times daily 1 Inhaler 1     ipratropium - albuterol 0.5 mg/2.5 mg/3 mL (DUONEB) 0.5-2.5 (3) MG/3ML neb solution Take 1 vial (3 mLs) by nebulization every 6 hours as needed for shortness of breath / dyspnea or wheezing 1 vial 0     ketoconazole (NIZORAL) 2 % cream Apply topically 2 times daily for 14 days 30 g 0     metroNIDAZOLE (FLAGYL) 500 MG tablet Take 1 tablet (500 mg) by mouth 2 times daily for 7 days 14 tablet 0     multivitamin, therapeutic with minerals (MULTI-VITAMIN) TABS tablet Take 1 tablet by mouth daily       order for DME Equipment being ordered:  Knee walker 1 Units 0     order for DME Equipment being ordered: Knee walker  1 Device 0     order for DME Equipment being ordered: Nebulizer with tubing and instructions 1 Device 0     order for DME Equipment being ordered: [employer]x Bed Buddy- heated neck roll 1 Device 0     order for DME Equipment being ordered: TENS 1 Units 0     phentermine 30 MG capsule Take 30 mg by mouth every morning       predniSONE (DELTASONE) 5 MG tablet 4tab=20 mg qd x 7 days, 3tab=15 mg qd x 7 days, 2tab=10 mg qd daily. 30 tablet 2     triamcinolone (KENALOG) 0.1 % cream Apply sparingly to affected area three times daily as needed 80 g 1     valACYclovir (VALTREX) 500 MG tablet Take 500 mg by mouth as needed Reported on 4/11/2017       desonide (DESOWEN) 0.05 % ointment Apply topically 2 times daily (Patient not taking: Reported  on 8/31/2018) 30 g 0     fluticasone (FLONASE) 50 MCG/ACT spray Spray 1-2 sprays into both nostrils daily (Patient not taking: Reported on 8/31/2018) 1 Bottle 11     Social History   Substance Use Topics     Smoking status: Never Smoker     Smokeless tobacco: Never Used     Alcohol use 0.0 oz/week     0 Standard drinks or equivalent per week      Comment: SOCIAL - 1 glass of wine twice a week; 2 drinks on the weekend       OBJECTIVE  /79 (BP Location: Left arm, Patient Position: Chair, Cuff Size: Adult Regular)  Pulse 81  Temp 98.7  F (37.1  C) (Oral)  Wt 186 lb 6.4 oz (84.6 kg)  LMP 03/30/2014  SpO2 100%  BMI 30.09 kg/m2    Physical Exam   Cardiovascular: Normal rate and normal heart sounds.    Pulmonary/Chest: Effort normal and breath sounds normal.   Neurological: She is alert.   Skin:   Anular scaly dry erythematous rash on left leg with cental clearing       Labs:  Results for orders placed or performed in visit on 09/08/18 (from the past 24 hour(s))   UA with Microscopic reflex to Culture   Result Value Ref Range    Color Urine Yellow     Appearance Urine Clear     Glucose Urine Negative NEG^Negative mg/dL    Bilirubin Urine Negative NEG^Negative    Ketones Urine Negative NEG^Negative mg/dL    Specific Gravity Urine 1.025 1.003 - 1.035    pH Urine 5.5 5.0 - 7.0 pH    Protein Albumin Urine Negative NEG^Negative mg/dL    Urobilinogen Urine 0.2 0.2 - 1.0 EU/dL    Nitrite Urine Negative NEG^Negative    Blood Urine Negative NEG^Negative    Leukocyte Esterase Urine Negative NEG^Negative    Source Midstream Urine     WBC Urine 0 - 5 OTO5^0 - 5 /HPF    RBC Urine O - 2 OTO2^O - 2 /HPF    Squamous Epithelial /LPF Urine Few FEW^Few /LPF    Bacteria Urine Few (A) NEG^Negative /HPF    Mucous Urine Present (A) NEG^Negative /LPF   Wet prep   Result Value Ref Range    Specimen Description Vagina     Wet Prep Clue cells seen (A)     Wet Prep No Trichomonas seen     Wet Prep No yeast seen            ASSESSMENT:       ICD-10-CM    1. Urinary tract infection N39.0 UA with Microscopic reflex to Culture     Wet prep   2. Tinea corporis B35.4 ketoconazole (NIZORAL) 2 % cream   3. Bacterial vaginitis N76.0 metroNIDAZOLE (FLAGYL) 500 MG tablet    B96.89       PLAN:  Patient educational/instructional material provided including reasons for follow-up    The patient indicates understanding of these issues and agrees with the plan.  If not improving or if condition worsens, follow up with your Primary Care Provider    There are no Patient Instructions on file for this visit.

## 2018-10-02 ENCOUNTER — OFFICE VISIT (OUTPATIENT)
Dept: FAMILY MEDICINE | Facility: CLINIC | Age: 53
End: 2018-10-02
Payer: COMMERCIAL

## 2018-10-02 VITALS
BODY MASS INDEX: 29.96 KG/M2 | TEMPERATURE: 98.5 F | WEIGHT: 185.6 LBS | DIASTOLIC BLOOD PRESSURE: 81 MMHG | SYSTOLIC BLOOD PRESSURE: 116 MMHG | HEART RATE: 75 BPM | OXYGEN SATURATION: 98 %

## 2018-10-02 DIAGNOSIS — M75.41 IMPINGEMENT SYNDROME, SHOULDER, RIGHT: ICD-10-CM

## 2018-10-02 DIAGNOSIS — S46.001A INJURY OF RIGHT ROTATOR CUFF, INITIAL ENCOUNTER: Primary | ICD-10-CM

## 2018-10-02 PROBLEM — N39.3 SUI (STRESS URINARY INCONTINENCE, FEMALE): Status: ACTIVE | Noted: 2017-04-19

## 2018-10-02 PROCEDURE — 99213 OFFICE O/P EST LOW 20 MIN: CPT | Performed by: FAMILY MEDICINE

## 2018-10-02 RX ORDER — IBUPROFEN 800 MG/1
800 TABLET, FILM COATED ORAL EVERY 8 HOURS PRN
Qty: 60 TABLET | Refills: 1 | Status: SHIPPED | OUTPATIENT
Start: 2018-10-02 | End: 2021-05-26

## 2018-10-02 NOTE — PROGRESS NOTES
SUBJECTIVE:   Katie Aponte is a 52 year old female who presents to clinic today for the following health issues:  Joint Pain    Onset: 3 weeks ago started getting sore in back of shoulder. No known injury.     Description:   Location: right shoulder  Character: Sharp and clicking    Intensity: moderate    Progression of Symptoms: worse    Accompanying Signs & Symptoms:  Other symptoms: weakness of right arm- if holds anything it can fall out of her hand    History:   Previous similar pain: no       Precipitating factors:   Trauma or overuse: no     Alleviating factors:  Improved by: nothing  Therapies Tried and outcome: medicated pads and headed pads- didn't help, tylenol didn't help, Advil.             Problem list and histories reviewed & adjusted, as indicated.  Additional history: as documented    Patient Active Problem List   Diagnosis     Knee pain     Abnormal uterine bleeding     CARDIOVASCULAR SCREENING; LDL GOAL LESS THAN 160     Acne rosacea     Dermatitis     Leukopenia     Allergic bronchitis, moderate persistent, uncomplicated     Mild persistent asthma with acute exacerbation     JESENIA (stress urinary incontinence, female)     Cervical radiculopathy     Class 1 obesity due to excess calories without serious comorbidity with body mass index (BMI) of 30.0 to 30.9 in adult     Medial meniscus tear     Pain in joint, ankle and foot     Tear of medial meniscus of knee     Joint pain     Past Surgical History:   Procedure Laterality Date      SECTION       COLONOSCOPY       CYSTOSCOPY, SLING TRANSVAGINAL N/A 2017    Procedure: CYSTOSCOPY, SLING TRANSVAGINAL;  TRANSVAGINAL TAPING, CYSTOSCOPY, MID URETHRAL SLING;  Surgeon: Jett Montes MD;  Location:  OR     DILATE CERVIX, ABLATE ENDOMETRIUM THERMACHOICE, COMBINED  4/10/2014    Procedure: COMBINED DILATE CERVIX, ABLATE ENDOMETRIUM THERMACHOICE;;  Surgeon: Jett Montes MD;  Location:  [facility]     DILATION AND CURETTAGE, HYSTEROSCOPY,  ABLATE ENDOMETRIUM NOVASURE, COMBINED  4/10/2014    Procedure: COMBINED DILATION AND CURETTAGE, HYSTEROSCOPY, ABLATE ENDOMETRIUM NOVASURE;  HYSTEROSCOPY, DILATION AND CURETTAGE, NOVASURE ABLATION ATTEMPTED, THERMACHOICE ABLATION ATTEMPTED;  Surgeon: Jett Montes MD;  Location: [facility]     LAPAROSCOPIC ASSISTED HYSTERECTOMY VAGINAL, BILATERAL SALPINGO-OOPHORECTOMY, COMBINED  7/31/2014    Procedure: COMBINED LAPAROSCOPIC ASSISTED HYSTERECTOMY VAGINAL, SALPINGO-OOPHORECTOMY;  Surgeon: Jett Montes MD;  Location: [facility]     ORTHOPEDIC SURGERY      L KNEE MENISCUS,ankle surgery, left knee replacement       Social History   Substance Use Topics     Smoking status: Never Smoker     Smokeless tobacco: Never Used     Alcohol use 0.0 oz/week     0 Standard drinks or equivalent per week      Comment: SOCIAL - 1 glass of wine twice a week; 2 drinks on the weekend     Family History   Problem Relation Age of Onset     Cancer Mother      patient is unsure of what kind         Current Outpatient Prescriptions   Medication Sig Dispense Refill     albuterol (2.5 MG/3ML) 0.083% neb solution Take 1 vial (2.5 mg) by nebulization every 6 hours as needed for shortness of breath / dyspnea or wheezing 25 vial 1     albuterol (PROAIR HFA/PROVENTIL HFA/VENTOLIN HFA) 108 (90 BASE) MCG/ACT Inhaler Inhale 2 puffs into the lungs every 6 hours as needed for shortness of breath / dyspnea or wheezing 1 Inhaler 2     Estrogens Conjugated (PREMARIN PO) Take 0.9 mg by mouth daily       estrogens conjugated (PREMARIN) 0.9 MG TABS Take 0.3 mg by mouth daily       FLUOCINOLONE ACETONIDE SCALP 0.01 % OIL oil Apply topically 2 times daily as needed 118 mL 0     fluticasone (FLOVENT HFA) 44 MCG/ACT Inhaler Inhale 2 puffs into the lungs 2 times daily 1 Inhaler 1     ibuprofen (ADVIL/MOTRIN) 800 MG tablet Take 1 tablet (800 mg) by mouth every 8 hours as needed for moderate pain 60 tablet 1     ipratropium - albuterol 0.5 mg/2.5 mg/3 mL (DUONEB) 0.5-2.5 (3)  MG/3ML neb solution Take 1 vial (3 mLs) by nebulization every 6 hours as needed for shortness of breath / dyspnea or wheezing 1 vial 0     multivitamin, therapeutic with minerals (MULTI-VITAMIN) TABS tablet Take 1 tablet by mouth daily       order for DME Equipment being ordered:  Knee walker 1 Units 0     order for DME Equipment being ordered: Knee walker  1 Device 0     order for DME Equipment being ordered: Nebulizer with tubing and instructions 1 Device 0     order for DME Equipment being ordered: [employer] Bed Buddy- heated neck roll 1 Device 0     order for DME Equipment being ordered: TENS 1 Units 0     phentermine 30 MG capsule Take 30 mg by mouth every morning       predniSONE (DELTASONE) 5 MG tablet 4tab=20 mg qd x 7 days, 3tab=15 mg qd x 7 days, 2tab=10 mg qd daily. 30 tablet 2     triamcinolone (KENALOG) 0.1 % cream Apply sparingly to affected area three times daily as needed 80 g 1     valACYclovir (VALTREX) 500 MG tablet Take 500 mg by mouth as needed Reported on 4/11/2017       desonide (DESOWEN) 0.05 % ointment Apply topically 2 times daily (Patient not taking: Reported on 8/31/2018) 30 g 0     fluticasone (FLONASE) 50 MCG/ACT spray Spray 1-2 sprays into both nostrils daily (Patient not taking: Reported on 8/31/2018) 1 Bottle 11     Allergies   Allergen Reactions     Droperidol Itching     Feels jumpy     Percocet [Oxycodone-Acetaminophen] Nausea and Vomiting and GI Disturbance     Recent Labs   Lab Test  03/29/18   0913  09/25/15   1540  09/18/15   1001   A1C   --    --   5.7   LDL  131*   --    --    HDL  80   --    --    TRIG  82   --    --    ALT   --   24   --    CR   --   0.77  0.83   GFRESTIMATED   --   80  73   GFRESTBLACK   --   >90   GFR Calc    89   POTASSIUM   --   3.9  4.6   TSH   --   1.62   --       BP Readings from Last 3 Encounters:   10/02/18 116/81   09/08/18 133/79   08/31/18 121/81    Wt Readings from Last 3 Encounters:   10/02/18 185 lb 9.6 oz (84.2 kg)    09/08/18 186 lb 6.4 oz (84.6 kg)   08/31/18 187 lb 6.4 oz (85 kg)                  Labs reviewed in EPIC    Reviewed and updated as needed this visit by clinical staff       Reviewed and updated as needed this visit by Provider         ROS:  Constitutional, HEENT, cardiovascular, pulmonary, gi and gu systems are negative, except as otherwise noted.    OBJECTIVE:     /81 (BP Location: Right arm, Patient Position: Chair, Cuff Size: Adult Regular)  Pulse 75  Temp 98.5  F (36.9  C) (Oral)  Wt 185 lb 9.6 oz (84.2 kg)  LMP 03/30/2014  SpO2 98%  BMI 29.96 kg/m2  Body mass index is 29.96 kg/(m^2).  GENERAL: healthy, alert and no distress  EYES: Eyes grossly normal to inspection, conjunctivae and sclerae normal  MS: no gross musculoskeletal defects noted, no edema, right shoulder tender in posterior shoulder and ac joint. Decreased ROM with pain external and internal rotation. Normal abduction, adduction. No swelling or deformity. Normal strength.   SKIN: no suspicious lesions or rashes  NEURO: Normal strength and tone, gait and speech normal  PSYCH: mentation appears normal, affect normal/bright     Diagnostic Test Results:  none     ASSESSMENT/PLAN:             1. Injury of right rotator cuff, initial encounter  Not likely to be a tear but seems to have had an acute event lifting a bag. May benefit from steroid injection if not improving with other treatments. Rest the affected painful area as much as possible.  Apply ice for 15-20 minutes intermittently as needed and especially after any offending activity. Daily stretching.  As pain recedes, begin normal activities slowly as tolerated.  Consider Physical Therapy if symptoms not better with symptomatic care.   - ORTHOPEDICS ADULT REFERRAL    2. Impingement syndrome, shoulder, right  Tenderness posteriorly and suspect impingement syndrome also. Discussed good shoulder posture while working and with activities  - ibuprofen (ADVIL/MOTRIN) 800 MG tablet; Take 1  tablet (800 mg) by mouth every 8 hours as needed for moderate pain  Dispense: 60 tablet; Refill: 1  - [facility] REFERRAL; Future    CONSULTATION/REFERRAL to physical therapy as needed. Orthopedics if not improving.   FUTURE LABS:       - Schedule fasting labs in 6 months  FUTURE APPOINTMENTS:       - Follow-up visit in 6 months or sooner if any questions or concerns.   See Patient Instructions    Laura Muñoz MD  [facility]
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
## 2018-10-03 ASSESSMENT — ASTHMA QUESTIONNAIRES: ACT_TOTALSCORE: 20

## 2018-10-03 NOTE — PATIENT INSTRUCTIONS
Understanding a Rotator Cuff Tendon Tear    The rotator cuff is a group of 4 muscles and their tendons in the shoulder. The muscles are located in the front, back, and top of the shoulder joint. They each have a strong band of tissue (tendon) that attaches to the top of the upper arm bone. This helps keep the arm bone firmly in place in the socket of the shoulder joint. The muscles and tendons of the rotator cuff also help the shoulder joint with certain movements. These include reaching the arm over the head and rotating the arm.  Any one of the rotator cuff tendons can fray or tear from causes such as injury and overuse. A tear may be partial, with some of the tendon still intact. Or it may be a complete tear, with the tendon fully torn. Both types can cause pain and weakness, and limit arm and shoulder movement. A rotator cuff tear often needs treatment to heal properly.  Causes of a rotator cuff tendon tear  Causes can include:    Wear and tear of the tendons from aging or normal use over time    Overuse of the tendons from sports or work activities, especially those that involve repeated overhead movements    Injury to the tendons from a fall or other accident  Symptoms of a rotator cuff tendon tear  Some people with a rotator cuff tendon tear have few or no symptoms. Others may have symptoms that range from mild to severe. Possible symptoms include:    Pain in your shoulder, which may be worse with overhead movements or at night from lying on the affected side    Weakness in your arm and shoulder    Trouble lifting your arm up or rotating your arm    Clicking or crackling sounds when moving or using your arm and shoulder  Treating a rotator cuff tendon tear  Treatment for a rotator cuff tendon tear depends on several factors. These include the severity of the tear and your symptoms. Options may include:    Resting your arm and shoulder. This involves limiting certain movements, such as reaching above your  head or lifting your arm up. These can slow healing and worsen symptoms. You may also need to avoid certain sports and types of work for a time.    Cold packs or heat packs. These help reduce pain and swelling.    Prescription or over-the-counter pain medicines. These help reduce pain and swelling.    Injections of medicine into your shoulder. These help relieve pain and swelling for a time.    Physical therapy and exercises.  These help improve strength, flexibility, and range of motion in your arm and shoulder.     Surgery. You may need surgery if your tendon is completely torn or if other treatments don t relieve your symptoms. Different options are available. In many cases, the damaged tendon is repaired. It is then reattached to your arm bone.  Possible complications    If a partial tear isn t given time to heal, it may get larger or tear completely. You may then need more treatment.    Even with treatment, a partial or complete tear may sometimes have trouble healing. The problem may become long-term (chronic). This can cause ongoing pain, weakness, and limited movement of your arm and shoulder.     When to call your healthcare provider  Call your healthcare provider right away if you have any of these:    Fever of 100.4 F (38 C) or higher, or as directed    Symptoms that don t get better with treatment, or get worse    After surgery, symptoms at the incision sites such as redness, warmth, swelling, bleeding, or drainage    New symptoms   Date Last Reviewed: 3/10/2016    6019-4470 [employer]. [address] [place] [address]. All rights reserved. This information is not intended as a substitute for professional medical care. Always follow your healthcare professional's instructions.        Understanding Shoulder Impingement Syndrome    Shoulder impingement syndrome is a problem with the shoulder joint. It occurs when certain parts within the joint swell and are pinched. This can cause  nagging pain and problems with moving the arm.  What causes shoulder impingement syndrome?  It is possible to develop impingement after years of normal shoulder use. But in most cases the condition occurs because of repeated overhead movements. These include such things as stocking shelves, painting, swimming, and throwing. These movements can irritate parts of the shoulder, leading to swelling. Swollen parts of the shoulder take up more room, making the joint space smaller. Some parts that may be involved include:    A sac of fluid (bursa) that cushions the shoulder joint.  When the bursa is irritated, it may lead to a condition called bursitis. This is when the bursa swells with fluid, filling and squeezing the joint space.    Fibrous tissues (tendons) that connect muscle to bone. When tendons are irritated, they may become swollen. This is called tendonitis.    The end (acromion) of the shoulder blade. This bone may be flat or hooked. If the acromion is hooked, the joint space may be smaller than normal. Growths (bone spurs) on the acromion can also narrow the joint space. Acromion problems don t often cause impingement. But they can make it worse.  Symptoms of shoulder impingement syndrome  Symptoms include pain, pinching, or stiffness in your shoulder. Pain often comes with movement, particularly reaching overhead or backward. It may also be felt when the shoulder is at rest. Pain at night during sleep is common.  Treatment for shoulder impingement syndrome  Treatment will depend on the cause of the problem, how bad the problem is, and if other parts of the shoulder are damaged. Treatment may include the following:    Active rest. This allows the shoulder to heal. It means using the arm and shoulder, but avoiding activities that cause pain. These likely include reaching overhead or sleeping on your shoulder.    Cold packs. These help reduce swelling and relieve pain.    Prescription or over-the-counter pain  medicines. These help relieve pain and swelling.    Arm and shoulder exercises. These help keep your shoulder joint mobile as it heals. They also help improve muscle strength around the joint.    Shots of medicine into the joint. This can help reduce swelling and pain for a short time.  If other measures don t work to relieve symptoms, you may need surgery. This opens up space in the joint to allow pain-free motion.  Possible complications of shoulder impingement syndrome  It might be tempting to stop using your shoulder completely to avoid pain. But doing so may lead to a condition called frozen shoulder. To help prevent this, follow instructions you are given for active rest and for doing exercises to help your shoulder heal.  When to call your healthcare provider  Call your healthcare provider right away if you have any of these:    Fever of 100.4 F (38 C) or higher, or as directed    Symptoms that don t get better, or get worse    New symptoms   Date Last Reviewed: 3/10/2016    7488-8260 [employer]. [address] [place] [address], [place] [address]. All rights reserved. This information is not intended as a substitute for professional medical care. Always follow your healthcare professional's instructions.

## 2018-10-09 ENCOUNTER — OFFICE VISIT (OUTPATIENT)
Dept: ORTHOPEDICS | Facility: CLINIC | Age: 53
End: 2018-10-09
Payer: COMMERCIAL

## 2018-10-09 VITALS
HEART RATE: 88 BPM | SYSTOLIC BLOOD PRESSURE: 134 MMHG | BODY MASS INDEX: 29.41 KG/M2 | DIASTOLIC BLOOD PRESSURE: 90 MMHG | TEMPERATURE: 97.9 F | WEIGHT: 182.2 LBS

## 2018-10-09 DIAGNOSIS — M75.101 ROTATOR CUFF SYNDROME OF RIGHT SHOULDER: ICD-10-CM

## 2018-10-09 DIAGNOSIS — M25.511 RIGHT SHOULDER PAIN, UNSPECIFIED CHRONICITY: Primary | ICD-10-CM

## 2018-10-09 PROCEDURE — 20610 DRAIN/INJ JOINT/BURSA W/O US: CPT | Mod: RT | Performed by: ORTHOPAEDIC SURGERY

## 2018-10-09 PROCEDURE — 99203 OFFICE O/P NEW LOW 30 MIN: CPT | Mod: 25 | Performed by: ORTHOPAEDIC SURGERY

## 2018-10-09 RX ORDER — TRIAMCINOLONE ACETONIDE 40 MG/ML
40 INJECTION, SUSPENSION INTRA-ARTICULAR; INTRAMUSCULAR ONCE
Qty: 1 ML | Refills: 0 | OUTPATIENT
Start: 2018-10-09 | End: 2018-10-09

## 2018-10-09 ASSESSMENT — PAIN SCALES - GENERAL: PAINLEVEL: SEVERE PAIN (6)

## 2018-10-09 NOTE — PROGRESS NOTES
The patient's right shoulder was prepped with betadine solution after verification of allergies. Area approximately 10 cm x 10 cm prepped in a sterile fashion. After injection, betadine removed with soap and water and band-aids applied.    1ml kenalog with 1% lidocaine plain injected into patient's right shoulder by Dr. Wade Rea  LOT# [id number]  Exp. 12/2019    Srinivas Chacon PA-C  Supervising physician: Wade Rea MD  Dept. of Orthopedics  [facility]

## 2018-10-09 NOTE — LETTER
10/9/2018         RE: Katie Aponte  [address]  [place] [address]        Dear Colleague,    Thank you for referring your patient, Katie Aponte, to the [facility]. Please see a copy of my visit note below.    Katie Aponte is a 52 year old female who is seen in consultation at the request of Dr Laura Muñoz  for right shoulder pain.     Past Medical History:   Diagnosis Date     Herpes     Under eye     Joint pain      Seasonal allergies      Thrombocytopenia (H) 2015     Uncomplicated asthma     very mild       Past Surgical History:   Procedure Laterality Date      SECTION       COLONOSCOPY       CYSTOSCOPY, SLING TRANSVAGINAL N/A 2017    Procedure: CYSTOSCOPY, SLING TRANSVAGINAL;  TRANSVAGINAL TAPING, CYSTOSCOPY, MID URETHRAL SLING;  Surgeon: Jett Montes MD;  Location:  OR     DILATE CERVIX, ABLATE ENDOMETRIUM THERMACHOICE, COMBINED  4/10/2014    Procedure: COMBINED DILATE CERVIX, ABLATE ENDOMETRIUM THERMACHOICE;;  Surgeon: Jett Montes MD;  Location: [facility]     DILATION AND CURETTAGE, HYSTEROSCOPY, ABLATE ENDOMETRIUM NOVASURE, COMBINED  4/10/2014    Procedure: COMBINED DILATION AND CURETTAGE, HYSTEROSCOPY, ABLATE ENDOMETRIUM NOVASURE;  HYSTEROSCOPY, DILATION AND CURETTAGE, NOVASURE ABLATION ATTEMPTED, THERMACHOICE ABLATION ATTEMPTED;  Surgeon: Jett Montes MD;  Location: [facility]     LAPAROSCOPIC ASSISTED HYSTERECTOMY VAGINAL, BILATERAL SALPINGO-OOPHORECTOMY, COMBINED  2014    Procedure: COMBINED LAPAROSCOPIC ASSISTED HYSTERECTOMY VAGINAL, SALPINGO-OOPHORECTOMY;  Surgeon: Jett Montes MD;  Location: [facility]     ORTHOPEDIC SURGERY      L KNEE MENISCUS,ankle surgery, left knee replacement       Family History   Problem Relation Age of Onset     Cancer Mother      patient is unsure of what kind       Social History     Social History     Marital status: Single     Spouse name: N/A     Number of children: N/A     Years of education: N/A      Occupational History     Not on file.     Social History Main Topics     Smoking status: Never Smoker     Smokeless tobacco: Never Used     Alcohol use 0.0 oz/week     0 Standard drinks or equivalent per week      Comment: SOCIAL - 1 glass of wine twice a week; 2 drinks on the weekend     Drug use: No     Sexual activity: Yes     Partners: Male     Other Topics Concern     Parent/Sibling W/ Cabg, Mi Or Angioplasty Before 65f 55m? 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       Current Outpatient Prescriptions   Medication Sig Dispense Refill     albuterol (2.5 MG/3ML) 0.083% neb solution Take 1 vial (2.5 mg) by nebulization every 6 hours as needed for shortness of breath / dyspnea or wheezing 25 vial 1     albuterol (PROAIR HFA/PROVENTIL HFA/VENTOLIN HFA) 108 (90 BASE) MCG/ACT Inhaler Inhale 2 puffs into the lungs every 6 hours as needed for shortness of breath / dyspnea or wheezing 1 Inhaler 2     desonide (DESOWEN) 0.05 % ointment Apply topically 2 times daily 30 g 0     estrogens conjugated (PREMARIN) 0.9 MG TABS Take 0.3 mg by mouth daily       FLUOCINOLONE ACETONIDE SCALP 0.01 % OIL oil Apply topically 2 times daily as needed 118 mL 0     fluticasone (FLONASE) 50 MCG/ACT spray Spray 1-2 sprays into both nostrils daily 1 Bottle 11     fluticasone (FLOVENT HFA) 44 MCG/ACT Inhaler Inhale 2 puffs into the lungs 2 times daily 1 Inhaler 1     ibuprofen (ADVIL/MOTRIN) 800 MG tablet Take 1 tablet (800 mg) by mouth every 8 hours as needed for moderate pain 60 tablet 1     ipratropium - albuterol 0.5 mg/2.5 mg/3 mL (DUONEB) 0.5-2.5 (3) MG/3ML neb solution Take 1 vial (3 mLs) by nebulization every 6 hours as needed for shortness of breath / dyspnea or wheezing 1 vial 0     multivitamin, therapeutic with minerals (MULTI-VITAMIN) TABS tablet Take 1 tablet by mouth daily       order for DME Equipment being ordered:  Knee walker 1 Units 0     order for DME Equipment being ordered: Knee  walker  1 Device 0     order for DME Equipment being ordered: Nebulizer with tubing and instructions 1 Device 0     order for DME Equipment being ordered: [employer] Bed Buddy- heated neck roll 1 Device 0     order for DME Equipment being ordered: TENS 1 Units 0     phentermine 30 MG capsule Take 30 mg by mouth every morning       triamcinolone (KENALOG) 0.1 % cream Apply sparingly to affected area three times daily as needed 80 g 1     Estrogens Conjugated (PREMARIN PO) Take 0.9 mg by mouth daily       predniSONE (DELTASONE) 5 MG tablet 4tab=20 mg qd x 7 days, 3tab=15 mg qd x 7 days, 2tab=10 mg qd daily. (Patient not taking: Reported on 10/9/2018) 30 tablet 2     valACYclovir (VALTREX) 500 MG tablet Take 500 mg by mouth as needed Reported on 4/11/2017         Allergies   Allergen Reactions     Droperidol Itching     Feels jumpy     Percocet [Oxycodone-Acetaminophen] Nausea and Vomiting and GI Disturbance       REVIEW OF SYSTEMS:  CONSTITUTIONAL:  NEGATIVE for fever, chills, change in weight, not feeling tired  SKIN:  NEGATIVE for worrisome rashes, no skin lumps, no skin ulcers and no non-healing wounds  EYES:  NEGATIVE for vision changes or irritation.  ENT/MOUTH:  NEGATIVE.  No hearing loss, no hoarseness, no difficulty swallowing.  RESP:  NEGATIVE. No cough or shortness of breath.  CV:  NEGATIVE for chest pain, palpitations or peripheral edema  GI:  NEGATIVE for nausea, abdominal pain, heartburn, or change in bowel habits  :  Negative. No dysuria, no hematuria  MUSCULOSKELETAL:  See HPI above  NEURO:  NEGATIVE . No headaches, no dizziness,  no numbness  ENDOCRINE:  NEGATIVE for temperature intolerance, skin/hair changes  HEME/ALLERGY/IMMUNE:  NEGATIVE for bleeding problems  PSYCHIATRIC:  NEGATIVE. no anxiety, no depression.     Exam:  Vitals: /90 (BP Location: Left arm, Patient Position: Sitting, Cuff Size: Adult Regular)  Pulse 88  Temp 97.9  F (36.6  C) (Oral)  Wt 82.6 kg (182 lb 3.2 oz)  LMP  03/30/2014  BMI 29.41 kg/m2  BMI= Body mass index is 29.41 kg/(m^2).  Constitutional:  healthy, alert and no distress  Neuro: Alert and Oriented x 3, Sensation grossly WNL.  Psych: Affect normal   Respiratory: Breathing not labored.  Cardiovascular: normal peripheral pulses  Lymph: no adenopathy  Skin: No rashes,worrisome lesions or skin problems        The patient's right shoulder was prepped with betadine solution after verification of allergies. Area approximately 10 cm x 10 cm prepped in a sterile fashion. After injection, betadine removed with soap and water and band-aids applied.    1ml kenalog with 1% lidocaine plain injected into patient's right shoulder by Dr. Wade Rea  LOT# [id number]  Exp. 12/2019    Srinivas Chacon PA-C  Supervising physician: Wade Rea MD  Dept. of Orthopedics  [facility]          Again, thank you for allowing me to participate in the care of your patient.        Sincerely,        Wade Rea MD

## 2018-10-09 NOTE — PROGRESS NOTES
Katie Aponte is a 52 year old female who is seen in consultation at the request of Dr Laura Muñoz  for right shoulder pain.     Past Medical History:   Diagnosis Date     Herpes     Under eye     Joint pain      Seasonal allergies      Thrombocytopenia (H) 2015     Uncomplicated asthma     very mild       Past Surgical History:   Procedure Laterality Date      SECTION       COLONOSCOPY       CYSTOSCOPY, SLING TRANSVAGINAL N/A 2017    Procedure: CYSTOSCOPY, SLING TRANSVAGINAL;  TRANSVAGINAL TAPING, CYSTOSCOPY, MID URETHRAL SLING;  Surgeon: Jett Montes MD;  Location:  OR     DILATE CERVIX, ABLATE ENDOMETRIUM THERMACHOICE, COMBINED  4/10/2014    Procedure: COMBINED DILATE CERVIX, ABLATE ENDOMETRIUM THERMACHOICE;;  Surgeon: Jett Montes MD;  Location: [facility]     DILATION AND CURETTAGE, HYSTEROSCOPY, ABLATE ENDOMETRIUM NOVASURE, COMBINED  4/10/2014    Procedure: COMBINED DILATION AND CURETTAGE, HYSTEROSCOPY, ABLATE ENDOMETRIUM NOVASURE;  HYSTEROSCOPY, DILATION AND CURETTAGE, NOVASURE ABLATION ATTEMPTED, THERMACHOICE ABLATION ATTEMPTED;  Surgeon: Jett Montes MD;  Location: [facility]     LAPAROSCOPIC ASSISTED HYSTERECTOMY VAGINAL, BILATERAL SALPINGO-OOPHORECTOMY, COMBINED  2014    Procedure: COMBINED LAPAROSCOPIC ASSISTED HYSTERECTOMY VAGINAL, SALPINGO-OOPHORECTOMY;  Surgeon: Jett Montes MD;  Location: [facility]     ORTHOPEDIC SURGERY      L KNEE MENISCUS,ankle surgery, left knee replacement       Family History   Problem Relation Age of Onset     Cancer Mother      patient is unsure of what kind       Social History     Social History     Marital status: Single     Spouse name: N/A     Number of children: N/A     Years of education: N/A     Occupational History     Not on file.     Social History Main Topics     Smoking status: Never Smoker     Smokeless tobacco: Never Used     Alcohol use 0.0 oz/week     0 Standard drinks or equivalent per week      Comment: SOCIAL - 1 glass of wine  twice a week; 2 drinks on the weekend     Drug use: No     Sexual activity: Yes     Partners: Male     Other Topics Concern     Parent/Sibling W/ Cabg, Mi Or Angioplasty Before 65f 55m? 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       Current Outpatient Prescriptions   Medication Sig Dispense Refill     albuterol (2.5 MG/3ML) 0.083% neb solution Take 1 vial (2.5 mg) by nebulization every 6 hours as needed for shortness of breath / dyspnea or wheezing 25 vial 1     albuterol (PROAIR HFA/PROVENTIL HFA/VENTOLIN HFA) 108 (90 BASE) MCG/ACT Inhaler Inhale 2 puffs into the lungs every 6 hours as needed for shortness of breath / dyspnea or wheezing 1 Inhaler 2     desonide (DESOWEN) 0.05 % ointment Apply topically 2 times daily 30 g 0     estrogens conjugated (PREMARIN) 0.9 MG TABS Take 0.3 mg by mouth daily       FLUOCINOLONE ACETONIDE SCALP 0.01 % OIL oil Apply topically 2 times daily as needed 118 mL 0     fluticasone (FLONASE) 50 MCG/ACT spray Spray 1-2 sprays into both nostrils daily 1 Bottle 11     fluticasone (FLOVENT HFA) 44 MCG/ACT Inhaler Inhale 2 puffs into the lungs 2 times daily 1 Inhaler 1     ibuprofen (ADVIL/MOTRIN) 800 MG tablet Take 1 tablet (800 mg) by mouth every 8 hours as needed for moderate pain 60 tablet 1     ipratropium - albuterol 0.5 mg/2.5 mg/3 mL (DUONEB) 0.5-2.5 (3) MG/3ML neb solution Take 1 vial (3 mLs) by nebulization every 6 hours as needed for shortness of breath / dyspnea or wheezing 1 vial 0     multivitamin, therapeutic with minerals (MULTI-VITAMIN) TABS tablet Take 1 tablet by mouth daily       order for DME Equipment being ordered:  Knee walker 1 Units 0     order for DME Equipment being ordered: Knee walker  1 Device 0     order for DME Equipment being ordered: Nebulizer with tubing and instructions 1 Device 0     order for DME Equipment being ordered: [employer] Bed Buddy- heated neck roll 1 Device 0     order for DME Equipment being ordered: TENS  1 Units 0     phentermine 30 MG capsule Take 30 mg by mouth every morning       triamcinolone (KENALOG) 0.1 % cream Apply sparingly to affected area three times daily as needed 80 g 1     Estrogens Conjugated (PREMARIN PO) Take 0.9 mg by mouth daily       predniSONE (DELTASONE) 5 MG tablet 4tab=20 mg qd x 7 days, 3tab=15 mg qd x 7 days, 2tab=10 mg qd daily. (Patient not taking: Reported on 10/9/2018) 30 tablet 2     valACYclovir (VALTREX) 500 MG tablet Take 500 mg by mouth as needed Reported on 4/11/2017         Allergies   Allergen Reactions     Droperidol Itching     Feels jumpy     Percocet [Oxycodone-Acetaminophen] Nausea and Vomiting and GI Disturbance       REVIEW OF SYSTEMS:  CONSTITUTIONAL:  NEGATIVE for fever, chills, change in weight, not feeling tired  SKIN:  NEGATIVE for worrisome rashes, no skin lumps, no skin ulcers and no non-healing wounds  EYES:  NEGATIVE for vision changes or irritation.  ENT/MOUTH:  NEGATIVE.  No hearing loss, no hoarseness, no difficulty swallowing.  RESP:  NEGATIVE. No cough or shortness of breath.  CV:  NEGATIVE for chest pain, palpitations or peripheral edema  GI:  NEGATIVE for nausea, abdominal pain, heartburn, or change in bowel habits  :  Negative. No dysuria, no hematuria  MUSCULOSKELETAL:  See HPI above  NEURO:  NEGATIVE . No headaches, no dizziness,  no numbness  ENDOCRINE:  NEGATIVE for temperature intolerance, skin/hair changes  HEME/ALLERGY/IMMUNE:  NEGATIVE for bleeding problems  PSYCHIATRIC:  NEGATIVE. no anxiety, no depression.     Exam:  Vitals: /90 (BP Location: Left arm, Patient Position: Sitting, Cuff Size: Adult Regular)  Pulse 88  Temp 97.9  F (36.6  C) (Oral)  Wt 82.6 kg (182 lb 3.2 oz)  LMP 03/30/2014  BMI 29.41 kg/m2  BMI= Body mass index is 29.41 kg/(m^2).  Constitutional:  healthy, alert and no distress  Neuro: Alert and Oriented x 3, Sensation grossly WNL.  Psych: Affect normal   Respiratory: Breathing not labored.  Cardiovascular: normal  peripheral pulses  Lymph: no adenopathy  Skin: No rashes,worrisome lesions or skin problems

## 2018-10-09 NOTE — PATIENT INSTRUCTIONS
You have had a steroid injection today.  For the first 2 hours there will likely be some numbing in the joint from the lidocaine.  This is a good sign, indicating that the injection is in the right place.  In 2 hours the lidocaine will wear off, and the joint will hurt like you had a shot.  Each day the cortisone makes it feel better.  It reaches peak effect in 2 weeks.  We expect it to last for 3 months.  You may resume regular activity when you feel ready.  If you are diabetic, your glucoses will be quite high for several days.    Avoid tugging with the right arm.  Return to clinic 3 weeks if pain persists to consider MRI.

## 2018-10-10 NOTE — PROGRESS NOTES
Visit Date:   10/09/2018      DATE OF VISIT: 10/09/2018      SUBJECTIVE:  Ms. Arizmendi is a 52-year-old female seen in consultation at the request of Dr. Laura Muñoz for evaluation of right shoulder pain.  This began about 3 weeks ago when she grabbed a work computer bag and jerked her arm up.  She had pain in the shoulder.  She has persistent constant aching in the shoulder and she has clicking in the shoulder.  She has noted weakness if she holds anything.  She has tried medicated pads and heating pads.  Tylenol did not help.      PHYSICAL EXAMINATION:  Shows full passive motion of the shoulder.  She has mild pain with resisted internal rotation.  She has significant pain with resisted external rotation and will not push against resistance there.  She has moderate pain with resisted abduction.  She has positive Gamez sign on the right, negative on the left.  Sensation and circulation are intact to the arm.      IMPRESSION:  This appears to be likely rotator cuff tear or tendinosis.  I have suggested MRI scan of the shoulder, but she is very claustrophobic and did not want to consider this at this time.  She has instead asked for steroid injection and strengthening.  We performed an injection into the subacromial bursa with 40 mg Kenalog and lidocaine and will refer to therapy for strengthening.  We should see her back in 3 weeks to check her progress and consider MRI if she is still having significant weakness and pain.         LUH CERVANTES MD             D: 10/09/2018   T: 10/10/2018   MT:       Name:     LARS ARIZMENDI   MRN:      [id number]        Account:      [id number]   :      1965           Visit Date:   10/09/2018      Document: [id number]

## 2018-10-19 ENCOUNTER — THERAPY VISIT (OUTPATIENT)
Dept: PHYSICAL THERAPY | Facility: CLINIC | Age: 53
End: 2018-10-19
Attending: FAMILY MEDICINE
Payer: COMMERCIAL

## 2018-10-19 DIAGNOSIS — M75.41 IMPINGEMENT SYNDROME, SHOULDER, RIGHT: ICD-10-CM

## 2018-10-19 DIAGNOSIS — M25.511 RIGHT SHOULDER PAIN, UNSPECIFIED CHRONICITY: Primary | ICD-10-CM

## 2018-10-19 PROCEDURE — 97161 PT EVAL LOW COMPLEX 20 MIN: CPT | Mod: GP | Performed by: PHYSICAL THERAPIST

## 2018-10-19 PROCEDURE — 97010 HOT OR COLD PACKS THERAPY: CPT | Mod: GP | Performed by: PHYSICAL THERAPIST

## 2018-10-19 PROCEDURE — 97110 THERAPEUTIC EXERCISES: CPT | Mod: GP | Performed by: PHYSICAL THERAPIST

## 2018-10-19 NOTE — PROGRESS NOTES
[facility] Initial Evaluation  Subjective:  Patient is a 52 year old female presenting with rehab right shoulder hpi. The history is provided by the patient. No  was used.   Katie Aponte is a 52 year old female with a right shoulder condition.  Condition occurred with:  Unknown cause.  Condition occurred: for unknown reasons.  This is a new condition  Patient presents to PT today with c/o posterior R shoulder pain.  Patient is unsure of how she hurt her arm; thinks it might be due to pulling her bag off her deck. Pt denies any previous R shoulder issues.    Patient referred to PT: 10/2/18..    Patient reports pain:  Posterior.  Radiates to:  Upper arm.  Pain is described as aching and sharp and is constant and reported as 6/10 and 7/10.  Associated symptoms:  Catching, loss of motion/stiffness and painful arc. Pain is the same all the time.  Symptoms are exacerbated by lying on extremity, lifting, using arm behind back, using arm at shoulder level and using arm overhead and relieved by ice and rest.  Since onset symptoms are unchanged.    Previous treatment includes other (cortison).    General health as reported by patient is good.  Pertinent medical history includes:  Concussions/dizziness, rheumatoid arthritis, migraines/headaches and sleep disorder/apnea.  Medical allergies: yes (percocet, droperidal).  Other surgeries include:  Orthopedic surgery (Lknee replacement).  Current medications:  Hormone replacement therapy and pain medication.  Current occupation  position.  Patient is working in normal job without restrictions.  Primary job tasks include:  Repetitive tasks, other and prolonged sitting (computer work).    Barriers include:  None as reported by the patient.    Red flags:  None as reported by the patient.                        Objective:  Standing Alignment:    Cervical/Thoracic:  Forward head  Shoulder/UE:  Rounded  shoulders                                       Shoulder Evaluation:  ROM:  AROM:    Flexion:  Left:  174    Right:  101  Extension: Left: 68Right: 32  Abduction:  Left: 172   Right:  82                  Extension/Internal Rotation:  Left:  Bra    Right:  Back pocket    PROM:  : pt very guarded with PROM.                                Strength:  not assessed                        Special Tests:  Special tests assessed shoulder: unable to complete special tests.      Palpation:      Right shoulder tenderness present at: Acrimioclavicular; Supraspinatus; Infraspinatus and Teres Minor  Mobility Tests:  not assessed                                                 General     ROS    Assessment/Plan:    Patient is a 52 year old female with right side shoulder complaints.    Patient has the following significant findings with corresponding treatment plan.                Diagnosis 1:  R shoulder pain (? RC tear)  Pain -  hot/cold therapy and manual therapy  Decreased ROM/flexibility - manual therapy, therapeutic exercise and therapeutic activity  Decreased strength  (unable to assess but assumed)- therapeutic exercise and therapeutic activities  Impaired muscle performance - neuro re-education  Decreased function - therapeutic activities  Impaired posture - neuro re-education    Therapy Evaluation Codes:   1) History comprised of:   Personal factors that impact the plan of care:      None.    Comorbidity factors that impact the plan of care are:      None.     Medications impacting care: None.  2) Examination of Body Systems comprised of:   Body structures and functions that impact the plan of care:      Shoulder.   Activity limitations that impact the plan of care are:      Bathing, Cooking, Dressing, Lifting, Sleeping and reaching.  3) Clinical presentation characteristics are:   Stable/Uncomplicated.  4) Decision-Making    Low complexity using standardized patient assessment instrument and/or measureable assessment of  functional outcome.  Cumulative Therapy Evaluation is: Low complexity.    Previous and current functional limitations:  (See Goal Flow Sheet for this information)    Short term and Long term goals: (See Goal Flow Sheet for this information)     Communication ability:  Patient appears to be able to clearly communicate and understand verbal and written communication and follow directions correctly.  Treatment Explanation - The following has been discussed with the patient:   RX ordered/plan of care  Anticipated outcomes  Possible risks and side effects  This patient would benefit from PT intervention to resume normal activities.   Rehab potential is good.    Frequency:  1 X week, once daily  Duration:  for 4-6 visits  Discharge Plan:  Achieve all LTG.  Independent in home treatment program.  Reach maximal therapeutic benefit.    Please refer to the daily flowsheet for treatment today, total treatment time and time spent performing 1:1 timed codes.
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
## 2018-10-26 ENCOUNTER — THERAPY VISIT (OUTPATIENT)
Dept: PHYSICAL THERAPY | Facility: CLINIC | Age: 53
End: 2018-10-26
Payer: COMMERCIAL

## 2018-10-26 DIAGNOSIS — M25.511 RIGHT SHOULDER PAIN, UNSPECIFIED CHRONICITY: ICD-10-CM

## 2018-10-26 DIAGNOSIS — M75.41 IMPINGEMENT SYNDROME, SHOULDER, RIGHT: ICD-10-CM

## 2018-10-26 PROCEDURE — 97014 ELECTRIC STIMULATION THERAPY: CPT | Mod: GP | Performed by: PHYSICAL THERAPIST

## 2018-10-26 PROCEDURE — 97110 THERAPEUTIC EXERCISES: CPT | Mod: GP | Performed by: PHYSICAL THERAPIST

## 2018-11-02 ENCOUNTER — THERAPY VISIT (OUTPATIENT)
Dept: PHYSICAL THERAPY | Facility: CLINIC | Age: 53
End: 2018-11-02
Payer: COMMERCIAL

## 2018-11-02 DIAGNOSIS — M25.511 RIGHT SHOULDER PAIN, UNSPECIFIED CHRONICITY: ICD-10-CM

## 2018-11-02 DIAGNOSIS — M75.41 IMPINGEMENT SYNDROME, SHOULDER, RIGHT: ICD-10-CM

## 2018-11-02 PROCEDURE — 97110 THERAPEUTIC EXERCISES: CPT | Mod: GP

## 2018-11-02 PROCEDURE — 97014 ELECTRIC STIMULATION THERAPY: CPT | Mod: GP

## 2018-11-09 ENCOUNTER — THERAPY VISIT (OUTPATIENT)
Dept: PHYSICAL THERAPY | Facility: CLINIC | Age: 53
End: 2018-11-09
Payer: COMMERCIAL

## 2018-11-09 DIAGNOSIS — M75.41 IMPINGEMENT SYNDROME, SHOULDER, RIGHT: ICD-10-CM

## 2018-11-09 DIAGNOSIS — M25.511 RIGHT SHOULDER PAIN, UNSPECIFIED CHRONICITY: ICD-10-CM

## 2018-11-09 PROCEDURE — 97110 THERAPEUTIC EXERCISES: CPT | Mod: GP

## 2018-11-09 PROCEDURE — 97014 ELECTRIC STIMULATION THERAPY: CPT | Mod: GP

## 2018-11-09 PROCEDURE — 97140 MANUAL THERAPY 1/> REGIONS: CPT | Mod: GP

## 2018-11-13 ENCOUNTER — DOCUMENTATION ONLY (OUTPATIENT)
Dept: LAB | Facility: CLINIC | Age: 53
End: 2018-11-13

## 2018-11-16 ENCOUNTER — THERAPY VISIT (OUTPATIENT)
Dept: PHYSICAL THERAPY | Facility: CLINIC | Age: 53
End: 2018-11-16
Payer: COMMERCIAL

## 2018-11-16 DIAGNOSIS — M25.511 RIGHT SHOULDER PAIN, UNSPECIFIED CHRONICITY: ICD-10-CM

## 2018-11-16 DIAGNOSIS — M75.41 IMPINGEMENT SYNDROME, SHOULDER, RIGHT: ICD-10-CM

## 2018-11-16 PROCEDURE — 97140 MANUAL THERAPY 1/> REGIONS: CPT | Mod: GP

## 2018-11-16 PROCEDURE — 97110 THERAPEUTIC EXERCISES: CPT | Mod: GP

## 2018-11-16 PROCEDURE — 97530 THERAPEUTIC ACTIVITIES: CPT | Mod: GP
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
## 2018-11-27 ENCOUNTER — DOCUMENTATION ONLY (OUTPATIENT)
Dept: LAB | Facility: CLINIC | Age: 53
End: 2018-11-27

## 2018-11-27 DIAGNOSIS — M25.50 ARTHRALGIA, UNSPECIFIED JOINT: Primary | ICD-10-CM

## 2018-11-28 ENCOUNTER — OFFICE VISIT (OUTPATIENT)
Dept: ORTHOPEDICS | Facility: CLINIC | Age: 53
End: 2018-11-28
Payer: COMMERCIAL

## 2018-11-28 VITALS — TEMPERATURE: 98.2 F | SYSTOLIC BLOOD PRESSURE: 129 MMHG | DIASTOLIC BLOOD PRESSURE: 80 MMHG | RESPIRATION RATE: 16 BRPM

## 2018-11-28 DIAGNOSIS — M25.511 ACUTE PAIN OF RIGHT SHOULDER: Primary | ICD-10-CM

## 2018-11-28 PROCEDURE — 99214 OFFICE O/P EST MOD 30 MIN: CPT | Performed by: ORTHOPAEDIC SURGERY

## 2018-11-28 ASSESSMENT — PAIN SCALES - GENERAL: PAINLEVEL: SEVERE PAIN (7)

## 2018-11-28 NOTE — LETTER
11/28/2018         RE: Katie Aponte  [address]  [facility] [address]        Dear Colleague,    Thank you for referring your patient, Katie Aponte, to the [facility]. Please see a copy of my visit note below.    CHIEF COMPLAINT:   Chief Complaint   Patient presents with     Right Shoulder - Pain     Pt seen Dr Rea 6 weeks ago, and received cortisone injection also did PT.  Pt states pain is worse than when she was last seen.    Chelly Caldwell CMA  11/28/2018  1:44 PM           HISTORY:  Katie Aponte is a 53 year old female, right -hand dominant, who is seen for right shoulder pain that started about 2 months ago. No known injury. Initially treated with a 800 mg ibuprofen. Then was seen by Dr. Rea and had a cortisone injection and physical therapy. Since onset, she continues to have pain. Severe pain today, 7/10. Pain is located over the posterior shoulder today. Reports pain with external rotation movements or overhead movements. Positive night pain. For treatment, she has tried physical therapy, 4 sessions, and had a cortisone injection. Injection provided no relief. Denies numbness and tingling. Some neck issues as well.      Onset: unknown  Symptoms have been worsening since that time.  Aggravated by: overhead motions and external rotation  Relieved by: ibuprofen  Present symptoms: pain with overhead activities,  pain reaching behind back,  pain reaching out or away from body (flexion/ abduction),  pain lifting,  Pain location: lateral shoulder and posterior  Pain severity: 7/10  Pain quality: aching  Frequency of symptoms: frequently  Associated symptoms: neck pain    Treatment up to this point: Physical Therapy, cortisone injection, NSAIDS  Has not tried: nothing  Prior history of related problems: no prior problems with this area in the past    Significant Orthopedic past medical history: history of ankle and nec  Usual level of recreational activity:  sedentary  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 She also has no past medical history of Difficult intubation; Malignant hyperthermia; PONV (postoperative nausea and vomiting); or Spinal headache.  Patient Active Problem List    Diagnosis Date Noted     Impingement syndrome, shoulder, right 10/19/2018     Priority: Medium     Right shoulder pain, unspecified chronicity 10/19/2018     Priority: Medium     Joint pain      Priority: Medium     Class 1 obesity due to excess calories without serious comorbidity with body mass index (BMI) of 30.0 to 30.9 in adult 2018     Priority: Medium     Cervical radiculopathy 2017     Priority: Medium     JESENIA (stress urinary incontinence, female) 2017     Priority: Medium     Mild persistent asthma with acute exacerbation 2017     Priority: Medium     Allergic bronchitis, moderate persistent, uncomplicated 2016     Priority: Medium     Quality         Leukopenia 2015     Priority: Medium     Acne rosacea 2015     Priority: Medium     Dermatitis 2015     Priority: Medium     CARDIOVASCULAR SCREENING; LDL GOAL LESS THAN 160 2014     Priority: Medium     Abnormal uterine bleeding 2014     Priority: Medium     Knee pain 2013     Priority: Medium     Medial meniscus tear 2013     Priority: Medium     Tear of medial meniscus of knee 2012     Priority: Medium     Overview:   Tear of medial cartilage or meniscus of knee, current, left       Pain in joint, ankle and foot 2009     Priority: Medium     Overview:   LW Modifier:  s/p surgery - ligament  ; Pain Ankle Right         Surgical Hx:  has a past surgical history that includes  section; colonoscopy; Dilation and curettage, hysteroscopy, ablate endometrium novasure,  combined (4/10/2014); Dilate cervix, ablate endometrium thermachoice, combined (4/10/2014); Laparoscopic assisted hysterectomy vaginal, bilateral salpingo-oophorectomy, combined (7/31/2014); orthopedic surgery; and Cystoscopy, Sling Transvaginal (N/A, 4/20/2017).    Medications:   Current Outpatient Prescriptions:      albuterol (2.5 MG/3ML) 0.083% neb solution, Take 1 vial (2.5 mg) by nebulization every 6 hours as needed for shortness of breath / dyspnea or wheezing, Disp: 25 vial, Rfl: 1     albuterol (PROAIR HFA/PROVENTIL HFA/VENTOLIN HFA) 108 (90 BASE) MCG/ACT Inhaler, Inhale 2 puffs into the lungs every 6 hours as needed for shortness of breath / dyspnea or wheezing, Disp: 1 Inhaler, Rfl: 2     desonide (DESOWEN) 0.05 % ointment, Apply topically 2 times daily, Disp: 30 g, Rfl: 0     Estrogens Conjugated (PREMARIN PO), Take 0.9 mg by mouth daily, Disp: , Rfl:      estrogens conjugated (PREMARIN) 0.9 MG TABS, Take 0.3 mg by mouth daily, Disp: , Rfl:      FLUOCINOLONE ACETONIDE SCALP 0.01 % OIL oil, Apply topically 2 times daily as needed, Disp: 118 mL, Rfl: 0     fluticasone (FLONASE) 50 MCG/ACT spray, Spray 1-2 sprays into both nostrils daily, Disp: 1 Bottle, Rfl: 11     fluticasone (FLOVENT HFA) 44 MCG/ACT Inhaler, Inhale 2 puffs into the lungs 2 times daily, Disp: 1 Inhaler, Rfl: 1     ibuprofen (ADVIL/MOTRIN) 800 MG tablet, Take 1 tablet (800 mg) by mouth every 8 hours as needed for moderate pain, Disp: 60 tablet, Rfl: 1     ipratropium - albuterol 0.5 mg/2.5 mg/3 mL (DUONEB) 0.5-2.5 (3) MG/3ML neb solution, Take 1 vial (3 mLs) by nebulization every 6 hours as needed for shortness of breath / dyspnea or wheezing, Disp: 1 vial, Rfl: 0     multivitamin, therapeutic with minerals (MULTI-VITAMIN) TABS tablet, Take 1 tablet by mouth daily, Disp: , Rfl:      order for DME, Equipment being ordered:  Knee walker, Disp: 1 Units, Rfl: 0     order for DME, Equipment being ordered: Knee walker , Disp: 1 Device, Rfl:  0     order for DME, Equipment being ordered: Nebulizer with tubing and instructions, Disp: 1 Device, Rfl: 0     order for DME, Equipment being ordered: Carex Bed Buddy- heated neck roll, Disp: 1 Device, Rfl: 0     order for DME, Equipment being ordered: TENS, Disp: 1 Units, Rfl: 0     phentermine 30 MG capsule, Take 30 mg by mouth every morning, Disp: , Rfl:      predniSONE (DELTASONE) 5 MG tablet, 4tab=20 mg qd x 7 days, 3tab=15 mg qd x 7 days, 2tab=10 mg qd daily. (Patient not taking: Reported on 10/9/2018), Disp: 30 tablet, Rfl: 2     triamcinolone (KENALOG) 0.1 % cream, Apply sparingly to affected area three times daily as needed, Disp: 80 g, Rfl: 1     valACYclovir (VALTREX) 500 MG tablet, Take 500 mg by mouth as needed Reported on 4/11/2017, Disp: , Rfl:     Allergies:   Allergies   Allergen Reactions     Droperidol Itching     Feels jumpy     Percocet [Oxycodone-Acetaminophen] Nausea and Vomiting and GI Disturbance       Social Hx: computer type work.   reports that she has never smoked. She has never used smokeless tobacco. She reports that she drinks alcohol. She reports that she does not use illicit drugs.    Family Hx: family history includes Cancer in her mother..    REVIEW OF SYSTEMS: 10 point ROS neg other than the symptoms noted above in the HPI and PMH. Notables include  CONSTITUTIONAL:NEGATIVE for fever, chills, change in weight  INTEGUMENTARY/SKIN: NEGATIVE for worrisome rashes, moles or lesions  MUSCULOSKELETAL:See HPI above  NEURO: NEGATIVE for weakness, dizziness or paresthesias    This document serves as a record of the services and decisions personally performed and made by Remi Rowland MD. It was created on his behalf by Deb Sanchez, a trained medical scribe. The creation of this document is based the provider's statements to the medical scribe.    Toni Sanchez 1:58 PM 11/28/2018    PHYSICAL EXAM:  /80 (BP Location: Left arm, Patient Position: Sitting, Cuff Size: Adult Regular)   Temp 98.2  F (36.8  C) (Oral)  Resp 16  LMP 03/30/2014   GENERAL APPEARANCE: healthy, alert, no distress  SKIN: no suspicious lesions or rashes  NEURO: Normal strength and tone, mentation intact and speech normal  PSYCH:  mentation appears normal and affect normal, not anxious  RESPIRATORY: No increased work of breathing.  VASCULAR: Radial pulses 2+ and brisk cappillary refill   HANDS: no clubbing or nail pitting, no nodes    MUSCULOSKELETAL:    NECK:  Cervical range of motion: full, causes pain in the neck, and does not cause shoulder pain or reproduce shoulder pain.  Posterior cervical spine nontender to palpation over midline bony prominences  There is mild-moderate  tenderness to palpation along neck paraspinals and trapezius muscles  No palpable cervical lymphadenopathy.    RIGHT UPPER EXTREMITY:  Sensation intact to light touch in median, radial, ulnar and axillary nerve distributions  Palpable 2+ radial pulse, brisk capillary refill to all fingers, wwp  Intact epl fpl fdp edc wrist flexion/extension biceps triceps deltoid    RIGHT SHOULDER:  Shoulder Inspection: no swelling, bruising, discoloration, or obvious deformity or asymmetry  Tender: paraspinals, rhomboids, infraspinatus, supraspinatus, greater tuberosity, anterior shoulder, deltoid muscle  Range of Motion:   Active: forward flexion 100 degrees, external rotation 40 degrees, internal rotation sacrum   Passive: forward flexion 170 degrees, limited by discomfort, external rotation 40, limited by discomfort   Strength: forward flexion 4/5, External rotation 5-/5   Impingement: all grade 2 positive  Special tests: Empty Can: Positive, belly press: negative, Spurling's: negative     LEFT UPPER EXTREMITY:  Sensation intact to light touch in median, radial, ulnar and axillary nerve distributions  Palpable 2+ radial pulse, brisk capillary refill to all fingers, wwp  Intact epl fpl fdp edc wrist flexion/extension biceps triceps deltoid    LEFT  SHOULDER:  Shoulder Inspection: no swelling, bruising, discoloration, or obvious deformity or asymmetry  Tender: nontender to palpation  Range of Motion:   Active: forward flexion 180 degrees, external rotation 60 degrees, internal rotation bra line  Strength: forward flexion 5/5, External rotation 5/5  Liftoff: Able  Impingement: negative   Special tests: Empty can: negative, belly press: negative, Spurling's: negative       X-RAY INTERPRETATION: no xrays available for review.      ASSESSMENT: Katie Aponte is a 53 year old female, right -hand dominant with acute right shoulder pain, shoulder impingement, possible rotator cuff tear     PLAN:   * Reviewed imaging with patient. Also, clinical exam findings.  * Discussed with patient that based on physical exam findings, it is not possible to completely rule out rotator cuff given weakness, continued pain.  * An open sided MRI of the right shoulder was ordered for further evaluation.     * Rest  * Activity modification - avoid activities that aggravate symptoms.  * NSAIDS - regular use for inflammation, with food, as long as no contra-indications. Please discuss with pcp if needed.  * Ice twice daily to three times daily.    * Tylenol as needed for pain    * After having the MRI, I would like the patient to call me so that we can discuss the results as well as how to proceed.       The information in this document, created by a scribe for me, accurately reflects the services I personally performed and the decisions made by me. I have reviewed and approved this document for accuracy.     Remi Rowland M.D., M.S.  Dept. of Orthopaedic Surgery  [facility]    Again, thank you for allowing me to participate in the care of your patient.        Sincerely,        Remi Rowland MD

## 2018-11-28 NOTE — PROGRESS NOTES
CHIEF COMPLAINT:   Chief Complaint   Patient presents with     Right Shoulder - Pain     Pt seen Dr Rea 6 weeks ago, and received cortisone injection also did PT.  Pt states pain is worse than when she was last seen.    Chelly Caldwell [facility]  11/28/2018  1:44 PM           HISTORY:  Katie Aponte is a 53 year old female, right -hand dominant, who is seen for right shoulder pain that started about 2 months ago. No known injury. Initially treated with a 800 mg ibuprofen. Then was seen by Dr. Rea and had a cortisone injection and physical therapy. Since onset, she continues to have pain. Severe pain today, 7/10. Pain is located over the posterior shoulder today. Reports pain with external rotation movements or overhead movements. Positive night pain. For treatment, she has tried physical therapy, 4 sessions, and had a cortisone injection. Injection provided no relief. Denies numbness and tingling. Some neck issues as well.      Onset: unknown  Symptoms have been worsening since that time.  Aggravated by: overhead motions and external rotation  Relieved by: ibuprofen  Present symptoms: pain with overhead activities,  pain reaching behind back,  pain reaching out or away from body (flexion/ abduction),  pain lifting,  Pain location: lateral shoulder and posterior  Pain severity: 7/10  Pain quality: aching  Frequency of symptoms: frequently  Associated symptoms: neck pain    Treatment up to this point: Physical Therapy, cortisone injection, NSAIDS  Has not tried: nothing  Prior history of related problems: no prior problems with this area in the past    Significant Orthopedic past medical history: history of ankle and nec  Usual level of recreational activity: sedentary  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 She also has no  past medical history of Difficult intubation; Malignant hyperthermia; PONV (postoperative nausea and vomiting); or Spinal headache.  Patient Active Problem List    Diagnosis Date Noted     Impingement syndrome, shoulder, right 10/19/2018     Priority: Medium     Right shoulder pain, unspecified chronicity 10/19/2018     Priority: Medium     Joint pain      Priority: Medium     Class 1 obesity due to excess calories without serious comorbidity with body mass index (BMI) of 30.0 to 30.9 in adult 2018     Priority: Medium     Cervical radiculopathy 2017     Priority: Medium     JESENIA (stress urinary incontinence, female) 2017     Priority: Medium     Mild persistent asthma with acute exacerbation 2017     Priority: Medium     Allergic bronchitis, moderate persistent, uncomplicated 2016     Priority: Medium     Quality         Leukopenia 2015     Priority: Medium     Acne rosacea 2015     Priority: Medium     Dermatitis 2015     Priority: Medium     CARDIOVASCULAR SCREENING; LDL GOAL LESS THAN 160 2014     Priority: Medium     Abnormal uterine bleeding 2014     Priority: Medium     Knee pain 2013     Priority: Medium     Medial meniscus tear 2013     Priority: Medium     Tear of medial meniscus of knee 2012     Priority: Medium     Overview:   Tear of medial cartilage or meniscus of knee, current, left       Pain in joint, ankle and foot 2009     Priority: Medium     Overview:   LW Modifier:  s/p surgery - ligament  ; Pain Ankle Right         Surgical Hx:  has a past surgical history that includes  section; colonoscopy; Dilation and curettage, hysteroscopy, ablate endometrium novasure, combined (4/10/2014); Dilate cervix, ablate endometrium thermachoice, combined (4/10/2014); Laparoscopic assisted hysterectomy vaginal, bilateral salpingo-oophorectomy, combined (2014); orthopedic surgery; and Cystoscopy, Sling Transvaginal  (N/A, 4/20/2017).    Medications:   Current Outpatient Prescriptions:      albuterol (2.5 MG/3ML) 0.083% neb solution, Take 1 vial (2.5 mg) by nebulization every 6 hours as needed for shortness of breath / dyspnea or wheezing, Disp: 25 vial, Rfl: 1     albuterol (PROAIR HFA/PROVENTIL HFA/VENTOLIN HFA) 108 (90 BASE) MCG/ACT Inhaler, Inhale 2 puffs into the lungs every 6 hours as needed for shortness of breath / dyspnea or wheezing, Disp: 1 Inhaler, Rfl: 2     desonide (DESOWEN) 0.05 % ointment, Apply topically 2 times daily, Disp: 30 g, Rfl: 0     Estrogens Conjugated (PREMARIN PO), Take 0.9 mg by mouth daily, Disp: , Rfl:      estrogens conjugated (PREMARIN) 0.9 MG TABS, Take 0.3 mg by mouth daily, Disp: , Rfl:      FLUOCINOLONE ACETONIDE SCALP 0.01 % OIL oil, Apply topically 2 times daily as needed, Disp: 118 mL, Rfl: 0     fluticasone (FLONASE) 50 MCG/ACT spray, Spray 1-2 sprays into both nostrils daily, Disp: 1 Bottle, Rfl: 11     fluticasone (FLOVENT HFA) 44 MCG/ACT Inhaler, Inhale 2 puffs into the lungs 2 times daily, Disp: 1 Inhaler, Rfl: 1     ibuprofen (ADVIL/MOTRIN) 800 MG tablet, Take 1 tablet (800 mg) by mouth every 8 hours as needed for moderate pain, Disp: 60 tablet, Rfl: 1     ipratropium - albuterol 0.5 mg/2.5 mg/3 mL (DUONEB) 0.5-2.5 (3) MG/3ML neb solution, Take 1 vial (3 mLs) by nebulization every 6 hours as needed for shortness of breath / dyspnea or wheezing, Disp: 1 vial, Rfl: 0     multivitamin, therapeutic with minerals (MULTI-VITAMIN) TABS tablet, Take 1 tablet by mouth daily, Disp: , Rfl:      order for DME, Equipment being ordered:  Knee walker, Disp: 1 Units, Rfl: 0     order for DME, Equipment being ordered: Knee walker , Disp: 1 Device, Rfl: 0     order for DME, Equipment being ordered: Nebulizer with tubing and instructions, Disp: 1 Device, Rfl: 0     order for DME, Equipment being ordered: [employer]x Bed Buddy- heated neck roll, Disp: 1 Device, Rfl: 0     order for DME, Equipment being  ordered: TENS, Disp: 1 Units, Rfl: 0     phentermine 30 MG capsule, Take 30 mg by mouth every morning, Disp: , Rfl:      predniSONE (DELTASONE) 5 MG tablet, 4tab=20 mg qd x 7 days, 3tab=15 mg qd x 7 days, 2tab=10 mg qd daily. (Patient not taking: Reported on 10/9/2018), Disp: 30 tablet, Rfl: 2     triamcinolone (KENALOG) 0.1 % cream, Apply sparingly to affected area three times daily as needed, Disp: 80 g, Rfl: 1     valACYclovir (VALTREX) 500 MG tablet, Take 500 mg by mouth as needed Reported on 4/11/2017, Disp: , Rfl:     Allergies:   Allergies   Allergen Reactions     Droperidol Itching     Feels jumpy     Percocet [Oxycodone-Acetaminophen] Nausea and Vomiting and GI Disturbance       Social Hx: computer type work.   reports that she has never smoked. She has never used smokeless tobacco. She reports that she drinks alcohol. She reports that she does not use illicit drugs.    Family Hx: family history includes Cancer in her mother..    REVIEW OF SYSTEMS: 10 point ROS neg other than the symptoms noted above in the HPI and PMH. Notables include  CONSTITUTIONAL:NEGATIVE for fever, chills, change in weight  INTEGUMENTARY/SKIN: NEGATIVE for worrisome rashes, moles or lesions  MUSCULOSKELETAL:See HPI above  NEURO: NEGATIVE for weakness, dizziness or paresthesias    This document serves as a record of the services and decisions personally performed and made by Remi Rowland MD. It was created on his behalf by Deb Sanchez, a trained medical scribe. The creation of this document is based the provider's statements to the medical scribe.    Scribe Deb Sanchez 1:58 PM 11/28/2018    PHYSICAL EXAM:  /80 (BP Location: Left arm, Patient Position: Sitting, Cuff Size: Adult Regular)  Temp 98.2  F (36.8  C) (Oral)  Resp 16  LMP 03/30/2014   GENERAL APPEARANCE: healthy, alert, no distress  SKIN: no suspicious lesions or rashes  NEURO: Normal strength and tone, mentation intact and speech normal  PSYCH:  mentation appears  normal and affect normal, not anxious  RESPIRATORY: No increased work of breathing.  VASCULAR: Radial pulses 2+ and brisk cappillary refill   HANDS: no clubbing or nail pitting, no nodes    MUSCULOSKELETAL:    NECK:  Cervical range of motion: full, causes pain in the neck, and does not cause shoulder pain or reproduce shoulder pain.  Posterior cervical spine nontender to palpation over midline bony prominences  There is mild-moderate  tenderness to palpation along neck paraspinals and trapezius muscles  No palpable cervical lymphadenopathy.    RIGHT UPPER EXTREMITY:  Sensation intact to light touch in median, radial, ulnar and axillary nerve distributions  Palpable 2+ radial pulse, brisk capillary refill to all fingers, wwp  Intact epl fpl fdp edc wrist flexion/extension biceps triceps deltoid    RIGHT SHOULDER:  Shoulder Inspection: no swelling, bruising, discoloration, or obvious deformity or asymmetry  Tender: paraspinals, rhomboids, infraspinatus, supraspinatus, greater tuberosity, anterior shoulder, deltoid muscle  Range of Motion:   Active: forward flexion 100 degrees, external rotation 40 degrees, internal rotation sacrum   Passive: forward flexion 170 degrees, limited by discomfort, external rotation 40, limited by discomfort   Strength: forward flexion 4/5, External rotation 5-/5   Impingement: all grade 2 positive  Special tests: Empty Can: Positive, belly press: negative, Spurling's: negative     LEFT UPPER EXTREMITY:  Sensation intact to light touch in median, radial, ulnar and axillary nerve distributions  Palpable 2+ radial pulse, brisk capillary refill to all fingers, wwp  Intact epl fpl fdp edc wrist flexion/extension biceps triceps deltoid    LEFT SHOULDER:  Shoulder Inspection: no swelling, bruising, discoloration, or obvious deformity or asymmetry  Tender: nontender to palpation  Range of Motion:   Active: forward flexion 180 degrees, external rotation 60 degrees, internal rotation bra  line  Strength: forward flexion 5/5, External rotation 5/5  Liftoff: Able  Impingement: negative   Special tests: Empty can: negative, belly press: negative, Spurling's: negative       X-RAY INTERPRETATION: no xrays available for review.      ASSESSMENT: Katie Aponte is a 53 year old female, right -hand dominant with acute right shoulder pain, shoulder impingement, possible rotator cuff tear     PLAN:   * Reviewed imaging with patient. Also, clinical exam findings.  * Discussed with patient that based on physical exam findings, it is not possible to completely rule out rotator cuff given weakness, continued pain.  * An open sided MRI of the right shoulder was ordered for further evaluation.     * Rest  * Activity modification - avoid activities that aggravate symptoms.  * NSAIDS - regular use for inflammation, with food, as long as no contra-indications. Please discuss with pcp if needed.  * Ice twice daily to three times daily.    * Tylenol as needed for pain    * After having the MRI, I would like the patient to call me so that we can discuss the results as well as how to proceed.       The information in this document, created by a scribe for me, accurately reflects the services I personally performed and the decisions made by me. I have reviewed and approved this document for accuracy.     Remi Rowland M.D., M.S.  Dept. of Orthopaedic Surgery  [facility]

## 2018-11-29 ENCOUNTER — OFFICE VISIT (OUTPATIENT)
Dept: RHEUMATOLOGY | Facility: CLINIC | Age: 53
End: 2018-11-29
Payer: COMMERCIAL

## 2018-11-29 ENCOUNTER — TRANSFERRED RECORDS (OUTPATIENT)
Dept: HEALTH INFORMATION MANAGEMENT | Facility: CLINIC | Age: 53
End: 2018-11-29

## 2018-11-29 VITALS
SYSTOLIC BLOOD PRESSURE: 122 MMHG | DIASTOLIC BLOOD PRESSURE: 76 MMHG | WEIGHT: 172.6 LBS | HEIGHT: 66 IN | BODY MASS INDEX: 27.74 KG/M2 | HEART RATE: 83 BPM

## 2018-11-29 DIAGNOSIS — M05.79 RHEUMATOID ARTHRITIS, SEROPOSITIVE, MULTIPLE SITES (H): Primary | ICD-10-CM

## 2018-11-29 DIAGNOSIS — M25.50 ARTHRALGIA, UNSPECIFIED JOINT: ICD-10-CM

## 2018-11-29 LAB
ALBUMIN SERPL-MCNC: 3.3 G/DL (ref 3.4–5)
ALT SERPL W P-5'-P-CCNC: 25 U/L (ref 0–50)
AST SERPL W P-5'-P-CCNC: 16 U/L (ref 0–45)
CREAT SERPL-MCNC: 0.89 MG/DL (ref 0.52–1.04)
ERYTHROCYTE [DISTWIDTH] IN BLOOD BY AUTOMATED COUNT: 13.2 % (ref 10–15)
GFR SERPL CREATININE-BSD FRML MDRD: 66 ML/MIN/1.7M2
HCT VFR BLD AUTO: 40.7 % (ref 35–47)
HGB BLD-MCNC: 13.2 G/DL (ref 11.7–15.7)
MCH RBC QN AUTO: 27.7 PG (ref 26.5–33)
MCHC RBC AUTO-ENTMCNC: 32.4 G/DL (ref 31.5–36.5)
MCV RBC AUTO: 86 FL (ref 78–100)
PLATELET # BLD AUTO: 199 10E9/L (ref 150–450)
RBC # BLD AUTO: 4.76 10E12/L (ref 3.8–5.2)
WBC # BLD AUTO: 3.5 10E9/L (ref 4–11)

## 2018-11-29 PROCEDURE — 36415 COLL VENOUS BLD VENIPUNCTURE: CPT | Performed by: STUDENT IN AN ORGANIZED HEALTH CARE EDUCATION/TRAINING PROGRAM

## 2018-11-29 PROCEDURE — 84450 TRANSFERASE (AST) (SGOT): CPT | Performed by: STUDENT IN AN ORGANIZED HEALTH CARE EDUCATION/TRAINING PROGRAM

## 2018-11-29 PROCEDURE — 84460 ALANINE AMINO (ALT) (SGPT): CPT | Performed by: STUDENT IN AN ORGANIZED HEALTH CARE EDUCATION/TRAINING PROGRAM

## 2018-11-29 PROCEDURE — 82040 ASSAY OF SERUM ALBUMIN: CPT | Performed by: STUDENT IN AN ORGANIZED HEALTH CARE EDUCATION/TRAINING PROGRAM

## 2018-11-29 PROCEDURE — 82565 ASSAY OF CREATININE: CPT | Performed by: STUDENT IN AN ORGANIZED HEALTH CARE EDUCATION/TRAINING PROGRAM

## 2018-11-29 PROCEDURE — 85027 COMPLETE CBC AUTOMATED: CPT | Performed by: STUDENT IN AN ORGANIZED HEALTH CARE EDUCATION/TRAINING PROGRAM

## 2018-11-29 PROCEDURE — 99214 OFFICE O/P EST MOD 30 MIN: CPT | Performed by: STUDENT IN AN ORGANIZED HEALTH CARE EDUCATION/TRAINING PROGRAM

## 2018-11-29 RX ORDER — HYDROXYCHLOROQUINE SULFATE 200 MG/1
200 TABLET, FILM COATED ORAL 2 TIMES DAILY
Qty: 60 TABLET | Refills: 3 | Status: SHIPPED | OUTPATIENT
Start: 2018-11-29 | End: 2019-05-10

## 2018-11-29 RX ORDER — PREDNISONE 5 MG/1
TABLET ORAL
Qty: 70 TABLET | Refills: 2 | Status: SHIPPED | OUTPATIENT
Start: 2018-11-29 | End: 2021-05-26

## 2018-11-29 ASSESSMENT — PAIN SCALES - GENERAL: PAINLEVEL: SEVERE PAIN (7)

## 2018-11-29 NOTE — PATIENT INSTRUCTIONS
-- RTC in 2.5 months     -- Start Plaquenil 200 mg Twice a day     -- Prednisone taper       Hydroxychloroquine tablets  Brand Names: Plaquenil, Quineprox  What is this medicine?  HYDROXYCHLOROQUINE (julia drox ee KLOR oh kwin) is used to treat rheumatoid arthritis and systemic lupus erythematosus. It is also used to treat malaria.  How should I use this medicine?  Take this medicine by mouth with a glass of water. Follow the directions on the prescription label. Avoid taking antacids within 4 hours of taking this medicine. It is best to separate these medicines by at least 4 hours. Do not cut, crush or chew this medicine. You can take it with or without food. If it upsets your stomach, take it with food. Take your medicine at regular intervals. Do not take your medicine more often than directed. Take all of your medicine as directed even if you think you are better. Do not skip doses or stop your medicine early.  Talk to your pediatrician regarding the use of this medicine in children. While this drug may be prescribed for selected conditions, precautions do apply.  What side effects may I notice from receiving this medicine?  Side effects that you should report to your doctor or health care professional as soon as possible:    allergic reactions like skin rash, itching or hives, swelling of the face, lips, or tongue    changes in vision    decreased hearing or ringing of the ears    redness, blistering, peeling or loosening of the skin, including inside the mouth    seizures    sensitivity to light    signs and symptoms of a dangerous change in heartbeat or heart rhythm like chest pain; dizziness; fast or irregular heartbeat; palpitations; feeling faint or lightheaded, falls; breathing problems    signs and symptoms of liver injury like dark yellow or brown urine; general ill feeling or flu-like symptoms; light-colored stools; loss of appetite; nausea; right upper belly pain; unusually weak or tired; yellowing of  the eyes or skin    signs and symptoms of low blood sugar such as feeling anxious; confusion; dizziness; increased hunger; unusually weak or tired; sweating; shakiness; cold; irritable; headache; blurred vision; fast heartbeat; loss of consciousness    uncontrollable head, mouth, neck, arm, or leg movements  Side effects that usually do not require medical attention (report to your doctor or health care professional if they continue or are bothersome):    anxious    diarrhea    dizziness    hair loss    headache    irritable    loss of appetite    nausea, vomiting    stomach pain  What may interact with this medicine?  Do not take this medicine with any of the following medications:    cisapride    dofetilide    dronedarone    live virus vaccines    penicillamine    pimozide    thioridazine    ziprasidone  This medicine may also interact with the following medications:    ampicillin    antacids    cimetidine    cyclosporine    digoxin    medicines for diabetes, like insulin, glipizide, glyburide    medicines for seizures like carbamazepine, phenobarbital, phenytoin    mefloquine    methotrexate    other medicines that prolong the QT interval (cause an abnormal heart rhythm)    praziquantel  What if I miss a dose?  If you miss a dose, take it as soon as you can. If it is almost time for your next dose, take only that dose. Do not take double or extra doses.  Where should I keep my medicine?  Keep out of the reach of children. In children, this medicine can cause overdose with small doses.  Store at room temperature between 15 and 30 degrees C (59 and 86 degrees F). Protect from moisture and light. Throw away any unused medicine after the expiration date.  What should I tell my health care provider before I take this medicine?  They need to know if you have any of these conditions:    diabetes    eye disease, vision problems    G6PD deficiency    history of blood diseases    history of irregular heartbeat    if you  often drink alcohol    kidney disease    liver disease    porphyria    psoriasis    seizures    an unusual or allergic reaction to chloroquine, hydroxychloroquine, other medicines, foods, dyes, or preservatives    pregnant or trying to get pregnant    breast-feeding  What should I watch for while using this medicine?  Tell your doctor or healthcare professional if your symptoms do not start to get better or if they get worse.  Avoid taking antacids within 4 hours of taking this medicine. It is best to separate these medicines by at least 4 hours.  Tell your doctor or health care professional right away if you have any change in your eyesight.  Your vision and blood may be tested before and during use of this medicine.  This medicine can make you more sensitive to the sun. Keep out of the sun. If you cannot avoid being in the sun, wear protective clothing and use sunscreen. Do not use sun lamps or tanning beds/booths.  NOTE:This sheet is a summary. It may not cover all possible information. If you have questions about this medicine, talk to your doctor, pharmacist, or health care provider. Copyright  2018 [employer]        Methotrexate tablets  Brand Names: Rheumatrex, Trexall  What is this medicine?  METHOTREXATE (METH oh TREX ate) is a chemotherapy drug used to treat cancer including breast cancer, leukemia, and lymphoma. This medicine can also be used to treat psoriasis and certain kinds of arthritis.  How should I use this medicine?  Take this medicine by mouth with a glass of water. Follow the directions on the prescription label. Take your medicine at regular intervals. Do not take it more often than directed. Do not stop taking except on your doctor's advice.  Make sure you know why you are taking this medicine and how often you should take it. If this medicine is used for a condition that is not cancer, like arthritis or psoriasis, it should be taken weekly, NOT daily. Taking this medicine more often than  directed can cause serious side effects, even death.  Talk to your healthcare provider about safe handling and disposal of this medicine. You may need to take special precautions.  Talk to your pediatrician regarding the use of this medicine in children. While this drug may be prescribed for selected conditions, precautions do apply.  What side effects may I notice from receiving this medicine?  Side effects that you should report to your doctor or health care professional as soon as possible:    allergic reactions like skin rash, itching or hives, swelling of the face, lips, or tongue    breathing problems or shortness of breath    diarrhea    dry, nonproductive cough    low blood counts - this medicine may decrease the number of white blood cells, red blood cells and platelets. You may be at increased risk for infections and bleeding.    mouth sores    redness, blistering, peeling or loosening of the skin, including inside the mouth    signs of infection - fever or chills, cough, sore throat, pain or trouble passing urine    signs and symptoms of bleeding such as bloody or black, tarry stools; red or dark-brown urine; spitting up blood or brown material that looks like coffee grounds; red spots on the skin; unusual bruising or bleeding from the eye, gums, or nose    signs and symptoms of kidney injury like trouble passing urine or change in the amount of urine    signs and symptoms of liver injury like dark yellow or brown urine; general ill feeling or flu-like symptoms; light-colored stools; loss of appetite; nausea; right upper belly pain; unusually weak or tired; yellowing of the eyes or skin  Side effects that usually do not require medical attention (report to your doctor or health care professional if they continue or are bothersome):    dizziness    hair loss    tiredness    upset stomach    vomiting  What may interact with this medicine?  This medicine may interact with the following  medication:    acitretin    aspirin and aspirin-like medicines including salicylates    azathioprine    certain antibiotics like penicillins, tetracycline, and chloramphenicol    cyclosporine    gold    hydroxychloroquine    live virus vaccines    NSAIDs, medicines for pain and inflammation, like ibuprofen or naproxen    other cytotoxic agents    penicillamine    phenylbutazone    phenytoin    probenecid    retinoids such as isotretinoin and tretinoin    steroid medicines like prednisone or cortisone    sulfonamides like sulfasalazine and trimethoprim/sulfamethoxazole    theophylline  What if I miss a dose?  If you miss a dose, talk with your doctor or health care professional. Do not take double or extra doses.  Where should I keep my medicine?  Keep out of the reach of children.  Store at room temperature between 20 and 25 degrees C (68 and 77 degrees F). Protect from light. Throw away any unused medicine after the expiration date.  What should I tell my health care provider before I take this medicine?  They need to know if you have any of these conditions:    fluid in the stomach area or lungs    if you often drink alcohol    infection or immune system problems    kidney disease or on hemodialysis    liver disease    low blood counts, like low white cell, platelet, or red cell counts    lung disease    radiation therapy    stomach ulcers    ulcerative colitis    an unusual or allergic reaction to methotrexate, other medicines, foods, dyes, or preservatives    pregnant or trying to get pregnant    breast-feeding  What should I watch for while using this medicine?  Avoid alcoholic drinks.  This medicine can make you more sensitive to the sun. Keep out of the sun. If you cannot avoid being in the sun, wear protective clothing and use sunscreen. Do not use sun lamps or tanning beds/booths.  You may need blood work done while you are taking this medicine.  Call your doctor or health care professional for advice if  you get a fever, chills or sore throat, or other symptoms of a cold or flu. Do not treat yourself. This drug decreases your body's ability to fight infections. Try to avoid being around people who are sick.  This medicine may increase your risk to bruise or bleed. Call your doctor or health care professional if you notice any unusual bleeding.  Check with your doctor or health care professional if you get an attack of severe diarrhea, nausea and vomiting, or if you sweat a lot. The loss of too much body fluid can make it dangerous for you to take this medicine.  Talk to your doctor about your risk of cancer. You may be more at risk for certain types of cancers if you take this medicine.  Both men and women must use effective birth control with this medicine. Do not become pregnant while taking this medicine or until at least 1 normal menstrual cycle has occurred after stopping it. Women should inform their doctor if they wish to become pregnant or think they might be pregnant. Men should not father a child while taking this medicine and for 3 months after stopping it. There is a potential for serious side effects to an unborn child. Talk to your health care professional or pharmacist for more information. Do not breast-feed an infant while taking this medicine.  NOTE:This sheet is a summary. It may not cover all possible information. If you have questions about this medicine, talk to your doctor, pharmacist, or health care provider. Copyright  2018 [employer]
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
## 2018-11-29 NOTE — NURSING NOTE
"Katie Aponte's goals for this visit include:   She requests these members of her care team be copied on today's visit information:     PCP: Karlee Birmingham    Referring Provider:  No referring provider defined for this encounter.    /76  Pulse 83  Ht 1.676 m (5' 6\")  Wt 78.3 kg (172 lb 9.6 oz)  LMP 03/30/2014  BMI 27.86 kg/m2   "

## 2018-11-29 NOTE — PROGRESS NOTES
Rheumatology Clinic Visit     Katie Aponte MRN# [id number]   YOB: 1965 Age: 52 year old     Date of Visit: November 29, 2018  Primary care provider: Francis Capps          Assessment and Plan:   Assessment     -- Seropositive Rheumatoid arthritis ( + RF, + ACPA)  -- Bilateral ankle pain  -- Left hand pain   -- Raynaud's  -- Chronic Leukopenia  -- + RF - 84, + IRIS - 1:40, > 340 ACPA  -- Neg FEDERICA, normal C3/C4, dsDNA  -- Neg Hep B/C, TB    Ms Aponte is 51 yo female seen in clinic for management of seropositive rheumatoid arthritis.     Polyarthritis: Symptoms started in June 2018 with pain in left heel.  She was seen by podiatrist and was in boot for some time. Later developed right ankle swelling. Her symptoms have worsened over time. Now both of her ankles and Achilles tendons hurt.  She has also noticed pain in second third fourth fingers of left hand.  She has stiffness in the morning which lasts for couple hours mostly in her feet and hands.  Blood tests showed positive rheumatoid factor of 84 and ACPA.     On physical exam she has tenderness over the left second third fourth PIP joints along with mild synovitis.  Tenderness present over bilateral ankles, Achilles tendon and plantar fascia.  No tenderness and synovitis present over the right hand, elbows and shoulders.  She has chronic knee pain from osteoarthritis.    She was given prednisone taper and responded well to it.  I discussed with her regarding the start of methotrexate but she declined it due to restriction on alcohol use.  She drinks 1 glass of wine every day and does not want to limit alcohol intake at this time.  She asked about alternative medication.  I will start her on Plaquenil 200 mg twice daily along with a prednisone taper.  Will follow her back in about 1-1/2 months.  If by that time she is unable to taper down the prednisone and does not have significant control of her joint inflammation will strongly recommend her  to start methotrexate and limit alcohol use.     Discussed benefits and risks of Plaquenil. Informed that Plaquenil has a rare side effect of retinal toxicity (1/40,000 before 5 yr of use), the risk could go up to 1% after 5 yr of continous use and is more with doses above 5mg/kg. It requires an annual eye exam(visual field and OCT).     Was informed that it's slow acting and might take 3 months to show benefit.    Positive IRIS: She has low titer positive IRIS 1: 40.  Has raynaud's, chronic leukopenia and new onset joint pains which can be seen in IRIS associated connective tissue disease.  She has new onset sicca symptoms which she attributes to phentermine use.     Her specific serologies including FEDERICA panel, complement levels, double-stranded DNA, centromere are normal.  hepatitis C serology and protein electrophoresis are normal as well.    Plan    1. Start Plaquenil 200 mg BID     2. Prednisone taper - 4tab=20 mg qd x 5 days, 3tab=15 mg qd x 5 days, 2tab=10 mg qd x 5 days, 1 tab=5 mg every day.     3. Information about Methotrexate and plaquenil provided.     4. RTC in 2.5 months           Active Problem List:     Patient Active Problem List    Diagnosis Date Noted     Impingement syndrome, shoulder, right 10/19/2018     Priority: Medium     Right shoulder pain, unspecified chronicity 10/19/2018     Priority: Medium     Joint pain      Priority: Medium     Class 1 obesity due to excess calories without serious comorbidity with body mass index (BMI) of 30.0 to 30.9 in adult 02/26/2018     Priority: Medium     Cervical radiculopathy 06/02/2017     Priority: Medium     JESENIA (stress urinary incontinence, female) 04/19/2017     Priority: Medium     Mild persistent asthma with acute exacerbation 04/11/2017     Priority: Medium     Allergic bronchitis, moderate persistent, uncomplicated 06/17/2016     Priority: Medium     Quality         Leukopenia 09/25/2015     Priority: Medium     Acne rosacea 08/16/2015     Priority:  Medium     Dermatitis 08/16/2015     Priority: Medium     CARDIOVASCULAR SCREENING; LDL GOAL LESS THAN 160 06/03/2014     Priority: Medium     Abnormal uterine bleeding 04/09/2014     Priority: Medium     Knee pain 12/06/2013     Priority: Medium     Medial meniscus tear 09/03/2013     Priority: Medium     Tear of medial meniscus of knee 07/30/2012     Priority: Medium     Overview:   Tear of medial cartilage or meniscus of knee, current, left       Pain in joint, ankle and foot 04/16/2009     Priority: Medium     Overview:   LW Modifier:  s/p surgery - ligament  ; Pain Ankle Right              History of Present Illness:   Katie Aponte is a 52 year old female with PMH of Asthma, obesity, meniscal tear left knee status post surgery seen in the clinic in consultation at request of Francis Capps DPM for evaluation of joint pains.    November 29, 2018 - She has pain in her knees, right shoulder, ankles. Her autoimmune work up done on last visit showed high titer positive anti-CCP antibody.  Negative FEDERICA panel, complements and double-stranded DNA and centromere antibody. she had normal hepatitis and TB serology.  Normal protein electrophoresis.  She was given prednisone taper and responded very well to it.  After finishing the taper the joint pains came back again.       History from initial visit : She reports that in June 2018 she developed severe pain in left heel.  She was seen by a podiatrist Dr Capps and was given a cam walker for left foot.  Left foot pain did not improve.  After a while she started having pain and swelling in the right ankle.  The right ankle was swollen up to grapefruit size.  In the morning she had severe pain in bilateral feet, Achilles tendon and ankles.  She has stiffness for couple hours.  Lately she has noticed pain in the left second third fourth fingers.  Denies significant pain in the wrists, elbows and shoulders.  She has chronic pain in her knees.    Her blood work revealed  positive rheumatoid factor 84, low titer IRIS 1: 40, normal ESR, uric acid and negative Lyme serology.    She also complains of color changes in her middle and ring fingers of bilateral hands on exposure to cold.  They turn white and purple and has been going on for couple of years.  She has chronic leukopenia of unknown cause.  Also noticed sicca symptoms in the last few weeks which she attributes to phentermine use started a month ago.  Other than that she denies history of malar rash, photosensitivity, recurrent mouth/genital ulcers, sicca symptoms, pleuritic chest pains, recurrent sinusitis/rhinitis, swallowing difficulty, hearing or visual changes recently. She denies any history of psoriasis, ulcerative colitis or chron's disease. No h/o iritis, enthesitis, finger or toe swelling like dactylitis.      She was given short prednisone taper for a week by podiatrist which helped to bring right ankle swelling down.  She denies any side effects with the prednisone use.         Review of Systems:   Review Of Systems  Constitutional: denies fever, chills, night sweats and weight loss.  Skin: No skin rash.  Eyes: + dryness or irritation in eyes. No episode of eye inflammation or redness.   Ears/Nose/Throat: no recurrent sinus infections.  Respiratory: No shortness of breath, dyspnea on exertion, cough, or hemoptysis  Cardiovascular: no chest pain or palpitations.  Gastrointestinal: no nausea, vomiting, abdominal pain.  Normal bowel movements.  Genitourinary: no dysuria, frequency  or hematuria.  Musculoskeletal: as in HPI  Neurologic: no numbness, tingling.  Psychiatric: no mood disorders.  Hematologic/Lymphatic/Immunologic: no history of easy bruising, petechia or purpura.  No abnormal bleeding.   Endocrine: no h/o thyroid disease or Diabetes.                  Past Medical History:     Past Medical History:   Diagnosis Date     Herpes     Under eye     Joint pain      Seasonal allergies      Thrombocytopenia (H)  2015     Uncomplicated asthma     very mild     Past Surgical History:   Procedure Laterality Date      SECTION       COLONOSCOPY       CYSTOSCOPY, SLING TRANSVAGINAL N/A 2017    Procedure: CYSTOSCOPY, SLING TRANSVAGINAL;  TRANSVAGINAL TAPING, CYSTOSCOPY, MID URETHRAL SLING;  Surgeon: Jett Montes MD;  Location: [facility]     DILATE CERVIX, ABLATE ENDOMETRIUM THERMACHOICE, COMBINED  4/10/2014    Procedure: COMBINED DILATE CERVIX, ABLATE ENDOMETRIUM THERMACHOICE;;  Surgeon: Jett Montes MD;  Location: [facility]     DILATION AND CURETTAGE, HYSTEROSCOPY, ABLATE ENDOMETRIUM NOVASURE, COMBINED  4/10/2014    Procedure: COMBINED DILATION AND CURETTAGE, HYSTEROSCOPY, ABLATE ENDOMETRIUM NOVASURE;  HYSTEROSCOPY, DILATION AND CURETTAGE, NOVASURE ABLATION ATTEMPTED, THERMACHOICE ABLATION ATTEMPTED;  Surgeon: Jett Montes MD;  Location: [facility]     LAPAROSCOPIC ASSISTED HYSTERECTOMY VAGINAL, BILATERAL SALPINGO-OOPHORECTOMY, COMBINED  2014    Procedure: COMBINED LAPAROSCOPIC ASSISTED HYSTERECTOMY VAGINAL, SALPINGO-OOPHORECTOMY;  Surgeon: Jett Montes MD;  Location: [facility]     ORTHOPEDIC SURGERY      L KNEE MENISCUS,ankle surgery, left knee replacement            Social History:     Social History     Occupational History     Not on file.     Social History Main Topics     Smoking status: Never Smoker     Smokeless tobacco: Never Used     Alcohol use 0.0 oz/week     0 Standard drinks or equivalent per week      Comment: SOCIAL - 1 glass of wine twice a week; 2 drinks on the weekend     Drug use: No     Sexual activity: Yes     Partners: Male            Family History:     Family History   Problem Relation Age of Onset     Cancer Mother      patient is unsure of what kind            Allergies:     Allergies   Allergen Reactions     Droperidol Itching     Feels jumpy     Percocet [Oxycodone-Acetaminophen] Nausea and Vomiting and GI Disturbance            Medications:     Current Outpatient Prescriptions    Medication Sig Dispense Refill     albuterol (2.5 MG/3ML) 0.083% neb solution Take 1 vial (2.5 mg) by nebulization every 6 hours as needed for shortness of breath / dyspnea or wheezing 25 vial 1     albuterol (PROAIR HFA/PROVENTIL HFA/VENTOLIN HFA) 108 (90 BASE) MCG/ACT Inhaler Inhale 2 puffs into the lungs every 6 hours as needed for shortness of breath / dyspnea or wheezing 1 Inhaler 2     desonide (DESOWEN) 0.05 % ointment Apply topically 2 times daily 30 g 0     Estrogens Conjugated (PREMARIN PO) Take 0.9 mg by mouth daily       estrogens conjugated (PREMARIN) 0.9 MG TABS Take 0.3 mg by mouth daily       FLUOCINOLONE ACETONIDE SCALP 0.01 % OIL oil Apply topically 2 times daily as needed 118 mL 0     fluticasone (FLOVENT HFA) 44 MCG/ACT Inhaler Inhale 2 puffs into the lungs 2 times daily 1 Inhaler 1     ibuprofen (ADVIL/MOTRIN) 800 MG tablet Take 1 tablet (800 mg) by mouth every 8 hours as needed for moderate pain 60 tablet 1     ipratropium - albuterol 0.5 mg/2.5 mg/3 mL (DUONEB) 0.5-2.5 (3) MG/3ML neb solution Take 1 vial (3 mLs) by nebulization every 6 hours as needed for shortness of breath / dyspnea or wheezing 1 vial 0     multivitamin, therapeutic with minerals (MULTI-VITAMIN) TABS tablet Take 1 tablet by mouth daily       order for DME Equipment being ordered:  Knee walker 1 Units 0     order for DME Equipment being ordered: Knee walker  1 Device 0     order for DME Equipment being ordered: Nebulizer with tubing and instructions 1 Device 0     order for DME Equipment being ordered: Carex Bed Buddy- heated neck roll 1 Device 0     order for DME Equipment being ordered: TENS 1 Units 0     phentermine 30 MG capsule Take 30 mg by mouth every morning       predniSONE (DELTASONE) 5 MG tablet 4tab=20 mg qd x 7 days, 3tab=15 mg qd x 7 days, 2tab=10 mg qd daily. 30 tablet 2     triamcinolone (KENALOG) 0.1 % cream Apply sparingly to affected area three times daily as needed 80 g 1     valACYclovir (VALTREX)  "500 MG tablet Take 500 mg by mouth as needed Reported on 4/11/2017       fluticasone (FLONASE) 50 MCG/ACT spray Spray 1-2 sprays into both nostrils daily (Patient not taking: Reported on 11/29/2018) 1 Bottle 11            Physical Exam:   Blood pressure 122/76, pulse 83, height 1.676 m (5' 6\"), weight 78.3 kg (172 lb 9.6 oz), last menstrual period 03/30/2014, not currently breastfeeding.  Wt Readings from Last 4 Encounters:   11/29/18 78.3 kg (172 lb 9.6 oz)   10/09/18 82.6 kg (182 lb 3.2 oz)   10/02/18 84.2 kg (185 lb 9.6 oz)   09/08/18 84.6 kg (186 lb 6.4 oz)       Constitutional: obese, appearing stated age; cooperative  Eyes: nl EOM, PERRLA, vision, conjunctiva, sclera  ENT: nl external ears, nose, hearing, lips, teeth, gums, throat  No mucous membrane lesions, normal saliva pool  Neck: no mass or thyroid enlargement  Resp: lungs clear to auscultation, nl to palpation  CV: RRR, no murmurs, rubs or gallops, no edema  GI: no ABD mass or tenderness, no HSM  : not tested  Lymph: no cervical, supraclavicular, inguinal or epitrochlear nodes    MS: All TMJ, neck, shoulder, elbow, wrist, MCP/PIP/DIP, spine, hip, knee, ankle, and foot MTP/IP joints were examined.     -- she has tenderness over the left 2 - 4 PIP joints along with mild synovitis.  Tenderness present over bilateral ankles, Achilles tendon.  No tenderness and synovitis present over the right hand, elbows and shoulders.  She has chronic knee pain from osteoarthritis.    -- No dactylitis,  tenosynovitis, enthesopathy.    Skin: no nail pitting, alopecia, rash, nodules or lesions  Neuro: nl cranial nerves, strength, sensation, DTRs.   Psych: nl judgement, orientation, memory, affect.         Data:     Results for orders placed or performed in visit on 11/29/18   ALT   Result Value Ref Range    ALT 25 0 - 50 U/L   Albumin level   Result Value Ref Range    Albumin 3.3 (L) 3.4 - 5.0 g/dL   AST   Result Value Ref Range    AST 16 0 - 45 U/L   Creatinine   Result " Value Ref Range    Creatinine 0.89 0.52 - 1.04 mg/dL    GFR Estimate 66 >60 mL/min/1.7m2    GFR Estimate If Black 80 >60 mL/min/1.7m2   CBC with platelets   Result Value Ref Range    WBC 3.5 (L) 4.0 - 11.0 10e9/L    RBC Count 4.76 3.8 - 5.2 10e12/L    Hemoglobin 13.2 11.7 - 15.7 g/dL    Hematocrit 40.7 35.0 - 47.0 %    MCV 86 78 - 100 fl    MCH 27.7 26.5 - 33.0 pg    MCHC 32.4 31.5 - 36.5 g/dL    RDW 13.2 10.0 - 15.0 %    Platelet Count 199 150 - 450 10e9/L       Recent Labs   Lab Test  10/15/15   1506  09/25/15   1540  09/18/15   1001   WBC   --   3.2*  2.4*   RBC   --   4.74  4.98   HGB  13.6  13.6  14.1   HCT   --   38.9  42.3   MCV   --   82  85   RDW   --   12.9  13.0   PLT   --   161  98*   ALBUMIN   --   3.5   --    BUN   --   9  11      Recent Labs   Lab Test  09/25/15   1540   TSH  1.62     Hemoglobin   Date Value Ref Range Status   11/29/2018 13.2 11.7 - 15.7 g/dL Final   10/15/2015 13.6 11.7 - 15.7 g/dL Final   09/25/2015 13.6 11.7 - 15.7 g/dL Final     Urea Nitrogen   Date Value Ref Range Status   09/25/2015 9 7 - 30 mg/dL Final   09/18/2015 11 7 - 30 mg/dL Final     Sed Rate   Date Value Ref Range Status   08/31/2018 19 0 - 30 mm/h Final   06/29/2018 17 0 - 30 mm/h Final     CRP Inflammation   Date Value Ref Range Status   08/31/2018 <2.9 0.0 - 8.0 mg/L Final     AST   Date Value Ref Range Status   11/29/2018 16 0 - 45 U/L Final   09/25/2015 14 0 - 45 U/L Final     Albumin   Date Value Ref Range Status   11/29/2018 3.3 (L) 3.4 - 5.0 g/dL Final   09/25/2015 3.5 3.4 - 5.0 g/dL Final     Alkaline Phosphatase   Date Value Ref Range Status   09/25/2015 63 40 - 150 U/L Final     ALT   Date Value Ref Range Status   11/29/2018 25 0 - 50 U/L Final   09/25/2015 24 0 - 50 U/L Final     Rheumatoid Factor   Date Value Ref Range Status   06/29/2018 84 (H) <20 IU/mL Final     Recent Labs   Lab Test  11/29/18   0905  10/15/15   1506  09/25/15   1540  09/18/15   1001   WBC  3.5*   --   3.2*  2.4*   HGB  13.2  13.6  13.6   14.1   HCT  40.7   --   38.9  42.3   MCV  86   --   82  85   PLT  199   --   161  98*   BUN   --    --   9  11   TSH   --    --   1.62   --    AST  16   --   14   --    ALT  25   --   24   --    ALKPHOS   --    --   63   --      Other studies:   Component      Latest Ref Rng & Units 9/25/2015 3/29/2018 6/29/2018   IRIS interpretation      NEG:Negative   Borderline Positive (A)   IRIS pattern 1         SPECKLED   IRIS titer 1         1:40   Hepatitis B Core Marilin      NR Nonreactive     Hep B Surface Agn      NR Nonreactive     HIV Antigen Antibody Combo      NR Nonreactive . . .     Hepatitis C Antibody      NR:Nonreactive  Nonreactive    Rheumatoid Factor      <20 IU/mL   84 (H)       Reviewed Rheumatology lab flowsheet    Jonhnie Medley MD  [facility]  Department of Rheumatology & Autoimmune Disorders  [facility]: [phone]   Pager - [phone]

## 2018-11-30 ENCOUNTER — TELEPHONE (OUTPATIENT)
Dept: ORTHOPEDICS | Facility: CLINIC | Age: 53
End: 2018-11-30

## 2018-11-30 DIAGNOSIS — M25.511 ACUTE PAIN OF RIGHT SHOULDER: Primary | ICD-10-CM

## 2018-11-30 NOTE — TELEPHONE ENCOUNTER
Patient had her MRI done wed morning on her rt shoulder  Can you call her as soon as possible with results  [phone]    Leave voicemail if she needs surgery  Thank you

## 2018-12-03 NOTE — TELEPHONE ENCOUNTER
I spoke to jay after speaking with Dr. Remi Rowland and put in an ortho  referral. I let her know that she would be contacted in the next couple days to schedule an appointment with Dr. Sims or Danika at [place]. She thanked me for the call.    Chandu Holley PA-C, CAQ (Ortho)  Supervising Physician: Remi Rowland M.D., M.S.  Dept. of Orthopaedic Surgery  [facility]

## 2018-12-06 ENCOUNTER — OFFICE VISIT (OUTPATIENT)
Dept: ORTHOPEDICS | Facility: CLINIC | Age: 53
End: 2018-12-06
Payer: COMMERCIAL

## 2018-12-06 VITALS — WEIGHT: 172 LBS | BODY MASS INDEX: 27.64 KG/M2 | HEIGHT: 66 IN

## 2018-12-06 DIAGNOSIS — M25.511 ACUTE PAIN OF RIGHT SHOULDER: Primary | ICD-10-CM

## 2018-12-06 PROCEDURE — 20611 DRAIN/INJ JOINT/BURSA W/US: CPT | Mod: RT | Performed by: FAMILY MEDICINE

## 2018-12-06 RX ORDER — BETAMETHASONE SODIUM PHOSPHATE AND BETAMETHASONE ACETATE 3; 3 MG/ML; MG/ML
6 INJECTION, SUSPENSION INTRA-ARTICULAR; INTRALESIONAL; INTRAMUSCULAR; SOFT TISSUE
Status: DISCONTINUED | OUTPATIENT
Start: 2018-12-06 | End: 2020-01-06

## 2018-12-06 RX ORDER — ROPIVACAINE HYDROCHLORIDE 5 MG/ML
3 INJECTION, SOLUTION EPIDURAL; INFILTRATION; PERINEURAL
Status: DISCONTINUED | OUTPATIENT
Start: 2018-12-06 | End: 2020-01-06

## 2018-12-06 RX ADMIN — BETAMETHASONE SODIUM PHOSPHATE AND BETAMETHASONE ACETATE 6 MG: 3; 3 INJECTION, SUSPENSION INTRA-ARTICULAR; INTRALESIONAL; INTRAMUSCULAR; SOFT TISSUE at 09:15

## 2018-12-06 RX ADMIN — ROPIVACAINE HYDROCHLORIDE 3 ML: 5 INJECTION, SOLUTION EPIDURAL; INFILTRATION; PERINEURAL at 09:15

## 2018-12-06 NOTE — PATIENT INSTRUCTIONS
[facility]      You may shower, however avoid swimming, tub baths or hot tubs for 24 hours following your procedure    You may have a mild to moderate increase in pain for several days following the injection.    It may take up to 14 days for the steroid medication to start working although you may feel the effect as early as a few days after the procedure.    You may use ice packs for 10-15 minutes, 3 to 4 times a day at the injection site for comfort    You may use anti-inflammatory medications (such as Ibuprofen or Aleve or Advil) or Tylenol for pain control if necessary    If you were fasting, you may resume your normal diet and medications after the procedure    If you have diabetes, check your blood sugar more frequently than usual as your blood sugar may be higher than normal for 10-14 days following a steroid injection. Contact your doctor who manages your diabetes if your blood sugar is higher than usual      If you experience any of the following, call [facility] @ [phone] or [phone]  -Fever over 100 degree F  -Swelling, bleeding, redness, drainage, warmth at the injection site  - New or worsening pain

## 2018-12-06 NOTE — PROGRESS NOTES
Large Joint Injection/Arthocentesis  Date/Time: 12/6/2018 9:15 AM  Performed by: GUILLERMO SOSA  Authorized by: GUILLERMO SOSA     Indications:  Diagnostic evaluation, pain and joint swelling  Needle Size:  25 G  Guidance: ultrasound    Approach:  Lateral  Location:  Shoulder  Site:  R glenohumeral joint  Medications:  6 mg betamethasone acet & sod phos 6 (3-3) MG/ML; 3 mL ropivacaine 5 MG/ML  Medications comment:  Actual amount of ropivacaine used 4 mL.  Outcome:  Tolerated well, no immediate complications  Procedure discussed: discussed risks, benefits, and alternatives    Consent Given by:  Patient  Timeout: timeout called immediately prior to procedure    Prep: patient was prepped and draped in usual sterile fashion     Patient reported improvement of pain after injection.  Aftercare instructions given to patient.  Plan to follow-up as previously discussed with referring provider.     Guillermo Sosa

## 2018-12-06 NOTE — LETTER
12/6/2018         RE: Katie Aponte  [address]  [place] [address]        Dear Colleague,    Thank you for referring your patient, Katie Aponte, to the [facility]. Please see a copy of my visit note below.    Large Joint Injection/Arthocentesis  Date/Time: 12/6/2018 9:15 AM  Performed by: GUILLERMO SIMS  Authorized by: GUILLERMO SIMS     Indications:  Diagnostic evaluation, pain and joint swelling  Needle Size:  25 G  Guidance: ultrasound    Approach:  Lateral  Location:  Shoulder  Site:  R glenohumeral joint  Medications:  6 mg betamethasone acet & sod phos 6 (3-3) MG/ML; 3 mL ropivacaine 5 MG/ML  Medications comment:  Actual amount of ropivacaine used 4 mL.  Outcome:  Tolerated well, no immediate complications  Procedure discussed: discussed risks, benefits, and alternatives    Consent Given by:  Patient  Timeout: timeout called immediately prior to procedure    Prep: patient was prepped and draped in usual sterile fashion     Patient reported improvement of pain after injection.  Aftercare instructions given to patient.  Plan to follow-up as previously discussed with referring provider.     Guillermo Sims              Again, thank you for allowing me to participate in the care of your patient.        Sincerely,        Guillermo Sims MD
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
## 2018-12-26 ENCOUNTER — TELEPHONE (OUTPATIENT)
Dept: RHEUMATOLOGY | Facility: CLINIC | Age: 53
End: 2018-12-26

## 2018-12-26 NOTE — TELEPHONE ENCOUNTER
[facility] Call Center    Phone Message    May a detailed message be left on voicemail: yes    Reason for Call: Other: Patient has some questions regarding forms that need to be filled out for FMLA. Wondering if she needs an appointment to be seen? She has some things she would like to discuss to be addressed in the paperwork. Please advise.      Action Taken: Message routed to:  Adult Clinics: Rheumatology p [id number]

## 2019-01-10 ENCOUNTER — MYC MEDICAL ADVICE (OUTPATIENT)
Dept: RHEUMATOLOGY | Facility: CLINIC | Age: 54
End: 2019-01-10

## 2019-01-24 ENCOUNTER — TELEPHONE (OUTPATIENT)
Dept: RHEUMATOLOGY | Facility: CLINIC | Age: 54
End: 2019-01-24

## 2019-01-24 NOTE — TELEPHONE ENCOUNTER
Called patient, notified her that [facility] forms were faxed out today. [facility] and sent to HIM for scanning.

## 2019-02-15 ENCOUNTER — OFFICE VISIT (OUTPATIENT)
Dept: RHEUMATOLOGY | Facility: CLINIC | Age: 54
End: 2019-02-15
Payer: COMMERCIAL

## 2019-02-15 VITALS
SYSTOLIC BLOOD PRESSURE: 119 MMHG | DIASTOLIC BLOOD PRESSURE: 73 MMHG | TEMPERATURE: 97.9 F | OXYGEN SATURATION: 96 % | HEART RATE: 79 BPM

## 2019-02-15 DIAGNOSIS — Z79.899 HIGH RISK MEDICATION USE: Primary | ICD-10-CM

## 2019-02-15 PROCEDURE — 99213 OFFICE O/P EST LOW 20 MIN: CPT | Performed by: STUDENT IN AN ORGANIZED HEALTH CARE EDUCATION/TRAINING PROGRAM

## 2019-02-15 ASSESSMENT — PAIN SCALES - GENERAL: PAINLEVEL: MODERATE PAIN (4)

## 2019-02-15 NOTE — NURSING NOTE
Katie Aponte's goals for this visit include:   Chief Complaint   Patient presents with     Follow Up     inflammatory arthritis       She requests these members of her care team be copied on today's visit information:     PCP: Karlee Birmingham    Referring Provider:  No referring provider defined for this encounter.    /73 (BP Location: Left arm, Patient Position: Sitting, Cuff Size: Adult Regular)   Pulse 79   Temp 97.9  F (36.6  C)   LMP 03/30/2014   SpO2 96%     Do you need any medication refills at today's visit? Salome Potts LPN

## 2019-02-15 NOTE — PROGRESS NOTES
Rheumatology Clinic Visit     Katie Aponte MRN# [id number]   YOB: 1965 Age: 52 year old     Date of Visit: February 15, 2019  Primary care provider: Francis Capps          Assessment and Plan:   Assessment     -- Seropositive Rheumatoid arthritis ( + RF, + ACPA)  -- Bilateral ankle pain  -- Left hand pain   -- Raynaud's  -- Chronic Leukopenia  -- + RF - 84, + IRIS - 1:40, > 340 ACPA  -- Neg FEDERICA, normal C3/C4, dsDNA  -- Neg Hep B/C, TB    Ms Aponte is 54 yo female seen in clinic for management of seropositive rheumatoid arthritis.     Polyarthritis: Symptoms started in June 2018 with pain in left heel.  She was seen by podiatrist and was in boot for some time. Later developed right ankle swelling. Her symptoms worsened over time. Both of her ankles and Achilles tendons hurt.  She also noticed pain in second third fourth fingers of left hand along with morning stiffness for couple hours mostly in her feet and hands. Blood tests showed positive rheumatoid factor of 84 and ACPA.     On physical exam she has tenderness over the left second third fourth PIP joints along with mild synovitis.  Tenderness present over bilateral ankles, Achilles tendon and plantar fascia.  No tenderness and synovitis present over the right hand, elbows and shoulders.  She has chronic knee pain from osteoarthritis.    She was given prednisone taper and responded well to it.  I discussed with her regarding the start of methotrexate but she declined it due to restriction on alcohol use.  She drinks 1 glass of wine every day and does not want to limit alcohol intake at this time. She was started on Plaquenil 200 mg BID dose in 11/2018.  She has noticed some improvement in her joint pains with it and want to continue with Plaquenil.       Positive IRIS: She has low titer positive IRIS 1: 40.  Has raynaud's, chronic leukopenia and new onset joint pains which can be seen in IRIS associated connective tissue disease.  She has new  onset sicca symptoms which she attributes to phentermine use.     Her specific serologies including FEDERICA panel, complement levels, double-stranded DNA, centromere are normal.  hepatitis C serology and protein electrophoresis are normal as well.    Plan    1. Continue Plaquenil 200 mg BID     2. RTC in 3 months with labs.             Active Problem List:     Patient Active Problem List    Diagnosis Date Noted     Impingement syndrome, shoulder, right 10/19/2018     Priority: Medium     Right shoulder pain, unspecified chronicity 10/19/2018     Priority: Medium     Joint pain      Priority: Medium     Class 1 obesity due to excess calories without serious comorbidity with body mass index (BMI) of 30.0 to 30.9 in adult 02/26/2018     Priority: Medium     Cervical radiculopathy 06/02/2017     Priority: Medium     JESENIA (stress urinary incontinence, female) 04/19/2017     Priority: Medium     Mild persistent asthma with acute exacerbation 04/11/2017     Priority: Medium     Allergic bronchitis, moderate persistent, uncomplicated 06/17/2016     Priority: Medium     Quality         Leukopenia 09/25/2015     Priority: Medium     Acne rosacea 08/16/2015     Priority: Medium     Dermatitis 08/16/2015     Priority: Medium     CARDIOVASCULAR SCREENING; LDL GOAL LESS THAN 160 06/03/2014     Priority: Medium     Abnormal uterine bleeding 04/09/2014     Priority: Medium     Knee pain 12/06/2013     Priority: Medium     Medial meniscus tear 09/03/2013     Priority: Medium     Tear of medial meniscus of knee 07/30/2012     Priority: Medium     Overview:   Tear of medial cartilage or meniscus of knee, current, left       Pain in joint, ankle and foot 04/16/2009     Priority: Medium     Overview:   LW Modifier:  s/p surgery - ligament  ; Pain Ankle Right              History of Present Illness:   Katie Aponte is a 52 year old female with PMH of Asthma, obesity, meniscal tear left knee status post surgery seen in the clinic in  consultation at request of Francis Capps DPM for evaluation of joint pains.    February 15, 2019 - She is on 5 mg prednisone daily and taking Plaquenil 200 mg daily. Her tongue is extremely sensitive to spices. A month ago she could not raise her arm. She had MRI of right shoulder done but is not aware of the result. She was given intraarticular steroid injection which helped for few days and then pain came back again.     November 29, 2018 - She has pain in her knees, right shoulder, ankles. Her autoimmune work up done on last visit showed high titer positive anti-CCP antibody.  Negative FEDERICA panel, complements and double-stranded DNA and centromere antibody. she had normal hepatitis and TB serology.  Normal protein electrophoresis.  She was given prednisone taper and responded very well to it.  After finishing the taper the joint pains came back again.       History from initial visit : She reports that in June 2018 she developed severe pain in left heel.  She was seen by a podiatrist Dr Capps and was given a cam walker for left foot.  Left foot pain did not improve.  After a while she started having pain and swelling in the right ankle.  The right ankle was swollen up to grapefruit size.  In the morning she had severe pain in bilateral feet, Achilles tendon and ankles.  She has stiffness for couple hours.  Lately she has noticed pain in the left second third fourth fingers.  Denies significant pain in the wrists, elbows and shoulders.  She has chronic pain in her knees.    Her blood work revealed positive rheumatoid factor 84, low titer IRIS 1: 40, normal ESR, uric acid and negative Lyme serology.    She also complains of color changes in her middle and ring fingers of bilateral hands on exposure to cold.  They turn white and purple and has been going on for couple of years.  She has chronic leukopenia of unknown cause.  Also noticed sicca symptoms in the last few weeks which she attributes to phentermine use  started a month ago.  Other than that she denies history of malar rash, photosensitivity, recurrent mouth/genital ulcers, sicca symptoms, pleuritic chest pains, recurrent sinusitis/rhinitis, swallowing difficulty, hearing or visual changes recently. She denies any history of psoriasis, ulcerative colitis or chron's disease. No h/o iritis, enthesitis, finger or toe swelling like dactylitis.      She was given short prednisone taper for a week by podiatrist which helped to bring right ankle swelling down.  She denies any side effects with the prednisone use.         Review of Systems:   Review Of Systems  Constitutional: denies fever, chills, night sweats and weight loss.  Skin: No skin rash.  Eyes: + dryness or irritation in eyes. No episode of eye inflammation or redness.   Ears/Nose/Throat: no recurrent sinus infections.  Respiratory: No shortness of breath, dyspnea on exertion, cough, or hemoptysis  Cardiovascular: no chest pain or palpitations.  Gastrointestinal: no nausea, vomiting, abdominal pain.  Normal bowel movements.  Genitourinary: no dysuria, frequency  or hematuria.  Musculoskeletal: as in HPI  Neurologic: no numbness, tingling.  Psychiatric: no mood disorders.  Hematologic/Lymphatic/Immunologic: no history of easy bruising, petechia or purpura.  No abnormal bleeding.   Endocrine: no h/o thyroid disease or Diabetes.                  Past Medical History:     Past Medical History:   Diagnosis Date     Herpes     Under eye     Joint pain      Seasonal allergies      Thrombocytopenia (H) 2015     Uncomplicated asthma     very mild     Past Surgical History:   Procedure Laterality Date      SECTION       COLONOSCOPY       CYSTOSCOPY, SLING TRANSVAGINAL N/A 2017    Procedure: CYSTOSCOPY, SLING TRANSVAGINAL;  TRANSVAGINAL TAPING, CYSTOSCOPY, MID URETHRAL SLING;  Surgeon: Jett Montes MD;  Location: [facility]     DILATE [facility]  4/10/2014    Procedure:  COMBINED DILATE CERVIX, ABLATE ENDOMETRIUM THERMACHOICE;;  Surgeon: Jett Montes MD;  Location: [facility]     DILATION AND CURETTAGE, HYSTEROSCOPY, ABLATE ENDOMETRIUM NOVASURE, COMBINED  4/10/2014    Procedure: COMBINED DILATION AND CURETTAGE, HYSTEROSCOPY, ABLATE ENDOMETRIUM NOVASURE;  HYSTEROSCOPY, DILATION AND CURETTAGE, NOVASURE ABLATION ATTEMPTED, THERMACHOICE ABLATION ATTEMPTED;  Surgeon: Jett Montes MD;  Location: [facility]     LAPAROSCOPIC ASSISTED HYSTERECTOMY VAGINAL, BILATERAL SALPINGO-OOPHORECTOMY, COMBINED  7/31/2014    Procedure: COMBINED LAPAROSCOPIC ASSISTED HYSTERECTOMY VAGINAL, SALPINGO-OOPHORECTOMY;  Surgeon: Jett Montes MD;  Location: [facility]     ORTHOPEDIC SURGERY      L KNEE MENISCUS,ankle surgery, left knee replacement            Social History:     Social History     Occupational History     Not on file   Tobacco Use     Smoking status: Never Smoker     Smokeless tobacco: Never Used   Substance and Sexual Activity     Alcohol use: Yes     Alcohol/week: 0.0 oz     Comment: SOCIAL - 1 glass of wine twice a week; 2 drinks on the weekend     Drug use: No     Sexual activity: Yes     Partners: Male            Family History:     Family History   Problem Relation Age of Onset     Cancer Mother         patient is unsure of what kind            Allergies:     Allergies   Allergen Reactions     Droperidol Itching     Feels jumpy     Percocet [Oxycodone-Acetaminophen] Nausea and Vomiting and GI Disturbance            Medications:     Current Outpatient Medications   Medication Sig Dispense Refill     albuterol (2.5 MG/3ML) 0.083% neb solution Take 1 vial (2.5 mg) by nebulization every 6 hours as needed for shortness of breath / dyspnea or wheezing 25 vial 1     albuterol (PROAIR HFA/PROVENTIL HFA/VENTOLIN HFA) 108 (90 BASE) MCG/ACT Inhaler Inhale 2 puffs into the lungs every 6 hours as needed for shortness of breath / dyspnea or wheezing 1 Inhaler 2     desonide (DESOWEN) 0.05 % ointment Apply  topically 2 times daily 30 g 0     Estrogens Conjugated (PREMARIN PO) Take 0.9 mg by mouth daily       estrogens conjugated (PREMARIN) 0.9 MG TABS Take 0.3 mg by mouth daily       FLUOCINOLONE ACETONIDE SCALP 0.01 % OIL oil Apply topically 2 times daily as needed 118 mL 0     fluticasone (FLOVENT HFA) 44 MCG/ACT Inhaler Inhale 2 puffs into the lungs 2 times daily 1 Inhaler 1     hydroxychloroquine (PLAQUENIL) 200 MG tablet Take 1 tablet (200 mg) by mouth 2 times daily Annual Plaquenil toxicity eye screening required. 60 tablet 3     ipratropium - albuterol 0.5 mg/2.5 mg/3 mL (DUONEB) 0.5-2.5 (3) MG/3ML neb solution Take 1 vial (3 mLs) by nebulization every 6 hours as needed for shortness of breath / dyspnea or wheezing 1 vial 0     multivitamin, therapeutic with minerals (MULTI-VITAMIN) TABS tablet Take 1 tablet by mouth daily       order for DME Equipment being ordered:  Knee walker 1 Units 0     order for DME Equipment being ordered: Knee walker  1 Device 0     order for DME Equipment being ordered: Nebulizer with tubing and instructions 1 Device 0     order for DME Equipment being ordered: Carex Bed Buddy- heated neck roll 1 Device 0     order for DME Equipment being ordered: TENS 1 Units 0     phentermine 30 MG capsule Take 30 mg by mouth every morning       predniSONE (DELTASONE) 5 MG tablet 4tab=20 mg qd x 7 days, 3tab=15 mg qd x 7 days, 2tab=10 mg qd daily. 30 tablet 2     triamcinolone (KENALOG) 0.1 % cream Apply sparingly to affected area three times daily as needed 80 g 1     valACYclovir (VALTREX) 500 MG tablet Take 500 mg by mouth as needed Reported on 4/11/2017       fluticasone (FLONASE) 50 MCG/ACT spray Spray 1-2 sprays into both nostrils daily (Patient not taking: Reported on 2/15/2019) 1 Bottle 11     ibuprofen (ADVIL/MOTRIN) 800 MG tablet Take 1 tablet (800 mg) by mouth every 8 hours as needed for moderate pain (Patient not taking: Reported on 2/15/2019) 60 tablet 1     predniSONE (DELTASONE)  5 MG tablet 4tab=20 mg qd x 5 days, 3tab=15 mg qd x 5 days, 2tab=10 mg qd x 5 days, 1 tab=5 mg qd daily (Patient not taking: Reported on 2/15/2019.) 70 tablet 2            Physical Exam:   Blood pressure 119/73, pulse 79, temperature 97.9  F (36.6  C), last menstrual period 03/30/2014, SpO2 96 %, not currently breastfeeding.  Wt Readings from Last 4 Encounters:   12/06/18 78 kg (172 lb)   11/29/18 78.3 kg (172 lb 9.6 oz)   10/09/18 82.6 kg (182 lb 3.2 oz)   10/02/18 84.2 kg (185 lb 9.6 oz)       Constitutional: obese, appearing stated age; cooperative  Eyes: nl EOM, PERRLA, vision, conjunctiva, sclera  ENT: nl external ears, nose, hearing, lips, teeth, gums, throat  No mucous membrane lesions, normal saliva pool  Neck: no mass or thyroid enlargement  Resp: lungs clear to auscultation, nl to palpation  CV: RRR, no murmurs, rubs or gallops, no edema  GI: no ABD mass or tenderness, no HSM  : not tested  Lymph: no cervical, supraclavicular, inguinal or epitrochlear nodes    MS: All TMJ, neck, shoulder, elbow, wrist, MCP/PIP/DIP, spine, hip, knee, ankle, and foot MTP/IP joints were examined.     -- she has tenderness over the left 2 - 4 PIP joints along with mild synovitis.  Tenderness present over bilateral ankles, Achilles tendon.  No tenderness and synovitis present over the right hand, elbows and shoulders.  She has chronic knee pain from osteoarthritis.    -- No dactylitis,  tenosynovitis, enthesopathy.    Skin: no nail pitting, alopecia, rash, nodules or lesions  Neuro: nl cranial nerves, strength, sensation, DTRs.   Psych: nl judgement, orientation, memory, affect.         Data:     Results for orders placed or performed in visit on 12/06/18   Large Joint Injection/Arthocentesis    Narrative    Guillermo Sosa MD     12/6/2018 10:54 AM  Large Joint Injection/Arthocentesis  Date/Time: 12/6/2018 9:15 AM  Performed by: GUILLERMO SOSA  Authorized by: GUILLERMO SOSA     Indications:  Diagnostic  evaluation, pain and joint swelling  Needle Size:  25 G  Guidance: ultrasound    Approach:  Lateral  Location:  Shoulder  Site:  R glenohumeral joint  Medications:  6 mg betamethasone acet & sod phos 6 (3-3) MG/ML; 3 mL   ropivacaine 5 MG/ML  Medications comment:  Actual amount of ropivacaine used 4 mL.  Outcome:  Tolerated well, no immediate complications  Procedure discussed: discussed risks, benefits, and alternatives    Consent Given by:  Patient  Timeout: timeout called immediately prior to procedure    Prep: patient was prepped and draped in usual sterile fashion     Patient reported improvement of pain after injection.  Aftercare   instructions given to patient.  Plan to follow-up as previously discussed   with referring provider.     Guillermo Sims         Recent Labs   Lab Test  10/15/15   1506  09/25/15   1540  09/18/15   1001   WBC   --   3.2*  2.4*   RBC   --   4.74  4.98   HGB  13.6  13.6  14.1   HCT   --   38.9  42.3   MCV   --   82  85   RDW   --   12.9  13.0   PLT   --   161  98*   ALBUMIN   --   3.5   --    BUN   --   9  11      Recent Labs   Lab Test  09/25/15   1540   TSH  1.62     Hemoglobin   Date Value Ref Range Status   11/29/2018 13.2 11.7 - 15.7 g/dL Final   10/15/2015 13.6 11.7 - 15.7 g/dL Final   09/25/2015 13.6 11.7 - 15.7 g/dL Final     Urea Nitrogen   Date Value Ref Range Status   09/25/2015 9 7 - 30 mg/dL Final   09/18/2015 11 7 - 30 mg/dL Final     Sed Rate   Date Value Ref Range Status   08/31/2018 19 0 - 30 mm/h Final   06/29/2018 17 0 - 30 mm/h Final     CRP Inflammation   Date Value Ref Range Status   08/31/2018 <2.9 0.0 - 8.0 mg/L Final     AST   Date Value Ref Range Status   11/29/2018 16 0 - 45 U/L Final   09/25/2015 14 0 - 45 U/L Final     Albumin   Date Value Ref Range Status   11/29/2018 3.3 (L) 3.4 - 5.0 g/dL Final   09/25/2015 3.5 3.4 - 5.0 g/dL Final     Alkaline Phosphatase   Date Value Ref Range Status   09/25/2015 63 40 - 150 U/L Final     ALT   Date Value Ref  Range Status   11/29/2018 25 0 - 50 U/L Final   09/25/2015 24 0 - 50 U/L Final     Rheumatoid Factor   Date Value Ref Range Status   06/29/2018 84 (H) <20 IU/mL Final     Recent Labs   Lab Test 11/29/18  0905 10/15/15  1506 09/25/15  1540 09/18/15  1001   WBC 3.5*  --  3.2* 2.4*   HGB 13.2 13.6 13.6 14.1   HCT 40.7  --  38.9 42.3   MCV 86  --  82 85     --  161 98*   BUN  --   --  9 11   TSH  --   --  1.62  --    AST 16  --  14  --    ALT 25  --  24  --    ALKPHOS  --   --  63  --      Other studies:   Component      Latest Ref Rng & Units 9/25/2015 3/29/2018 6/29/2018   IRIS interpretation      NEG:Negative   Borderline Positive (A)   IRIS pattern 1         SPECKLED   IRIS titer 1         1:40   Hepatitis B Core Marilin      NR Nonreactive     Hep B Surface Agn      NR Nonreactive     HIV Antigen Antibody Combo      NR Nonreactive . . .     Hepatitis C Antibody      NR:Nonreactive  Nonreactive    Rheumatoid Factor      <20 IU/mL   84 (H)       Reviewed Rheumatology lab flowsheet    Johnnie Medley MD  [facility]  Department of Rheumatology & Autoimmune Disorders  [facility]: [phone]   Pager - [phone]

## 2019-03-11 ENCOUNTER — OFFICE VISIT (OUTPATIENT)
Dept: OPHTHALMOLOGY | Facility: CLINIC | Age: 54
End: 2019-03-11
Attending: STUDENT IN AN ORGANIZED HEALTH CARE EDUCATION/TRAINING PROGRAM
Payer: COMMERCIAL

## 2019-03-11 DIAGNOSIS — H52.4 BILATERAL PRESBYOPIA: ICD-10-CM

## 2019-03-11 DIAGNOSIS — Z79.899 HIGH RISK MEDICATION USE: Primary | ICD-10-CM

## 2019-03-11 PROCEDURE — 92134 CPTRZ OPH DX IMG PST SGM RTA: CPT | Performed by: OPHTHALMOLOGY

## 2019-03-11 PROCEDURE — 92083 EXTENDED VISUAL FIELD XM: CPT | Performed by: OPHTHALMOLOGY

## 2019-03-11 PROCEDURE — 92004 COMPRE OPH EXAM NEW PT 1/>: CPT | Performed by: OPHTHALMOLOGY

## 2019-03-11 PROCEDURE — 92015 DETERMINE REFRACTIVE STATE: CPT | Performed by: OPHTHALMOLOGY

## 2019-03-11 ASSESSMENT — REFRACTION_MANIFEST
OD_ADD: +2.25
OS_SPHERE: +0.25
OS_CYLINDER: +0.50
OS_ADD: +2.25
OD_CYLINDER: +0.50
OD_SPHERE: PLANO
OD_AXIS: 167
OS_AXIS: 175

## 2019-03-11 ASSESSMENT — CUP TO DISC RATIO
OD_RATIO: 0.45
OS_RATIO: 0.45

## 2019-03-11 ASSESSMENT — REFRACTION_WEARINGRX
OD_SPHERE: +1.75
OD_CYLINDER: SPHERE
SPECS_TYPE: OTC
OS_SPHERE: +1.75
OS_CYLINDER: SPHERE

## 2019-03-11 ASSESSMENT — VISUAL ACUITY
OD_SC: 20/20
METHOD: SNELLEN - LINEAR
OS_SC: 20/20
OS_CC: 20/30
OD_CC: 20/30

## 2019-03-11 ASSESSMENT — EXTERNAL EXAM - LEFT EYE: OS_EXAM: NORMAL

## 2019-03-11 ASSESSMENT — SLIT LAMP EXAM - LIDS
COMMENTS: NORMAL
COMMENTS: NORMAL

## 2019-03-11 ASSESSMENT — TONOMETRY
OD_IOP_MMHG: 22
OS_IOP_MMHG: 21
IOP_METHOD: ICARE

## 2019-03-11 ASSESSMENT — CONF VISUAL FIELD
METHOD: COUNTING FINGERS
OS_NORMAL: 1
OD_NORMAL: 1

## 2019-03-11 ASSESSMENT — EXTERNAL EXAM - RIGHT EYE: OD_EXAM: NORMAL

## 2019-03-11 NOTE — NURSING NOTE
Patient presents with:  Annual Eye Exam: Pt states eyes feel dry and are blurry after prolonged computer use. Pt denies use of eye meds.  Monitor Current High Risk Meds: Ref by Dr Medley Pt taking plaquenil 200 mg bid for RA started 11/2018      Referring Provider:  Johnnie Medley MD  [address] [place] [address]        Blanca Rae, COA

## 2019-03-11 NOTE — LETTER
3/11/2019       RE: Katie Aponte  [address]  [place] [address]     Dear Colleague,    Thank you for referring your patient, Katie Aponte, to the [facility]. Please see a copy of my visit note below.    Assessment & Plan   Katie Aponte is a 53 year old female who presents with:   Review of systems for the eyes was negative other than the pertinent positives and negatives noted in the HPI.  History is obtained from the patient.    Chief Complaint   Patient presents with     Annual Eye Exam     Pt states eyes feel dry and are blurry after prolonged computer use. Pt denies use of eye meds.     Monitor Current High Risk Meds     Ref by Dr Medley Pt taking plaquenil 200 mg bid for RA started 11/2018       Lab Results   Component Value Date    A1C 5.7 09/18/2015         High risk medication use  - Long term/ current use of Plaquenil Therapy. No evidence of Chloroquine maculopathy today in either eye. No bullseye, no loss of photoreceptor junction on OCT, excellent color vision. Cleared to continue Plaquenil. Risk factors for toxicity include: does, use > 5 years, age > 65, renal and/or hepatic failure.  - SD-OCT Macula Optovue OU (both eyes)  - HVF 10-2 OU    Bilateral presbyopia  Rx per MR for glasses (optional)  - REFRACTION    Return in about 1 year (around 3/11/2020) for Annual Eye Exam, Plaquenil toxicity screening.    Documentation for today's encounter was performed by Dione Nazario COA. OSC. Acting as a scribe in my presence. I have reviewed and verified that it is an accurate recording of today's encounter.    Attending Physician Attestation:  Complete documentation of historical and exam elements from today's encounter can be found in the full encounter summary report (not reduplicated in this progress note).  I personally obtained the chief complaint(s) and history of present illness.  I confirmed and edited as  necessary the review of systems, past medical/surgical history, family history, social history, and examination findings as documented by others; and I examined the patient myself.  I personally reviewed the relevant tests, images, and reports as documented above.  I formulated and edited as necessary the assessment and plan and discussed the findings and management plan with the patient and family. - Rafat Coronado MD      Again, thank you for allowing me to participate in the care of your patient.      Sincerely,    Rafat Coronado MD

## 2019-03-11 NOTE — PATIENT INSTRUCTIONS
In order for us to perform a thorough eye examination, your eyes were dilated.  The affects of the dilating drops last for 4- 6 hours.  You will be more sensitive to light and vision will be blurry up close.  Mydriatic sunglasses were given if needed.

## 2019-03-11 NOTE — PROGRESS NOTES
Assessment & Plan   Katie Aponte is a 53 year old female who presents with:   Review of systems for the eyes was negative other than the pertinent positives and negatives noted in the HPI.  History is obtained from the patient.    Chief Complaint   Patient presents with     Annual Eye Exam     Pt states eyes feel dry and are blurry after prolonged computer use. Pt denies use of eye meds.     Monitor Current High Risk Meds     Ref by Dr Medley Pt taking plaquenil 200 mg bid for RA started 11/2018       Lab Results   Component Value Date    A1C 5.7 09/18/2015         High risk medication use  - Long term/ current use of Plaquenil Therapy. No evidence of Chloroquine maculopathy today in either eye. No bullseye, no loss of photoreceptor junction on OCT, excellent color vision. Cleared to continue Plaquenil. Risk factors for toxicity include: does, use > 5 years, age > 65, renal and/or hepatic failure.  - SD-OCT Macula Optovue OU (both eyes)  - HVF 10-2 OU    Bilateral presbyopia  Rx per MR for glasses (optional)  - REFRACTION    Return in about 1 year (around 3/11/2020) for Annual Eye Exam, Plaquenil toxicity screening.    Documentation for today's encounter was performed by Dione Nazario COA. OSC. Acting as a scribe in my presence. I have reviewed and verified that it is an accurate recording of today's encounter.    Attending Physician Attestation:  Complete documentation of historical and exam elements from today's encounter can be found in the full encounter summary report (not reduplicated in this progress note).  I personally obtained the chief complaint(s) and history of present illness.  I confirmed and edited as necessary the review of systems, past medical/surgical history, family history, social history, and examination findings as documented by others; and I examined the patient myself.  I personally reviewed the relevant tests, images, and reports as documented above.  I formulated and edited as  necessary the assessment and plan and discussed the findings and management plan with the patient and family. - Rafat Coronado MD

## 2019-03-12 ENCOUNTER — OFFICE VISIT (OUTPATIENT)
Dept: ORTHOPEDICS | Facility: CLINIC | Age: 54
End: 2019-03-12
Payer: COMMERCIAL

## 2019-03-12 VITALS — DIASTOLIC BLOOD PRESSURE: 70 MMHG | HEIGHT: 66 IN | SYSTOLIC BLOOD PRESSURE: 112 MMHG | BODY MASS INDEX: 27.76 KG/M2

## 2019-03-12 DIAGNOSIS — G89.29 CHRONIC RIGHT SHOULDER PAIN: Primary | ICD-10-CM

## 2019-03-12 DIAGNOSIS — M25.511 CHRONIC RIGHT SHOULDER PAIN: Primary | ICD-10-CM

## 2019-03-12 PROCEDURE — 99214 OFFICE O/P EST MOD 30 MIN: CPT | Mod: 25 | Performed by: FAMILY MEDICINE

## 2019-03-12 PROCEDURE — 20611 DRAIN/INJ JOINT/BURSA W/US: CPT | Mod: RT | Performed by: FAMILY MEDICINE

## 2019-03-12 RX ADMIN — BETAMETHASONE SODIUM PHOSPHATE AND BETAMETHASONE ACETATE 6 MG: 3; 3 INJECTION, SUSPENSION INTRA-ARTICULAR; INTRALESIONAL; INTRAMUSCULAR; SOFT TISSUE at 12:15

## 2019-03-12 RX ADMIN — ROPIVACAINE HYDROCHLORIDE 3 ML: 5 INJECTION, SOLUTION EPIDURAL; INFILTRATION; PERINEURAL at 12:15

## 2019-03-12 NOTE — PATIENT INSTRUCTIONS
[facility] Injection Discharge Instructions      You may shower, however avoid swimming, tub baths or hot tubs for 24 hours following your procedure    You may have a mild to moderate increase in pain for several days following the injection.    It may take up to 14 days for the steroid medication to start working although you may feel the effect as early as a few days after the procedure.    You may use ice packs for 10-15 minutes, 3 to 4 times a day at the injection site for comfort    You may use anti-inflammatory medications (such as Ibuprofen or Aleve or Advil) or Tylenol for pain control if necessary    If you were fasting, you may resume your normal diet and medications after the procedure    If you have diabetes, check your blood sugar more frequently than usual as your blood sugar may be higher than normal for 10-14 days following a steroid injection. Contact your doctor who manages your diabetes if your blood sugar is higher than usual      If you experience any of the following, call [facility] @ [phone] or [phone]  -Fever over 100 degree F  -Swelling, bleeding, redness, drainage, warmth at the injection site  - New or worsening pain

## 2019-03-12 NOTE — LETTER
3/12/2019         RE: Katie Aponte  [address]  [place] [address]        Dear Colleague,    Thank you for referring your patient, Katie Aponte, to the [facility] [place]. Please see a copy of my visit note below.    ASSESSMENT & PLAN  Katie was seen today for pain.    Diagnoses and all orders for this visit:    Chronic right shoulder pain  -     [facility] PT, HAND, AND CHIROPRACTIC REFERRAL; Future  -     Large Joint Injection/Arthocentesis: R subacromial bursa    Patient is a 53-year-old female with past medical history of cervical radiculopathy, right shoulder pain presenting for an evaluation of 5-month history of chronic shoulder pain.  Patient does have mildly positive Spurlings but also impingement signs on right side.  Differential subacromial bursitis vs cervical radiculopathy.  Steroid injection was completed with patient reporting no significant improvement of her pain.  If this does not help her symptoms likely etiology of her pain is cervical radiculopathy.  Given this will refer to physical therapy and follow-up with her in 1-2 months or sooner if her symptoms significant worsen.  Patient understands and agrees to plan.    -----    SUBJECTIVE  Katie Aponte is a/an 53 year old Right handed female who is seen as a self referral for evaluation of right shoulder pain. The patient is seen by themselves.  Pt works as manager compute    Onset: 5 month(s) ago. Reports insidious onset without acute precipitating event.  Location of Pain: right posterior shoulder   Rating of Pain at worst: 10/10  Rating of Pain Currently: 4/10 constant/ every day   Worsened by: overhead motions, reaching behind   Better with: nothing   Treatments tried: steroid injection in glenohumeral joint 12/6/18 gave only 5 days of relief, physical therapy    Associated symptoms: numbness and tingling started in last 1-2 weeks   Orthopedic history: No sig surgery  Relevant surgical history: NO  Past  Medical History:   Diagnosis Date     Herpes     Under eye     Joint pain      Seasonal allergies      Thrombocytopenia (H) 9/25/2015     Uncomplicated asthma     very mild     Social History     Socioeconomic History     Marital status: Single     Spouse name: None     Number of children: None     Years of education: None     Highest education level: None   Occupational History     None   Social Needs     Financial resource strain: None     Food insecurity:     Worry: None     Inability: None     Transportation needs:     Medical: None     Non-medical: None   Tobacco Use     Smoking status: Never Smoker     Smokeless tobacco: Never Used   Substance and Sexual Activity     Alcohol use: Yes     Alcohol/week: 0.0 oz     Comment: SOCIAL - 1 glass of wine twice a week; 2 drinks on the weekend     Drug use: No     Sexual activity: Yes     Partners: Male   Lifestyle     Physical activity:     Days per week: None     Minutes per session: None     Stress: None   Relationships     Social connections:     Talks on phone: None     Gets together: None     Attends [employer] service: None     Active member of club or organization: None     Attends meetings of clubs or organizations: None     Relationship status: None     Intimate partner violence:     Fear of current or ex partner: None     Emotionally abused: None     Physically abused: None     Forced sexual activity: None   Other Topics Concern     Parent/sibling w/ CABG, MI or angioplasty before 65F 55M? 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         Patient's past medical, surgical, social, and family histories were reviewed today and no changes are noted.    REVIEW OF SYSTEMS:  10 point ROS is negative other than symptoms noted above in HPI, Past Medical History or as stated below  Constitutional: NEGATIVE for fever, chills, change in weight  Skin: NEGATIVE for worrisome rashes, moles or lesions  GI/: NEGATIVE for bowel or bladder  "changes  Neuro: NEGATIVE for weakness, dizziness or paresthesias    OBJECTIVE:  /70   Ht 1.676 m (5' 6\")   LMP 03/30/2014   BMI 27.76 kg/m      General: healthy, alert and in no distress  HEENT: no scleral icterus or conjunctival erythema  Skin: no suspicious lesions or rash. No jaundice.  CV: regular rhythm by palpation  Resp: normal respiratory effort without conversational dyspnea   Psych: normal mood and affect  Gait: normal steady gait with appropriate coordination and balance  Neuro: normal light touch sensory exam of the bilateral upper extremities.    MSK:  RIGHT SHOULDER  Inspection:    no swelling, bruising, discoloration, or obvious deformity or asymmetry  Palpation:    Tender about the anterior capsule and Posterior shoulder. Remainder of bony and tendinous landmarks are nontender.  Active Range of Motion:     Abduction normal0, FF normal0, ER normal0, IR normal.      Scapular dyskinesis None  Strength:    Scapular plane abduction 5-/5, painful,  ER 5/5, IR 5/5, biceps 5/5, triceps 5/5  Special Tests:    Positive: Neer's, Gamez' and supraspinatus (empty can)    Negative: crossed arm adduction, Speed's and Yergason's    CERVICAL SPINE  Inspection:    normal cervical lordosis present, rounded shoulders, forward head posture  Palpation:   Nontender.  Range of Motion:     Flexion full    Extension full    Right side bend full    Left side bend full    Right rotation full    Left rotation full  Strength:    Full strength throughout all neck muscles  Special Tests:    Positive: Spurling's (right)    Negative: Spurling's (left), King's (bilateral)    Independent visualization of the below image:  No results found for this or any previous visit (from the past 24 hour(s)).    Large Joint Injection/Arthocentesis: R subacromial bursa  Date/Time: 3/12/2019 12:15 PM  Performed by: Guillermo Sims MD  Authorized by: Guillermo Sims MD     Indications:  Pain and diagnostic evaluation  Needle Size: "  25 G  Guidance: ultrasound    Approach:  Posterolateral  Location:  Shoulder  Site:  R subacromial bursa  Medications:  6 mg betamethasone acet & sod phos 6 (3-3) MG/ML; 3 mL ropivacaine 5 MG/ML  Medications comment:  Actual amount of celestone used 4 mL  Outcome:  Tolerated well, no immediate complications  Procedure discussed: discussed risks, benefits, and alternatives    Consent Given by:  Patient  Timeout: timeout called immediately prior to procedure    Prep: patient was prepped and draped in usual sterile fashion     Patient reported no significant change in pain after injection.  Aftercare instructions given to patient.  Plan to follow-up as discussed above.     MD RAM[facility]N [facility]            Patient's conditions were thoroughly discussed during today's visit with greater than 50% of the visit spent counseling the patient with total time spent face-to-face with the patient being 30 minutes.    MD MOIZ [facility]      Again, thank you for allowing me to participate in the care of your patient.        Sincerely,        Guillermo Sims MD

## 2019-03-12 NOTE — PROGRESS NOTES
ASSESSMENT & PLAN  Katie was seen today for pain.    Diagnoses and all orders for this visit:    Chronic right shoulder pain  -     [facility] PT, HAND, AND CHIROPRACTIC REFERRAL; Future  -     Large Joint Injection/Arthocentesis: R subacromial bursa    Patient is a 53-year-old female with past medical history of cervical radiculopathy, right shoulder pain presenting for an evaluation of 5-month history of chronic shoulder pain.  Patient does have mildly positive Spurlings but also impingement signs on right side.  Differential subacromial bursitis vs cervical radiculopathy.  Steroid injection was completed with patient reporting no significant improvement of her pain.  If this does not help her symptoms likely etiology of her pain is cervical radiculopathy.  Given this will refer to physical therapy and follow-up with her in 1-2 months or sooner if her symptoms significant worsen.  Patient understands and agrees to plan.    -----    SUBJECTIVE  Katie Aponte is a/an 53 year old Right handed female who is seen as a self referral for evaluation of right shoulder pain. The patient is seen by themselves.  Pt works as manager compute    Onset: 5 month(s) ago. Reports insidious onset without acute precipitating event.  Location of Pain: right posterior shoulder   Rating of Pain at worst: 10/10  Rating of Pain Currently: 4/10 constant/ every day   Worsened by: overhead motions, reaching behind   Better with: nothing   Treatments tried: steroid injection in glenohumeral joint 12/6/18 gave only 5 days of relief, physical therapy    Associated symptoms: numbness and tingling started in last 1-2 weeks   Orthopedic history: No sig surgery  Relevant surgical history: NO  Past Medical History:   Diagnosis Date     Herpes     Under eye     Joint pain      Seasonal allergies      Thrombocytopenia (H) 9/25/2015     Uncomplicated asthma     very mild     Social History     Socioeconomic History     Marital status: Single     Spouse  "name: None     Number of children: None     Years of education: None     Highest education level: None   Occupational History     None   Social Needs     Financial resource strain: None     Food insecurity:     Worry: None     Inability: None     Transportation needs:     Medical: None     Non-medical: None   Tobacco Use     Smoking status: Never Smoker     Smokeless tobacco: Never Used   Substance and Sexual Activity     Alcohol use: Yes     Alcohol/week: 0.0 oz     Comment: SOCIAL - 1 glass of wine twice a week; 2 drinks on the weekend     Drug use: No     Sexual activity: Yes     Partners: Male   Lifestyle     Physical activity:     Days per week: None     Minutes per session: None     Stress: None   Relationships     Social connections:     Talks on phone: None     Gets together: None     Attends Scientology service: None     Active member of club or organization: None     Attends meetings of clubs or organizations: None     Relationship status: None     Intimate partner violence:     Fear of current or ex partner: None     Emotionally abused: None     Physically abused: None     Forced sexual activity: None   Other Topics Concern     Parent/sibling w/ CABG, MI or angioplasty before 65F 55M? 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         Patient's past medical, surgical, social, and family histories were reviewed today and no changes are noted.    REVIEW OF SYSTEMS:  10 point ROS is negative other than symptoms noted above in HPI, Past Medical History or as stated below  Constitutional: NEGATIVE for fever, chills, change in weight  Skin: NEGATIVE for worrisome rashes, moles or lesions  GI/: NEGATIVE for bowel or bladder changes  Neuro: NEGATIVE for weakness, dizziness or paresthesias    OBJECTIVE:  /70   Ht 1.676 m (5' 6\")   LMP 03/30/2014   BMI 27.76 kg/m     General: healthy, alert and in no distress  HEENT: no scleral icterus or conjunctival erythema  Skin: no " suspicious lesions or rash. No jaundice.  CV: regular rhythm by palpation  Resp: normal respiratory effort without conversational dyspnea   Psych: normal mood and affect  Gait: normal steady gait with appropriate coordination and balance  Neuro: normal light touch sensory exam of the bilateral upper extremities.    MSK:  RIGHT SHOULDER  Inspection:    no swelling, bruising, discoloration, or obvious deformity or asymmetry  Palpation:    Tender about the anterior capsule and Posterior shoulder. Remainder of bony and tendinous landmarks are nontender.  Active Range of Motion:     Abduction normal0, FF normal0, ER normal0, IR normal.      Scapular dyskinesis None  Strength:    Scapular plane abduction 5-/5, painful,  ER 5/5, IR 5/5, biceps 5/5, triceps 5/5  Special Tests:    Positive: Neer's, Gamez' and supraspinatus (empty can)    Negative: crossed arm adduction, Speed's and Yergason's    CERVICAL SPINE  Inspection:    normal cervical lordosis present, rounded shoulders, forward head posture  Palpation:   Nontender.  Range of Motion:     Flexion full    Extension full    Right side bend full    Left side bend full    Right rotation full    Left rotation full  Strength:    Full strength throughout all neck muscles  Special Tests:    Positive: Spurling's (right)    Negative: Spurling's (left), King's (bilateral)    Independent visualization of the below image:  No results found for this or any previous visit (from the past 24 hour(s)).    Large Joint Injection/Arthocentesis: R subacromial bursa  Date/Time: 3/12/2019 12:15 PM  Performed by: Guillermo Sims MD  Authorized by: Guillermo Sims MD     Indications:  Pain and diagnostic evaluation  Needle Size:  25 G  Guidance: ultrasound    Approach:  Posterolateral  Location:  Shoulder  Site:  R subacromial bursa  Medications:  6 mg betamethasone acet & sod phos 6 (3-3) MG/ML; 3 mL ropivacaine 5 MG/ML  Medications comment:  Actual amount of celestone used 4  mL  Outcome:  Tolerated well, no immediate complications  Procedure discussed: discussed risks, benefits, and alternatives    Consent Given by:  Patient  Timeout: timeout called immediately prior to procedure    Prep: patient was prepped and draped in usual sterile fashion     Patient reported no significant change in pain after injection.  Aftercare instructions given to patient.  Plan to follow-up as discussed above.     Guillermo Sims MD [facility]            Patient's conditions were thoroughly discussed during today's visit with greater than 50% of the visit spent counseling the patient with total time spent face-to-face with the patient being 30 minutes.    Guillermo Sims MD [facility]

## 2019-03-14 RX ORDER — BETAMETHASONE SODIUM PHOSPHATE AND BETAMETHASONE ACETATE 3; 3 MG/ML; MG/ML
6 INJECTION, SUSPENSION INTRA-ARTICULAR; INTRALESIONAL; INTRAMUSCULAR; SOFT TISSUE
Status: DISCONTINUED | OUTPATIENT
Start: 2019-03-12 | End: 2020-01-06

## 2019-03-14 RX ORDER — ROPIVACAINE HYDROCHLORIDE 5 MG/ML
3 INJECTION, SOLUTION EPIDURAL; INFILTRATION; PERINEURAL
Status: DISCONTINUED | OUTPATIENT
Start: 2019-03-12 | End: 2020-01-06

## 2019-03-29 ENCOUNTER — THERAPY VISIT (OUTPATIENT)
Dept: PHYSICAL THERAPY | Facility: CLINIC | Age: 54
End: 2019-03-29
Payer: COMMERCIAL

## 2019-03-29 DIAGNOSIS — M75.41 IMPINGEMENT SYNDROME, SHOULDER, RIGHT: ICD-10-CM

## 2019-03-29 DIAGNOSIS — M25.511 CHRONIC RIGHT SHOULDER PAIN: ICD-10-CM

## 2019-03-29 DIAGNOSIS — M54.12 CERVICAL RADICULOPATHY: Primary | ICD-10-CM

## 2019-03-29 DIAGNOSIS — M25.511 RIGHT SHOULDER PAIN, UNSPECIFIED CHRONICITY: ICD-10-CM

## 2019-03-29 DIAGNOSIS — G89.29 CHRONIC RIGHT SHOULDER PAIN: ICD-10-CM

## 2019-03-29 PROCEDURE — 97110 THERAPEUTIC EXERCISES: CPT | Mod: GP | Performed by: PHYSICAL THERAPIST

## 2019-03-29 PROCEDURE — 97161 PT EVAL LOW COMPLEX 20 MIN: CPT | Mod: GP | Performed by: PHYSICAL THERAPIST

## 2019-03-29 PROCEDURE — 97012 MECHANICAL TRACTION THERAPY: CPT | Mod: GP | Performed by: PHYSICAL THERAPIST

## 2019-03-30 PROBLEM — M54.12 CERVICAL RADICULOPATHY: Status: RESOLVED | Noted: 2017-06-02 | Resolved: 2019-03-30

## 2019-03-30 PROBLEM — M54.12 CERVICAL RADICULOPATHY: Status: RESOLVED | Noted: 2017-03-04 | Resolved: 2019-03-30
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
## 2019-04-05 ENCOUNTER — THERAPY VISIT (OUTPATIENT)
Dept: PHYSICAL THERAPY | Facility: CLINIC | Age: 54
End: 2019-04-05
Payer: COMMERCIAL

## 2019-04-05 DIAGNOSIS — M54.12 CERVICAL RADICULOPATHY: ICD-10-CM

## 2019-04-05 DIAGNOSIS — M79.601 PAIN OF RIGHT UPPER EXTREMITY: ICD-10-CM

## 2019-04-05 PROCEDURE — 97012 MECHANICAL TRACTION THERAPY: CPT | Mod: GP | Performed by: PHYSICAL THERAPIST

## 2019-04-05 PROCEDURE — 97110 THERAPEUTIC EXERCISES: CPT | Mod: GP | Performed by: PHYSICAL THERAPIST

## 2019-04-05 PROCEDURE — 97140 MANUAL THERAPY 1/> REGIONS: CPT | Mod: GP | Performed by: PHYSICAL THERAPIST

## 2019-04-07 PROBLEM — M79.601 PAIN OF RIGHT UPPER EXTREMITY: Status: ACTIVE | Noted: 2019-04-07

## 2019-04-07 PROBLEM — M54.12 CERVICAL RADICULOPATHY: Status: ACTIVE | Noted: 2019-04-07

## 2019-04-11 ENCOUNTER — THERAPY VISIT (OUTPATIENT)
Dept: PHYSICAL THERAPY | Facility: CLINIC | Age: 54
End: 2019-04-11
Payer: COMMERCIAL

## 2019-04-11 DIAGNOSIS — M54.12 CERVICAL RADICULOPATHY: ICD-10-CM

## 2019-04-11 DIAGNOSIS — M79.601 PAIN OF RIGHT UPPER EXTREMITY: ICD-10-CM

## 2019-04-11 PROCEDURE — 97012 MECHANICAL TRACTION THERAPY: CPT | Mod: GP | Performed by: PHYSICAL THERAPIST

## 2019-04-11 PROCEDURE — 97140 MANUAL THERAPY 1/> REGIONS: CPT | Mod: GP | Performed by: PHYSICAL THERAPIST

## 2019-04-11 PROCEDURE — 97035 APP MDLTY 1+ULTRASOUND EA 15: CPT | Mod: GP | Performed by: PHYSICAL THERAPIST

## 2019-04-19 ENCOUNTER — THERAPY VISIT (OUTPATIENT)
Dept: PHYSICAL THERAPY | Facility: CLINIC | Age: 54
End: 2019-04-19
Payer: COMMERCIAL

## 2019-04-19 DIAGNOSIS — M79.601 PAIN OF RIGHT UPPER EXTREMITY: ICD-10-CM

## 2019-04-19 DIAGNOSIS — M54.12 CERVICAL RADICULOPATHY: ICD-10-CM

## 2019-04-19 PROCEDURE — 97012 MECHANICAL TRACTION THERAPY: CPT | Mod: GP | Performed by: PHYSICAL THERAPIST

## 2019-04-19 PROCEDURE — 97140 MANUAL THERAPY 1/> REGIONS: CPT | Mod: GP | Performed by: PHYSICAL THERAPIST

## 2019-04-19 PROCEDURE — 97035 APP MDLTY 1+ULTRASOUND EA 15: CPT | Mod: GP | Performed by: PHYSICAL THERAPIST

## 2019-04-26 ENCOUNTER — THERAPY VISIT (OUTPATIENT)
Dept: PHYSICAL THERAPY | Facility: CLINIC | Age: 54
End: 2019-04-26
Payer: COMMERCIAL

## 2019-04-26 DIAGNOSIS — M54.12 CERVICAL RADICULOPATHY: ICD-10-CM

## 2019-04-26 DIAGNOSIS — M79.601 PAIN OF RIGHT UPPER EXTREMITY: ICD-10-CM

## 2019-04-26 PROCEDURE — 97140 MANUAL THERAPY 1/> REGIONS: CPT | Mod: GP | Performed by: PHYSICAL THERAPIST

## 2019-04-26 PROCEDURE — 97035 APP MDLTY 1+ULTRASOUND EA 15: CPT | Mod: GP | Performed by: PHYSICAL THERAPIST

## 2019-04-26 PROCEDURE — 97012 MECHANICAL TRACTION THERAPY: CPT | Mod: GP | Performed by: PHYSICAL THERAPIST

## 2019-05-03 ENCOUNTER — THERAPY VISIT (OUTPATIENT)
Dept: PHYSICAL THERAPY | Facility: CLINIC | Age: 54
End: 2019-05-03
Payer: COMMERCIAL

## 2019-05-03 DIAGNOSIS — M79.601 PAIN OF RIGHT UPPER EXTREMITY: ICD-10-CM

## 2019-05-03 DIAGNOSIS — M54.12 CERVICAL RADICULOPATHY: ICD-10-CM

## 2019-05-03 PROCEDURE — 97012 MECHANICAL TRACTION THERAPY: CPT | Mod: GP | Performed by: PHYSICAL THERAPIST

## 2019-05-03 PROCEDURE — 97035 APP MDLTY 1+ULTRASOUND EA 15: CPT | Mod: GP | Performed by: PHYSICAL THERAPIST

## 2019-05-03 PROCEDURE — 97140 MANUAL THERAPY 1/> REGIONS: CPT | Mod: GP | Performed by: PHYSICAL THERAPIST

## 2019-05-10 DIAGNOSIS — M05.79 RHEUMATOID ARTHRITIS, SEROPOSITIVE, MULTIPLE SITES (H): ICD-10-CM

## 2019-05-14 ENCOUNTER — MYC MEDICAL ADVICE (OUTPATIENT)
Dept: RHEUMATOLOGY | Facility: CLINIC | Age: 54
End: 2019-05-14

## 2019-05-14 NOTE — TELEPHONE ENCOUNTER
hydroxychloroquine (PLAQUENIL) 200 MG tablet      Sig - Route: Take 1 tablet (200 mg) by mouth 2 times daily Annual Plaquenil toxicity eye screening required. - Oral  Last Written Prescription Date:  11/29/2018  Last Fill Quantity: 60,   # refills: 3  Last Office Visit: 2/15/2019  Future Office visit:    Next 5 appointments (look out 90 days)    May 17, 2019 11:30 AM CDT  Return Visit with Johnnie Medley MD  [facility] ([facility]) [address] [place] [id number]  [phone]           Last Eye exam: not on file      Topic Date Due     Eye Exam - yearly  03/11/2020       According to [facility] Rheumatology refill protocol:

## 2019-05-15 ENCOUNTER — DOCUMENTATION ONLY (OUTPATIENT)
Dept: LAB | Facility: CLINIC | Age: 54
End: 2019-05-15

## 2019-05-15 DIAGNOSIS — M25.50 ARTHRALGIA, UNSPECIFIED JOINT: Primary | ICD-10-CM

## 2019-05-15 RX ORDER — HYDROXYCHLOROQUINE SULFATE 200 MG/1
TABLET, FILM COATED ORAL
Qty: 180 TABLET | Refills: 1 | Status: SHIPPED | OUTPATIENT
Start: 2019-05-15 | End: 2019-10-15

## 2019-05-15 NOTE — TELEPHONE ENCOUNTER
hydroxychloroquine (PLAQUENIL) 200 MG tablet      Sig - Route: Take 1 tablet (200 mg) by mouth 2 times daily Annual Plaquenil toxicity eye screening required. - Oral  Last Written Prescription Date:  11/29/2018  Last Fill Quantity: 60,   # refills: 3  Last Office Visit: 2/15/2019  Future Office visit:  Next 5 appointments (look out 90 days)    May 17, 2019 11:30 AM CDT  Return Visit with Johnnie Medley MD  [facility] ([facility]) [address] [place] [id number]  [phone]           Last Eye exam: 3/11/18      According to [facility] Rheumatology refill protocol:      PABLO RiosN, RN   Rheumatology Care Coordinator   [facility]

## 2019-05-16 ENCOUNTER — THERAPY VISIT (OUTPATIENT)
Dept: PHYSICAL THERAPY | Facility: CLINIC | Age: 54
End: 2019-05-16
Payer: COMMERCIAL

## 2019-05-16 DIAGNOSIS — M79.601 PAIN OF RIGHT UPPER EXTREMITY: ICD-10-CM

## 2019-05-16 DIAGNOSIS — M54.12 CERVICAL RADICULOPATHY: ICD-10-CM

## 2019-05-16 PROCEDURE — 97012 MECHANICAL TRACTION THERAPY: CPT | Mod: GP | Performed by: PHYSICAL THERAPIST

## 2019-05-16 PROCEDURE — 97035 APP MDLTY 1+ULTRASOUND EA 15: CPT | Mod: GP | Performed by: PHYSICAL THERAPIST

## 2019-05-16 PROCEDURE — 97140 MANUAL THERAPY 1/> REGIONS: CPT | Mod: GP | Performed by: PHYSICAL THERAPIST

## 2019-05-17 ENCOUNTER — OFFICE VISIT (OUTPATIENT)
Dept: RHEUMATOLOGY | Facility: CLINIC | Age: 54
End: 2019-05-17
Payer: COMMERCIAL

## 2019-05-17 ENCOUNTER — ANCILLARY PROCEDURE (OUTPATIENT)
Dept: GENERAL RADIOLOGY | Facility: CLINIC | Age: 54
End: 2019-05-17
Attending: STUDENT IN AN ORGANIZED HEALTH CARE EDUCATION/TRAINING PROGRAM
Payer: COMMERCIAL

## 2019-05-17 VITALS
DIASTOLIC BLOOD PRESSURE: 75 MMHG | WEIGHT: 161.4 LBS | HEIGHT: 66 IN | SYSTOLIC BLOOD PRESSURE: 120 MMHG | BODY MASS INDEX: 25.94 KG/M2 | HEART RATE: 72 BPM | OXYGEN SATURATION: 100 %

## 2019-05-17 DIAGNOSIS — M05.79 RHEUMATOID ARTHRITIS INVOLVING MULTIPLE SITES WITH POSITIVE RHEUMATOID FACTOR (H): ICD-10-CM

## 2019-05-17 DIAGNOSIS — M25.50 ARTHRALGIA, UNSPECIFIED JOINT: ICD-10-CM

## 2019-05-17 PROBLEM — M06.9 RHEUMATOID ARTHRITIS (H): Status: ACTIVE | Noted: 2019-05-17

## 2019-05-17 LAB
ALBUMIN SERPL-MCNC: 3.8 G/DL (ref 3.4–5)
ALT SERPL W P-5'-P-CCNC: 18 U/L (ref 0–50)
AST SERPL W P-5'-P-CCNC: 14 U/L (ref 0–45)
CREAT SERPL-MCNC: 0.82 MG/DL (ref 0.52–1.04)
ERYTHROCYTE [DISTWIDTH] IN BLOOD BY AUTOMATED COUNT: 13.3 % (ref 10–15)
GFR SERPL CREATININE-BSD FRML MDRD: 82 ML/MIN/{1.73_M2}
HCT VFR BLD AUTO: 40.6 % (ref 35–47)
HGB BLD-MCNC: 13.2 G/DL (ref 11.7–15.7)
MCH RBC QN AUTO: 28 PG (ref 26.5–33)
MCHC RBC AUTO-ENTMCNC: 32.5 G/DL (ref 31.5–36.5)
MCV RBC AUTO: 86 FL (ref 78–100)
PLATELET # BLD AUTO: 204 10E9/L (ref 150–450)
RBC # BLD AUTO: 4.71 10E12/L (ref 3.8–5.2)
WBC # BLD AUTO: 3.5 10E9/L (ref 4–11)

## 2019-05-17 PROCEDURE — 84460 ALANINE AMINO (ALT) (SGPT): CPT | Performed by: STUDENT IN AN ORGANIZED HEALTH CARE EDUCATION/TRAINING PROGRAM

## 2019-05-17 PROCEDURE — 73130 X-RAY EXAM OF HAND: CPT | Mod: LT | Performed by: RADIOLOGY

## 2019-05-17 PROCEDURE — 99214 OFFICE O/P EST MOD 30 MIN: CPT | Performed by: STUDENT IN AN ORGANIZED HEALTH CARE EDUCATION/TRAINING PROGRAM

## 2019-05-17 PROCEDURE — 36415 COLL VENOUS BLD VENIPUNCTURE: CPT | Performed by: STUDENT IN AN ORGANIZED HEALTH CARE EDUCATION/TRAINING PROGRAM

## 2019-05-17 PROCEDURE — 73630 X-RAY EXAM OF FOOT: CPT | Mod: RT | Performed by: RADIOLOGY

## 2019-05-17 PROCEDURE — 85027 COMPLETE CBC AUTOMATED: CPT | Performed by: STUDENT IN AN ORGANIZED HEALTH CARE EDUCATION/TRAINING PROGRAM

## 2019-05-17 PROCEDURE — 82565 ASSAY OF CREATININE: CPT | Performed by: STUDENT IN AN ORGANIZED HEALTH CARE EDUCATION/TRAINING PROGRAM

## 2019-05-17 PROCEDURE — 82040 ASSAY OF SERUM ALBUMIN: CPT | Performed by: STUDENT IN AN ORGANIZED HEALTH CARE EDUCATION/TRAINING PROGRAM

## 2019-05-17 PROCEDURE — 84450 TRANSFERASE (AST) (SGOT): CPT | Performed by: STUDENT IN AN ORGANIZED HEALTH CARE EDUCATION/TRAINING PROGRAM

## 2019-05-17 ASSESSMENT — PAIN SCALES - GENERAL: PAINLEVEL: MILD PAIN (3)

## 2019-05-17 ASSESSMENT — MIFFLIN-ST. JEOR: SCORE: 1353.86

## 2019-05-17 NOTE — NURSING NOTE
"Katie Aponte's goals for this visit include:   She requests these members of her care team be copied on today's visit information:     PCP: Karlee Birmingham    Referring Provider:  No referring provider defined for this encounter.    /75   Pulse 72   Ht 1.676 m (5' 6\")   LMP 03/30/2014   SpO2 100%   BMI 27.76 kg/m      "

## 2019-05-17 NOTE — PROGRESS NOTES
Rheumatology Clinic Visit     Katie Aponte MRN# [id number]   YOB: 1965 Age: 52 year old     Date of Visit: May 17, 2019  Primary care provider: Francis Capps          Assessment and Plan:   Assessment     -- Seropositive Rheumatoid arthritis ( + RF, + ACPA)  -- Raynaud's  -- Chronic Leukopenia  -- Hx of left knee partial replacement - 4.5 years ago.   -- + RF - 84, + IRIS - 1:40, > 340 ACPA  -- Neg FEDERICA, normal C3/C4, dsDNA  -- Neg Hep B/C, TB  -- OCT testing - 3/11/19 - normal.     Ms Aponte is 52 yo female seen in clinic for management of seropositive rheumatoid arthritis.     Seropositive Rheumatoid arthritis: Symptoms started in June 2018 with pain in left heel.  She was seen by podiatrist and was in boot for some time. Later developed right ankle swelling. Her symptoms worsened over time. Both of her ankles and Achilles tendons hurt.  She also noticed pain in second third fourth fingers of left hand along with morning stiffness for couple hours mostly in her feet and hands. Blood tests showed positive rheumatoid factor of 84 and ACPA.     On physical exam she had tenderness over the left second third fourth PIP joints along with mild synovitis.  Tenderness present over bilateral ankles, Achilles tendon and plantar fascia.  No tenderness and synovitis present over the right hand, elbows and shoulders.  She has chronic knee pain from osteoarthritis.     She was given prednisone taper and responded well to it.  I discussed with her regarding the start of methotrexate but she declined it due to restriction on alcohol use.  She drinks 1 glass of wine every day and does not want to limit alcohol intake at this time. She was started on Plaquenil 200 mg BID dose in 11/2018.  She has noticed some improvement in her joint pains with it and want to continue with Plaquenil. She still takes 5 mg daily prednisone.       Positive IRIS: She has low titer positive IRIS 1: 40.  Has raynaud's, chronic leukopenia  and new onset joint pains which can be seen in IRIS associated connective tissue disease.  She has new onset sicca symptoms which she attributes to phentermine use. Her specific serologies including FEDERICA panel, complement levels, double-stranded DNA, centromere are normal.  Hepatitis C serology and protein electrophoresis are normal as well.    Plan    1. Continue Plaquenil 200 mg BID     2. X-rays of hands and feet to look at inflammatory arthritis.     3.  RTC in 2 months             Active Problem List:     Patient Active Problem List    Diagnosis Date Noted     Pain of right upper extremity 04/07/2019     Priority: Medium     Cervical radiculopathy 04/07/2019     Priority: Medium     Impingement syndrome, shoulder, right 10/19/2018     Priority: Medium     Right shoulder pain, unspecified chronicity 10/19/2018     Priority: Medium     Pain in joint, upper arm, right      Priority: Medium     Class 1 obesity due to excess calories without serious comorbidity with body mass index (BMI) of 30.0 to 30.9 in adult 02/26/2018     Priority: Medium     JESENIA (stress urinary incontinence, female) 04/19/2017     Priority: Medium     Mild persistent asthma with acute exacerbation 04/11/2017     Priority: Medium     Allergic bronchitis, moderate persistent, uncomplicated 06/17/2016     Priority: Medium     Quality         Leukopenia 09/25/2015     Priority: Medium     Acne rosacea 08/16/2015     Priority: Medium     Dermatitis 08/16/2015     Priority: Medium     CARDIOVASCULAR SCREENING; LDL GOAL LESS THAN 160 06/03/2014     Priority: Medium     Abnormal uterine bleeding 04/09/2014     Priority: Medium     Knee pain 12/06/2013     Priority: Medium     Medial meniscus tear 09/03/2013     Priority: Medium     Tear of medial meniscus of knee 07/30/2012     Priority: Medium     Overview:   Tear of medial cartilage or meniscus of knee, current, left       Pain in joint, ankle and foot 04/16/2009     Priority: Medium     Overview:    LW Modifier:  s/p surgery - ligament  ; Pain Ankle Right              History of Present Illness:   Katie Aponte is a 52 year old female with PMH of Asthma, obesity, meniscal tear left knee status post surgery seen in the clinic in consultation at request of Francis Capps DPM for evaluation of joint pains.    May 17, 2019 - She is on Plaquenil 400 mg daily and also takes 5 mg prednisone daily.  She has not been able to taper herself off of prednisone.  She complains of pain in her hands, knees and ankles.  Denies any other symptoms like skin rashes, oral sores, pleurisy.    February 15, 2019 - She is on 5 mg prednisone daily and taking Plaquenil 200 mg daily. Her tongue is extremely sensitive to spices. A month ago she could not raise her arm. She had MRI of right shoulder done but is not aware of the result. She was given intraarticular steroid injection which helped for few days and then pain came back again.     November 29, 2018 - She has pain in her knees, right shoulder, ankles. Her autoimmune work up done on last visit showed high titer positive anti-CCP antibody.  Negative FEDERICA panel, complements and double-stranded DNA and centromere antibody. she had normal hepatitis and TB serology.  Normal protein electrophoresis.  She was given prednisone taper and responded very well to it.  After finishing the taper the joint pains came back again.       History from initial visit : She reports that in June 2018 she developed severe pain in left heel.  She was seen by a podiatrist Dr Capps and was given a cam walker for left foot.  Left foot pain did not improve.  After a while she started having pain and swelling in the right ankle.  The right ankle was swollen up to grapefruit size.  In the morning she had severe pain in bilateral feet, Achilles tendon and ankles.  She has stiffness for couple hours.  Lately she has noticed pain in the left second third fourth fingers.  Denies significant pain in the  wrists, elbows and shoulders.  She has chronic pain in her knees.    Her blood work revealed positive rheumatoid factor 84, low titer IRIS 1: 40, normal ESR, uric acid and negative Lyme serology.    She also complains of color changes in her middle and ring fingers of bilateral hands on exposure to cold.  They turn white and purple and has been going on for couple of years.  She has chronic leukopenia of unknown cause.  Also noticed sicca symptoms in the last few weeks which she attributes to phentermine use started a month ago.  Other than that she denies history of malar rash, photosensitivity, recurrent mouth/genital ulcers, sicca symptoms, pleuritic chest pains, recurrent sinusitis/rhinitis, swallowing difficulty, hearing or visual changes recently. She denies any history of psoriasis, ulcerative colitis or chron's disease. No h/o iritis, enthesitis, finger or toe swelling like dactylitis.      She was given short prednisone taper for a week by podiatrist which helped to bring right ankle swelling down.  She denies any side effects with the prednisone use.         Review of Systems:   Review Of Systems  Constitutional: denies fever, chills, night sweats and weight loss.  Skin: No skin rash.  Eyes: + dryness or irritation in eyes. No episode of eye inflammation or redness.   Ears/Nose/Throat: no recurrent sinus infections.  Respiratory: No shortness of breath, dyspnea on exertion, cough, or hemoptysis  Cardiovascular: no chest pain or palpitations.  Gastrointestinal: no nausea, vomiting, abdominal pain.  Normal bowel movements.  Genitourinary: no dysuria, frequency  or hematuria.  Musculoskeletal: as in HPI  Neurologic: no numbness, tingling.  Psychiatric: no mood disorders.  Hematologic/Lymphatic/Immunologic: no history of easy bruising, petechia or purpura.  No abnormal bleeding.   Endocrine: no h/o thyroid disease or Diabetes.                  Past Medical History:     Past Medical History:   Diagnosis Date      Herpes     Under eye     Joint pain      Seasonal allergies      Thrombocytopenia (H) 2015     Uncomplicated asthma     very mild     Past Surgical History:   Procedure Laterality Date      SECTION       COLONOSCOPY       CYSTOSCOPY, SLING TRANSVAGINAL N/A 2017    Procedure: CYSTOSCOPY, SLING TRANSVAGINAL;  TRANSVAGINAL TAPING, CYSTOSCOPY, MID URETHRAL SLING;  Surgeon: Jett Montes MD;  Location:  OR     DILATE CERVIX, ABLATE ENDOMETRIUM THERMACHOICE, COMBINED  4/10/2014    Procedure: COMBINED DILATE CERVIX, ABLATE ENDOMETRIUM THERMACHOICE;;  Surgeon: Jett Montes MD;  Location: [facility] [place]     DILATION AND CURETTAGE, HYSTEROSCOPY, ABLATE ENDOMETRIUM NOVASURE, COMBINED  4/10/2014    Procedure: COMBINED DILATION AND CURETTAGE, HYSTEROSCOPY, ABLATE ENDOMETRIUM NOVASURE;  HYSTEROSCOPY, DILATION AND CURETTAGE, NOVASURE ABLATION ATTEMPTED, THERMACHOICE ABLATION ATTEMPTED;  Surgeon: Jett Montes MD;  Location: [facility] [place]     LAPAROSCOPIC ASSISTED HYSTERECTOMY VAGINAL, BILATERAL SALPINGO-OOPHORECTOMY, COMBINED  2014    Procedure: COMBINED LAPAROSCOPIC ASSISTED HYSTERECTOMY VAGINAL, SALPINGO-OOPHORECTOMY;  Surgeon: Jett Montes MD;  Location: [facility] [place]     ORTHOPEDIC SURGERY      L KNEE MENISCUS,ankle surgery, left knee replacement            Social History:     Social History     Occupational History     Not on file   Tobacco Use     Smoking status: Never Smoker     Smokeless tobacco: Never Used   Substance and Sexual Activity     Alcohol use: Yes     Alcohol/week: 0.0 oz     Comment: SOCIAL - 1 glass of wine twice a week; 2 drinks on the weekend     Drug use: No     Sexual activity: Yes     Partners: Male            Family History:     Family History   Problem Relation Age of Onset     Cancer Mother         patient is unsure of what kind            Allergies:     Allergies   Allergen Reactions     Droperidol Itching     Feels jumpy     Percocet [Oxycodone-Acetaminophen] Nausea and Vomiting and GI  Disturbance            Medications:     Current Outpatient Medications   Medication Sig Dispense Refill     albuterol (2.5 MG/3ML) 0.083% neb solution Take 1 vial (2.5 mg) by nebulization every 6 hours as needed for shortness of breath / dyspnea or wheezing 25 vial 1     albuterol (PROAIR HFA/PROVENTIL HFA/VENTOLIN HFA) 108 (90 BASE) MCG/ACT Inhaler Inhale 2 puffs into the lungs every 6 hours as needed for shortness of breath / dyspnea or wheezing 1 Inhaler 2     desonide (DESOWEN) 0.05 % ointment Apply topically 2 times daily 30 g 0     Estrogens Conjugated (PREMARIN PO) Take 0.9 mg by mouth daily       FLUOCINOLONE ACETONIDE SCALP 0.01 % OIL oil Apply topically 2 times daily as needed 118 mL 0     fluticasone (FLOVENT HFA) 44 MCG/ACT Inhaler Inhale 2 puffs into the lungs 2 times daily 1 Inhaler 1     hydroxychloroquine (PLAQUENIL) 200 MG tablet TAKE 1 TABLET BY MOUTH TWICE DAILY 180 tablet 1     ibuprofen (ADVIL/MOTRIN) 800 MG tablet Take 1 tablet (800 mg) by mouth every 8 hours as needed for moderate pain 60 tablet 1     ipratropium - albuterol 0.5 mg/2.5 mg/3 mL (DUONEB) 0.5-2.5 (3) MG/3ML neb solution Take 1 vial (3 mLs) by nebulization every 6 hours as needed for shortness of breath / dyspnea or wheezing 1 vial 0     multivitamin, therapeutic with minerals (MULTI-VITAMIN) TABS tablet Take 1 tablet by mouth daily       order for DME Equipment being ordered: Nebulizer with tubing and instructions 1 Device 0     order for DME Equipment being ordered: TENS 1 Units 0     phentermine 30 MG capsule Take 30 mg by mouth every morning       predniSONE (DELTASONE) 5 MG tablet 4tab=20 mg qd x 7 days, 3tab=15 mg qd x 7 days, 2tab=10 mg qd daily. 30 tablet 2     triamcinolone (KENALOG) 0.1 % cream Apply sparingly to affected area three times daily as needed 80 g 1     valACYclovir (VALTREX) 500 MG tablet Take 500 mg by mouth as needed Reported on 4/11/2017       estrogens conjugated (PREMARIN) 0.9 MG TABS Take 0.3 mg by  "mouth daily       fluticasone (FLONASE) 50 MCG/ACT spray Spray 1-2 sprays into both nostrils daily (Patient not taking: Reported on 2/15/2019) 1 Bottle 11     order for DME Equipment being ordered:  Knee walker (Patient not taking: Reported on 5/17/2019) 1 Units 0     order for DME Equipment being ordered: Knee walker  (Patient not taking: Reported on 5/17/2019) 1 Device 0     order for DME Equipment being ordered: Carex Bed Kade- heated neck roll (Patient not taking: Reported on 5/17/2019) 1 Device 0     predniSONE (DELTASONE) 5 MG tablet 4tab=20 mg qd x 5 days, 3tab=15 mg qd x 5 days, 2tab=10 mg qd x 5 days, 1 tab=5 mg qd daily (Patient not taking: Reported on 2/15/2019.) 70 tablet 2            Physical Exam:   Blood pressure 120/75, pulse 72, height 1.676 m (5' 6\"), last menstrual period 03/30/2014, SpO2 100 %, not currently breastfeeding.  Wt Readings from Last 4 Encounters:   12/06/18 78 kg (172 lb)   11/29/18 78.3 kg (172 lb 9.6 oz)   10/09/18 82.6 kg (182 lb 3.2 oz)   10/02/18 84.2 kg (185 lb 9.6 oz)       Constitutional: obese, appearing stated age; cooperative  Eyes: nl EOM, PERRLA, vision, conjunctiva, sclera  ENT: nl external ears, nose, hearing, lips, teeth, gums, throat  No mucous membrane lesions, normal saliva pool  Neck: no mass or thyroid enlargement  Resp: lungs clear to auscultation, nl to palpation  CV: RRR, no murmurs, rubs or gallops, no edema  GI: no ABD mass or tenderness, no HSM  : not tested  Lymph: no cervical, supraclavicular, inguinal or epitrochlear nodes    MS: All TMJ, neck, shoulder, elbow, wrist, MCP/PIP/DIP, spine, hip, knee, ankle, and foot MTP/IP joints were examined.     -- Tenderness over the left 2 - 4 PIP joints better. Tenderness present over bilateral ankles, Achilles tendon.  No tenderness and synovitis present over the right hand, elbows and shoulders.  She has chronic knee pain from osteoarthritis.    -- No dactylitis,  tenosynovitis, enthesopathy.    Skin: no " nail pitting, alopecia, rash, nodules or lesions  Neuro: nl cranial nerves, strength, sensation, DTRs.   Psych: nl judgement, orientation, memory, affect.         Data:     Results for orders placed or performed in visit on 03/12/19   Large Joint Injection/Arthocentesis: R subacromial bursa    Narrative    Guillermo Sims MD     3/14/2019  7:41 PM  Large Joint Injection/Arthocentesis: R subacromial bursa  Date/Time: 3/12/2019 12:15 PM  Performed by: Guillermo Sims MD  Authorized by: Guillermo Sims MD     Indications:  Pain and diagnostic evaluation  Needle Size:  25 G  Guidance: ultrasound    Approach:  Posterolateral  Location:  Shoulder  Site:  R subacromial bursa  Medications:  6 mg betamethasone acet & sod phos 6 (3-3) MG/ML; 3 mL   ropivacaine 5 MG/ML  Medications comment:  Actual amount of celestone used 4 mL  Outcome:  Tolerated well, no immediate complications  Procedure discussed: discussed risks, benefits, and alternatives    Consent Given by:  Patient  Timeout: timeout called immediately prior to procedure    Prep: patient was prepped and draped in usual sterile fashion     Patient reported no significant change in pain after injection.    Aftercare instructions given to patient.  Plan to follow-up as discussed   above.     Guillermo Sims MD [facility]         Recent Labs   Lab Test  10/15/15   1506  09/25/15   1540  09/18/15   1001   WBC   --   3.2*  2.4*   RBC   --   4.74  4.98   HGB  13.6  13.6  14.1   HCT   --   38.9  42.3   MCV   --   82  85   RDW   --   12.9  13.0   PLT   --   161  98*   ALBUMIN   --   3.5   --    BUN   --   9  11      Recent Labs   Lab Test  09/25/15   1540   TSH  1.62     Hemoglobin   Date Value Ref Range Status   11/29/2018 13.2 11.7 - 15.7 g/dL Final   10/15/2015 13.6 11.7 - 15.7 g/dL Final   09/25/2015 13.6 11.7 - 15.7 g/dL Final     Urea Nitrogen   Date Value Ref Range Status   09/25/2015 9 7 - 30 mg/dL Final   09/18/2015 11 7 -  30 mg/dL Final     Sed Rate   Date Value Ref Range Status   08/31/2018 19 0 - 30 mm/h Final   06/29/2018 17 0 - 30 mm/h Final     CRP Inflammation   Date Value Ref Range Status   08/31/2018 <2.9 0.0 - 8.0 mg/L Final     AST   Date Value Ref Range Status   11/29/2018 16 0 - 45 U/L Final   09/25/2015 14 0 - 45 U/L Final     Albumin   Date Value Ref Range Status   11/29/2018 3.3 (L) 3.4 - 5.0 g/dL Final   09/25/2015 3.5 3.4 - 5.0 g/dL Final     Alkaline Phosphatase   Date Value Ref Range Status   09/25/2015 63 40 - 150 U/L Final     ALT   Date Value Ref Range Status   11/29/2018 25 0 - 50 U/L Final   09/25/2015 24 0 - 50 U/L Final     Rheumatoid Factor   Date Value Ref Range Status   06/29/2018 84 (H) <20 IU/mL Final     Recent Labs   Lab Test 11/29/18  0905 10/15/15  1506 09/25/15  1540 09/18/15  1001   WBC 3.5*  --  3.2* 2.4*   HGB 13.2 13.6 13.6 14.1   HCT 40.7  --  38.9 42.3   MCV 86  --  82 85     --  161 98*   BUN  --   --  9 11   TSH  --   --  1.62  --    AST 16  --  14  --    ALT 25  --  24  --    ALKPHOS  --   --  63  --      Other studies:   Component      Latest Ref Rng & Units 9/25/2015 3/29/2018 6/29/2018   IRIS interpretation      NEG:Negative   Borderline Positive (A)   IRIS pattern 1         SPECKLED   IRIS titer 1         1:40   Hepatitis B Core Marilin      NR Nonreactive     Hep B Surface Agn      NR Nonreactive     HIV Antigen Antibody Combo      NR Nonreactive . . .     Hepatitis C Antibody      NR:Nonreactive  Nonreactive    Rheumatoid Factor      <20 IU/mL   84 (H)       Reviewed Rheumatology lab flowsheet    Johnnie Medley MD  [facility]  Department of Rheumatology & Autoimmune Disorders  [facility]: [phone]   Pager - [phone]

## 2019-05-17 NOTE — PATIENT INSTRUCTIONS
-- Labs today     -- Xray of your hands and feet     -- Continue Plaquenil 400 mg daily     -- She will taper the prednisone to off and if joint pains worsen then will add another DMARD like Methotrexate or Arava.     -- RTC in 2 months

## 2019-05-24 ENCOUNTER — THERAPY VISIT (OUTPATIENT)
Dept: PHYSICAL THERAPY | Facility: CLINIC | Age: 54
End: 2019-05-24
Payer: COMMERCIAL

## 2019-05-24 DIAGNOSIS — M79.601 PAIN OF RIGHT UPPER EXTREMITY: ICD-10-CM

## 2019-05-24 DIAGNOSIS — M54.12 CERVICAL RADICULOPATHY: ICD-10-CM

## 2019-05-24 PROCEDURE — 97012 MECHANICAL TRACTION THERAPY: CPT | Mod: GP | Performed by: PHYSICAL THERAPIST

## 2019-05-24 PROCEDURE — 97035 APP MDLTY 1+ULTRASOUND EA 15: CPT | Mod: GP | Performed by: PHYSICAL THERAPIST

## 2019-05-24 PROCEDURE — 97140 MANUAL THERAPY 1/> REGIONS: CPT | Mod: GP | Performed by: PHYSICAL THERAPIST

## 2019-05-27 NOTE — PROGRESS NOTES
Subjective:  HPI                    Objective:  System    Physical Exam    General     ROS    Assessment/Plan:    PROGRESS  REPORT    Progress reporting period as of 524/19.       SUBJECTIVE  Subjective changes noted by patient:  Patient seen 8 times for treatment of R arm/shoulder pain.  Patient stated overall 50% better. Stated pain is more intermittent now in the R arm. Patient describes pain as a tingling sensation and usually comes on with R arm activity.  Patient feel the symptoms are coming from her neck based on her response to cervical traction.    Current pain level is 2/10     Previous pain level was: 4/10.   Changes in function:  Yes (See Goal flowsheet attached for changes in current functional level)  Adverse reaction to treatment or activity: activity - increase R arm pain    OBJECTIVE  Changes noted in objective findings:  Yes, Patient has been given HEP to follow.    Cervical ROM:  Flex= chin to chest     Ext= min loss     Rot= min loss    Shoulder ROM: WNL; Bilaterally  Shoulder Strength: 4-4+/5 Bilaterally    Response to Treatment:  Decrease in R arm pain/symptoms due to cervical mechanical and manual traction.    ASSESSMENT/PLAN  Updated problem list and treatment plan: Diagnosis 1:  R arm pain  Pain -  hot/cold therapy, US, mechanical traction and manual therapy  Impaired muscle performance - neuro re-education and home program  Decreased function - therapeutic activities  STG/LTGs have been met or progress has been made towards goals:  Yes (See Goal flow sheet completed today.)  Assessment of Progress: The patient's condition is improving.  Self Management Plans:  Patient has been instructed in a home treatment program.      Recommendations:  We have completed 8 planned PT visits; noted decrease in R arm symptoms for short-term with each treatment.  Recommending follow up with MD to discuss ongoing POC.    Please refer to the daily flowsheet for treatment today, total treatment time and time  spent performing 1:1 timed codes.

## 2019-05-28 NOTE — PATIENT INSTRUCTIONS
Thank you for referring  Katie to [facility] for Physical Therapy.  I have attached a Current PT progress note for you to review.  I am encouraging Katie to follow up with you to discuss future POC at this time.  I feel Katie's R arm pain/symptoms is more related to her Cervical Spine vs shoulder involvement and.   Please message me or call me at [phone] if you have any questions.    Thank You again for referring to the [facility].  Lawanda Newell, MPT  [facility].

## 2019-06-18 ENCOUNTER — ANCILLARY PROCEDURE (OUTPATIENT)
Dept: GENERAL RADIOLOGY | Facility: CLINIC | Age: 54
End: 2019-06-18
Attending: INTERNAL MEDICINE
Payer: COMMERCIAL

## 2019-06-18 ENCOUNTER — OFFICE VISIT (OUTPATIENT)
Dept: RHEUMATOLOGY | Facility: CLINIC | Age: 54
End: 2019-06-18
Payer: COMMERCIAL

## 2019-06-18 VITALS
BODY MASS INDEX: 26.45 KG/M2 | SYSTOLIC BLOOD PRESSURE: 118 MMHG | OXYGEN SATURATION: 100 % | HEART RATE: 67 BPM | DIASTOLIC BLOOD PRESSURE: 76 MMHG | WEIGHT: 164.6 LBS | HEIGHT: 66 IN

## 2019-06-18 DIAGNOSIS — Z79.899 HIGH RISK MEDICATIONS (NOT ANTICOAGULANTS) LONG-TERM USE: ICD-10-CM

## 2019-06-18 DIAGNOSIS — R76.8 ANA POSITIVE: ICD-10-CM

## 2019-06-18 DIAGNOSIS — R53.83 FATIGUE, UNSPECIFIED TYPE: ICD-10-CM

## 2019-06-18 DIAGNOSIS — Z86.39 HISTORY OF VITAMIN D DEFICIENCY: ICD-10-CM

## 2019-06-18 DIAGNOSIS — G89.29 CHRONIC RIGHT SHOULDER PAIN: ICD-10-CM

## 2019-06-18 DIAGNOSIS — M05.9 SEROPOSITIVE RHEUMATOID ARTHRITIS (H): Primary | ICD-10-CM

## 2019-06-18 DIAGNOSIS — Z78.0 POST-MENOPAUSAL: ICD-10-CM

## 2019-06-18 DIAGNOSIS — Z13.820 OSTEOPOROSIS SCREENING: ICD-10-CM

## 2019-06-18 DIAGNOSIS — M25.511 CHRONIC RIGHT SHOULDER PAIN: ICD-10-CM

## 2019-06-18 DIAGNOSIS — M05.9 SEROPOSITIVE RHEUMATOID ARTHRITIS (H): ICD-10-CM

## 2019-06-18 LAB
BASOPHILS # BLD AUTO: 0 10E9/L (ref 0–0.2)
BASOPHILS NFR BLD AUTO: 0.9 %
DIFFERENTIAL METHOD BLD: ABNORMAL
EOSINOPHIL # BLD AUTO: 0.4 10E9/L (ref 0–0.7)
EOSINOPHIL NFR BLD AUTO: 11.1 %
ERYTHROCYTE [DISTWIDTH] IN BLOOD BY AUTOMATED COUNT: 13.8 % (ref 10–15)
HCT VFR BLD AUTO: 39.2 % (ref 35–47)
HGB BLD-MCNC: 13.1 G/DL (ref 11.7–15.7)
LYMPHOCYTES # BLD AUTO: 1.4 10E9/L (ref 0.8–5.3)
LYMPHOCYTES NFR BLD AUTO: 40.8 %
MCH RBC QN AUTO: 28.7 PG (ref 26.5–33)
MCHC RBC AUTO-ENTMCNC: 33.4 G/DL (ref 31.5–36.5)
MCV RBC AUTO: 86 FL (ref 78–100)
MONOCYTES # BLD AUTO: 0.4 10E9/L (ref 0–1.3)
MONOCYTES NFR BLD AUTO: 11.4 %
NEUTROPHILS # BLD AUTO: 1.2 10E9/L (ref 1.6–8.3)
NEUTROPHILS NFR BLD AUTO: 35.8 %
PLATELET # BLD AUTO: 193 10E9/L (ref 150–450)
RBC # BLD AUTO: 4.56 10E12/L (ref 3.8–5.2)
TSH SERPL DL<=0.005 MIU/L-ACNC: 1.73 MU/L (ref 0.4–4)
WBC # BLD AUTO: 3.3 10E9/L (ref 4–11)

## 2019-06-18 PROCEDURE — 36415 COLL VENOUS BLD VENIPUNCTURE: CPT | Performed by: INTERNAL MEDICINE

## 2019-06-18 PROCEDURE — 71046 X-RAY EXAM CHEST 2 VIEWS: CPT

## 2019-06-18 PROCEDURE — 82306 VITAMIN D 25 HYDROXY: CPT | Performed by: INTERNAL MEDICINE

## 2019-06-18 PROCEDURE — 84443 ASSAY THYROID STIM HORMONE: CPT | Performed by: INTERNAL MEDICINE

## 2019-06-18 PROCEDURE — 86481 TB AG RESPONSE T-CELL SUSP: CPT | Performed by: INTERNAL MEDICINE

## 2019-06-18 PROCEDURE — 99215 OFFICE O/P EST HI 40 MIN: CPT | Performed by: INTERNAL MEDICINE

## 2019-06-18 PROCEDURE — 85025 COMPLETE CBC W/AUTO DIFF WBC: CPT | Performed by: INTERNAL MEDICINE

## 2019-06-18 ASSESSMENT — MIFFLIN-ST. JEOR: SCORE: 1368.37

## 2019-06-18 NOTE — PATIENT INSTRUCTIONS
Rheumatology    Dr. Richard Mcfarlane         [facility]   (Monday)  [address]  [place] [address]       [facility]   (Tuesday)  [address]  [place] [address]    [facility]   (Wed., Thurs., and Friday)  [address] [place] [address]    Phone number: [phone]  Thank you for choosing [facility].  Noy Burt CMA

## 2019-06-18 NOTE — PROGRESS NOTES
Rheumatology Clinic Visit      Katie Aponte MRN# [id number]   YOB: 1965 Age: 53 year old      Date of visit: 6/18/19   Referring provider: Dr. Francis Capps  PCP: Dr. Karlee Birmingham    Chief Complaint   Patient presents with:  Consult: RA, Patient has days when she feels bad but relaxes around noonish.      Assessment and Plan     1.  Seropositive nonerosive rheumatoid arthritis (RF 84, CCP >340): Diagnosed in 2018.  Previously followed at the [facility].  Currently on HCQ 200mg BID.  Subtle synovial swelling on exam today and significant morning stiffness that previously resolved when she was on prednisone of at least 5 mg daily.  We reviewed her diagnosis in detail today.  We reviewed the treatment options.  Given her chronic neutropenia, will avoid methotrexate, leflunomide, and sulfasalazine; escalate treatment with Humira.  Screening chest x-ray and labs today; and if okay will start Humira.  - Start Humira 40 mg SQ every 14 days if labs and x-ray are okay  - Continue hydroxychloroquine 200 mg twice daily (last eye exam was performed on 3/11/2019)  - Labs today: CBC, vitamin D, QuantiFERON-TB Gold plus  - Chest x-ray today    # Adalimumab (Humira) Risks and Benefits: The risks and benefits of adalimumab were discussed in detail and the patient verbalized understanding.  The risks discussed include, but are not limited to, the risk for hypersensitivity, anaphylaxis, anaphylactoid reactions, an increased risk for serious infections leading to hospitalization or death, a possible increased risk for lymphoma and other malignancies, a possible worsening of demyelinating diseases, a possible worsening of heart failure, risk for cytopenias, risk for drug induced lupus, possible reactivation of hepatitis B, and possible reactivation of latent tuberculosis.  Subcutaneous injections may result in injection site reactions and/or pain at the site of injection.  The most common adverse  reactions are infections, injection site reactions, headache, and rash.  It was discussed that the medication would need to be discontinued if a serious infection develops.  It was discussed that live vaccinations should not be received while using adalimumab or within 30 days prior to starting adalimumab.  I encouraged reviewing the package insert and asking any questions about the medication.      # Hydroxychloroquine (Plaquenil) Risks and Benefits:  The risks and benefits of hydroxychloroquine were discussed in detail and the patient verbalized understanding; the patient also verbalized agreement to get the required ophthalmologic toxicity monitoring.  The risks discussed include, but are not limited to, the risk for hypersensitivity, anaphylaxis, anaphylactoid reactions, irreversible retinal damage, rare hematologic reactions, and rare cardiomyopathy.  Patients with G6PD deficiency or hepatic impairment may be at an increased risk for adverse effects.  I encouraged reviewing the package insert and asking any questions about the medication.       2.  Fatigue: Likely multifactorial.  Possibly related to RA disease activity and would therefore expect to see improvement with escalation of treatment.  May also be related to vitamin D deficiency; see #3.  Advised that she also follow-up with her primary care provider regarding the fatigue.  - Lab today: TSH, Vitamin D    3.  Vitamin D deficiency history: Reportedly required high-dose vitamin D supplements previously.  Currently on vitamin D 400 IU daily.    - Lab today: Vitamin D    4. Bone health: post-menopausal s/p HEMALATHA; was on prednisone for RA.  DEXA ordered. Checking vitamin D as noted above    5. Raynaud's Phenomenon; positive IRIS: Reportedly started at the age of 15 years old.  IRIS is positive but she does not have other symptoms than an inflammatory arthritis to support an IRIS-associated rheumatologic disorder.  No other symptoms to suggest systemic sclerosis.   Reviewed the diagnosis of Raynaud's phenomenon and the treatment options.  She is doing well with cold avoidance at this time    6.  Pes planus bilaterally: Has been followed by podiatry.  She is considering orthotics.    7.  Right shoulder pain: Normal range of motion.  Consistent with impingement syndrome; possible that partial rotator cuff tear.  She reportedly has not responded to steroid injections.  I recommended that she escalate her RA treatment and if her right shoulder symptoms persist then an MRI could be considered.  She is also followed in the sports medicine clinic specifically for this issue.    Ms. Aponte verbalized agreement with and understanding of the rational for the diagnosis and treatment plan.  All questions were answered to best of my ability and the patient's satisfaction. Ms. Aponte was advised to contact the clinic with any questions that may arise after the clinic visit.      Thank you for involving me in the care of the patient    Return to clinic: 4 months      HPI   Katie Aponte is a 53 year old female with a past medical history significant for acne rosacea, dermatitis, leukopenia, allergic bronchitis, medial meniscal tear, right shoulder impingement syndrome, cervical radiculopathy, and rheumatoid arthritis who is seen in consultation at the request of Dr. Francis Capps for rheumatology evaluation.     5/17/2019 [facility] rheumatology clinic note by Dr. Johnnie Medley documents seropositive rheumatoid arthritis with a positive rheumatoid factor and a positive CCP.  History of left knee partial replacement 4.5 years ago.  IRIS 1: 40.  CCP >340.  Rheumatoid factor 84.  Negative IRIS and dsDNA; normal C3 and C4.  Negative hepatitis B/C, TB.  OCT testing 3/11/2019 normal.  Symptoms started in June 2018 with pain in the left heel.  She was seen by podiatry.  Later developed right ankle swelling.  Symptoms worsened over time to include both ankles and both Achilles  tendons.  Also with pain in the second-fourth fingers of the left hand and morning stiffness for couple hours.  Also history of Raynaud's phenomenon.  Sicca symptoms possibly related to phentermine use.  Plan was to continue Plaquenil 200 mg twice daily and add x-rays of her hands and feet to look for inflammatory arthritis.    Today, Ms. Aponte reports that she was diagnosed with rheumatoid arthritis in 2018.  She initially had pain at her Achilles tendon, then ankles, other than both ankles and both Achilles tendons, that her hands and knees.  She was found to have elevated rheumatoid factor and CCP by her podiatrist and was subsequently referred to rheumatology.  She was seen at the [facility] where hydroxychloroquine was started and she felt improvement with this.  She continues to have pain at her right foot that is worse with increased ambulation; she has been following with podiatry.  She has a history of right shoulder impingement syndrome that did not improve with 2 steroid injections; she is followed in the sports medicine clinic and plans to follow-up there again.  She is also gone to physical therapy without improvement.  Left first MCP and bilateral first CMC's bother her.  Also with some pain at the right second PIP.  History of left partial knee replacement.  Morning stiffness for approximately 7 hours; she wakes up at 5:15 AM and is improved by noon.  Raynaud's phenomenon diagnosed at the age of 15 years old and is successfully treated with cold avoidance; typically just affects her fingers.  She has had dysphagia twice in her life.  No pain or difficulty with swallowing otherwise.  No skin thickening or skin tightening.      Denies fevers, chills, nausea, vomiting, constipation, diarrhea. No abdominal pain. No chest pain/pressure, palpitations, or shortness of breath. No LE swelling. No neck pain. No oral or nasal sores.  No rash. No sicca symptoms. No photosensitivity or photophobia.  No eye pain or redness. No history of inflammatory eye disease.  No history of inflammatory bowel disease.  No history of DVT, pulmonary embolism, or miscarriage.   No history of serositis.  No seizure history.  No known renal disorder.      Tobacco: None  EtOH: 0-4 drinks per week  Drugs: None    ROS   GEN: No fevers, chills, night sweats, or weight change  SKIN: No itching, rashes, sores; see HPI  HEENT: No epistaxis. No oral or nasal ulcers.  CV: No chest pain, pressure, palpitations, or dyspnea on exertion.  PULM: No SOB, wheeze, cough.  GI: No nausea, vomiting, constipation, diarrhea. No blood in stool. No abdominal pain.  : No blood in urine.  MSK: See HPI.  NEURO: No numbness or tingling  ENDO: No heat/cold intolerance.  EXT: No LE swelling  PSYCH: Negative    Active Problem List     Patient Active Problem List   Diagnosis     Knee pain     Abnormal uterine bleeding     CARDIOVASCULAR SCREENING; LDL GOAL LESS THAN 160     Acne rosacea     Dermatitis     Leukopenia     Allergic bronchitis, moderate persistent, uncomplicated     Mild persistent asthma with acute exacerbation     JESENIA (stress urinary incontinence, female)     Class 1 obesity due to excess calories without serious comorbidity with body mass index (BMI) of 30.0 to 30.9 in adult     Medial meniscus tear     Pain in joint, ankle and foot     Tear of medial meniscus of knee     Pain in joint, upper arm, right     Impingement syndrome, shoulder, right     Right shoulder pain, unspecified chronicity     Pain of right upper extremity     Cervical radiculopathy     Rheumatoid arthritis (H)     Past Medical History     Past Medical History:   Diagnosis Date     Herpes     Under eye     Joint pain      Seasonal allergies      Thrombocytopenia (H) 2015     Uncomplicated asthma     very mild     Past Surgical History     Past Surgical History:   Procedure Laterality Date      SECTION       COLONOSCOPY       CYSTOSCOPY, SLING TRANSVAGINAL N/A  4/20/2017    Procedure: CYSTOSCOPY, SLING TRANSVAGINAL;  TRANSVAGINAL TAPING, CYSTOSCOPY, MID URETHRAL SLING;  Surgeon: Jett Montes MD;  Location:  OR     DILATE CERVIX, ABLATE ENDOMETRIUM THERMACHOICE, COMBINED  4/10/2014    Procedure: COMBINED DILATE CERVIX, ABLATE ENDOMETRIUM THERMACHOICE;;  Surgeon: Jett Montes MD;  Location: [facility]     DILATION AND CURETTAGE, HYSTEROSCOPY, ABLATE ENDOMETRIUM NOVASURE, COMBINED  4/10/2014    Procedure: COMBINED DILATION AND CURETTAGE, HYSTEROSCOPY, ABLATE ENDOMETRIUM NOVASURE;  HYSTEROSCOPY, DILATION AND CURETTAGE, NOVASURE ABLATION ATTEMPTED, THERMACHOICE ABLATION ATTEMPTED;  Surgeon: Jett Montes MD;  Location: [facility]     LAPAROSCOPIC ASSISTED HYSTERECTOMY VAGINAL, BILATERAL SALPINGO-OOPHORECTOMY, COMBINED  7/31/2014    Procedure: COMBINED LAPAROSCOPIC ASSISTED HYSTERECTOMY VAGINAL, SALPINGO-OOPHORECTOMY;  Surgeon: Jett Montes MD;  Location: [facility]     ORTHOPEDIC SURGERY      L KNEE MENISCUS,ankle surgery, left knee replacement     Allergy     Allergies   Allergen Reactions     Droperidol Itching     Feels jumpy     Percocet [Oxycodone-Acetaminophen] Nausea and Vomiting and GI Disturbance     Current Medication List     Current Outpatient Medications   Medication Sig     albuterol (2.5 MG/3ML) 0.083% neb solution Take 1 vial (2.5 mg) by nebulization every 6 hours as needed for shortness of breath / dyspnea or wheezing     albuterol (PROAIR HFA/PROVENTIL HFA/VENTOLIN HFA) 108 (90 BASE) MCG/ACT Inhaler Inhale 2 puffs into the lungs every 6 hours as needed for shortness of breath / dyspnea or wheezing     desonide (DESOWEN) 0.05 % ointment Apply topically 2 times daily     Estrogens Conjugated (PREMARIN PO) Take 0.9 mg by mouth daily     estrogens conjugated (PREMARIN) 0.9 MG TABS Take 0.3 mg by mouth daily     hydroxychloroquine (PLAQUENIL) 200 MG tablet TAKE 1 TABLET BY MOUTH TWICE DAILY     ibuprofen (ADVIL/MOTRIN) 800 MG tablet Take 1 tablet (800 mg) by mouth  every 8 hours as needed for moderate pain     ipratropium - albuterol 0.5 mg/2.5 mg/3 mL (DUONEB) 0.5-2.5 (3) MG/3ML neb solution Take 1 vial (3 mLs) by nebulization every 6 hours as needed for shortness of breath / dyspnea or wheezing     multivitamin, therapeutic with minerals (MULTI-VITAMIN) TABS tablet Take 1 tablet by mouth daily     valACYclovir (VALTREX) 500 MG tablet Take 500 mg by mouth as needed Reported on 4/11/2017     FLUOCINOLONE ACETONIDE SCALP 0.01 % OIL oil Apply topically 2 times daily as needed     fluticasone (FLONASE) 50 MCG/ACT spray Spray 1-2 sprays into both nostrils daily (Patient not taking: Reported on 2/15/2019)     fluticasone (FLOVENT HFA) 44 MCG/ACT Inhaler Inhale 2 puffs into the lungs 2 times daily     order for DME Equipment being ordered:  Knee walker (Patient not taking: Reported on 5/17/2019)     order for DME Equipment being ordered: Knee walker  (Patient not taking: Reported on 5/17/2019)     order for DME Equipment being ordered: Nebulizer with tubing and instructions     order for DME Equipment being ordered: [employer] Bed Buddy- heated neck roll (Patient not taking: Reported on 5/17/2019)     order for DME Equipment being ordered: TENS     phentermine 30 MG capsule Take 30 mg by mouth every morning     predniSONE (DELTASONE) 5 MG tablet 4tab=20 mg qd x 5 days, 3tab=15 mg qd x 5 days, 2tab=10 mg qd x 5 days, 1 tab=5 mg qd daily (Patient not taking: Reported on 2/15/2019.)     predniSONE (DELTASONE) 5 MG tablet 4tab=20 mg qd x 7 days, 3tab=15 mg qd x 7 days, 2tab=10 mg qd daily. (Patient not taking: Reported on 6/18/2019)     triamcinolone (KENALOG) 0.1 % cream Apply sparingly to affected area three times daily as needed (Patient not taking: Reported on 6/18/2019)     Current Facility-Administered Medications   Medication     betamethasone acet & sod phos (CELESTONE) injection 6 mg     betamethasone acet & sod phos (CELESTONE) injection 6 mg     ropivacaine (NAROPIN)  "injection 3 mL     ropivacaine (NAROPIN) injection 3 mL         Social History   See HPI    Family History     Family History   Problem Relation Age of Onset     Cancer Mother         patient is unsure of what kind     Physical Exam     Temp Readings from Last 3 Encounters:   02/15/19 97.9  F (36.6  C)   11/28/18 98.2  F (36.8  C) (Oral)   10/09/18 97.9  F (36.6  C) (Oral)     BP Readings from Last 5 Encounters:   06/18/19 118/76   05/17/19 120/75   03/12/19 112/70   02/15/19 119/73   11/29/18 122/76     Pulse Readings from Last 1 Encounters:   06/18/19 67     Resp Readings from Last 1 Encounters:   11/28/18 16     Estimated body mass index is 26.57 kg/m  as calculated from the following:    Height as of this encounter: 1.676 m (5' 6\").    Weight as of this encounter: 74.7 kg (164 lb 9.6 oz).    GEN: NAD  HEENT: MMM. No oral lesions. Anicteric, noninjected sclera  CV: S1, S2. RRR. No m/r/g.  PULM: CTA bilaterally. No w/c.  ABD: +BS.   MSK: Right first MCP with subtle synovial swelling and tenderness to palpation.  Bilateral first CMC joints tender to palpation but without swelling or increased warmth; no squaring.  Right second PIP with subtle synovial swelling and tenderness to palpation but no increased warmth or overlying erythema.  Other MCPs and PIPs without swelling or tenderness to palpation.  Wrists and elbows without swelling or tenderness to palpation.  Left shoulder without swelling or tenderness to palpation; normal range of motion.  Right shoulder tender to palpation on the most lateral aspect; pain with abduction above 90 degrees; no swelling or increased warmth.  Hips nontender to palpation.  Knees without swelling or tenderness to palpation.  Ankles and MTPs without swelling or tenderness palpation.  Mildly tender to palpation at the right foot on the medial aspect near the heel.  Pes planus bilaterally.  Achilles tendons nontender to palpation.  No dactylitis.       NEURO: UE and LE strengths 5/5 " and equal bilaterally.   SKIN: No rash.  No skin thickening or skin tightening observed.  No evidence of current or previous ischemic insults to the fingers by visual inspection.  EXT: No LE edema  PSYCH: Alert. Appropriate.    Labs / Imaging (select studies)     RF/CCP  Recent Labs   Lab Test 08/31/18  1220 06/29/18  1301   CCPIGG >340*  --    RHF  --  84*     IRIS  Recent Labs   Lab Test 06/29/18  1301   JET Borderline Positive*   ANAP1 SPECKLED   ANAT1 1:40     RNP/Sm/SSA/SSB  Recent Labs   Lab Test 08/31/18  1220   RNPIGG <0.2   SMIGG <0.2   SSAIGG <0.2   SSBIGG <0.2   SCLIGG <0.2     dsDNA  Recent Labs   Lab Test 08/31/18  1220   DNA 1     C3/C4  Recent Labs   Lab Test 08/31/18  1220   S6YOYGA 126   U9BYKBD 32     IgG  Recent Labs   Lab Test 08/31/18  1220   IGG 1,450      IGM 94     CBC  Recent Labs   Lab Test 05/17/19  1249 11/29/18  0905 10/15/15  1506 09/25/15  1540 09/18/15  1023   WBC 3.5* 3.5*  --  3.2*  --    RBC 4.71 4.76  --  4.74  --    HGB 13.2 13.2 13.6 13.6  --    HCT 40.6 40.7  --  38.9  --    MCV 86 86  --  82  --    RDW 13.3 13.2  --  12.9  --     199  --  161  --    MCH 28.0 27.7  --  28.7  --    MCHC 32.5 32.4  --  35.0  --    NEUTROPHIL  --   --   --  31.7 33.0   LYMPH  --   --   --  49.2 50.0   MONOCYTE  --   --   --  11.9 9.0   EOSINOPHIL  --   --   --  6.6 7.0   BASOPHIL  --   --   --  0.6 1.0   ANEU  --   --   --  1.0*  --    ALYM  --   --   --  1.6  --    FIDE  --   --   --  0.4  --    AEOS  --   --   --  0.2  --    ABAS  --   --   --  0.0  --      CMP  Recent Labs   Lab Test 05/17/19  1249 11/29/18  0905 09/25/15  1540 09/18/15  1001 08/01/14  0749   NA  --   --  139 136  --    POTASSIUM  --   --  3.9 4.6  --    CHLORIDE  --   --  104 103  --    CO2  --   --  30 31  --    ANIONGAP  --   --  5 2*  --    GLC  --   --  99 96 101*   BUN  --   --  9 11  --    CR 0.82 0.89 0.77 0.83  --    GFRESTIMATED 82 66 80 73  --    GFRESTBLACK >90 80 >90   GFR Calc   89   --    NATALYA  --   --  8.0* 9.3  --    BILITOTAL  --   --  0.4  --   --    ALBUMIN 3.8 3.3* 3.5  --   --    PROTTOTAL  --   --  7.6  --   --    ALKPHOS  --   --  63  --   --    AST 14 16 14  --   --    ALT 18 25 24  --   --      HgA1c  Recent Labs   Lab Test 09/18/15  1001   A1C 5.7     Uric Acid  Recent Labs   Lab Test 06/29/18  1301   URIC 4.8     ESR/CRP  Recent Labs   Lab Test 08/31/18  1220 06/29/18  1301   SED 19 17   CRP <2.9  --      TSH/T4  Recent Labs   Lab Test 09/25/15  1540   TSH 1.62     Hepatitis B  Recent Labs   Lab Test 08/31/18  1220 09/25/15  1540   AUSAB  --  0.09   HBCAB Nonreactive Nonreactive   HEPBANG Nonreactive Nonreactive     Hepatitis C  Recent Labs   Lab Test 08/31/18  1220 03/29/18  0913   HCVAB Nonreactive Nonreactive     Lyme ab screening  Recent Labs   Lab Test 06/29/18  1301   LYMEGM 0.15     Tuberculosis Screening  Recent Labs   Lab Test 08/31/18  1220   TBRES Negative     HIV Screening  Recent Labs   Lab Test 09/25/15  1540   HIAGAB Nonreactive   HIV-1 p24 Ag & HIV-1/HIV-2 Ab Not Detected       UA  Recent Labs   Lab Test 09/08/18  0949 09/18/15  1001   COLOR Yellow Yellow   APPEARANCE Clear Clear   URINEGLC Negative Negative   URINEBILI Negative Negative   SG 1.025 1.020   URINEPH 5.5 6.0   PROTEIN Negative Negative   UROBILINOGEN 0.2 0.2   NITRITE Negative Negative   UBLD Negative Negative   LEUKEST Negative Negative   WBCU 0 - 5 O - 2   RBCU O - 2 O - 2   SQUAMOUSEPI Few Moderate*   BACTERIA Few* Few*   MUCOUS Present*  --      Urine Microscopic  Recent Labs   Lab Test 09/08/18  0949 09/18/15  1001   WBCU 0 - 5 O - 2   RBCU O - 2 O - 2   SQUAMOUSEPI Few Moderate*   BACTERIA Few* Few*   MUCOUS Present*  --      PATH  Recent Labs   Lab Test 09/25/15  0000 07/31/14  1230   PATH Patient Name: LARS ARIZMENDI  MR#: [id number]  Specimen #: [id number]  Collected: 9/25/2015  Received: 9/28/2015  Reported: 9/28/2015 14:02  Ordering Phy(s): LEN VAUGHN  Additional Phy(s): Katehrine SEALS  MD Noe    TEST(S):  Peripheral Smear Morphology    FINAL DIAGNOSIS:  Peripheral smear, exam-  Slight neutropenia (see comment)    COMMENT:  Examination of peripheral smear does not reveal a specific cause for  patient's neutropenia.  Drug history and recent infection history is  relevant.  Other causes of acquired neutropenia includes immune  neutropenia and autoimmune disease.  Rarely MDS can present as isolated  neutropenia, but however there is no morphologic evidence for  myelodysplasia.  Clinical correlation is requested.    Electronically signed out by:    Katherine Liu MD    CLINICAL HISTORY:  49-year-old female with leukopenia and thrombocytopenia.    PERIPHERAL BLOOD DATA:    PERIPHERAL BLOOD DATA  Patient Value (Reference Range >18 year old female)  3.19 ..... ..WBC   (4.0-11.0 x 10*9/L)  4.74 .......RBC   (3.8-5.2 x 10*12/L)  13.6 .......HGB   (11.7-15.7 g/dL)  38.9 .......HCT   (35.0-47.0 %)  82.1 .......MCV   (78-100fL)  28.7 .......MCH   (26.5-33.0 pg)  35.0 .......MCHC   (31.5-36.5 g/dL)  12.9 .......RDW   (10.0-15.0 %)  161 .......PLT   (150-450 x 10*9/L)  0.86 .......Retic   (0.5-2.0%)    PERIPHERAL BLOOD DIFFERENTIAL  (Reference ranges >18 year old female)    Percent  42....Neutrophils, segmented and bands   (40 - 75)  41....Lymphocytes   (20 - 48)  7....Monocytes   (0 - 12)  6....Eosinophils   (0 - 6)  4....Basophils   (0 - 2)    Absolute  1.3....Neutrophils,segmented and bands    (1.6 - 8.3 x 10*9/L)  1.3....Lymphocytes    (0.8 - 5.3 x 10*9/L)  0.2....Monocytes    (0 -1.3 x 10*9/L)  0.19....Eosinophils    (0 - 0.7 x 10*9/L)  0.  12....Basophils    (0 - 0.2 x 10*9/L)    PERIPHERAL MORPHOLOGY:    ERYTHROCYTES: The red cells appear overall normal in number,  normochromic and normocytic. Anisopoikilocytosis is slight and non  specific.  Rouleaux is not evident. Polychromasia is not increased.    LEUKOCYTES: The white cells appear slightly decreased in number but show  normal morphology..  "Dysplastic changes are not present. Left shift or  circulating blasts are not seen.    PLATELETS: The platelets appear normal in number and are well  granulated.    CPT Codes:  A: 50098-DRVE    TESTING LAB LOCATION:  [address]  [id number] [phone]    COLLECTION SITE:  Client:  [facility]  Location:  [facility] (S)   Patient Name: LARS ARIZMENDI  MR#: [id number]  Specimen #: [id number]  Collected: 7/31/2014  Received: 7/31/2014  Reported: 8/1/2014 17:37  Ordering Phy(s): MARISELA CRAWFORD              SPECIMEN(S):  A: Uterus, bilateral tubes, and ovaries  B: Abdominal wall lipoma    FINAL DIAGNOSIS:  A.  Uterus with bilateral fallopian tubes and ovaries, excision:  -Normal cervix and endocervix  -Benign basalis pattern endometrium  -Histologically typical leiomyoma, 1.1  -Right ovary with hemorrhagic follicle cyst and normal fallopian tube  -Left ovary with corpus luteum cyst and normal into    D.  Soft tissue, abdominal wall, excision: Mature fatty tissue  consistent with benign lipoma    Electronically signed out by:    April Le M.D.      CLINICAL HISTORY:  Pelvic pain and abnormal uterine bleeding, abdominal wall lipoma      GROSS:  A. The specimen is received in formalin with the patient's name and  proper identification labeled \"uterus, bilateral tubes and ovaries\".  The specimen consists  of 101 g pink rubbery uterus and cervix (8.5 x 4.6  x 3.5 cm), right ovary (2.3 x 2.2 x 1.3 cm), right fallopian tube with  fimbria (8.5 x 0.5 x 0.5 cm), left ovary (2.6 x 2.4 x 1.  3 cm), left  fallopian tube with fimbria (6.7 x 0.5 x 0.5 cm).  There is a small  amount endometrium identified measuring 0.1 cm in thickness.  There is a  pink rubbery well circumscribed leiomyoma measuring up to 1.1 cm.  The  leiomyoma has a white nodular center is no evidence of hemorrhage,  necrosis or calcifications.  The ovaries have large hemorrhagic cysts.  The " "largest hemorrhagic cyst appears to be a corpus lutea measuring up  to 1.8 cm.  Representative sections are submitted in seven cassettes.    Cassette 1-anterior cervix  Cassette 2-posterior cervix  Cassettes 3-4-uterine wall  Cassette 5-leiomyoma  Cassette 6-right ovary and fallopian tube  Cassette 7-left ovary and fallopian tube    B.The specimen is received in formalin with the patient's name and  proper identification labeled \"abdominal wa ll lipoma\".  The specimen  consists of 20 g yellow fatty lobular mass (5.2 x 4.2 x 2.0 cm).  On  section the specimen has a yellow fatty lobular center throughout.  Representative sections are submitted in two cassettes. (Dictated by:  Peterson Quach 7/31/2014 03:21 PM)    MICROSCOPIC:  A., B.  A formal microscopic examination has been performed.            CPT Codes:  A: 54937-DB6  B: 75991-AA7    TESTING LAB LOCATION:  [address]  [address]  [phone]    COLLECTION SITE:  Client: [facility]  Location: [facility] (S)           Immunization History     Immunization History   Administered Date(s) Administered     Influenza (IIV3) PF 10/20/2009     Tdap (Adacel,Boostrix) 01/25/2010          Chart documentation done in part with Dragon Voice recognition Software. Although reviewed after completion, some word and grammatical error may remain.    Richard Mcfarlane MD      "

## 2019-06-19 ENCOUNTER — MYC MEDICAL ADVICE (OUTPATIENT)
Dept: RHEUMATOLOGY | Facility: CLINIC | Age: 54
End: 2019-06-19

## 2019-06-19 LAB — DEPRECATED CALCIDIOL+CALCIFEROL SERPL-MC: 33 UG/L (ref 20–75)

## 2019-06-20 LAB
GAMMA INTERFERON BACKGROUND BLD IA-ACNC: 0.04 IU/ML
M TB IFN-G BLD-IMP: NEGATIVE
M TB IFN-G CD4+ BCKGRND COR BLD-ACNC: >10 IU/ML
MITOGEN IGNF BCKGRD COR BLD-ACNC: 0 IU/ML
MITOGEN IGNF BCKGRD COR BLD-ACNC: 0 IU/ML

## 2019-06-21 DIAGNOSIS — M05.9 SEROPOSITIVE RHEUMATOID ARTHRITIS (H): Primary | ICD-10-CM

## 2019-06-21 NOTE — RESULT ENCOUNTER NOTE
"MyChart message sent:  \"Ms. Aponte,    Labs showed a reduced white blood cell count.  Tuberculosis screening was negative so Humira has been prescribed to the specialty pharmacy.    Sincerely,  Richard Mcfarlane MD  6/21/2019 2:58 PM\""

## 2019-06-24 ENCOUNTER — TELEPHONE (OUTPATIENT)
Dept: RHEUMATOLOGY | Facility: CLINIC | Age: 54
End: 2019-06-24

## 2019-06-24 NOTE — TELEPHONE ENCOUNTER
PA Initiation    Medication: humira pa pending   Insurance Company: [employer] - Phone [phone] Fax [phone]  Pharmacy Filling the Rx: [place], [place] - [address] [place] [address]  Filling Pharmacy Phone:    Filling Pharmacy Fax:    Start Date: 6/24/2019

## 2019-06-26 ENCOUNTER — OFFICE VISIT (OUTPATIENT)
Dept: FAMILY MEDICINE | Facility: CLINIC | Age: 54
End: 2019-06-26
Payer: COMMERCIAL

## 2019-06-26 VITALS
BODY MASS INDEX: 26.52 KG/M2 | HEART RATE: 83 BPM | OXYGEN SATURATION: 97 % | TEMPERATURE: 98.2 F | WEIGHT: 165 LBS | DIASTOLIC BLOOD PRESSURE: 75 MMHG | SYSTOLIC BLOOD PRESSURE: 118 MMHG | HEIGHT: 66 IN

## 2019-06-26 DIAGNOSIS — F43.22 ADJUSTMENT DISORDER WITH ANXIOUS MOOD: ICD-10-CM

## 2019-06-26 DIAGNOSIS — L21.9 DERMATITIS, SEBORRHEIC: ICD-10-CM

## 2019-06-26 DIAGNOSIS — J45.31 MILD PERSISTENT ASTHMA WITH ACUTE EXACERBATION: ICD-10-CM

## 2019-06-26 DIAGNOSIS — R10.13 ABDOMINAL PAIN, EPIGASTRIC: Primary | ICD-10-CM

## 2019-06-26 PROCEDURE — 99214 OFFICE O/P EST MOD 30 MIN: CPT | Performed by: NURSE PRACTITIONER

## 2019-06-26 RX ORDER — FLUOCINOLONE ACETONIDE 0.11 MG/ML
OIL TOPICAL 2 TIMES DAILY PRN
Qty: 118 ML | Refills: 0 | Status: SHIPPED | OUTPATIENT
Start: 2019-06-26 | End: 2024-06-12

## 2019-06-26 RX ORDER — FLUTICASONE PROPIONATE 44 UG/1
2 AEROSOL, METERED RESPIRATORY (INHALATION) 2 TIMES DAILY
Qty: 1 INHALER | Refills: 1 | Status: SHIPPED | OUTPATIENT
Start: 2019-06-26 | End: 2022-05-03

## 2019-06-26 RX ORDER — ALBUTEROL SULFATE 90 UG/1
2 AEROSOL, METERED RESPIRATORY (INHALATION) EVERY 6 HOURS PRN
Qty: 1 INHALER | Refills: 2 | Status: SHIPPED | OUTPATIENT
Start: 2019-06-26 | End: 2020-01-06

## 2019-06-26 ASSESSMENT — PAIN SCALES - GENERAL: PAINLEVEL: MODERATE PAIN (4)

## 2019-06-26 ASSESSMENT — MIFFLIN-ST. JEOR: SCORE: 1370.19

## 2019-06-26 NOTE — LETTER
[address] [address]  Phone: [phone]    June 26, 2019        Katie Aponte  [address] [facility] [address]          To whom it may concern:    RE: Katie Aponte    Patient was seen and treated today at our clinic and missed work. She can return to work tomorrow.    Please contact me for questions or concerns.      Sincerely,        TREVER Kim CNP

## 2019-06-26 NOTE — PROGRESS NOTES
"Subjective     Katie Aponte is a 53 year old female who presents to clinic today for the following health issues:    HPI   ABDOMINAL   PAIN     Onset: couple of weeks of constant epigastric ache/cramping    Description:   Character: Sharp, Dull ache and Cramping  Location: epigastric region  Radiation: None    Intensity: moderate, 4/10    Progression of Symptoms:  same, intermittent and waxing and waning    Accompanying Signs & Symptoms:  Fever/Chills?: no   Gas/Bloating: YES-bloated  Nausea: YES  Vomitting: no   Diarrhea?: YES- no blood or mucus  Constipation:no   Dysuria or Hematuria: no    History:   Trauma: no   Previous similar pain: no    Previous tests done: none    Precipitating factors:   Does the pain change with:     Food: YES dairy    BM: YES-improved after BM    Urination: no     Alleviating factors:  Lying down, sleep    Therapies Tried and outcome: none    LMP:  not applicable   Patient reports \"slight lactose intolerance.\"  She had a Blizzard 2 days ago- has had diarrhea since Monday night through today.  She complains of \"constant knot and aching\" in stomach.  Appetite is decreased and she is not sleeping well. She also reports that she is under a lot of stress at work.  Her boss reportedly made some racist remarks and patient filed a complaint about it. Her boss now knows who filed the complaint and she is feeling ostracized and uncomfortable at work now.  This occurred about 3 weeks ago and her stomach pain started shortly after this.       Patient Active Problem List   Diagnosis     Knee pain     Abnormal uterine bleeding     CARDIOVASCULAR SCREENING; LDL GOAL LESS THAN 160     Acne rosacea     Dermatitis     Leukopenia     Allergic bronchitis, moderate persistent, uncomplicated     Mild persistent asthma with acute exacerbation     JESENIA (stress urinary incontinence, female)     Class 1 obesity due to excess calories without serious comorbidity with body mass index (BMI) of 30.0 to 30.9 in " adult     Medial meniscus tear     Pain in joint, ankle and foot     Tear of medial meniscus of knee     Pain in joint, upper arm, right     Impingement syndrome, shoulder, right     Right shoulder pain, unspecified chronicity     Pain of right upper extremity     Cervical radiculopathy     Seropositive rheumatoid arthritis (H)     Past Surgical History:   Procedure Laterality Date      SECTION       COLONOSCOPY       CYSTOSCOPY, SLING TRANSVAGINAL N/A 2017    Procedure: CYSTOSCOPY, SLING TRANSVAGINAL;  TRANSVAGINAL TAPING, CYSTOSCOPY, MID URETHRAL SLING;  Surgeon: Jett Montes MD;  Location: [facility]     DILATE CERVIX, ABLATE ENDOMETRIUM THERMACHOICE, COMBINED  4/10/2014    Procedure: COMBINED DILATE CERVIX, ABLATE ENDOMETRIUM THERMACHOICE;;  Surgeon: Jett Montes MD;  Location: [facility]     DILATION AND CURETTAGE, HYSTEROSCOPY, ABLATE ENDOMETRIUM NOVASURE, COMBINED  4/10/2014    Procedure: COMBINED DILATION AND CURETTAGE, HYSTEROSCOPY, ABLATE ENDOMETRIUM NOVASURE;  HYSTEROSCOPY, DILATION AND CURETTAGE, NOVASURE ABLATION ATTEMPTED, THERMACHOICE ABLATION ATTEMPTED;  Surgeon: Jett Montes MD;  Location: [facility]     LAPAROSCOPIC ASSISTED HYSTERECTOMY VAGINAL, BILATERAL SALPINGO-OOPHORECTOMY, COMBINED  2014    Procedure: COMBINED LAPAROSCOPIC ASSISTED HYSTERECTOMY VAGINAL, SALPINGO-OOPHORECTOMY;  Surgeon: Jett Montes MD;  Location: [facility]     ORTHOPEDIC SURGERY      L KNEE MENISCUS,ankle surgery, left knee replacement       Social History     Tobacco Use     Smoking status: Never Smoker     Smokeless tobacco: Never Used   Substance Use Topics     Alcohol use: Yes     Alcohol/week: 0.0 oz     Comment: SOCIAL - 1 glass of wine twice a week; 2 drinks on the weekend     Family History   Problem Relation Age of Onset     Cancer Mother         patient is unsure of what kind         Current Outpatient Medications   Medication Sig Dispense Refill     albuterol (2.5 MG/3ML) 0.083% neb solution Take 1 vial  (2.5 mg) by nebulization every 6 hours as needed for shortness of breath / dyspnea or wheezing 25 vial 1     albuterol (PROAIR HFA/PROVENTIL HFA/VENTOLIN HFA) 108 (90 Base) MCG/ACT inhaler Inhale 2 puffs into the lungs every 6 hours as needed for shortness of breath / dyspnea or wheezing 1 Inhaler 2     desonide (DESOWEN) 0.05 % ointment Apply topically 2 times daily 30 g 0     Estrogens Conjugated (PREMARIN PO) Take 0.9 mg by mouth daily       estrogens conjugated (PREMARIN) 0.9 MG TABS Take 0.3 mg by mouth daily       Fluocinolone Acetonide Scalp 0.01 % OIL oil Apply topically 2 times daily as needed 118 mL 0     fluticasone (FLOVENT HFA) 44 MCG/ACT inhaler Inhale 2 puffs into the lungs 2 times daily 1 Inhaler 1     hydroxychloroquine (PLAQUENIL) 200 MG tablet TAKE 1 TABLET BY MOUTH TWICE DAILY 180 tablet 1     ibuprofen (ADVIL/MOTRIN) 800 MG tablet Take 1 tablet (800 mg) by mouth every 8 hours as needed for moderate pain 60 tablet 1     multivitamin, therapeutic with minerals (MULTI-VITAMIN) TABS tablet Take 1 tablet by mouth daily       order for DME Equipment being ordered:  Knee walker 1 Units 0     order for DME Equipment being ordered: Knee walker  1 Device 0     order for DME Equipment being ordered: Nebulizer with tubing and instructions 1 Device 0     order for DME Equipment being ordered: Carex Bed Buddy- heated neck roll 1 Device 0     order for DME Equipment being ordered: TENS 1 Units 0     ranitidine (ZANTAC) 150 MG capsule Take 1 capsule (150 mg) by mouth 2 times daily 60 capsule 1     triamcinolone (KENALOG) 0.1 % cream Apply sparingly to affected area three times daily as needed 80 g 1     valACYclovir (VALTREX) 500 MG tablet Take 500 mg by mouth as needed Reported on 4/11/2017       adalimumab (HUMIRA *CF*) 40 MG/0.4ML pen kit Inject 0.4 mLs (40 mg) Subcutaneous every 14 days Hold for signs of infection, then seek medical attention. (Patient not taking: Reported on 6/26/2019) 1 each 5      fluticasone (FLONASE) 50 MCG/ACT spray Spray 1-2 sprays into both nostrils daily (Patient not taking: Reported on 2/15/2019) 1 Bottle 11     ipratropium - albuterol 0.5 mg/2.5 mg/3 mL (DUONEB) 0.5-2.5 (3) MG/3ML neb solution Take 1 vial (3 mLs) by nebulization every 6 hours as needed for shortness of breath / dyspnea or wheezing (Patient not taking: Reported on 6/26/2019) 1 vial 0     phentermine 30 MG capsule Take 30 mg by mouth every morning       predniSONE (DELTASONE) 5 MG tablet 4tab=20 mg qd x 5 days, 3tab=15 mg qd x 5 days, 2tab=10 mg qd x 5 days, 1 tab=5 mg qd daily (Patient not taking: Reported on 2/15/2019.) 70 tablet 2     predniSONE (DELTASONE) 5 MG tablet 4tab=20 mg qd x 7 days, 3tab=15 mg qd x 7 days, 2tab=10 mg qd daily. (Patient not taking: Reported on 6/18/2019) 30 tablet 2     BP Readings from Last 3 Encounters:   06/26/19 118/75   06/18/19 118/76   05/17/19 120/75    Wt Readings from Last 3 Encounters:   06/26/19 74.8 kg (165 lb)   06/18/19 74.7 kg (164 lb 9.6 oz)   05/17/19 73.2 kg (161 lb 6.4 oz)                    Asthma Follow-Up    Was ACT completed today?    Yes    ACT Total Scores 10/2/2018   ACT TOTAL SCORE (Goal Greater than or Equal to 20) 20   In the past 12 months, how many times did you visit the emergency room for your asthma without being admitted to the hospital? 0   In the past 12 months, how many times were you hospitalized overnight because of your asthma? 0       How many days per week do you miss taking your asthma controller medication?  I do not have an asthma controller medication    Please describe any recent triggers for your asthma: upper respiratory infections, pollens, humidity and emotions    Have you had any Emergency Room Visits, Urgent Care Visits, or Hospital Admissions since your last office visit?  No      Reviewed and updated as needed this visit by Provider         Review of Systems   ROS COMP: Constitutional, HEENT, cardiovascular, pulmonary, gi and gu  "systems are negative, except as otherwise noted.      Objective    /75 (BP Location: Left arm, Patient Position: Chair, Cuff Size: Adult Regular)   Pulse 83   Temp 98.2  F (36.8  C) (Oral)   Ht 1.676 m (5' 6\")   Wt 74.8 kg (165 lb)   LMP 03/30/2014   SpO2 97%   BMI 26.63 kg/m    Body mass index is 26.63 kg/m .  Physical Exam   GENERAL: healthy, alert and no distress  EYES: Eyes grossly normal to inspection, PERRL and conjunctivae and sclerae normal  HENT: ear canals and TM's normal, nose and mouth without ulcers or lesions  NECK: no adenopathy, no asymmetry, masses, or scars and thyroid normal to palpation  RESP: lungs -scattered  Exp wheezing otherwise, clear to auscultation - no rales or rhonchi  CV: regular rate and rhythm, normal S1 S2, no S3 or S4, no murmur, click or rub, no peripheral edema and peripheral pulses strong  ABDOMEN: soft, nontender, no hepatosplenomegaly, no masses and bowel sounds normal  MS: no gross musculoskeletal defects noted, no edema  SKIN: no suspicious lesions or rashes  NEURO: Normal strength and tone, mentation intact and speech normal  BACK: no CVA tenderness, no paralumbar tenderness  PSYCH: mentation appears normal, affect normal/bright  LYMPH: normal ant/post cervical, supraclavicular nodes    Diagnostic Test Results:  Labs reviewed in Epic  none         Assessment & Plan     1. Abdominal pain, epigastric  Giving trial of Zantac, advised frequent, smaller meals, avoid alcohol, citrus, spicy, fatty foods, return to clinic if not improved, new, or worsening symptoms.   - ranitidine (ZANTAC) 150 MG capsule; Take 1 capsule (150 mg) by mouth 2 times daily  Dispense: 60 capsule; Refill: 1    2. Adjustment disorder with anxious mood  Likely situational.  She has a counselor she can see through her employer and I encouraged her to do so.  If her symptoms persist/worsen, we could consider short term medication such as Buspar but would avoid Benzo.    3. Dermatitis, " "seborrheic  Refilled per patient request.    - Fluocinolone Acetonide Scalp 0.01 % OIL oil; Apply topically 2 times daily as needed  Dispense: 118 mL; Refill: 0    4. Mild persistent asthma with acute exacerbation  Refilled Flovent which patient has not been taking, reviewed triggers/avoidance, return to clinic if not improved, new, or worsening symptoms. ACT=20 today.  - fluticasone (FLOVENT HFA) 44 MCG/ACT inhaler; Inhale 2 puffs into the lungs 2 times daily  Dispense: 1 Inhaler; Refill: 1     BMI:   Estimated body mass index is 26.63 kg/m  as calculated from the following:    Height as of this encounter: 1.676 m (5' 6\").    Weight as of this encounter: 74.8 kg (165 lb).   Weight management plan: Discussed healthy diet and exercise guidelines        Work on weight loss  Regular exercise  See Patient Instructions    Return in about 4 weeks (around 7/24/2019), or if symptoms worsen or fail to improve, for Routine Visit.    TREVER Kim [facility]        "

## 2019-06-26 NOTE — PATIENT INSTRUCTIONS
Patient Education     Adjustment Disorder  Life changes--work, family, parents, children--each can cause a great deal of stress in life. An adjustment disorder means you have trouble dealing with this change and stress. This problem can have serious results. You may feel helpless, depressed, make bad decisions, or even feel like you want to hurt yourself.  Adjustment disorder can cause anxiety or depression. It is triggered by daily stresses such as:    Death of a loved one    Divorce    Marriage    General life changes such as changing or leaving a job    Moving    Illness or other health issue for you or a family member    Sex    Money     Symptoms may include:    Sadness or crying    Anxiety    Insomnia    Poor concentration    Trouble doing simple things    New problems at work or with family or friends    Loss of self-esteem    Sense of hopelessness    Feeling trapped or cut off from others  With this condition, it is common to feel sad, guilty, hopeless, and restless. These feelings may continue for weeks or months. It can be helpful to identify what is causing the additional stress and take steps to get extra support. If new stressful events do not happen, it is likely that you will gradually start feeling better.  Home care    If you have been given a prescription for medicine, take it as directed.    It helps to talk about your feelings and thoughts with family or friends who understand and support you.  Follow-up care  Follow up with your healthcare provider, or therapist as advised. Let them know if this condition does not improve or gets worse.  When to seek medical advice  Call your healthcare provider right away if any of these happen:    Worsening depression or anxiety    Feeling out of control    Thoughts of harming yourself or another    Being unable to care for yourself  Date Last Reviewed: 10/1/2017    [id number] [employer]. [address] [place] [address], [place] [address]. All rights  reserved. This information is not intended as a substitute for professional medical care. Always follow your healthcare professional's instructions.

## 2019-06-26 NOTE — TELEPHONE ENCOUNTER
Prior Authorization Approval    Authorization Effective Date: 6/26/2019  Authorization Expiration Date: lifetime   Medication: humira pa aproved  Approved Dose/Quantity: 2/28ds  Reference #: [id number]   Insurance Company: [employer] - Phone [phone] Fax [phone]  Expected CoPay: na     CoPay Card Available: Yes    Foundation Assistance Needed: na  Which Pharmacy is filling the prescription (Not needed for infusion/clinic administered): [address]  Pharmacy Notified: Yes  Patient Notified: Yes

## 2019-06-27 ASSESSMENT — ASTHMA QUESTIONNAIRES: ACT_TOTALSCORE: 20

## 2019-07-02 NOTE — TELEPHONE ENCOUNTER
Forms have been printed, filled out, signed by Dr. Mcfarlane and faxed to [phone]. Copy has been mailed to patients home address and original has been sent to abstracting.  Noy Burt CMA Rheumatology  7/2/2019 4:22 PM

## 2019-07-03 ENCOUNTER — MYC MEDICAL ADVICE (OUTPATIENT)
Dept: RHEUMATOLOGY | Facility: CLINIC | Age: 54
End: 2019-07-03

## 2019-07-29 ENCOUNTER — ALLIED HEALTH/NURSE VISIT (OUTPATIENT)
Dept: NURSING | Facility: CLINIC | Age: 54
End: 2019-07-29
Payer: COMMERCIAL

## 2019-07-29 DIAGNOSIS — M05.9 SEROPOSITIVE RHEUMATOID ARTHRITIS (H): Primary | ICD-10-CM

## 2019-07-29 PROCEDURE — 99207 ZZC NO CHARGE NURSE ONLY: CPT

## 2019-07-29 NOTE — TELEPHONE ENCOUNTER
Called and scheduled patient a nurse visit for today at 2 PM in [place].    Kanu Capps RN....7/29/2019 12:10 PM

## 2019-07-29 NOTE — PROGRESS NOTES
RN educated patient on Humira injection administration.  Patient administered first shot in [facility] without issue.  She will call with any questions.    Kanu Capps RN....7/29/2019 2:41 PM

## 2019-09-24 ENCOUNTER — OFFICE VISIT (OUTPATIENT)
Dept: FAMILY MEDICINE | Facility: CLINIC | Age: 54
End: 2019-09-24
Payer: COMMERCIAL

## 2019-09-24 VITALS
DIASTOLIC BLOOD PRESSURE: 72 MMHG | BODY MASS INDEX: 27.8 KG/M2 | WEIGHT: 173 LBS | RESPIRATION RATE: 18 BRPM | SYSTOLIC BLOOD PRESSURE: 122 MMHG | HEART RATE: 84 BPM | TEMPERATURE: 99.4 F | HEIGHT: 66 IN | OXYGEN SATURATION: 100 %

## 2019-09-24 DIAGNOSIS — R68.89 FLU-LIKE SYMPTOMS: ICD-10-CM

## 2019-09-24 DIAGNOSIS — J01.90 ACUTE SINUSITIS, RECURRENCE NOT SPECIFIED, UNSPECIFIED LOCATION: Primary | ICD-10-CM

## 2019-09-24 PROCEDURE — 99203 OFFICE O/P NEW LOW 30 MIN: CPT | Performed by: INTERNAL MEDICINE

## 2019-09-24 PROCEDURE — 36415 COLL VENOUS BLD VENIPUNCTURE: CPT | Performed by: INTERNAL MEDICINE

## 2019-09-24 PROCEDURE — 99000 SPECIMEN HANDLING OFFICE-LAB: CPT | Performed by: INTERNAL MEDICINE

## 2019-09-24 PROCEDURE — 86710 INFLUENZA VIRUS ANTIBODY: CPT | Mod: 90 | Performed by: INTERNAL MEDICINE

## 2019-09-24 RX ORDER — AZITHROMYCIN 250 MG/1
TABLET, FILM COATED ORAL
Qty: 6 TABLET | Refills: 2 | Status: SHIPPED | OUTPATIENT
Start: 2019-09-24 | End: 2020-01-06

## 2019-09-24 ASSESSMENT — MIFFLIN-ST. JEOR: SCORE: 1406.47

## 2019-09-24 ASSESSMENT — PAIN SCALES - GENERAL: PAINLEVEL: SEVERE PAIN (6)

## 2019-09-24 NOTE — PROGRESS NOTES
Subjective     Katie Aponte is a 53 year old female who presents to clinic today for the following health issues:    HPI   Acute Illness   Acute illness concerns: Sinus symptoms  Onset: 3-4 days    Fever: YES    Chills/Sweats: YES chills    Headache (location?): YES    Sinus Pressure:YES    Conjunctivitis:  no    Ear Pain: YES: bilateral    Rhinorrhea: YES    Congestion: YES    Sore Throat: YES     Cough: YES-productive of yellow/green sputum    Wheeze: YES    Decreased Appetite: YES    Nausea: no     Vomiting: no     Diarrhea:  no     Dysuria/Freq.: no     Fatigue/Achiness: YES    Sick/Strep Exposure: no      Therapies Tried and outcome: Mucinex with some help, nebulizer cause throat to feel on fire        Patient Active Problem List   Diagnosis     Knee pain     Abnormal uterine bleeding     CARDIOVASCULAR SCREENING; LDL GOAL LESS THAN 160     Acne rosacea     Dermatitis     Leukopenia     Allergic bronchitis, moderate persistent, uncomplicated     Mild persistent asthma with acute exacerbation     JESENIA (stress urinary incontinence, female)     Class 1 obesity due to excess calories without serious comorbidity with body mass index (BMI) of 30.0 to 30.9 in adult     Medial meniscus tear     Pain in joint, ankle and foot     Tear of medial meniscus of knee     Pain in joint, upper arm, right     Impingement syndrome, shoulder, right     Right shoulder pain, unspecified chronicity     Pain of right upper extremity     Cervical radiculopathy     Seropositive rheumatoid arthritis (H)     Past Surgical History:   Procedure Laterality Date      SECTION       COLONOSCOPY       CYSTOSCOPY, SLING TRANSVAGINAL N/A 2017    Procedure: CYSTOSCOPY, SLING TRANSVAGINAL;  TRANSVAGINAL TAPING, CYSTOSCOPY, MID URETHRAL SLING;  Surgeon: Jett Montes MD;  Location: [facility]     DILATE CERVIX, ABLATE ENDOMETRIUM THERMACHOICE, COMBINED  4/10/2014    Procedure: COMBINED DILATE CERVIX, ABLATE ENDOMETRIUM THERMACHOICE;;   Surgeon: Jett Montes MD;  Location: [facility]     DILATION AND CURETTAGE, HYSTEROSCOPY, ABLATE ENDOMETRIUM NOVASURE, COMBINED  4/10/2014    Procedure: COMBINED DILATION AND CURETTAGE, HYSTEROSCOPY, ABLATE ENDOMETRIUM NOVASURE;  HYSTEROSCOPY, DILATION AND CURETTAGE, NOVASURE ABLATION ATTEMPTED, THERMACHOICE ABLATION ATTEMPTED;  Surgeon: Jett Montes MD;  Location: [facility]     LAPAROSCOPIC ASSISTED HYSTERECTOMY VAGINAL, BILATERAL SALPINGO-OOPHORECTOMY, COMBINED  7/31/2014    Procedure: COMBINED LAPAROSCOPIC ASSISTED HYSTERECTOMY VAGINAL, SALPINGO-OOPHORECTOMY;  Surgeon: Jett Montes MD;  Location: [facility]     ORTHOPEDIC SURGERY      L KNEE MENISCUS,ankle surgery, left knee replacement       Social History     Tobacco Use     Smoking status: Never Smoker     Smokeless tobacco: Never Used   Substance Use Topics     Alcohol use: Yes     Alcohol/week: 0.0 standard drinks     Comment: SOCIAL - 1 glass of wine twice a week; 2 drinks on the weekend     Family History   Problem Relation Age of Onset     Cancer Mother         patient is unsure of what kind         Allergies   Allergen Reactions     Droperidol Itching     Feels jumpy     Percocet [Oxycodone-Acetaminophen] Nausea and Vomiting and GI Disturbance     Recent Labs   Lab Test 06/18/19  1453 05/17/19  1249 11/29/18  0905 03/29/18  0913 09/25/15  1540 09/18/15  1001   A1C  --   --   --   --   --  5.7   LDL  --   --   --  131*  --   --    HDL  --   --   --  80  --   --    TRIG  --   --   --  82  --   --    ALT  --  18 25  --  24  --    CR  --  0.82 0.89  --  0.77 0.83   GFRESTIMATED  --  82 66  --  80 73   GFRESTBLACK  --  >90 80  --  >90   GFR Calc   89   POTASSIUM  --   --   --   --  3.9 4.6   TSH 1.73  --   --   --  1.62  --       BP Readings from Last 3 Encounters:   09/24/19 122/72   06/26/19 118/75   06/18/19 118/76    Wt Readings from Last 3 Encounters:   09/24/19 78.5 kg (173 lb)   06/26/19 74.8 kg (165 lb)   06/18/19 74.7 kg (164 lb 9.6 oz)     "                Reviewed and updated as needed this visit by Provider         Review of Systems   ROS COMP: CONSTITUTIONAL: NEGATIVE for fever, chills, change in weight  INTEGUMENTARY/SKIN: NEGATIVE for worrisome rashes, moles or lesions  EYES: NEGATIVE for vision changes or irritation  ENT/MOUTH: NEGATIVE for epistaxis, fever and vertigo  RESP:NEGATIVE for hemoptysis, pleurisy and wheezing  CV: NEGATIVE for chest pain, palpitations or peripheral edema  GI: NEGATIVE for nausea, abdominal pain, heartburn, or change in bowel habits  : NEGATIVE for frequency, dysuria, or hematuria  MUSCULOSKELETAL: NEGATIVE for significant arthralgias or myalgia  NEURO: NEGATIVE for weakness, dizziness or paresthesias  ENDOCRINE: NEGATIVE for temperature intolerance, skin/hair changes  HEME: NEGATIVE for bleeding problems  PSYCHIATRIC: NEGATIVE for changes in mood or affect      Objective    /72 (BP Location: Right arm, Patient Position: Chair, Cuff Size: Adult Large)   Pulse 84   Temp 99.4  F (37.4  C) (Oral)   Resp 18   Ht 1.676 m (5' 6\")   Wt 78.5 kg (173 lb)   LMP 03/30/2014   SpO2 100%   BMI 27.92 kg/m    Body mass index is 27.92 kg/m .  Physical Exam   GENERAL: healthy, alert and no distress  EYES: Eyes grossly normal to inspection and conjunctivae and sclerae normal  HENT: normal cephalic/atraumatic and oral mucous membranes moist  NECK: no adenopathy  RESP: lungs clear to auscultation - no rales, rhonchi or wheezes  CV: regular rate and rhythm, normal S1 S2, no S3 or S4, no murmur, click or rub, no peripheral edema and peripheral pulses strong  MS: no gross musculoskeletal defects noted, no edema  SKIN: no suspicious lesions or rashes  NEURO: Normal strength and tone, mentation intact and speech normal  PSYCH: mentation appears normal, affect normal/bright    Diagnostic Test Results:  Results for orders placed or performed in visit on 09/24/19   Influenza A Virus Antibody IgM   Result Value Ref Range    " "Influenza A Ab IgM 0.39 <=0.89 IV           Assessment & Plan     1. Acute sinusitis, recurrence not specified, unspecified location  Sinus x-rays are usually recommended. Due to time constraints as a result of prolonged waiting, treat patient empirically with macrolide antibiotics since patient is considered immunosuppressed. Add oral corticosteroids due to associated nasal congestion. Consider second-line treatment with Augmentin if Azithromycin is not effective.  - azithromycin (ZITHROMAX) 250 MG tablet; Two tablets first day, then one tablet daily for four days.  Dispense: 6 tablet; Refill: 2  - amoxicillin-clavulanate (AUGMENTIN) 875-125 MG tablet; Take 1 tablet by mouth 2 times daily  Dispense: 20 tablet; Refill: 1  - predniSONE (DELTASONE) 20 MG tablet; Take 2 tablets (40 mg) by mouth daily for 5 days Then take 1 tab daily x 3 days and then 1/2 tab daily x 2 days.  Dispense: 10 tablet; Refill: 1    2. Flu-like symptoms  Influenza ruled out.  - Influenza A Virus Antibody IgM     BMI:   Estimated body mass index is 27.92 kg/m  as calculated from the following:    Height as of this encounter: 1.676 m (5' 6\").    Weight as of this encounter: 78.5 kg (173 lb).   Weight management plan: dietary changes.        FUTURE APPOINTMENTS:       - Follow-up visit in 7 days or as needed.      Adan Masterson MD  [facility]    "

## 2019-09-24 NOTE — LETTER
REPORT OF WORK ABILITY    [address] [address]  [phone]      PATIENT DATA    Employee Name: Katie Aponte      : 1965     #: [id number]    Today's date: 2019  Date of first visit: 2019    PROVIDER EVALUATION: Please fill in as needed.  Please give copy to employee for employer.    1. Diagnosis: Flu-like symptoms                        Acute sinusitis    2. Treatment: Rest and pharmacotherapy  3. Medication: Azithromycin  NOTE: When ordering a medication, [place] Rules require Work Comp or WC on prescriptions.    4. No work from 2019 to 2019.  5. Return to work date: Tentatively, 2019   ** WITH RESTRICTIONS? No restrictions      RESTRICTIONS: Unlimited unless listed.  Restrictions apply to home and leisure also.  If work restrictions is not available, the employee is totally disabled.    Maximum Medical Improvement (Date): Tentative date: 2019  Any Permanent Partial Disability? 0%    Comments: Katie needs to be screened for Influenza A via IgM antibody test. Empirical treatment with Azithromycin given.    Medical Examiner: Adan Masterson MD          License or registration: [place] [id number]    Next appointment: As needed    CC: Employer, Managed Care Plan/Payor, Patient

## 2019-09-24 NOTE — PATIENT INSTRUCTIONS
At [facility], we strive to deliver an exceptional experience to you, every time we see you.  If you receive a survey in the mail, please send us back your thoughts. We really do value your feedback.    Based on your medical history, these are the current health maintenance/preventive care services that you are due for (some may have been done at this visit.)  Health Maintenance Due   Topic Date Due     PREVENTIVE CARE VISIT  1965     HPV  11/12/1986     ZOSTER IMMUNIZATION (1 of 2) 11/12/2015     ASTHMA ACTION PLAN  04/11/2018     PHQ-9  05/03/2018     INFLUENZA VACCINE (1) 09/01/2019         Suggested websites for health information:  [email or web address] : Up to date and easily searchable information on multiple topics.  Www.medlineplus.gov : medication info, interactive tutorials, watch real surgeries online  Www.familydoctor.org : good info from the Academy of Family Physicians  Www.cdc.gov : public health info, travel advisories, epidemics (H1N1)  Www.aap.org : children's health info, normal development, vaccinations  Www.health.[facility].[place].us : [place] dept of health, public health issues in [place], N1N1    Your care team:                            Family Medicine Internal Medicine   MD Adan Moreno MD Shantel Branch-Fleming, MD Katya Georgiev PA-C Nam Ho, MD Pediatrics   HAROON Gallo, MD Nai Rivera CNP, MD Deborah Mielke, MD Kim Thein, APRN CNP      Clinic hours: Monday - Thursday 7 am-7 pm; Fridays 7 am-5 pm.   Urgent care: Monday - Friday 11 am-9 pm; Saturday and Sunday 9 am-5 pm.  Pharmacy : Monday -Thursday 8 am-8 pm; Friday 8 am-6 pm; Saturday and Sunday 9 am-5 pm.     Clinic: [phone]   Pharmacy: [phone]

## 2019-09-25 ASSESSMENT — ASTHMA QUESTIONNAIRES: ACT_TOTALSCORE: 20

## 2019-09-28 ENCOUNTER — HEALTH MAINTENANCE LETTER (OUTPATIENT)
Age: 54
End: 2019-09-28

## 2019-09-28 ENCOUNTER — NURSE TRIAGE (OUTPATIENT)
Dept: NURSING | Facility: CLINIC | Age: 54
End: 2019-09-28

## 2019-09-28 LAB — FLUAV IGM SER IA-ACNC: 0.39 IV

## 2019-09-28 NOTE — TELEPHONE ENCOUNTER
"Patient calling stating she was seen on 9/24/19 for sinus symptoms and has had no improvement on Zithromax.    Patient is questioning if they would change the medication? Reporting ongoing \"sinus pressure\" cough, wheezing. Patient stating she continues to feel difficulty breathing at rest. Patient has not used her inhaler this morning. Influenza A lab in process.  Advised for any difficulty breathing to be seen in the Emergency Room. Patient plans to return to [facility] this morning.  Declines ER.    Caller verbalized understanding. Denies further questions.     Maritza Tolbert RN  [facility]        Reason for Disposition    [1] Difficulty breathing AND [2] not from stuffy nose (e.g., not relieved by cleaning out the nose)    Additional Information    Negative: Severe difficulty breathing (e.g., struggling for each breath, speaks in single words)    Negative: Sounds like a life-threatening emergency to the triager    Negative: [1] Sinus infection AND [2] taking an antibiotic AND [3] symptoms continue    Protocols used: SINUS PAIN OR CONGESTION-A-AH      "

## 2019-10-01 RX ORDER — PREDNISONE 20 MG/1
40 TABLET ORAL DAILY
Qty: 10 TABLET | Refills: 1 | Status: SHIPPED | OUTPATIENT
Start: 2019-10-01 | End: 2019-10-03

## 2019-10-03 ENCOUNTER — TELEPHONE (OUTPATIENT)
Dept: FAMILY MEDICINE | Facility: CLINIC | Age: 54
End: 2019-10-03

## 2019-10-03 DIAGNOSIS — J01.90 ACUTE SINUSITIS, RECURRENCE NOT SPECIFIED, UNSPECIFIED LOCATION: ICD-10-CM

## 2019-10-03 RX ORDER — PREDNISONE 20 MG/1
40 TABLET ORAL DAILY
Qty: 14 TABLET | Refills: 2 | Status: SHIPPED | OUTPATIENT
Start: 2019-10-03 | End: 2020-01-06

## 2019-10-03 NOTE — TELEPHONE ENCOUNTER
Reason for Call:  Other prescription    Detailed comments: patient has questions about  Prednisone. The amount she was given and the directions do not match.    Phone Number Patient can be reached at: Home number on file [phone] (home)    Best Time: any    Can we leave a detailed message on this number? YES    Call taken on 10/3/2019 at 3:06 PM by Savanna Sorensen

## 2019-10-03 NOTE — TELEPHONE ENCOUNTER
Rx qty should be 14 but sounds like patient received qty 10 tabs of prednisone.     Routing to provider to review and advise.   Michelle Osei RN

## 2019-10-15 ENCOUNTER — OFFICE VISIT (OUTPATIENT)
Dept: RHEUMATOLOGY | Facility: CLINIC | Age: 54
End: 2019-10-15
Payer: COMMERCIAL

## 2019-10-15 VITALS
HEART RATE: 71 BPM | DIASTOLIC BLOOD PRESSURE: 68 MMHG | BODY MASS INDEX: 27.32 KG/M2 | HEIGHT: 66 IN | WEIGHT: 170 LBS | SYSTOLIC BLOOD PRESSURE: 100 MMHG

## 2019-10-15 DIAGNOSIS — M05.9 SEROPOSITIVE RHEUMATOID ARTHRITIS (H): Primary | ICD-10-CM

## 2019-10-15 DIAGNOSIS — Z79.899 HIGH RISK MEDICATIONS (NOT ANTICOAGULANTS) LONG-TERM USE: ICD-10-CM

## 2019-10-15 LAB
ALBUMIN SERPL-MCNC: 3.4 G/DL (ref 3.4–5)
ALP SERPL-CCNC: 63 U/L (ref 40–150)
ALT SERPL W P-5'-P-CCNC: 16 U/L (ref 0–50)
AST SERPL W P-5'-P-CCNC: 10 U/L (ref 0–45)
BASOPHILS # BLD AUTO: 0 10E9/L (ref 0–0.2)
BASOPHILS NFR BLD AUTO: 1 %
BILIRUB DIRECT SERPL-MCNC: 0.1 MG/DL (ref 0–0.2)
BILIRUB SERPL-MCNC: 0.4 MG/DL (ref 0.2–1.3)
CREAT SERPL-MCNC: 0.85 MG/DL (ref 0.52–1.04)
DIFFERENTIAL METHOD BLD: ABNORMAL
EOSINOPHIL # BLD AUTO: 0.2 10E9/L (ref 0–0.7)
EOSINOPHIL NFR BLD AUTO: 6 %
ERYTHROCYTE [DISTWIDTH] IN BLOOD BY AUTOMATED COUNT: 12.8 % (ref 10–15)
GFR SERPL CREATININE-BSD FRML MDRD: 78 ML/MIN/{1.73_M2}
HCT VFR BLD AUTO: 40.2 % (ref 35–47)
HGB BLD-MCNC: 13.1 G/DL (ref 11.7–15.7)
LYMPHOCYTES # BLD AUTO: 1.9 10E9/L (ref 0.8–5.3)
LYMPHOCYTES NFR BLD AUTO: 60 %
MCH RBC QN AUTO: 27.6 PG (ref 26.5–33)
MCHC RBC AUTO-ENTMCNC: 32.6 G/DL (ref 31.5–36.5)
MCV RBC AUTO: 85 FL (ref 78–100)
MONOCYTES # BLD AUTO: 0.3 10E9/L (ref 0–1.3)
MONOCYTES NFR BLD AUTO: 9 %
NEUTROPHILS # BLD AUTO: 0.8 10E9/L (ref 1.6–8.3)
NEUTROPHILS NFR BLD AUTO: 24 %
PLATELET # BLD AUTO: 206 10E9/L (ref 150–450)
PLATELET # BLD EST: ABNORMAL 10*3/UL
PROT SERPL-MCNC: 7.6 G/DL (ref 6.8–8.8)
RBC # BLD AUTO: 4.74 10E12/L (ref 3.8–5.2)
RBC MORPH BLD: NORMAL
WBC # BLD AUTO: 3.2 10E9/L (ref 4–11)

## 2019-10-15 PROCEDURE — 99213 OFFICE O/P EST LOW 20 MIN: CPT | Performed by: INTERNAL MEDICINE

## 2019-10-15 PROCEDURE — 36415 COLL VENOUS BLD VENIPUNCTURE: CPT | Performed by: INTERNAL MEDICINE

## 2019-10-15 PROCEDURE — 82565 ASSAY OF CREATININE: CPT | Performed by: INTERNAL MEDICINE

## 2019-10-15 PROCEDURE — 85025 COMPLETE CBC W/AUTO DIFF WBC: CPT | Performed by: INTERNAL MEDICINE

## 2019-10-15 PROCEDURE — 80076 HEPATIC FUNCTION PANEL: CPT | Performed by: INTERNAL MEDICINE

## 2019-10-15 RX ORDER — HYDROXYCHLOROQUINE SULFATE 200 MG/1
200 TABLET, FILM COATED ORAL DAILY
Qty: 90 TABLET | Refills: 1 | Status: SHIPPED | OUTPATIENT
Start: 2019-10-15 | End: 2020-02-04

## 2019-10-15 ASSESSMENT — MIFFLIN-ST. JEOR: SCORE: 1392.86

## 2019-10-15 NOTE — NURSING NOTE
RAPID3 (0-30) Cumulative Score  13.5          RAPID3 Weighted Score (divide #4 by 3 and that is the weighted score)  4.5

## 2019-10-15 NOTE — PROGRESS NOTES
Rheumatology Clinic Visit      Katie Aponte MRN# [id number]   YOB: 1965 Age: 53 year old      Date of visit: 10/15/19   Referring provider: Dr. Francis Capps  PCP: Dr. Karlee Birmingham    Chief Complaint   Patient presents with:  Arthritis      Assessment and Plan     1.  Seropositive nonerosive rheumatoid arthritis (RF 84, CCP >340): Diagnosed in 2018.  Previously followed at the [facility].  Currently on HCQ 200mg BID and Humira 40mg SQ every 14 days (started June 2019). Note that methotrexate, leflunomide, and sulfasalazine are avoided because of chronic neutropenia.  Significant improvement since starting Humira.  However, she has continued Humira during sinusitis and antibiotic treatment; we reviewed Humira again and the importance of not taking it during infection or antibiotic use for an infection.  Reduce HCQ because her arthritis is better controlled; and based on weight.   - Continue Humira 40 mg SQ every 14 days; hold for infection  - Reduce hydroxychloroquine to 200 mg daily (last eye exam was performed on 3/11/2019)  - Labs today: CBC, Cr, Hepatic Panel    2.  Fatigue: Likely multifactorial.  Possibly related to RA disease activity and recent sinusitis with persistent congestion. She plans to follow-up with her PCP.     3. Bone health: post-menopausal s/p HEMALATHA; was on prednisone for RA.  DEXA ordered. Checking vitamin D as noted above    4. Raynaud's Phenomenon; positive IRIS: Reportedly started at the age of 15 years old.  IRIS is positive but she does not have other symptoms than an inflammatory arthritis to support an IRIS-associated rheumatologic disorder.  No other symptoms to suggest systemic sclerosis.  Reviewed the diagnosis of Raynaud's phenomenon and the treatment options.  She is doing well with cold avoidance at this time    5.  Pes planus bilaterally: Has been followed by podiatry.  She is considering orthotics.    6.  Right shoulder pain: Normal range of motion.   Consistent with impingement syndrome; possible that partial rotator cuff tear.  She reportedly has not responded to steroid injections.  Improving with Humira.     Ms. Aponte verbalized agreement with and understanding of the rational for the diagnosis and treatment plan.  All questions were answered to best of my ability and the patient's satisfaction. Ms. Aponte was advised to contact the clinic with any questions that may arise after the clinic visit.      Thank you for involving me in the care of the patient    Return to clinic: 3 months      HPI   Katie Aponte is a 53 year old female with a past medical history significant for acne rosacea, dermatitis, leukopenia, allergic bronchitis, medial meniscal tear, right shoulder impingement syndrome, cervical radiculopathy, and rheumatoid arthritis who is seen for follow-up of RA.     5/17/2019 [facility] rheumatology clinic note by Dr. Johnnie Medley documents seropositive rheumatoid arthritis with a positive rheumatoid factor and a positive CCP.  History of left knee partial replacement 4.5 years ago.  IRIS 1: 40.  CCP >340.  Rheumatoid factor 84.  Negative IRIS and dsDNA; normal C3 and C4.  Negative hepatitis B/C, TB.  OCT testing 3/11/2019 normal.  Symptoms started in June 2018 with pain in the left heel.  She was seen by podiatry.  Later developed right ankle swelling.  Symptoms worsened over time to include both ankles and both Achilles tendons.  Also with pain in the second-fourth fingers of the left hand and morning stiffness for couple hours.  Also history of Raynaud's phenomenon.  Sicca symptoms possibly related to phentermine use.  Plan was to continue Plaquenil 200 mg twice daily and add x-rays of her hands and feet to look for inflammatory arthritis.    6/18/2019:  Ms. Aponte reported that she was diagnosed with rheumatoid arthritis in 2018.  She initially had pain at her Achilles tendon, then ankles, other than both ankles and both  Achilles tendons, that her hands and knees.  She was found to have elevated rheumatoid factor and CCP by her podiatrist and was subsequently referred to rheumatology.  She was seen at the [facility] where hydroxychloroquine was started and she felt improvement with this.  She continues to have pain at her right foot that is worse with increased ambulation; she has been following with podiatry.  She has a history of right shoulder impingement syndrome that did not improve with 2 steroid injections; she is followed in the sports medicine clinic and plans to follow-up there again.  She is also gone to physical therapy without improvement.  Left first MCP and bilateral first CMC's bother her.  Also with some pain at the right second PIP.  History of left partial knee replacement.  Morning stiffness for approximately 7 hours; she wakes up at 5:15 AM and is improved by noon.  Raynaud's phenomenon diagnosed at the age of 15 years old and is successfully treated with cold avoidance; typically just affects her fingers.  She has had dysphagia twice in her life.  No pain or difficulty with swallowing otherwise.  No skin thickening or skin tightening.      Today, 10/15/2019: Feels better since starting Humira.  Tolerating injections.  Still with some shoulder pain but it is improving.  Morning stiffness for approximately 1-2 hours.  Biggest issue right now she has sinusitis treated with 2 antibiotics without improvement and now she has a cough.  She is going to follow-up with her PCP for the sinusitis and cough.  She has not held Humira during these infections.    Denies fevers, chills, nausea, vomiting, constipation, diarrhea. No abdominal pain. No chest pain/pressure, palpitations, or shortness of breath. No LE swelling. No neck pain. No oral or nasal sores.  No rash. No sicca symptoms. No photosensitivity or photophobia. No eye pain or redness. No history of inflammatory eye disease.  No history of inflammatory  bowel disease.  No history of DVT, pulmonary embolism, or miscarriage.   No history of serositis.  No seizure history.  No known renal disorder.      Tobacco: None  EtOH: 0-4 drinks per week  Drugs: None    ROS   GEN: No fevers, chills, night sweats, or weight change  SKIN: No itching, rashes, sores; see HPI  HEENT: No epistaxis. No oral or nasal ulcers.  CV: No chest pain, pressure, palpitations, or dyspnea on exertion.  PULM: See HPI  GI: No nausea, vomiting, constipation, diarrhea. No blood in stool. No abdominal pain.  : No blood in urine.  MSK: See HPI.  NEURO: No numbness or tingling  ENDO: No heat/cold intolerance.  EXT: No LE swelling  PSYCH: Negative    Active Problem List     Patient Active Problem List   Diagnosis     Knee pain     Abnormal uterine bleeding     CARDIOVASCULAR SCREENING; LDL GOAL LESS THAN 160     Acne rosacea     Dermatitis     Leukopenia     Allergic bronchitis, moderate persistent, uncomplicated     Mild persistent asthma with acute exacerbation     JESENIA (stress urinary incontinence, female)     Class 1 obesity due to excess calories without serious comorbidity with body mass index (BMI) of 30.0 to 30.9 in adult     Medial meniscus tear     Pain in joint, ankle and foot     Tear of medial meniscus of knee     Pain in joint, upper arm, right     Impingement syndrome, shoulder, right     Right shoulder pain, unspecified chronicity     Pain of right upper extremity     Cervical radiculopathy     Seropositive rheumatoid arthritis (H)     Past Medical History     Past Medical History:   Diagnosis Date     Herpes     Under eye     Joint pain      Seasonal allergies      Thrombocytopenia (H) 2015     Uncomplicated asthma     very mild     Past Surgical History     Past Surgical History:   Procedure Laterality Date      SECTION       COLONOSCOPY       CYSTOSCOPY, SLING TRANSVAGINAL N/A 2017    Procedure: CYSTOSCOPY, SLING TRANSVAGINAL;  TRANSVAGINAL TAPING, CYSTOSCOPY, MID  URETHRAL SLING;  Surgeon: Jett Montes MD;  Location:  OR     DILATE CERVIX, ABLATE ENDOMETRIUM THERMACHOICE, COMBINED  4/10/2014    Procedure: COMBINED DILATE CERVIX, ABLATE ENDOMETRIUM THERMACHOICE;;  Surgeon: Jett Montes MD;  Location: [facility]     DILATION AND CURETTAGE, HYSTEROSCOPY, ABLATE ENDOMETRIUM NOVASURE, COMBINED  4/10/2014    Procedure: COMBINED DILATION AND CURETTAGE, HYSTEROSCOPY, ABLATE ENDOMETRIUM NOVASURE;  HYSTEROSCOPY, DILATION AND CURETTAGE, NOVASURE ABLATION ATTEMPTED, THERMACHOICE ABLATION ATTEMPTED;  Surgeon: Jett Montes MD;  Location: [facility]     LAPAROSCOPIC ASSISTED HYSTERECTOMY VAGINAL, BILATERAL SALPINGO-OOPHORECTOMY, COMBINED  7/31/2014    Procedure: COMBINED LAPAROSCOPIC ASSISTED HYSTERECTOMY VAGINAL, SALPINGO-OOPHORECTOMY;  Surgeon: Jett Montes MD;  Location: [facility]     ORTHOPEDIC SURGERY      L KNEE MENISCUS,ankle surgery, left knee replacement     Allergy     Allergies   Allergen Reactions     Droperidol Itching     Feels jumpy     Percocet [Oxycodone-Acetaminophen] Nausea and Vomiting and GI Disturbance     Current Medication List     Current Outpatient Medications   Medication Sig     adalimumab (HUMIRA *CF*) 40 MG/0.4ML pen kit Inject 0.4 mLs (40 mg) Subcutaneous every 14 days Hold for signs of infection, then seek medical attention.     albuterol (2.5 MG/3ML) 0.083% neb solution Take 1 vial (2.5 mg) by nebulization every 6 hours as needed for shortness of breath / dyspnea or wheezing     albuterol (PROAIR HFA/PROVENTIL HFA/VENTOLIN HFA) 108 (90 Base) MCG/ACT inhaler Inhale 2 puffs into the lungs every 6 hours as needed for shortness of breath / dyspnea or wheezing     amoxicillin-clavulanate (AUGMENTIN) 875-125 MG tablet Take 1 tablet by mouth 2 times daily     azithromycin (ZITHROMAX) 250 MG tablet Two tablets first day, then one tablet daily for four days.     Cetirizine-Pseudoephedrine (ZYRTEC-D PO)      desonide (DESOWEN) 0.05 % ointment Apply topically 2 times  daily     Estrogens Conjugated (PREMARIN PO) Take 0.9 mg by mouth daily     estrogens conjugated (PREMARIN) 0.9 MG TABS Take 0.3 mg by mouth daily     Fluocinolone Acetonide Scalp 0.01 % OIL oil Apply topically 2 times daily as needed     fluticasone (FLONASE) 50 MCG/ACT spray Spray 1-2 sprays into both nostrils daily (Patient not taking: Reported on 9/24/2019)     fluticasone (FLOVENT HFA) 44 MCG/ACT inhaler Inhale 2 puffs into the lungs 2 times daily     hydroxychloroquine (PLAQUENIL) 200 MG tablet TAKE 1 TABLET BY MOUTH TWICE DAILY     ibuprofen (ADVIL/MOTRIN) 800 MG tablet Take 1 tablet (800 mg) by mouth every 8 hours as needed for moderate pain (Patient not taking: Reported on 9/24/2019)     ipratropium - albuterol 0.5 mg/2.5 mg/3 mL (DUONEB) 0.5-2.5 (3) MG/3ML neb solution Take 1 vial (3 mLs) by nebulization every 6 hours as needed for shortness of breath / dyspnea or wheezing     multivitamin, therapeutic with minerals (MULTI-VITAMIN) TABS tablet Take 1 tablet by mouth daily     order for DME Equipment being ordered:  Knee walker     order for DME Equipment being ordered: Knee walker      order for DME Equipment being ordered: Nebulizer with tubing and instructions     order for DME Equipment being ordered: [employer]x Bed Buddy- heated neck roll     order for DME Equipment being ordered: TENS     phentermine 30 MG capsule Take 30 mg by mouth every morning     predniSONE (DELTASONE) 20 MG tablet Take 2 tablets (40 mg) by mouth daily Then take 1 tab daily x 3 days and then 1/2 tab daily x 2 days.     predniSONE (DELTASONE) 5 MG tablet 4tab=20 mg qd x 5 days, 3tab=15 mg qd x 5 days, 2tab=10 mg qd x 5 days, 1 tab=5 mg qd daily (Patient not taking: Reported on 9/24/2019)     triamcinolone (KENALOG) 0.1 % cream Apply sparingly to affected area three times daily as needed     valACYclovir (VALTREX) 500 MG tablet Take 500 mg by mouth as needed Reported on 4/11/2017     Current Facility-Administered Medications  "  Medication     betamethasone acet & sod phos (CELESTONE) injection 6 mg     betamethasone acet & sod phos (CELESTONE) injection 6 mg     ropivacaine (NAROPIN) injection 3 mL     ropivacaine (NAROPIN) injection 3 mL         Social History   See HPI    Family History     Family History   Problem Relation Age of Onset     Cancer Mother         patient is unsure of what kind     Physical Exam     Temp Readings from Last 3 Encounters:   09/24/19 99.4  F (37.4  C) (Oral)   06/26/19 98.2  F (36.8  C) (Oral)   02/15/19 97.9  F (36.6  C)     BP Readings from Last 5 Encounters:   09/24/19 122/72   06/26/19 118/75   06/18/19 118/76   05/17/19 120/75   03/12/19 112/70     Pulse Readings from Last 1 Encounters:   09/24/19 84     Resp Readings from Last 1 Encounters:   09/24/19 18     Estimated body mass index is 27.92 kg/m  as calculated from the following:    Height as of 9/24/19: 1.676 m (5' 6\").    Weight as of 9/24/19: 78.5 kg (173 lb).    GEN: NAD  HEENT: MMM. No oral lesions. Anicteric, noninjected sclera  CV: S1, S2. RRR. No m/r/g.  PULM: CTA bilaterally. No w/c.  No increased work of breathing.  Nasal congestion.  MSK: MCPs, PIPs, DIPs, wrists, elbows, knees, ankles, and MTPs without swelling or tenderness to palpation.  Bilateral first CMC joints tender to palpation but without swelling or increased warmth; no squaring.  Left shoulder without swelling or tenderness to palpation; normal range of motion.  Right shoulder tender to palpation on the most lateral aspect; pain with abduction above 90 degrees; no swelling or increased warmth.   Pes planus bilaterally.  Achilles tendons nontender to palpation.  No dactylitis.       NEURO: UE and LE strengths 5/5 and equal bilaterally.   SKIN: No rash.  No skin thickening or skin tightening observed.  No evidence of current or previous ischemic insults to the fingers by visual inspection.  EXT: No LE edema  PSYCH: Alert. Appropriate.    Labs / Imaging (select studies) "     RF/CCP  Recent Labs   Lab Test 08/31/18  1220 06/29/18  1301   CCPIGG >340*  --    RHF  --  84*     CBC  Recent Labs   Lab Test 06/18/19  1453 05/17/19  1249 11/29/18  0905  09/25/15  1540 09/18/15  1023   WBC 3.3* 3.5* 3.5*  --  3.2*  --    RBC 4.56 4.71 4.76  --  4.74  --    HGB 13.1 13.2 13.2   < > 13.6  --    HCT 39.2 40.6 40.7  --  38.9  --    MCV 86 86 86  --  82  --    RDW 13.8 13.3 13.2  --  12.9  --     204 199  --  161  --    MCH 28.7 28.0 27.7  --  28.7  --    MCHC 33.4 32.5 32.4  --  35.0  --    NEUTROPHIL 35.8  --   --   --  31.7 33.0   LYMPH 40.8  --   --   --  49.2 50.0   MONOCYTE 11.4  --   --   --  11.9 9.0   EOSINOPHIL 11.1  --   --   --  6.6 7.0   BASOPHIL 0.9  --   --   --  0.6 1.0   ANEU 1.2*  --   --   --  1.0*  --    ALYM 1.4  --   --   --  1.6  --    FIDE 0.4  --   --   --  0.4  --    AEOS 0.4  --   --   --  0.2  --    ABAS 0.0  --   --   --  0.0  --     < > = values in this interval not displayed.     CMP  Recent Labs   Lab Test 05/17/19  1249 11/29/18  0905 09/25/15  1540 09/18/15  1001  08/01/14  0749   NA  --   --  139 136  --   --    POTASSIUM  --   --  3.9 4.6  --   --    CHLORIDE  --   --  104 103  --   --    CO2  --   --  30 31  --   --    ANIONGAP  --   --  5 2*  --   --    GLC  --   --  99 96  --  101*   BUN  --   --  9 11  --   --    CR 0.82 0.89 0.77 0.83   < >  --    GFRESTIMATED 82 66 80 73   < >  --    GFRESTBLACK >90 80 >90   GFR Calc   89   < >  --    NATALYA  --   --  8.0* 9.3  --   --    BILITOTAL  --   --  0.4  --   --   --    ALBUMIN 3.8 3.3* 3.5  --   --   --    PROTTOTAL  --   --  7.6  --   --   --    ALKPHOS  --   --  63  --   --   --    AST 14 16 14  --   --   --    ALT 18 25 24  --   --   --     < > = values in this interval not displayed.     Calcium/VitaminD  Recent Labs   Lab Test 06/18/19  1453 09/25/15  1540 09/18/15  1001   NATALYA  --  8.0* 9.3   VITDT 33  --   --      ESR/CRP  Recent Labs   Lab Test 08/31/18  1220 06/29/18  1301   SED 19 17    CRP <2.9  --      Hepatitis B  Recent Labs   Lab Test 08/31/18  1220 09/25/15  1540   AUSAB  --  0.09   HBCAB Nonreactive Nonreactive   HEPBANG Nonreactive Nonreactive     Hepatitis C  Recent Labs   Lab Test 08/31/18  1220 03/29/18  0913   HCVAB Nonreactive Nonreactive     Lyme ab screening  Recent Labs   Lab Test 06/29/18  1301   LYMEGM 0.15     Tuberculosis Screening  Recent Labs   Lab Test 06/18/19  1453 08/31/18  1220   TBRES Negative Negative     HIV Screening  Recent Labs   Lab Test 09/25/15  1540   HIAGAB Nonreactive   HIV-1 p24 Ag & HIV-1/HIV-2 Ab Not Detected             Immunization History     Immunization History   Administered Date(s) Administered     Influenza (IIV3) PF 10/20/2009     Tdap (Adacel,Boostrix) 01/25/2010          Chart documentation done in part with Dragon Voice recognition Software. Although reviewed after completion, some word and grammatical error may remain.    Richard Mcfarlane MD

## 2019-10-15 NOTE — PATIENT INSTRUCTIONS
Rheumatology    Dr. Richard Mcfarlane         [facility]   (Monday)  [address]  [place] [address]       [facility]   (Tuesday)  [address] [place] [address]    [facility]   (Wed., Thurs., and Friday)  [address] [place] [address]    Phone number: [phone]  Thank you for choosing [facility].  EDMUND Ortiz RMA

## 2019-10-16 ENCOUNTER — TELEPHONE (OUTPATIENT)
Dept: PHARMACY | Facility: CLINIC | Age: 54
End: 2019-10-16

## 2019-10-16 NOTE — RESULT ENCOUNTER NOTE
"[employer] message sent:  \"Ms. Aponte,    The white blood cell count remains reduced similar to previous labs.  No change to the treatment plan based on these labs.    Sincerely,  Richard Mcfarlane MD  10/16/2019 5:37 PM\""

## 2019-11-10 PROBLEM — M79.601 PAIN OF RIGHT UPPER EXTREMITY: Status: RESOLVED | Noted: 2019-04-07 | Resolved: 2019-11-10

## 2019-11-10 PROBLEM — M75.41 IMPINGEMENT SYNDROME, SHOULDER, RIGHT: Status: RESOLVED | Noted: 2018-10-19 | Resolved: 2019-11-10

## 2019-11-10 PROBLEM — M54.12 CERVICAL RADICULOPATHY: Status: RESOLVED | Noted: 2019-04-07 | Resolved: 2019-11-10

## 2019-11-10 PROBLEM — M25.511 RIGHT SHOULDER PAIN, UNSPECIFIED CHRONICITY: Status: RESOLVED | Noted: 2018-10-19 | Resolved: 2019-11-10

## 2019-11-11 NOTE — PROGRESS NOTES
DISCHARGE SUMMARY    Katie Aponte was seen 5 times for evaluation and treatment.  Patient did not return for further treatment and current status is unknown.  Due to short treatment duration, no objective or functional changes were made.  Please see goal flow sheet from episode noted date below and initial evaluation for further information.  Patient is discharged from therapy and therapy episode is resolved as of 11/10/19.      Linked Episodes   Type: Episode: Status: Noted: Resolved: Last update: Updated by:   PHYSICAL THERAPY [id number] Active 10/19/2018  3/30/2019  8:04 PM Lawanda Newell, PT      Comments:

## 2019-11-11 NOTE — PROGRESS NOTES
Patient did not return for further treatment and no additional progress was noted.  Please refer to the progress note and goal flowsheet completed on 05/24/19 for discharge information.

## 2020-01-06 ENCOUNTER — OFFICE VISIT (OUTPATIENT)
Dept: FAMILY MEDICINE | Facility: CLINIC | Age: 55
End: 2020-01-06
Payer: COMMERCIAL

## 2020-01-06 ENCOUNTER — ANCILLARY PROCEDURE (OUTPATIENT)
Dept: GENERAL RADIOLOGY | Facility: CLINIC | Age: 55
End: 2020-01-06
Attending: INTERNAL MEDICINE
Payer: COMMERCIAL

## 2020-01-06 ENCOUNTER — TELEPHONE (OUTPATIENT)
Dept: RHEUMATOLOGY | Facility: CLINIC | Age: 55
End: 2020-01-06

## 2020-01-06 VITALS
TEMPERATURE: 98.3 F | DIASTOLIC BLOOD PRESSURE: 85 MMHG | OXYGEN SATURATION: 98 % | WEIGHT: 178 LBS | BODY MASS INDEX: 28.61 KG/M2 | SYSTOLIC BLOOD PRESSURE: 134 MMHG | HEIGHT: 66 IN | HEART RATE: 74 BPM

## 2020-01-06 DIAGNOSIS — J45.31 MILD PERSISTENT ASTHMA WITH ACUTE EXACERBATION: ICD-10-CM

## 2020-01-06 DIAGNOSIS — J01.01 ACUTE RECURRENT MAXILLARY SINUSITIS: Primary | ICD-10-CM

## 2020-01-06 DIAGNOSIS — R05.8 RECURRENT PRODUCTIVE COUGH: ICD-10-CM

## 2020-01-06 PROCEDURE — 99214 OFFICE O/P EST MOD 30 MIN: CPT | Performed by: INTERNAL MEDICINE

## 2020-01-06 PROCEDURE — 71046 X-RAY EXAM CHEST 2 VIEWS: CPT

## 2020-01-06 RX ORDER — PREDNISONE 20 MG/1
40 TABLET ORAL DAILY
Qty: 17 TABLET | Refills: 1 | Status: SHIPPED | OUTPATIENT
Start: 2020-01-06 | End: 2020-01-11

## 2020-01-06 RX ORDER — ALBUTEROL SULFATE 0.83 MG/ML
2.5 SOLUTION RESPIRATORY (INHALATION) 4 TIMES DAILY PRN
Qty: 50 VIAL | Refills: 2 | Status: SHIPPED | OUTPATIENT
Start: 2020-01-06 | End: 2021-05-18

## 2020-01-06 RX ORDER — ALBUTEROL SULFATE 90 UG/1
2 AEROSOL, METERED RESPIRATORY (INHALATION) EVERY 6 HOURS PRN
Qty: 1 INHALER | Refills: 11 | Status: SHIPPED | OUTPATIENT
Start: 2020-01-06 | End: 2021-06-15

## 2020-01-06 RX ORDER — BENZONATATE 100 MG/1
100 CAPSULE ORAL 3 TIMES DAILY PRN
Qty: 42 CAPSULE | Refills: 1 | Status: SHIPPED | OUTPATIENT
Start: 2020-01-06 | End: 2021-05-26

## 2020-01-06 ASSESSMENT — PATIENT HEALTH QUESTIONNAIRE - PHQ9: SUM OF ALL RESPONSES TO PHQ QUESTIONS 1-9: 1

## 2020-01-06 ASSESSMENT — PAIN SCALES - GENERAL: PAINLEVEL: NO PAIN (0)

## 2020-01-06 ASSESSMENT — MIFFLIN-ST. JEOR: SCORE: 1424.15

## 2020-01-06 NOTE — PROGRESS NOTES
Neida Aponte is a 54 year old female who presents to clinic today for the following health issues:    HPI   ENT Symptoms             Symptoms: cc Present Absent Comment   Fever/Chills   x    Fatigue  x     Muscle Aches  x     Eye Irritation  x     Sneezing  x     Nasal Jl/Drg  x     Sinus Pressure/Pain  x     Loss of smell  x     Dental pain   x    Sore Throat   x    Swollen Glands   x    Ear Pain/Fullness  x     Cough  x     Wheeze  x     Chest Pain   x    Shortness of breath   x    Rash   x    Other         Symptom duration:  about 5-6 days ago   Symptom severity:  moderate   Treatments tried:  Robitussin & Mucinex     Contacts:  work           Patient Active Problem List   Diagnosis     Knee pain     Abnormal uterine bleeding     CARDIOVASCULAR SCREENING; LDL GOAL LESS THAN 160     Acne rosacea     Dermatitis     Leukopenia     Allergic bronchitis, moderate persistent, uncomplicated     Mild persistent asthma with acute exacerbation     JESENIA (stress urinary incontinence, female)     Class 1 obesity due to excess calories without serious comorbidity with body mass index (BMI) of 30.0 to 30.9 in adult     Medial meniscus tear     Pain in joint, ankle and foot     Tear of medial meniscus of knee     Pain in joint, upper arm, right     Seropositive rheumatoid arthritis (H)     Past Surgical History:   Procedure Laterality Date      SECTION       COLONOSCOPY       CYSTOSCOPY, SLING TRANSVAGINAL N/A 2017    Procedure: CYSTOSCOPY, SLING TRANSVAGINAL;  TRANSVAGINAL TAPING, CYSTOSCOPY, MID URETHRAL SLING;  Surgeon: Jett Montes MD;  Location:  OR     DILATE CERVIX, ABLATE ENDOMETRIUM THERMACHOICE, COMBINED  4/10/2014    Procedure: COMBINED DILATE CERVIX, ABLATE ENDOMETRIUM THERMACHOICE;;  Surgeon: Jett Montes MD;  Location:  SD     DILATION AND CURETTAGE, HYSTEROSCOPY, ABLATE ENDOMETRIUM NOVASURE, COMBINED  4/10/2014    Procedure: COMBINED DILATION AND CURETTAGE, HYSTEROSCOPY,  ABLATE ENDOMETRIUM NOVASURE;  HYSTEROSCOPY, DILATION AND CURETTAGE, NOVASURE ABLATION ATTEMPTED, THERMACHOICE ABLATION ATTEMPTED;  Surgeon: Jett Montes MD;  Location: [facility]     LAPAROSCOPIC ASSISTED HYSTERECTOMY VAGINAL, BILATERAL SALPINGO-OOPHORECTOMY, COMBINED  7/31/2014    Procedure: COMBINED LAPAROSCOPIC ASSISTED HYSTERECTOMY VAGINAL, SALPINGO-OOPHORECTOMY;  Surgeon: Jett Montes MD;  Location: [facility]     ORTHOPEDIC SURGERY      L KNEE MENISCUS,ankle surgery, left knee replacement       Social History     Tobacco Use     Smoking status: Never Smoker     Smokeless tobacco: Never Used   Substance Use Topics     Alcohol use: Yes     Alcohol/week: 0.0 standard drinks     Comment: SOCIAL - 1 glass of wine twice a week; 2 drinks on the weekend     Family History   Problem Relation Age of Onset     Cancer Mother         patient is unsure of what kind         Allergies   Allergen Reactions     Droperidol Itching     Feels jumpy     Percocet [Oxycodone-Acetaminophen] Nausea and Vomiting and GI Disturbance     Recent Labs   Lab Test 10/15/19  1552 06/18/19  1453 05/17/19  1249 11/29/18  0905 03/29/18  0913 09/25/15  1540  09/18/15  1001   A1C  --   --   --   --   --   --   --  5.7   LDL  --   --   --   --  131*  --   --   --    HDL  --   --   --   --  80  --   --   --    TRIG  --   --   --   --  82  --   --   --    ALT 16  --  18 25  --  24   < >  --    CR 0.85  --  0.82 0.89  --  0.77  --  0.83   GFRESTIMATED 78  --  82 66  --  80  --  73   GFRESTBLACK 90  --  >90 80  --  >90   GFR Calc    --  89   POTASSIUM  --   --   --   --   --  3.9  --  4.6   TSH  --  1.73  --   --   --  1.62  --   --     < > = values in this interval not displayed.      BP Readings from Last 3 Encounters:   01/06/20 134/85   10/15/19 100/68   09/24/19 122/72    Wt Readings from Last 3 Encounters:   01/06/20 80.7 kg (178 lb)   10/15/19 77.1 kg (170 lb)   09/24/19 78.5 kg (173 lb)                      Reviewed and updated as  "needed this visit by Provider         Review of Systems   ROS COMP: CONSTITUTIONAL: NEGATIVE for fever, chills, change in weight  INTEGUMENTARY/SKIN: NEGATIVE for worrisome rashes, moles or lesions  EYES: NEGATIVE for vision changes or irritation  ENT/MOUTH: NEGATIVE for epistaxis and vertigo  RESP:POSITIVE for pleurisy and NEGATIVE for hemoptysis and wheezing  BREAST: NEGATIVE for masses, tenderness or discharge  CV: NEGATIVE for chest pain, palpitations or peripheral edema  GI: NEGATIVE for nausea, abdominal pain, heartburn, or change in bowel habits  : NEGATIVE for frequency, dysuria, or hematuria  MUSCULOSKELETAL: NEGATIVE for significant arthralgias or myalgia  NEURO: NEGATIVE for weakness, dizziness or paresthesias  ENDOCRINE: NEGATIVE for temperature intolerance, skin/hair changes  HEME: NEGATIVE for bleeding problems  PSYCHIATRIC: NEGATIVE for changes in mood or affect      Objective    /85 (BP Location: Left arm, Patient Position: Chair, Cuff Size: Adult Regular)   Pulse 74   Temp 98.3  F (36.8  C) (Oral)   Ht 1.676 m (5' 6\")   Wt 80.7 kg (178 lb)   LMP 03/30/2014   SpO2 98%   BMI 28.73 kg/m    Body mass index is 28.73 kg/m .  Physical Exam   GENERAL: alert, no distress and fatigued  EYES: Eyes grossly normal to inspection and conjunctivae and sclerae normal  HENT: normal cephalic/atraumatic, nasal mucosa edematous  and oral mucous membranes moist  NECK: no adenopathy  RESP: lungs clear to auscultation - no rales, rhonchi or wheezes  CV: regular rate and rhythm, normal S1 S2, no S3 or S4, no murmur, click or rub, no peripheral edema and peripheral pulses strong  MS: no gross musculoskeletal defects noted, no edema  NEURO: Normal strength and tone, mentation intact and speech normal  PSYCH: mentation appears normal, affect normal/bright    Diagnostic Test Results:  CHEST TWO VIEWS   1/6/2020 1:11 PM      HISTORY: Recurrent productive cough.     COMPARISON: Chest x-ray on 6/18/2019              "                                                         IMPRESSION: No acute airspace disease.     SHALA RUSS MD        Assessment & Plan     1. Acute recurrent maxillary sinusitis  Etiology of recurring upper respiratory symptoms. Start antibiotics with anaerobic coverage.  - amoxicillin-clavulanate (AUGMENTIN) 875-125 MG tablet; Take 1 tablet by mouth 2 times daily for 14 days  Dispense: 28 tablet; Refill: 1  - predniSONE (DELTASONE) 20 MG tablet; Take 2 tablets (40 mg) by mouth daily for 5 days Take 1 tablet daily x 5 days and then 1/2 tablet daily x 4 days.  Dispense: 17 tablet; Refill: 1    2. Mild persistent asthma with acute exacerbation  Secondary to above condition.  - albuterol (PROVENTIL) (2.5 MG/3ML) 0.083% neb solution; Take 1 vial (2.5 mg) by nebulization 4 times daily as needed for shortness of breath / dyspnea or wheezing  Dispense: 50 vial; Refill: 2  - order for DME; Equipment being ordered: Aerosol mask. Use with nebulizer.  Dispense: 1 each; Refill: 0  - albuterol (PROAIR HFA) 108 (90 Base) MCG/ACT inhaler; Inhale 2 puffs into the lungs every 6 hours as needed for shortness of breath / dyspnea or wheezing  Dispense: 1 Inhaler; Refill: 11    3. Recurrent productive cough  Due to condition #1.  - benzonatate (TESSALON) 100 MG capsule; Take 1 capsule (100 mg) by mouth 3 times daily as needed for cough  Dispense: 42 capsule; Refill: 1  - XR Chest 2 Views       FUTURE APPOINTMENTS:       - Follow-up visit in 4 weeks.      Adan Masterson MD  [facility]

## 2020-01-06 NOTE — TELEPHONE ENCOUNTER
.Reason for Call:  Form, our goal is to have forms completed with 72 hours, however, some forms may require a visit or additional information.    Type of letter, form or note:  FMLA    Who is the form from?: Patient    Where did the form come from: Patient or family brought in       What clinic location was the form placed at?: [facility]    Where the form was placed:[facility]    What number is listed as a contact on the form?: [phone]       Additional comments:     Call taken on 1/6/2020 at 3:56 PM by Isaiah Hyde

## 2020-01-06 NOTE — LETTER
REPORT OF WORK ABILITY    [address] [address]  [phone]      PATIENT DATA    Employee Name: Katie Aponte      : 1965     #: [id number]    Today's date: 2020  Date of first visit: 2020    PROVIDER EVALUATION: Please fill in as needed.  Please give copy to employee for employer.    1. Diagnosis: Acute recurrent maxillary sinusitis.    2. Treatment: Rest and pharmacotherapy.  3. Medication: Augmentin, Prednisone and Tessalon perles.    [employer] Medical Improvement (Date): 2020  Any Permanent Partial Disability? No    Provider comments: Due to Ms. Aponte's condition, she requires reverse isolation x 3 days. Therefore, she needs to work from home from 2020 to 2020.    Medical Examiner: Adan Masterson MD          [place] or [place]: [id number]    Next appointment: As needed    CC: Employer, Managed Care Plan/Payor, Patient

## 2020-01-06 NOTE — PATIENT INSTRUCTIONS
At [facility], we strive to deliver an exceptional experience to you, every time we see you. If you receive a survey, please complete it as we do value your feedback.  If you have MyChart, you can expect to receive results automatically within 24 hours of their completion.  Your provider will send a note interpreting your results as well.   If you do not have MyChart, you should receive your results in about a week by mail.    Your care team:                            Family Medicine Internal Medicine   MD Adan Moreno MD Shantel Branch-Fleming, MD Katya Georgiev PA-C Megan Hill, APRSHANIKA Scott, MD Pediatrics   Joo Ibrahim, PAJanaC  Shirley Irby, MD Camille Cardozo APRN CNP   MD Nai Palumbo MD Deborah Mielke, MD Kim Thein, APRN CNP      Clinic hours: Monday - Thursday 7 am-7 pm; Fridays 7 am-5 pm.   Urgent care: Monday - Friday 11 am-9 pm; Saturday and Sunday 9 am-5 pm.  Pharmacy : Monday -Thursday 8 am-8 pm; Friday 8 am-6 pm; Saturday and Sunday 9 am-5 pm.     Clinic: [phone]   Pharmacy: [phone]

## 2020-01-09 NOTE — TELEPHONE ENCOUNTER
Forms have been giving to Dr. Mcfarlane.  Noy Burt, [facility]  1/9/2020 9:34 AM

## 2020-01-15 NOTE — TELEPHONE ENCOUNTER
Received forms back from Dr. Mcfarlane, faxed to Abner Chen at [phone], copy made and mailed to patient and copy will be sent to abstracting.    Noy Burt CMA Rheumatology  1/15/2020 4:06 PM

## 2020-02-04 ENCOUNTER — OFFICE VISIT (OUTPATIENT)
Dept: RHEUMATOLOGY | Facility: CLINIC | Age: 55
End: 2020-02-04
Payer: COMMERCIAL

## 2020-02-04 VITALS
WEIGHT: 174 LBS | HEIGHT: 66 IN | SYSTOLIC BLOOD PRESSURE: 138 MMHG | OXYGEN SATURATION: 97 % | DIASTOLIC BLOOD PRESSURE: 85 MMHG | RESPIRATION RATE: 16 BRPM | BODY MASS INDEX: 27.97 KG/M2 | HEART RATE: 80 BPM

## 2020-02-04 DIAGNOSIS — M05.9 SEROPOSITIVE RHEUMATOID ARTHRITIS (H): Primary | ICD-10-CM

## 2020-02-04 PROCEDURE — 99213 OFFICE O/P EST LOW 20 MIN: CPT | Performed by: INTERNAL MEDICINE

## 2020-02-04 RX ORDER — HYDROXYCHLOROQUINE SULFATE 200 MG/1
200 TABLET, FILM COATED ORAL DAILY
Qty: 90 TABLET | Refills: 2 | Status: SHIPPED | OUTPATIENT
Start: 2020-02-04 | End: 2020-08-05

## 2020-02-04 ASSESSMENT — MIFFLIN-ST. JEOR: SCORE: 1406.01

## 2020-02-04 NOTE — PROGRESS NOTES
Rheumatology Clinic Visit      Katie Aponte MRN# [id number]   YOB: 1965 Age: 54 year old      Date of visit: 2/04/20   Referring provider: Dr. Francis Capps  PCP: Dr. Karlee Birmingham    Chief Complaint   Patient presents with:  Arthritis: RA.Had right shoulder injection on 10/15/2019     Assessment and Plan     1.  Seropositive nonerosive rheumatoid arthritis (RF 84, CCP >340): Diagnosed in 2018.  Previously followed at the [facility].  Currently on HCQ 200mg daily and Humira 40mg SQ every 14 days (started June 2019). Note that methotrexate, leflunomide, and sulfasalazine are avoided because of chronic neutropenia.  Doing well at this time.  - Continue Humira 40 mg SQ every 14 days; hold for infection  - Continue hydroxychloroquine to 200 mg daily (last eye exam was performed on 3/11/2019)  - Labs in 3 months: CBC, Creatinine, Hepatic Panel, ESR, CRP    2. Bone health: post-menopausal s/p HEMALATHA; was on prednisone for RA.  DEXA ordered previously; advised to schedule    3. Raynaud's Phenomenon; positive IRIS: Reportedly started at the age of 15 years old.  IRIS is positive but she does not have other symptoms than an inflammatory arthritis to support an IRIS-associated rheumatologic disorder.  No other symptoms to suggest systemic sclerosis.  Reviewed the diagnosis of Raynaud's phenomenon and the treatment options.  She is doing well with cold avoidance at this time    4.  Right shoulder pain: Normal range of motion.  Consistent with impingement syndrome; possible that partial rotator cuff tear.  She reportedly has not responded to steroid injections.  She says that she is following with sports medicine and orthopedics.  Also reportedly had an MRI done at [facility] but I do not see results of this; request records.    Ms. Aponte verbalized agreement with and understanding of the rational for the diagnosis and treatment plan.  All questions were answered to best of my ability and the patient's  satisfaction. Ms. Aponte was advised to contact the clinic with any questions that may arise after the clinic visit.      Thank you for involving me in the care of the patient    Return to clinic: 3 months      HPI   Katie Aponte is a 54 year old female with a past medical history significant for acne rosacea, dermatitis, leukopenia, allergic bronchitis, medial meniscal tear, right shoulder impingement syndrome, cervical radiculopathy, and rheumatoid arthritis who is seen for follow-up of RA.     5/17/2019 [facility] rheumatology clinic note by Dr. Johnnie Medley documents seropositive rheumatoid arthritis with a positive rheumatoid factor and a positive CCP.  History of left knee partial replacement 4.5 years ago.  IRIS 1: 40.  CCP >340.  Rheumatoid factor 84.  Negative IRIS and dsDNA; normal C3 and C4.  Negative hepatitis B/C, TB.  OCT testing 3/11/2019 normal.  Symptoms started in June 2018 with pain in the left heel.  She was seen by podiatry.  Later developed right ankle swelling.  Symptoms worsened over time to include both ankles and both Achilles tendons.  Also with pain in the second-fourth fingers of the left hand and morning stiffness for couple hours.  Also history of Raynaud's phenomenon.  Sicca symptoms possibly related to phentermine use.  Plan was to continue Plaquenil 200 mg twice daily and add x-rays of her hands and feet to look for inflammatory arthritis.    6/18/2019:  Ms. Aponte reported that she was diagnosed with rheumatoid arthritis in 2018.  She initially had pain at her Achilles tendon, then ankles, other than both ankles and both Achilles tendons, that her hands and knees.  She was found to have elevated rheumatoid factor and CCP by her podiatrist and was subsequently referred to rheumatology.  She was seen at the [facility] where hydroxychloroquine was started and she felt improvement with this.  She continues to have pain at her right foot that is worse with  increased ambulation; she has been following with podiatry.  She has a history of right shoulder impingement syndrome that did not improve with 2 steroid injections; she is followed in the sports medicine clinic and plans to follow-up there again.  She is also gone to physical therapy without improvement.  Left first MCP and bilateral first CMC's bother her.  Also with some pain at the right second PIP.  History of left partial knee replacement.  Morning stiffness for approximately 7 hours; she wakes up at 5:15 AM and is improved by noon.  Raynaud's phenomenon diagnosed at the age of 15 years old and is successfully treated with cold avoidance; typically just affects her fingers.  She has had dysphagia twice in her life.  No pain or difficulty with swallowing otherwise.  No skin thickening or skin tightening.      Today, 10/15/2019: Feels better since starting Humira.  Tolerating injections.  Still with some shoulder pain but it is improving.  Morning stiffness for approximately 1-2 hours.  Biggest issue right now she has sinusitis treated with 2 antibiotics without improvement and now she has a cough.  She is going to follow-up with her PCP for the sinusitis and cough.  She has not held Humira during these infections.    Denies fevers, chills, nausea, vomiting, constipation, diarrhea. No abdominal pain. No chest pain/pressure, palpitations, or shortness of breath. No LE swelling. No neck pain. No oral or nasal sores.  No rash. No sicca symptoms. No photosensitivity or photophobia. No eye pain or redness. No history of inflammatory eye disease.  No history of inflammatory bowel disease.  No history of DVT, pulmonary embolism, or miscarriage.   No history of serositis.  No seizure history.  No known renal disorder.      Tobacco: None  EtOH: 0-4 drinks per week  Drugs: None    ROS   GEN: No fevers, chills, night sweats, or weight change  SKIN: No itching, rashes, sores; see HPI  HEENT: No epistaxis. No oral or nasal  ulcers.  CV: No chest pain, pressure, palpitations, or dyspnea on exertion.  PULM: See HPI  GI: No nausea, vomiting, constipation, diarrhea. No blood in stool. No abdominal pain.  : No blood in urine.  MSK: See HPI.  NEURO: No numbness or tingling  ENDO: No heat/cold intolerance.  EXT: No LE swelling  PSYCH: Negative    Active Problem List     Patient Active Problem List   Diagnosis     Knee pain     Abnormal uterine bleeding     CARDIOVASCULAR SCREENING; LDL GOAL LESS THAN 160     Acne rosacea     Dermatitis     Leukopenia     Allergic bronchitis, moderate persistent, uncomplicated     Mild persistent asthma with acute exacerbation     JESENIA (stress urinary incontinence, female)     Class 1 obesity due to excess calories without serious comorbidity with body mass index (BMI) of 30.0 to 30.9 in adult     Medial meniscus tear     Pain in joint, ankle and foot     Tear of medial meniscus of knee     Pain in joint, upper arm, right     Seropositive rheumatoid arthritis (H)     Past Medical History     Past Medical History:   Diagnosis Date     Herpes     Under eye     Joint pain      Seasonal allergies      Thrombocytopenia (H) 2015     Uncomplicated asthma     very mild     Past Surgical History     Past Surgical History:   Procedure Laterality Date      SECTION       COLONOSCOPY       CYSTOSCOPY, SLING TRANSVAGINAL N/A 2017    Procedure: CYSTOSCOPY, SLING TRANSVAGINAL;  TRANSVAGINAL TAPING, CYSTOSCOPY, MID URETHRAL SLING;  Surgeon: Jett Montes MD;  Location:  OR     DILATE CERVIX, ABLATE ENDOMETRIUM THERMACHOICE, COMBINED  4/10/2014    Procedure: COMBINED DILATE CERVIX, ABLATE ENDOMETRIUM THERMACHOICE;;  Surgeon: Jett Montes MD;  Location:  SD     DILATION AND CURETTAGE, HYSTEROSCOPY, ABLATE ENDOMETRIUM NOVASURE, COMBINED  4/10/2014    Procedure: COMBINED DILATION AND CURETTAGE, HYSTEROSCOPY, ABLATE ENDOMETRIUM NOVASURE;  HYSTEROSCOPY, DILATION AND CURETTAGE, NOVASURE ABLATION ATTEMPTED,  THERMACHOICE ABLATION ATTEMPTED;  Surgeon: Jett Montes MD;  Location: [facility]     LAPAROSCOPIC ASSISTED HYSTERECTOMY VAGINAL, BILATERAL SALPINGO-OOPHORECTOMY, COMBINED  7/31/2014    Procedure: COMBINED LAPAROSCOPIC ASSISTED HYSTERECTOMY VAGINAL, SALPINGO-OOPHORECTOMY;  Surgeon: Jett Montes MD;  Location: [facility]     ORTHOPEDIC SURGERY      L KNEE MENISCUS,ankle surgery, left knee replacement     Allergy     Allergies   Allergen Reactions     Droperidol Itching     Feels jumpy     Percocet [Oxycodone-Acetaminophen] Nausea and Vomiting and GI Disturbance     Current Medication List     Current Outpatient Medications   Medication Sig     adalimumab (HUMIRA *CF*) 40 MG/0.4ML pen kit Inject 0.4 mLs (40 mg) Subcutaneous every 14 days Hold for signs of infection, then seek medical attention.     albuterol (PROAIR HFA) 108 (90 Base) MCG/ACT inhaler Inhale 2 puffs into the lungs every 6 hours as needed for shortness of breath / dyspnea or wheezing     albuterol (PROVENTIL) (2.5 MG/3ML) 0.083% neb solution Take 1 vial (2.5 mg) by nebulization 4 times daily as needed for shortness of breath / dyspnea or wheezing     benzonatate (TESSALON) 100 MG capsule Take 1 capsule (100 mg) by mouth 3 times daily as needed for cough     Cetirizine-Pseudoephedrine (ZYRTEC-D PO)      desonide (DESOWEN) 0.05 % ointment Apply topically 2 times daily     Estrogens Conjugated (PREMARIN PO) Take 0.9 mg by mouth daily     estrogens conjugated (PREMARIN) 0.9 MG TABS Take 0.3 mg by mouth daily     Fluocinolone Acetonide Scalp 0.01 % OIL oil Apply topically 2 times daily as needed     fluticasone (FLONASE) 50 MCG/ACT spray Spray 1-2 sprays into both nostrils daily     fluticasone (FLOVENT HFA) 44 MCG/ACT inhaler Inhale 2 puffs into the lungs 2 times daily     hydroxychloroquine (PLAQUENIL) 200 MG tablet Take 1 tablet (200 mg) by mouth daily     ibuprofen (ADVIL/MOTRIN) 800 MG tablet Take 1 tablet (800 mg) by mouth every 8 hours as needed for  "moderate pain     ipratropium - albuterol 0.5 mg/2.5 mg/3 mL (DUONEB) 0.5-2.5 (3) MG/3ML neb solution Take 1 vial (3 mLs) by nebulization every 6 hours as needed for shortness of breath / dyspnea or wheezing     multivitamin, therapeutic with minerals (MULTI-VITAMIN) TABS tablet Take 1 tablet by mouth daily     order for DME Equipment being ordered: Aerosol mask. Use with nebulizer.     order for DME Equipment being ordered:  Knee walker     order for DME Equipment being ordered: Knee walker      order for DME Equipment being ordered: Nebulizer with tubing and instructions     order for DME Equipment being ordered: [employer]x Bed Buddy- heated neck roll     order for DME Equipment being ordered: TENS     phentermine 30 MG capsule Take 30 mg by mouth every morning     predniSONE (DELTASONE) 5 MG tablet 4tab=20 mg qd x 5 days, 3tab=15 mg qd x 5 days, 2tab=10 mg qd x 5 days, 1 tab=5 mg qd daily     triamcinolone (KENALOG) 0.1 % cream Apply sparingly to affected area three times daily as needed     valACYclovir (VALTREX) 500 MG tablet Take 500 mg by mouth as needed Reported on 4/11/2017     No current facility-administered medications for this visit.          Social History   See HPI    Family History     Family History   Problem Relation Age of Onset     Cancer Mother         patient is unsure of what kind     Physical Exam     Temp Readings from Last 3 Encounters:   01/06/20 98.3  F (36.8  C) (Oral)   09/24/19 99.4  F (37.4  C) (Oral)   06/26/19 98.2  F (36.8  C) (Oral)     BP Readings from Last 5 Encounters:   02/04/20 (!) 149/85   01/06/20 134/85   10/15/19 100/68   09/24/19 122/72   06/26/19 118/75     Pulse Readings from Last 1 Encounters:   02/04/20 80     Resp Readings from Last 1 Encounters:   02/04/20 16     Estimated body mass index is 28.08 kg/m  as calculated from the following:    Height as of this encounter: 1.676 m (5' 6\").    Weight as of this encounter: 78.9 kg (174 lb).    GEN: NAD  HEENT: MMM. No " oral lesions. Anicteric, noninjected sclera  CV: S1, S2. RRR. No m/r/g.  PULM: CTA bilaterally. No w/c.    MSK: MCPs, PIPs, DIPs, wrists, elbows, knees, ankles, and MTPs without swelling or tenderness to palpation.  Bilateral first CMC joints tender to palpation but without swelling or increased warmth; no squaring.  Left shoulder without swelling or tenderness to palpation; normal range of motion.  Right shoulder tender to palpation on the most lateral aspect; pain with abduction above 90 degrees; no swelling or increased warmth.   Pes planus bilaterally.  Achilles tendons nontender to palpation.  No dactylitis.       NEURO: UE and LE strengths 5/5 and equal bilaterally.   SKIN: No rash.  No skin thickening or skin tightening observed.  No evidence of current or previous ischemic insults to the fingers by visual inspection.  EXT: No LE edema  PSYCH: Alert. Appropriate.    Labs / Imaging (select studies)   RF/CCP  Recent Labs   Lab Test 08/31/18  1220 06/29/18  1301   CCPIGG >340*  --    RHF  --  84*         CBC  Recent Labs   Lab Test 10/15/19  1552 06/18/19  1453 05/17/19  1249  09/25/15  1540   WBC 3.2* 3.3* 3.5*   < > 3.2*   RBC 4.74 4.56 4.71   < > 4.74   HGB 13.1 13.1 13.2   < > 13.6   HCT 40.2 39.2 40.6   < > 38.9   MCV 85 86 86   < > 82   RDW 12.8 13.8 13.3   < > 12.9    193 204   < > 161   MCH 27.6 28.7 28.0   < > 28.7   MCHC 32.6 33.4 32.5   < > 35.0   NEUTROPHIL 24.0 35.8  --   --  31.7   LYMPH 60.0 40.8  --   --  49.2   MONOCYTE 9.0 11.4  --   --  11.9   EOSINOPHIL 6.0 11.1  --   --  6.6   BASOPHIL 1.0 0.9  --   --  0.6   ANEU 0.8* 1.2*  --   --  1.0*   ALYM 1.9 1.4  --   --  1.6   FIDE 0.3 0.4  --   --  0.4   AEOS 0.2 0.4  --   --  0.2   ABAS 0.0 0.0  --   --  0.0    < > = values in this interval not displayed.     CMP  Recent Labs   Lab Test 10/15/19  1552 05/17/19  1249 11/29/18  0905 09/25/15  1540  09/18/15  1001  08/01/14  0749   NA  --   --   --  139  --  136  --   --    POTASSIUM  --   --    --  3.9  --  4.6  --   --    CHLORIDE  --   --   --  104  --  103  --   --    CO2  --   --   --  30  --  31  --   --    ANIONGAP  --   --   --  5  --  2*  --   --    GLC  --   --   --  99  --  96  --  101*   BUN  --   --   --  9  --  11  --   --    CR 0.85 0.82 0.89 0.77  --  0.83   < >  --    GFRESTIMATED 78 82 66 80  --  73   < >  --    GFRESTBLACK 90 >90 80 >90  African American GFR Calc    --  89   < >  --    NATALYA  --   --   --  8.0*  --  9.3  --   --    BILITOTAL 0.4  --   --  0.4  --   --   --   --    ALBUMIN 3.4 3.8 3.3* 3.5   < >  --   --   --    PROTTOTAL 7.6  --   --  7.6  --   --   --   --    ALKPHOS 63  --   --  63  --   --   --   --    AST 10 14 16 14   < >  --   --   --    ALT 16 18 25 24   < >  --   --   --     < > = values in this interval not displayed.     Calcium/VitaminD  Recent Labs   Lab Test 06/18/19  1453 09/25/15  1540 09/18/15  1001   NATALYA  --  8.0* 9.3   VITDT 33  --   --      ESR/CRP  Recent Labs   Lab Test 08/31/18  1220 06/29/18  1301   SED 19 17   CRP <2.9  --      Hepatitis B  Recent Labs   Lab Test 08/31/18  1220 09/25/15  1540   AUSAB  --  0.09   HBCAB Nonreactive Nonreactive   HEPBANG Nonreactive Nonreactive     Hepatitis C  Recent Labs   Lab Test 08/31/18  1220 03/29/18  0913   HCVAB Nonreactive Nonreactive     Lyme ab screening  Recent Labs   Lab Test 06/29/18  1301   LYMEGM 0.15     Tuberculosis Screening  Recent Labs   Lab Test 06/18/19  1453 08/31/18  1220   TBRES Negative Negative     HIV Screening  Recent Labs   Lab Test 09/25/15  1540   HIAGAB Nonreactive   HIV-1 p24 Ag & HIV-1/HIV-2 Ab Not Detected       Immunization History     Immunization History   Administered Date(s) Administered     Influenza (IIV3) PF 10/20/2009     Tdap (Adacel,Boostrix) 01/25/2010          Chart documentation done in part with Dragon Voice recognition Software. Although reviewed after completion, some word and grammatical error may remain.    Richard Mcfarlane MD

## 2020-04-29 DIAGNOSIS — M05.9 SEROPOSITIVE RHEUMATOID ARTHRITIS (H): ICD-10-CM

## 2020-04-29 LAB
ALBUMIN SERPL-MCNC: 3.4 G/DL (ref 3.4–5)
ALP SERPL-CCNC: 52 U/L (ref 40–150)
ALT SERPL W P-5'-P-CCNC: 18 U/L (ref 0–50)
AST SERPL W P-5'-P-CCNC: 17 U/L (ref 0–45)
BASOPHILS # BLD AUTO: 0 10E9/L (ref 0–0.2)
BASOPHILS NFR BLD AUTO: 1 %
BILIRUB DIRECT SERPL-MCNC: 0.2 MG/DL (ref 0–0.2)
BILIRUB SERPL-MCNC: 0.5 MG/DL (ref 0.2–1.3)
CREAT SERPL-MCNC: 0.76 MG/DL (ref 0.52–1.04)
CRP SERPL-MCNC: <2.9 MG/L (ref 0–8)
DIFFERENTIAL METHOD BLD: ABNORMAL
EOSINOPHIL # BLD AUTO: 0.3 10E9/L (ref 0–0.7)
EOSINOPHIL NFR BLD AUTO: 9 %
ERYTHROCYTE [DISTWIDTH] IN BLOOD BY AUTOMATED COUNT: 12.8 % (ref 10–15)
ERYTHROCYTE [SEDIMENTATION RATE] IN BLOOD BY WESTERGREN METHOD: 17 MM/H (ref 0–30)
GFR SERPL CREATININE-BSD FRML MDRD: 88 ML/MIN/{1.73_M2}
HCT VFR BLD AUTO: 39.8 % (ref 35–47)
HGB BLD-MCNC: 13 G/DL (ref 11.7–15.7)
LYMPHOCYTES # BLD AUTO: 2 10E9/L (ref 0.8–5.3)
LYMPHOCYTES NFR BLD AUTO: 57 %
MCH RBC QN AUTO: 27.7 PG (ref 26.5–33)
MCHC RBC AUTO-ENTMCNC: 32.7 G/DL (ref 31.5–36.5)
MCV RBC AUTO: 85 FL (ref 78–100)
MONOCYTES # BLD AUTO: 0.4 10E9/L (ref 0–1.3)
MONOCYTES NFR BLD AUTO: 12 %
NEUTROPHILS # BLD AUTO: 0.7 10E9/L (ref 1.6–8.3)
NEUTROPHILS NFR BLD AUTO: 21 %
PLATELET # BLD AUTO: 139 10E9/L (ref 150–450)
PLATELET # BLD EST: ABNORMAL 10*3/UL
PROT SERPL-MCNC: 7.7 G/DL (ref 6.8–8.8)
RBC # BLD AUTO: 4.69 10E12/L (ref 3.8–5.2)
RBC MORPH BLD: NORMAL
VARIANT LYMPHS BLD QL SMEAR: PRESENT
WBC # BLD AUTO: 3.4 10E9/L (ref 4–11)

## 2020-04-29 PROCEDURE — 36415 COLL VENOUS BLD VENIPUNCTURE: CPT | Performed by: INTERNAL MEDICINE

## 2020-04-29 PROCEDURE — 85025 COMPLETE CBC W/AUTO DIFF WBC: CPT | Performed by: INTERNAL MEDICINE

## 2020-04-29 PROCEDURE — 85652 RBC SED RATE AUTOMATED: CPT | Performed by: INTERNAL MEDICINE

## 2020-04-29 PROCEDURE — 86140 C-REACTIVE PROTEIN: CPT | Performed by: INTERNAL MEDICINE

## 2020-04-29 PROCEDURE — 82565 ASSAY OF CREATININE: CPT | Performed by: INTERNAL MEDICINE

## 2020-04-29 PROCEDURE — 80076 HEPATIC FUNCTION PANEL: CPT | Performed by: INTERNAL MEDICINE

## 2020-05-04 NOTE — PROGRESS NOTES
"Katie Aponte is a 54 year old female who is being evaluated via a billable video visit.      The patient has been notified of following:     \"This video visit will be conducted via a call between you and your physician/provider. We have found that certain health care needs can be provided without the need for an in-person physical exam.  This service lets us provide the care you need with a video conversation.  If a prescription is necessary we can send it directly to your pharmacy.  If lab work is needed we can place an order for that and you can then stop by our lab to have the test done at a later time.    Video visits are billed at different rates depending on your insurance coverage.  Please reach out to your insurance provider with any questions.    If during the course of the call the physician/provider feels a video visit is not appropriate, you will not be charged for this service.\"    Patient has given verbal consent for Video visit? Yes    How would you like to obtain your AVS? Mail a copy    Patient would like the video invitation sent by: Text to cell phone: [phone]    Will anyone else be joining your video visit? No    Rheumatology Video Visit      Katie Aponte MRN# [id number]   YOB: 1965 Age: 54 year old      Date of visit: 5/05/20   Referring provider: Dr. Francis Capps  PCP: Dr. Karlee Birmingham    Chief Complaint   Patient presents with:  Arthritis: right foot pain and right wrist    Assessment and Plan     1.  Seropositive nonerosive rheumatoid arthritis (RF 84, CCP >340): Diagnosed in 2018.  Previously followed at the [facility].  Currently on HCQ 200mg daily and Humira 40mg SQ every 14 days (started June 2019). Note that methotrexate, leflunomide, and sulfasalazine are avoided because of chronic neutropenia.  Doing well at this time except for mild right wrist and right ankle pain that is not clearly inflammatory; advised using ibuprofen 400-800mg TID " x2weeks on a scheduled basis and if not improved then to notify me. Overall doing much better on her current regimen. Tylenol 325-650mg once 30 min before Humira, and icing injection site before and after Humira injection recommended for the injection site reaction that is mild.   - Continue Humira 40 mg SQ every 14 days; hold for infection  - Continue hydroxychloroquine to 200 mg daily (last eye exam was performed on 3/11/2019)    2. Bone health: post-menopausal s/p HEMALATHA; was on prednisone for RA.  DEXA ordered previously; advised to schedule    3. Raynaud's Phenomenon; positive IRIS: Reportedly started at the age of 15 years old.  IRIS is positive but she does not have other symptoms than an inflammatory arthritis to support an IRIS-associated rheumatologic disorder.  No other symptoms to suggest systemic sclerosis.  Reviewed the diagnosis of Raynaud's phenomenon and the treatment options.  She is doing well with cold avoidance at this time    # Relevant labs and imaging were reviewed with the patient    # High risk medication toxicity monitoring: discussion and labs reviewed; appropriate labs ordered. See above    # Note that this is a virtual visit to reduce the risk of COVID-19 exposure during this current pandemic.        Ms. Aponte verbalized agreement with and understanding of the rational for the diagnosis and treatment plan.  All questions were answered to best of my ability and the patient's satisfaction. Ms. Aponte was advised to contact the clinic with any questions that may arise after the clinic visit.      Thank you for involving me in the care of the patient    Return to clinic: 3 months      HPI   Katie Aponte is a 54 year old female with a past medical history significant for acne rosacea, dermatitis, leukopenia, allergic bronchitis, medial meniscal tear, right shoulder impingement syndrome, cervical radiculopathy, and rheumatoid arthritis who is seen for follow-up of RA.     5/17/2019 [facility] rheumatology clinic note by Dr. Johnnie Medley documents seropositive rheumatoid arthritis with a positive rheumatoid factor and a positive CCP.  History of left knee partial replacement 4.5 years ago.  IRIS 1: 40.  CCP >340.  Rheumatoid factor 84.  Negative IRIS and dsDNA; normal C3 and C4.  Negative hepatitis B/C, TB.  OCT testing 3/11/2019 normal.  Symptoms started in June 2018 with pain in the left heel.  She was seen by podiatry.  Later developed right ankle swelling.  Symptoms worsened over time to include both ankles and both Achilles tendons.  Also with pain in the second-fourth fingers of the left hand and morning stiffness for couple hours.  Also history of Raynaud's phenomenon.  Sicca symptoms possibly related to phentermine use.  Plan was to continue Plaquenil 200 mg twice daily and add x-rays of her hands and feet to look for inflammatory arthritis.    6/18/2019:  Ms. Aponte reported that she was diagnosed with rheumatoid arthritis in 2018.  She initially had pain at her Achilles tendon, then ankles, other than both ankles and both Achilles tendons, that her hands and knees.  She was found to have elevated rheumatoid factor and CCP by her podiatrist and was subsequently referred to rheumatology.  She was seen at the [facility] where hydroxychloroquine was started and she felt improvement with this.  She continues to have pain at her right foot that is worse with increased ambulation; she has been following with podiatry.  She has a history of right shoulder impingement syndrome that did not improve with 2 steroid injections; she is followed in the sports medicine clinic and plans to follow-up there again.  She is also gone to physical therapy without improvement.  Left first MCP and bilateral first CMC's bother her.  Also with some pain at the right second PIP.  History of left partial knee replacement.  Morning stiffness for approximately 7 hours; she wakes up at 5:15 AM and  is improved by noon.  Raynaud's phenomenon diagnosed at the age of 15 years old and is successfully treated with cold avoidance; typically just affects her fingers.  She has had dysphagia twice in her life.  No pain or difficulty with swallowing otherwise.  No skin thickening or skin tightening.      10/15/2019: Feels better since starting Humira.  Tolerating injections.  Still with some shoulder pain but it is improving.  Morning stiffness for approximately 1-2 hours.  Biggest issue right now she has sinusitis treated with 2 antibiotics without improvement and now she has a cough.  She is going to follow-up with her PCP for the sinusitis and cough.  She has not held Humira during these infections.    Today, 5/5/2020: Tolerating hydroxychloroquine; mild redness at Humira site.. Right ankle and right wrist pain that is mild, improves with activity; no swelling of increase warmth. Ibuprofen resolves this pain. Otherwise doing well.     Denies fevers, chills, nausea, vomiting, constipation, diarrhea. No abdominal pain. No chest pain/pressure, palpitations, or shortness of breath. No LE swelling. No neck pain. No oral or nasal sores.  No rash. No sicca symptoms.      Tobacco: None  EtOH: 0-4 drinks per week  Drugs: None    ROS   GEN: No fevers, chills, night sweats, or weight change  SKIN: No itching, rashes, sores; see HPI  HEENT: No oral or nasal ulcers.  CV: No chest pain, pressure, palpitations, or dyspnea on exertion.  PULM: See HPI  GI: No nausea, vomiting, constipation, diarrhea. No blood in stool. No abdominal pain.  : No blood in urine.  MSK: See HPI.  NEURO: No numbness or tingling  ENDO: No heat/cold intolerance.  EXT: No LE swelling  PSYCH: Negative    Active Problem List     Patient Active Problem List   Diagnosis     Knee pain     Abnormal uterine bleeding     CARDIOVASCULAR SCREENING; LDL GOAL LESS THAN 160     Acne rosacea     Dermatitis     Leukopenia     Allergic bronchitis, moderate persistent,  uncomplicated     Mild persistent asthma with acute exacerbation     JESENIA (stress urinary incontinence, female)     Class 1 obesity due to excess calories without serious comorbidity with body mass index (BMI) of 30.0 to 30.9 in adult     Medial meniscus tear     Pain in joint, ankle and foot     Tear of medial meniscus of knee     Pain in joint, upper arm, right     Seropositive rheumatoid arthritis (H)     Past Medical History     Past Medical History:   Diagnosis Date     Herpes     Under eye     Joint pain      Seasonal allergies      Thrombocytopenia (H) 2015     Uncomplicated asthma     very mild     Past Surgical History     Past Surgical History:   Procedure Laterality Date      SECTION       COLONOSCOPY       CYSTOSCOPY, SLING TRANSVAGINAL N/A 2017    Procedure: CYSTOSCOPY, SLING TRANSVAGINAL;  TRANSVAGINAL TAPING, CYSTOSCOPY, MID URETHRAL SLING;  Surgeon: Jett Montes MD;  Location:  OR     DILATE CERVIX, ABLATE ENDOMETRIUM THERMACHOICE, COMBINED  4/10/2014    Procedure: COMBINED DILATE CERVIX, ABLATE ENDOMETRIUM THERMACHOICE;;  Surgeon: Jett Montes MD;  Location: [facility]     DILATION AND CURETTAGE, HYSTEROSCOPY, ABLATE ENDOMETRIUM NOVASURE, COMBINED  4/10/2014    Procedure: COMBINED DILATION AND CURETTAGE, HYSTEROSCOPY, ABLATE ENDOMETRIUM NOVASURE;  HYSTEROSCOPY, DILATION AND CURETTAGE, NOVASURE ABLATION ATTEMPTED, THERMACHOICE ABLATION ATTEMPTED;  Surgeon: Jett Montes MD;  Location: [facility]     LAPAROSCOPIC ASSISTED HYSTERECTOMY VAGINAL, BILATERAL SALPINGO-OOPHORECTOMY, COMBINED  2014    Procedure: COMBINED LAPAROSCOPIC ASSISTED HYSTERECTOMY VAGINAL, SALPINGO-OOPHORECTOMY;  Surgeon: Jett Montes MD;  Location: [facility]     ORTHOPEDIC SURGERY      L KNEE MENISCUS,ankle surgery, left knee replacement     Allergy     Allergies   Allergen Reactions     Droperidol Itching     Feels jumpy     Percocet [Oxycodone-Acetaminophen] Nausea and Vomiting and GI Disturbance     Current  Medication List     Current Outpatient Medications   Medication Sig     adalimumab (HUMIRA *CF*) 40 MG/0.4ML pen kit Inject 0.4 mLs (40 mg) Subcutaneous every 14 days . Hold for signs of infection, then seek medical attention.     albuterol (PROAIR HFA) 108 (90 Base) MCG/ACT inhaler Inhale 2 puffs into the lungs every 6 hours as needed for shortness of breath / dyspnea or wheezing     albuterol (PROVENTIL) (2.5 MG/3ML) 0.083% neb solution Take 1 vial (2.5 mg) by nebulization 4 times daily as needed for shortness of breath / dyspnea or wheezing     benzonatate (TESSALON) 100 MG capsule Take 1 capsule (100 mg) by mouth 3 times daily as needed for cough     Cetirizine-Pseudoephedrine (ZYRTEC-D PO)      desonide (DESOWEN) 0.05 % ointment Apply topically 2 times daily     Estrogens Conjugated (PREMARIN PO) Take 0.9 mg by mouth daily     estrogens conjugated (PREMARIN) 0.9 MG TABS Take 0.3 mg by mouth daily     Fluocinolone Acetonide Scalp 0.01 % OIL oil Apply topically 2 times daily as needed     fluticasone (FLONASE) 50 MCG/ACT spray Spray 1-2 sprays into both nostrils daily     fluticasone (FLOVENT HFA) 44 MCG/ACT inhaler Inhale 2 puffs into the lungs 2 times daily     hydroxychloroquine (PLAQUENIL) 200 MG tablet Take 1 tablet (200 mg) by mouth daily     ibuprofen (ADVIL/MOTRIN) 800 MG tablet Take 1 tablet (800 mg) by mouth every 8 hours as needed for moderate pain     ipratropium - albuterol 0.5 mg/2.5 mg/3 mL (DUONEB) 0.5-2.5 (3) MG/3ML neb solution Take 1 vial (3 mLs) by nebulization every 6 hours as needed for shortness of breath / dyspnea or wheezing     multivitamin, therapeutic with minerals (MULTI-VITAMIN) TABS tablet Take 1 tablet by mouth daily     phentermine 30 MG capsule Take 30 mg by mouth every morning     predniSONE (DELTASONE) 5 MG tablet 4tab=20 mg qd x 5 days, 3tab=15 mg qd x 5 days, 2tab=10 mg qd x 5 days, 1 tab=5 mg qd daily     triamcinolone (KENALOG) 0.1 % cream Apply sparingly to affected area  "three times daily as needed     valACYclovir (VALTREX) 500 MG tablet Take 500 mg by mouth as needed Reported on 4/11/2017     order for DME Equipment being ordered: Aerosol mask. Use with nebulizer.     order for DME Equipment being ordered:  Knee walker     order for DME Equipment being ordered: Knee walker      order for DME Equipment being ordered: Nebulizer with tubing and instructions     order for DME Equipment being ordered: Carex Bed Buddy- heated neck roll     order for DME Equipment being ordered: TENS     No current facility-administered medications for this visit.          Social History   See HPI    Family History     Family History   Problem Relation Age of Onset     Cancer Mother         patient is unsure of what kind     Physical Exam     Temp Readings from Last 3 Encounters:   01/06/20 98.3  F (36.8  C) (Oral)   09/24/19 99.4  F (37.4  C) (Oral)   06/26/19 98.2  F (36.8  C) (Oral)     BP Readings from Last 5 Encounters:   02/04/20 138/85   01/06/20 134/85   10/15/19 100/68   09/24/19 122/72   06/26/19 118/75     Pulse Readings from Last 1 Encounters:   02/04/20 80     Resp Readings from Last 1 Encounters:   02/04/20 16     Estimated body mass index is 28.08 kg/m  as calculated from the following:    Height as of 2/4/20: 1.676 m (5' 6\").    Weight as of 2/4/20: 78.9 kg (174 lb).      GEN: NAD  HEENT: MMM.  Anicteric, noninjected sclera  PULM: No increased work of breathing  MSK:  Hands and wrists without swelling. Ankles without swelling.  PSYCH: Alert. Appropriate.        Labs / Imaging (select studies)   RF/CCP  Recent Labs   Lab Test 08/31/18  1220 06/29/18  1301   CCPIGG >340*  --    RHF  --  84*     CBC  Recent Labs   Lab Test 04/29/20  1343 10/15/19  1552 06/18/19  1453   WBC 3.4* 3.2* 3.3*   RBC 4.69 4.74 4.56   HGB 13.0 13.1 13.1   HCT 39.8 40.2 39.2   MCV 85 85 86   RDW 12.8 12.8 13.8   * 206 193   MCH 27.7 27.6 28.7   MCHC 32.7 32.6 33.4   NEUTROPHIL 21.0 24.0 35.8   LYMPH 57.0 " 60.0 40.8   MONOCYTE 12.0 9.0 11.4   EOSINOPHIL 9.0 6.0 11.1   BASOPHIL 1.0 1.0 0.9   ANEU 0.7* 0.8* 1.2*   ALYM 2.0 1.9 1.4   FIDE 0.4 0.3 0.4   AEOS 0.3 0.2 0.4   ABAS 0.0 0.0 0.0     CMP  Recent Labs   Lab Test 04/29/20  1343 10/15/19  1552 05/17/19  1249  09/25/15  1540 09/18/15  1001  08/01/14  0749   NA  --   --   --   --  139 136  --   --    POTASSIUM  --   --   --   --  3.9 4.6  --   --    CHLORIDE  --   --   --   --  104 103  --   --    CO2  --   --   --   --  30 31  --   --    ANIONGAP  --   --   --   --  5 2*  --   --    GLC  --   --   --   --  99 96  --  101*   BUN  --   --   --   --  9 11  --   --    CR 0.76 0.85 0.82   < > 0.77 0.83   < >  --    GFRESTIMATED 88 78 82   < > 80 73   < >  --    GFRESTBLACK >90 90 >90   < > >90   GFR Calc   89   < >  --    NATALYA  --   --   --   --  8.0* 9.3  --   --    BILITOTAL 0.5 0.4  --   --  0.4  --   --   --    ALBUMIN 3.4 3.4 3.8   < > 3.5  --   --   --    PROTTOTAL 7.7 7.6  --   --  7.6  --   --   --    ALKPHOS 52 63  --   --  63  --   --   --    AST 17 10 14   < > 14  --   --   --    ALT 18 16 18   < > 24  --   --   --     < > = values in this interval not displayed.     Calcium/VitaminD  Recent Labs   Lab Test 06/18/19  1453 09/25/15  1540 09/18/15  1001   NATALYA  --  8.0* 9.3   VITDT 33  --   --      ESR/CRP  Recent Labs   Lab Test 04/29/20  1343 08/31/18  1220 06/29/18  1301   SED 17 19 17   CRP <2.9 <2.9  --      Lipid Panel  Recent Labs   Lab Test 03/29/18  0913   CHOL 227*   TRIG 82   HDL 80   *   NHDL 147*     Hepatitis B  Recent Labs   Lab Test 08/31/18  1220 09/25/15  1540   AUSAB  --  0.09   HBCAB Nonreactive Nonreactive   HEPBANG Nonreactive Nonreactive     Hepatitis C  Recent Labs   Lab Test 08/31/18  1220 03/29/18  0913   HCVAB Nonreactive Nonreactive     Lyme ab screening  Recent Labs   Lab Test 06/29/18  1301   LYMEGM 0.15       Tuberculosis Screening  Recent Labs   Lab Test 06/18/19  1453 08/31/18  1220   TBRES Negative Negative      HIV Screening  Recent Labs   Lab Test 09/25/15  1540   HIAGAB Nonreactive   HIV-1 p24 Ag & HIV-1/HIV-2 Ab Not Detected         Immunization History     Immunization History   Administered Date(s) Administered     Influenza (IIV3) PF 10/20/2009     Tdap (Adacel,Boostrix) 01/25/2010          Chart documentation done in part with Dragon Voice recognition Software. Although reviewed after completion, some word and grammatical error may remain.      Video-Visit Details    Type of service:  Video Visit    Video Start Time: 1:45 PM     Video End Time: 2:00 PM    Originating Location (pt. Location): Home    Distant Location (provider location):  Home    Platform used for Video Visit: Viki Mcfarlane MD

## 2020-05-05 ENCOUNTER — VIRTUAL VISIT (OUTPATIENT)
Dept: RHEUMATOLOGY | Facility: CLINIC | Age: 55
End: 2020-05-05
Payer: COMMERCIAL

## 2020-05-05 DIAGNOSIS — M05.9 SEROPOSITIVE RHEUMATOID ARTHRITIS (H): ICD-10-CM

## 2020-05-05 PROCEDURE — 99213 OFFICE O/P EST LOW 20 MIN: CPT | Mod: GT | Performed by: INTERNAL MEDICINE

## 2020-05-19 ENCOUNTER — VIRTUAL VISIT (OUTPATIENT)
Dept: FAMILY MEDICINE | Facility: OTHER | Age: 55
End: 2020-05-19

## 2020-05-19 ENCOUNTER — NURSE TRIAGE (OUTPATIENT)
Dept: NURSING | Facility: CLINIC | Age: 55
End: 2020-05-19

## 2020-05-19 NOTE — PROGRESS NOTES
"Date: 2020 17:36:16  Clinician: Tamara Tamayo  Clinician NPI: [id number]  Patient: Katie Aponte  Patient : 1965  Patient Address: [address] [place] [address] [place] [address]  Patient Phone: [phone]  Visit Protocol: URI  Patient Summary:  Katie is a 54 year old ( : 1965 ) female who initiated a Visit for COVID-19 (Coronavirus) evaluation and screening. When asked the question \"Please sign me up to receive news, health information and promotions from [employer].\", Katie responded \"No\".    Her symptoms consist of a cough and ear pain.   Symptom details   Cough: Katie coughs a few times an hour and her cough is not more bothersome at night. Phlegm comes into her throat when she coughs. She believes her cough is caused by post-nasal drip. The color of the phlegm is white and yellow.    Katie denies having nausea, teeth pain, ageusia, diarrhea, facial pain or pressure, chills, sore throat, wheezing, enlarged lymph nodes, fever, nasal congestion, vomiting, rhinitis, headache, malaise, myalgias, and anosmia. She also denies having recent facial or sinus surgery in the past 60 days. She is not experiencing dyspnea.   Precipitating events  She has not recently been exposed to someone with influenza. Katie has not been in close contact with any high risk individuals.   Pertinent COVID-19 (Coronavirus) information  In the past 14 days, Katie has not worked in a congregate living setting.   She does not work or volunteer as healthcare worker or a  and does not work or volunteer in a healthcare facility.   Katie also has not lived in a congregate living setting in the past 14 days. She does not live with a healthcare worker.   Katie has had a close contact with a laboratory-confirmed COVID-19 patient within 14 days of symptom onset. Additional information about contact with COVID-19 (Coronavirus) patient as reported by the patient (free text): Informed today 1 person tested positive " saw that person on May 11th 2020  did safe distancing, wascaround them for about 2.5 hours   Pertinent medical history  Katie has taken an antibiotic medication in the past month. Antibiotic details as reported by the patient (free text): Bacterial infection DANIEL Wilson typically gets a yeast infection when she takes antibiotics. She has used fluconazole (Diflucan) to treat previous yeast infections. 1 dose of fluconazole (Diflucan) has typically been sufficient for symptoms to resolve in the past.   Katie needs a return to work/school note.   Weight: 170 lbs   Katie does not smoke or use smokeless tobacco.   Additional information as reported by the patient (free text): I have RA and asthma   Weight: 170 lbs    MEDICATIONS: Premarin oral, hydroxychloroquine sulfate (bulk), ALLERGIES: NKDA  Clinician Response:  Dear Katie,   Dear Katie  Your symptoms show that you may have coronavirus (COVID-19). This illness can cause fever, cough and trouble breathing. Many people get a mild case and get better on their own. Some people can get very sick.  What should I do?  We would like to test you for this virus. This will be a curbside test done outside the clinic.  Please call [phone] to schedule your visit. Explain that you were referred by [facility] to have a COVID-19 test. Be ready to share your [facility] visit ID number.  Starting now:  Stay at least 6 feet away from others. (If someone will drive you to your test, stay in the backseat, as far away from the  as you can.)   Don't go to work, school or anywhere else. When it's time for your test, go straight to the testing site. Don't make any stops on the way there or back.   Wash your hands and face often. Use soap and water.   Cover your mouth and nose with a mask, tissue or washcloth.   Don't touch anyone. No hugging, kissing or handshakes.  While at home   Stay home and away from others (self-isolate) until:  You've had no fever---and no medicine that  "reduces fever---for 3 full days (72 hours). And...  Your other symptoms have gotten better. For example, your cough or breathing has improved. And...  At least 10 days have passed since your symptoms started.  During this time:  Stay in your own room (and use your own bathroom), if you can.  Don't go to work, school or anywhere else.  Stay away from others in your home. No hugging, kissing or shaking hands.  Don't let anyone visit.  Cover your mouth and nose with a mask, tissue or washcloth to avoid spreading germs.  Clean \"high touch\" surfaces often (doorknobs, counters, handles, etc.). Use a household cleaning spray or wipes.  Wash your hands and face often. Use soap and water.  How can I take care of myself?  1. Get lots of rest. Drink extra fluids (unless your doctor has told you not to).  2. Take Tylenol (acetaminophen) for fever or pain. If you have liver or kidney problems, ask your family doctor if it's okay to take Tylenol.  Adults can take either:   650 mg (two 325 mg pills) every 4 to 6 hours, or...  1,000 mg (two 500 mg pills) every 8 hours as needed.   Note: Don't take more than 3,000 mg in one day.   Acetaminophen is found in many medicines (both prescribed and over-the-counter medicines). Read all labels to be sure you don't take too much.   For children, check the Tylenol bottle for the right dose. The dose is based on the child's age or weight.  3. If you have other health problems (like cancer, heart failure, an organ transplant or severe kidney disease): Call your specialty clinic if you don't feel better in the next 2 days.  4. Know when to call 911: If your breathing is so bad that it keeps you from doing normal activities, call 911 or go to the emergency room. Tell them that you've been staying home and may have COVID-19.  5.&nbsp;The following will serve as your written order for this Covid Test ordered by me for the indication of suspected Covid [Z20.828]: The test will be ordered in Epic, " our electronic health record after you are scheduled and will show as ordered and authorized by Timothy Brown MD  Order: Covid-19 (Coronavirus) PCR for SYMPTOMATIC testing from [facility]  Where can I get more information?  To learn more about COVID-19 and how to care for yourself at home, please visit the CDC website at https://www.cdc.gov/coronavirus/2019-ncov/about/steps-when-sick.html.  For more about your care at [facility], please visit [email or web address]  If you'd like to be part of a COVID-19 clinical trial (research study) at the [facility], go to [email or web address] for details.    Diagnosis: Other malaise  Diagnosis ICD: R53.81

## 2020-05-19 NOTE — TELEPHONE ENCOUNTER
"Pt calling.   Her friend has tested + for COVID-19 and she would like to be tested.  The date of possible exposure is 5/11/2020.    Reason for Disposition    [1] Fever (or feeling feverish) OR cough occurs AND [2] living in area with major community spread AND [3] testing being done in the community for symptoms    Additional Information    Negative: SEVERE difficulty breathing (e.g., struggling for each breath, speaks in single words)    Negative: Bluish (or gray) lips or face now    Negative: Sounds like a life-threatening emergency to the triager    Negative: [1] Difficulty breathing occurs AND [2] within 14 days of COVID-19 EXPOSURE (Close Contact)    Negative: Patient sounds very sick or weak to the triager    Negative: [1] Fever (or feeling feverish) OR cough AND [2] within 14 Days of COVID-19 EXPOSURE (Close Contact)    Negative: [1] Fever (or feeling feverish) OR cough occurs AND [2] within 14 days of travel from another country (international travel)    Negative: [1] Fever (or feeling feverish) OR cough occurs AND [2] within 14 days of travel from a city or area with major community spread    Answer Assessment - Initial Assessment Questions  1. CLOSE CONTACT: \"Who is the person with the confirmed or suspected COVID-19 infection that you were exposed to?\"      YES her friend tested positive  2. PLACE of CONTACT: \"Where were you when you were exposed to COVID-19?\" (e.g., home, school, medical waiting room; which city?)      Mothers day function.  5/11/2020  3. TYPE of CONTACT: \"How much contact was there?\" (e.g., sitting next to, live in same house, work in same office, same building)      At the party she was wearing a mask and practiced social distancing  4. DURATION of CONTACT: \"How long were you in contact with the COVID-19 patient?\" (e.g., a few seconds, passed by person, a few minutes, live with the patient)      She was at the party for 2.5 hours.  She says it is hard to say if she was in contact w/ " "her closely during this time.  5. DATE of CONTACT: \"When did you have contact with a COVID-19 patient?\" (e.g., how many days ago)      5/11/2020  6. TRAVEL: \"Have you traveled out of the country recently?\" If so, \"When and where?\"      * Also ask about out-of-state travel, since the [facility] has identified some high risk cities for community spread in the .      * Note: Travel becomes less relevant if there is widespread community transmission where the patient lives.      no  7. COMMUNITY SPREAD: \"Are there lots of cases of COVID-19 (community spread) where you live?\" (See public health department website, if unsure)    * MAJOR community spread: high number of cases; numbers of cases are increasing; many people hospitalized.    * MINOR community spread: low number of cases; not increasing; few or no people hospitalized      unsure  8. SYMPTOMS: \"Do you have any symptoms?\" (e.g., fever, cough, breathing difficulty)      Cough but she says this is from allergies.  9. PREGNANCY OR POSTPARTUM: \"Is there any chance you are pregnant?\" \"When was your last menstrual period?\" \"Did you deliver in the last 2 weeks?\"      no  10. HIGH RISK: \"Do you have any heart or lung problems? Do you have a weak immune system?\" (e.g., CHF, COPD, asthma, HIV positive, chemotherapy, renal failure, diabetes mellitus, sickle cell anemia)        Asthma and RA.    [facility] Specific Disposition  - REQUIRED: [facility] Specific Patient Instructions  COVID 19 Nurse Triage Plan/Patient Instructions    Please be aware that novel coronavirus (COVID-19) may be circulating in the community. If you develop symptoms such as fever, cough, or SOB or if you have concerns about the presence of another infection including coronavirus (COVID-19), please contact your health care provider or visit [email or web address].       Patient to stay at home and follow home care protocol based instructions.   Patient to have an [facility] Visit with a provider " (Preferred option). Follow System Ambulatory Workflow for COVID 19.     To do this follow these instructions:    1. Go to the website [email or web address]  2. Create an account (you will need your insurance information)  3. Start a new visit  4. Choose your diagnosis (e.g. COVID19)  5. Fill out the information about your symptoms  6. A provider will reach out to you by text, phone call or video visit based on your request    Call Back If: Your symptoms worsen before you are able to complete your [facility] Visit with a provider.  Additional COVID19 information to add for patients.     Additional General Information About COVID-19    Whether or not you've been tested for COVID-19    Stay home and away from others (self-isolate) until:  At least 10 days have passed since your symptoms started. And   You've had no fever--and no medicine that reduces fever--for 3 full days (72 hours). And    Your other symptoms have resolved (gotten better).     During this time:  Stay in your own room (and use your own bathroom), if you can.  Stay away from others in your home. No hugging, kissing or shaking hands.  No visitors.  Don't go to work, school or anywhere else.   Clean  high touch  surfaces often (doorknobs, counters, handles, etc.). Use a household cleaning spray or wipes.  Cover your mouth and nose with a mask, tissue or wash cloth to avoid spreading germs.  Wash your hands and face often. Use soap and water.  For more tips, go to https://www.cdc.gov/coronavirus/2019-ncov/downloads/10Things.pdf.    How can I take care of myself?    Get lots of rest. Drink extra fluids (unless a doctor has told you not to).     Take Tylenol (acetaminophen) for fever or pain. If you have liver or kidney problems, ask your family doctor if it's okay to take Tylenol.     Adults can take either:   650 mg (two 325 mg pills) every 4 to 6 hours, or   1,000 mg (two 500 mg pills) every 8 hours as needed.   Note: Don't take more than 3,000 mg in one day.    Acetaminophen is found in many medicines (both prescribed and over-the-counter medicines). Read all labels to be sure you don't take too much.   For children, check the Tylenol bottle for the right dose. The dose is based on  the child's age or weight.    If you have other health problems (like cancer, heart failure, an organ transplant or severe kidney disease): Call your specialty clinic if you don't feel better in the next 2 days.    Know when to call 911: If your breathing is so bad that it keeps you from doing normal activities, call 911 or go to the emergency room. Tell them that you've been staying home and may have COVID-19..      What are the symptoms of COVID-19?   The most common symptoms are cough, fever and trouble breathing.   Less common symptoms include body aches, chills, diarrhea (loose, watery poops), fatigue (feeling very tired), headache, runny nose, sore throat and loss of smell.   COVID-19 can cause severe coughing (bronchitis) and lung infection (pneumonia).    How does it spread?   The virus may spread when a person coughs or sneezes into the air. The virus can travel about 6 feet this way, and it can live on surfaces.    Common  (household disinfectants) will kill the virus.    Who is at risk?  Anyone can catch COVID-19 if they're around someone who has the virus.    How can others protect themselves?   Stay away from people who have COVID-19 (or symptoms of COVID-19).  Wash hands often with soap and water. Or, use hand  with at least 60% alcohol.  Avoid touching the eyes, nose or mouth.   Wear a face mask when you go out in public, when sick or when caring for a sick person.      For more about COVID-19 and caring for yourself at home, please visit the CDC website at https://www.cdc.gov/coronavirus/2019-ncov/about/steps-when-sick.html.     To learn about care at [facility], go to [email or web address].    Below are the COVID-19 hotlines  at the [place] [facility] ([facility]). Interpreters are available.   For health questions: Call 765-772-4345 or 1-447.358.9909 (7 a.m. to 7 p.m.)  For questions about schools and childcare: Call 921-858-5757 or 1-496.803.2958 (7 a.m. to 7 p.m.)    Thank you for limiting contact with others, wearing a simple mask to cover your cough, practice good hand hygiene habits and accessing our virtual services where possible to limit the spread of this virus.    For more information about COVID19 and options for caring for yourself at home, please visit the CDC website at https://www.cdc.gov/coronavirus/2019-ncov/about/steps-when-sick.html  For more options for care at [facility], please visit our website at [email or web address]    For more information, please use the [place] Department [facility] ([facility]) COVID-19 Hotlines (Interpreters available):   Health questions: Phone Number: 161.189.7814 or 1-966.950.2918 and Hours: 7 a.m. to 7 p.m.  Schools and  questions: Phone Number: 224.489.8731 or 1-856.704.2949 and Hours 7 a.m. to 7 p.m.    Protocols used: CORONAVIRUS (COVID-19) EXPOSURE-A- 4.22.20    jacqueline oakes RN on 5/19/2020 at 4:50 PM

## 2020-05-21 ENCOUNTER — OFFICE VISIT (OUTPATIENT)
Dept: URGENT CARE | Facility: URGENT CARE | Age: 55
End: 2020-05-21
Payer: COMMERCIAL

## 2020-05-21 DIAGNOSIS — Z20.822 SUSPECTED COVID-19 VIRUS INFECTION: Primary | ICD-10-CM

## 2020-05-21 LAB
SARS-COV-2 RNA SPEC QL NAA+PROBE: NOT DETECTED
SPECIMEN SOURCE: NORMAL

## 2020-05-21 PROCEDURE — 87635 SARS-COV-2 COVID-19 AMP PRB: CPT | Mod: 90 | Performed by: FAMILY MEDICINE

## 2020-05-21 PROCEDURE — 99000 SPECIMEN HANDLING OFFICE-LAB: CPT | Performed by: FAMILY MEDICINE

## 2020-07-13 DIAGNOSIS — M05.9 SEROPOSITIVE RHEUMATOID ARTHRITIS (H): ICD-10-CM

## 2020-07-14 RX ORDER — HYDROXYCHLOROQUINE SULFATE 200 MG/1
200 TABLET, FILM COATED ORAL DAILY
Qty: 90 TABLET | Refills: 2 | OUTPATIENT
Start: 2020-07-14

## 2020-07-14 NOTE — TELEPHONE ENCOUNTER
Patient should still have refills at her pharmacy. Refill denied.    Kanu Capps RN....7/14/2020 4:14 PM

## 2020-07-21 ENCOUNTER — MYC MEDICAL ADVICE (OUTPATIENT)
Dept: RHEUMATOLOGY | Facility: CLINIC | Age: 55
End: 2020-07-21

## 2020-08-04 ENCOUNTER — VIRTUAL VISIT (OUTPATIENT)
Dept: RHEUMATOLOGY | Facility: CLINIC | Age: 55
End: 2020-08-04
Payer: COMMERCIAL

## 2020-08-04 DIAGNOSIS — G89.29 CHRONIC PAIN OF BOTH KNEES: ICD-10-CM

## 2020-08-04 DIAGNOSIS — M05.9 SEROPOSITIVE RHEUMATOID ARTHRITIS (H): ICD-10-CM

## 2020-08-04 DIAGNOSIS — M25.562 CHRONIC PAIN OF BOTH KNEES: ICD-10-CM

## 2020-08-04 DIAGNOSIS — G89.29 CHRONIC MIDLINE LOW BACK PAIN WITHOUT SCIATICA: Primary | ICD-10-CM

## 2020-08-04 DIAGNOSIS — M25.561 CHRONIC PAIN OF BOTH KNEES: ICD-10-CM

## 2020-08-04 DIAGNOSIS — Z79.899 HIGH RISK MEDICATIONS (NOT ANTICOAGULANTS) LONG-TERM USE: ICD-10-CM

## 2020-08-04 DIAGNOSIS — M54.50 CHRONIC MIDLINE LOW BACK PAIN WITHOUT SCIATICA: Primary | ICD-10-CM

## 2020-08-04 PROCEDURE — 99214 OFFICE O/P EST MOD 30 MIN: CPT | Mod: 95 | Performed by: INTERNAL MEDICINE

## 2020-08-04 NOTE — PROGRESS NOTES
"Katie Aponte is a 54 year old female who is being evaluated via a billable video visit.      The patient has been notified of following:     \"This video visit will be conducted via a call between you and your physician/provider. We have found that certain health care needs can be provided without the need for an in-person physical exam.  This service lets us provide the care you need with a video conversation.  If a prescription is necessary we can send it directly to your pharmacy.  If lab work is needed we can place an order for that and you can then stop by our lab to have the test done at a later time.    Video visits are billed at different rates depending on your insurance coverage.  Please reach out to your insurance provider with any questions.    If during the course of the call the physician/provider feels a video visit is not appropriate, you will not be charged for this service.\"    Patient has given verbal consent for Video visit? Yes  How would you like to obtain your AVS? Mail a copy  If you are dropped from the video visit, the video invite should be resent to: Text to cell phone: [phone]  Will anyone else be joining your video visit? No      Rheumatology Video Visit      Katie Aponte MRN# [id number]   YOB: 1965 Age: 54 year old      Date of visit: 8/04/20   Referring provider: Dr. Francis Capps  PCP: Dr. Karlee Birmingham    Chief Complaint   Patient presents with:  Arthritis: RA, doing ok. FMLA papers are in rheum fax    Assessment and Plan     1.  Seropositive nonerosive rheumatoid arthritis (RF 84, CCP >340): Diagnosed in 2018.  Previously followed at the [facility].  Currently on HCQ 200mg daily and Humira 40mg SQ every 14 days (started June 2019). Note that methotrexate, leflunomide, and sulfasalazine are avoided because of chronic neutropenia.  Doing well at this time except for mild right wrist and right ankle pain that is not clearly inflammatory; advised " using ibuprofen 400-800mg TID x2weeks on a scheduled basis and if not improved then to notify me. Overall doing much better on her current regimen. Tylenol 325-650mg once 30 min before Humira, and icing injection site before and after Humira injection recommended for the injection site reaction that is mild.   - Continue Humira 40 mg SQ every 14 days; hold for infection   - Continue hydroxychloroquine to 200 mg daily (last eye exam was performed on 3/11/2019)  - Labs in 3 months: CBC, Creatinine, Hepatic Panel, ESR, CRP  - Reminded her to have an eye exam for hydroxychloroquine toxicity monitoring.     2. Bone health: post-menopausal s/p HEMALATHA; was on prednisone for RA.  DEXA ordered previously; advised to schedule    3. Raynaud's Phenomenon; positive IRIS: Reportedly started at the age of 15 years old.  IRIS is positive but she does not have other symptoms than an inflammatory arthritis to support an IRIS-associated rheumatologic disorder.  No other symptoms to suggest systemic sclerosis.  Reviewed the diagnosis of Raynaud's phenomenon and the treatment options.  She is doing well with cold avoidance at this time    4. Lower back pain: Participating physical therapy exercises.  Following in the sports medicine clinic.    5. Bilateral knee pain: Degenerative in nature. Refer to PT    6. Right shoulder pain: x2 mo.  Reports having had a steroid injection in the right shoulder in the past, prior to RA treatment, and it did not last very long.  Her symptoms are degenerative and inflammatory in nature.  Question if there is rotator cuff pathology.  2018 MRI report reviewed (scanned document in the EMR) showing synovitis, and degenerative changes.  Suspect that she will benefit more from a steroid injection at this time as she is on RA treatment.  She is scheduled for an in-clinic visit on August 26, 2020 for the injection.  She says she referred to wait for that date; she declined possibly sooner injection with orthopedics  or sports medicine; okay if she changes her mind though.    # Relevant labs and imaging were reviewed with the patient    # High risk medication toxicity monitoring: discussion and labs reviewed; appropriate labs ordered. See above.  Instructed that if confirmed to have COVID-19 or exposure to someone with confirmed COVID-19 to call this clinic for directions on DMARD management.    # Note that this is a virtual visit to reduce the risk of COVID-19 exposure during this current pandemic.      # Considered to be at high risk of complications from the COVID-19 virus.  It is recommended to limit contact with other people and if possible to work remotely or provide a leave of absence to reduce the risk for COVID-19.      Ms. Aponte verbalized agreement with and understanding of the rational for the diagnosis and treatment plan.  All questions were answered to best of my ability and the patient's satisfaction. Ms. Aponte was advised to contact the clinic with any questions that may arise after the clinic visit.      Thank you for involving me in the care of the patient    Return to clinic: 3 months      HPI   Katie Aponte is a 54 year old female with a past medical history significant for acne rosacea, dermatitis, leukopenia, allergic bronchitis, medial meniscal tear, right shoulder impingement syndrome, cervical radiculopathy, and rheumatoid arthritis who is seen for follow-up of RA.     5/17/2019 [facility] rheumatology clinic note by Dr. Johnnie Medley documents seropositive rheumatoid arthritis with a positive rheumatoid factor and a positive CCP.  History of left knee partial replacement 4.5 years ago.  IRIS 1: 40.  CCP >340.  Rheumatoid factor 84.  Negative IIRS and dsDNA; normal C3 and C4.  Negative hepatitis B/C, TB.  OCT testing 3/11/2019 normal.  Symptoms started in June 2018 with pain in the left heel.  She was seen by podiatry.  Later developed right ankle swelling.  Symptoms worsened over time  to include both ankles and both Achilles tendons.  Also with pain in the second-fourth fingers of the left hand and morning stiffness for couple hours.  Also history of Raynaud's phenomenon.  Sicca symptoms possibly related to phentermine use.  Plan was to continue Plaquenil 200 mg twice daily and add x-rays of her hands and feet to look for inflammatory arthritis.    6/18/2019:  Ms. Aponte reported that she was diagnosed with rheumatoid arthritis in 2018.  She initially had pain at her Achilles tendon, then ankles, other than both ankles and both Achilles tendons, that her hands and knees.  She was found to have elevated rheumatoid factor and CCP by her podiatrist and was subsequently referred to rheumatology.  She was seen at the [facility] where hydroxychloroquine was started and she felt improvement with this.  She continues to have pain at her right foot that is worse with increased ambulation; she has been following with podiatry.  She has a history of right shoulder impingement syndrome that did not improve with 2 steroid injections; she is followed in the sports medicine clinic and plans to follow-up there again.  She is also gone to physical therapy without improvement.  Left first MCP and bilateral first CMC's bother her.  Also with some pain at the right second PIP.  History of left partial knee replacement.  Morning stiffness for approximately 7 hours; she wakes up at 5:15 AM and is improved by noon.  Raynaud's phenomenon diagnosed at the age of 15 years old and is successfully treated with cold avoidance; typically just affects her fingers.  She has had dysphagia twice in her life.  No pain or difficulty with swallowing otherwise.  No skin thickening or skin tightening.      10/15/2019: Feels better since starting Humira.  Tolerating injections.  Still with some shoulder pain but it is improving.  Morning stiffness for approximately 1-2 hours.  Biggest issue right now she has sinusitis  treated with 2 antibiotics without improvement and now she has a cough.  She is going to follow-up with her PCP for the sinusitis and cough.  She has not held Humira during these infections.    5/5/2020: Tolerating hydroxychloroquine; mild redness at Humira site.. Right ankle and right wrist pain that is mild, improves with activity; no swelling of increase warmth. Ibuprofen resolves this pain. Otherwise doing well.     Today, 8/4/2020: Toes, and wrists with morning stiffness for a few hours each day.  Knees ache all the time; worse with activity such as walking on flat ground >20min. Stairs are okay. Knee pain improves with rest and with an ice pain.  Hasn't done PT for her knees but is willing to do so.  Right shoulder aches; worse with over the head activity; recalls steroid injection prior to RA tx wasn't very helpful; worse in the past 2 months. Chronic back pain being addressed by sports medicine.     Denies fevers, chills, nausea, vomiting, constipation, diarrhea. No abdominal pain. No chest pain/pressure, palpitations, or shortness of breath. No LE swelling. No neck pain. No oral or nasal sores.  No rash. No sicca symptoms.      Tobacco: None  EtOH: 0-4 drinks per week  Drugs: None    ROS   GEN: No fevers, chills, night sweats, or weight change  SKIN: No itching, rashes, sores; see HPI  HEENT: No oral or nasal ulcers.  CV: No chest pain, pressure, palpitations, or dyspnea on exertion.  PULM: See HPI  GI: No nausea, vomiting, constipation, diarrhea. No blood in stool. No abdominal pain.  : No blood in urine.  MSK: See HPI.  NEURO: No numbness or tingling  ENDO: No heat/cold intolerance.  EXT: No LE swelling  PSYCH: Negative    Active Problem List     Patient Active Problem List   Diagnosis     Knee pain     Abnormal uterine bleeding     CARDIOVASCULAR SCREENING; LDL GOAL LESS THAN 160     Acne rosacea     Dermatitis     Leukopenia     Allergic bronchitis, moderate persistent, uncomplicated     Mild  persistent asthma with acute exacerbation     JESENIA (stress urinary incontinence, female)     Class 1 obesity due to excess calories without serious comorbidity with body mass index (BMI) of 30.0 to 30.9 in adult     Medial meniscus tear     Pain in joint, ankle and foot     Tear of medial meniscus of knee     Pain in joint, upper arm, right     Seropositive rheumatoid arthritis (H)     Past Medical History     Past Medical History:   Diagnosis Date     Herpes     Under eye     Joint pain      Seasonal allergies      Thrombocytopenia (H) 2015     Uncomplicated asthma     very mild     Past Surgical History     Past Surgical History:   Procedure Laterality Date      SECTION       COLONOSCOPY       CYSTOSCOPY, SLING TRANSVAGINAL N/A 2017    Procedure: CYSTOSCOPY, SLING TRANSVAGINAL;  TRANSVAGINAL TAPING, CYSTOSCOPY, MID URETHRAL SLING;  Surgeon: Jett Montes MD;  Location:  OR     DILATE CERVIX, ABLATE ENDOMETRIUM THERMACHOICE, COMBINED  4/10/2014    Procedure: COMBINED DILATE CERVIX, ABLATE ENDOMETRIUM THERMACHOICE;;  Surgeon: Jett Montes MD;  Location: [facility]     DILATION AND CURETTAGE, HYSTEROSCOPY, ABLATE ENDOMETRIUM NOVASURE, COMBINED  4/10/2014    Procedure: COMBINED DILATION AND CURETTAGE, HYSTEROSCOPY, ABLATE ENDOMETRIUM NOVASURE;  HYSTEROSCOPY, DILATION AND CURETTAGE, NOVASURE ABLATION ATTEMPTED, THERMACHOICE ABLATION ATTEMPTED;  Surgeon: Jett Montes MD;  Location: [facility]     LAPAROSCOPIC ASSISTED HYSTERECTOMY VAGINAL, BILATERAL SALPINGO-OOPHORECTOMY, COMBINED  2014    Procedure: COMBINED LAPAROSCOPIC ASSISTED HYSTERECTOMY VAGINAL, SALPINGO-OOPHORECTOMY;  Surgeon: Jett Montes MD;  Location: [facility]     ORTHOPEDIC SURGERY      L KNEE MENISCUS,ankle surgery, left knee replacement     Allergy     Allergies   Allergen Reactions     Droperidol Itching     Feels jumpy     Percocet [Oxycodone-Acetaminophen] Nausea and Vomiting and GI Disturbance     Current Medication List      Current Outpatient Medications   Medication Sig     adalimumab (HUMIRA *CF*) 40 MG/0.4ML pen kit Inject 0.4 mLs (40 mg) Subcutaneous every 14 days . Hold for signs of infection, then seek medical attention.     albuterol (PROAIR HFA) 108 (90 Base) MCG/ACT inhaler Inhale 2 puffs into the lungs every 6 hours as needed for shortness of breath / dyspnea or wheezing     albuterol (PROVENTIL) (2.5 MG/3ML) 0.083% neb solution Take 1 vial (2.5 mg) by nebulization 4 times daily as needed for shortness of breath / dyspnea or wheezing     benzonatate (TESSALON) 100 MG capsule Take 1 capsule (100 mg) by mouth 3 times daily as needed for cough     Cetirizine-Pseudoephedrine (ZYRTEC-D PO)      desonide (DESOWEN) 0.05 % ointment Apply topically 2 times daily     Estrogens Conjugated (PREMARIN PO) Take 0.9 mg by mouth daily     estrogens conjugated (PREMARIN) 0.9 MG TABS Take 0.3 mg by mouth daily     Fluocinolone Acetonide Scalp 0.01 % OIL oil Apply topically 2 times daily as needed     fluticasone (FLONASE) 50 MCG/ACT spray Spray 1-2 sprays into both nostrils daily     fluticasone (FLOVENT HFA) 44 MCG/ACT inhaler Inhale 2 puffs into the lungs 2 times daily     hydroxychloroquine (PLAQUENIL) 200 MG tablet Take 1 tablet (200 mg) by mouth daily     ibuprofen (ADVIL/MOTRIN) 800 MG tablet Take 1 tablet (800 mg) by mouth every 8 hours as needed for moderate pain     ipratropium - albuterol 0.5 mg/2.5 mg/3 mL (DUONEB) 0.5-2.5 (3) MG/3ML neb solution Take 1 vial (3 mLs) by nebulization every 6 hours as needed for shortness of breath / dyspnea or wheezing     multivitamin, therapeutic with minerals (MULTI-VITAMIN) TABS tablet Take 1 tablet by mouth daily     phentermine 30 MG capsule Take 30 mg by mouth every morning     predniSONE (DELTASONE) 5 MG tablet 4tab=20 mg qd x 5 days, 3tab=15 mg qd x 5 days, 2tab=10 mg qd x 5 days, 1 tab=5 mg qd daily     triamcinolone (KENALOG) 0.1 % cream Apply sparingly to affected area three times daily  "as needed     valACYclovir (VALTREX) 500 MG tablet Take 500 mg by mouth as needed Reported on 4/11/2017     order for DME Equipment being ordered: Aerosol mask. Use with nebulizer.     order for DME Equipment being ordered:  Knee walker     order for DME Equipment being ordered: Knee walker      order for DME Equipment being ordered: Nebulizer with tubing and instructions     order for DME Equipment being ordered: [employer]x Bed Buddy- heated neck roll     order for DME Equipment being ordered: TENS     No current facility-administered medications for this visit.          Social History   See HPI    Family History     Family History   Problem Relation Age of Onset     Cancer Mother         patient is unsure of what kind     Physical Exam     Temp Readings from Last 3 Encounters:   01/06/20 98.3  F (36.8  C) (Oral)   09/24/19 99.4  F (37.4  C) (Oral)   06/26/19 98.2  F (36.8  C) (Oral)     BP Readings from Last 5 Encounters:   02/04/20 138/85   01/06/20 134/85   10/15/19 100/68   09/24/19 122/72   06/26/19 118/75     Pulse Readings from Last 1 Encounters:   02/04/20 80     Resp Readings from Last 1 Encounters:   02/04/20 16     Estimated body mass index is 28.08 kg/m  as calculated from the following:    Height as of 2/4/20: 1.676 m (5' 6\").    Weight as of 2/4/20: 78.9 kg (174 lb).      GEN: NAD. Healthy appearing adult.   HEENT: MMM.  Anicteric, noninjected sclera. No obvious external lesions of the ear and nose. Hearing intact.  PULM: No increased work of breathing; no use of accessory muscles.  MSK:  Hands and wrists without swelling. Elbows without swelling. Shoulders with normal ROM. Knees without swelling. Feet without swelling. Joints without overlying erythema.   SKIN: No rash or jaundice seen  PSYCH: Alert. Appropriate. Oriented to person, place, and time.       Labs / Imaging (select studies)     RF/CCP  Recent Labs   Lab Test 08/31/18  1220 06/29/18  1301   CCPIGG >340*  --    RHF  --  84*     CBC  Recent " Labs   Lab Test 04/29/20  1343 10/15/19  1552 06/18/19  1453   WBC 3.4* 3.2* 3.3*   RBC 4.69 4.74 4.56   HGB 13.0 13.1 13.1   HCT 39.8 40.2 39.2   MCV 85 85 86   RDW 12.8 12.8 13.8   * 206 193   MCH 27.7 27.6 28.7   MCHC 32.7 32.6 33.4   NEUTROPHIL 21.0 24.0 35.8   LYMPH 57.0 60.0 40.8   MONOCYTE 12.0 9.0 11.4   EOSINOPHIL 9.0 6.0 11.1   BASOPHIL 1.0 1.0 0.9   ANEU 0.7* 0.8* 1.2*   ALYM 2.0 1.9 1.4   FIDE 0.4 0.3 0.4   AEOS 0.3 0.2 0.4   ABAS 0.0 0.0 0.0     CMP  Recent Labs   Lab Test 04/29/20  1343 10/15/19  1552 05/17/19  1249  09/25/15  1540 09/18/15  1001  08/01/14  0749   NA  --   --   --   --  139 136  --   --    POTASSIUM  --   --   --   --  3.9 4.6  --   --    CHLORIDE  --   --   --   --  104 103  --   --    CO2  --   --   --   --  30 31  --   --    ANIONGAP  --   --   --   --  5 2*  --   --    GLC  --   --   --   --  99 96  --  101*   BUN  --   --   --   --  9 11  --   --    CR 0.76 0.85 0.82   < > 0.77 0.83   < >  --    GFRESTIMATED 88 78 82   < > 80 73   < >  --    GFRESTBLACK >90 90 >90   < > >90   GFR Calc   89   < >  --    NATALYA  --   --   --   --  8.0* 9.3  --   --    BILITOTAL 0.5 0.4  --   --  0.4  --   --   --    ALBUMIN 3.4 3.4 3.8   < > 3.5  --   --   --    PROTTOTAL 7.7 7.6  --   --  7.6  --   --   --    ALKPHOS 52 63  --   --  63  --   --   --    AST 17 10 14   < > 14  --   --   --    ALT 18 16 18   < > 24  --   --   --     < > = values in this interval not displayed.     Calcium/VitaminD  Recent Labs   Lab Test 06/18/19  1453 09/25/15  1540 09/18/15  1001   NATALYA  --  8.0* 9.3   VITDT 33  --   --      ESR/CRP  Recent Labs   Lab Test 04/29/20  1343 08/31/18  1220 06/29/18  1301   SED 17 19 17   CRP <2.9 <2.9  --      Hepatitis B  Recent Labs   Lab Test 08/31/18  1220 09/25/15  1540   AUSAB  --  0.09   HBCAB Nonreactive Nonreactive   HEPBANG Nonreactive Nonreactive     Hepatitis C  Recent Labs   Lab Test 08/31/18  1220 03/29/18  0913   HCVAB Nonreactive Nonreactive     Lyme  ab screening  Recent Labs   Lab Test 06/29/18  1301   LYMEGM 0.15     Tuberculosis Screening  Recent Labs   Lab Test 06/18/19  1453 08/31/18  1220   TBRES Negative Negative     HIV Screening  Recent Labs   Lab Test 09/25/15  1540   HIAGAB Nonreactive   HIV-1 p24 Ag & HIV-1/HIV-2 Ab Not Detected         Immunization History     Immunization History   Administered Date(s) Administered     Influenza (IIV3) PF 10/20/2009     Tdap (Adacel,Boostrix) 01/25/2010          Chart documentation done in part with Dragon Voice recognition Software. Although reviewed after completion, some word and grammatical error may remain.      Video-Visit Details    Type of service:  Video Visit    Video Start Time: 3:47 PM  Video End Time: 4:08 PM    Originating Location (pt. Location): Home in [place]    Distant Location (provider location):  Home    Platform used for Video Visit: Viki Mcfarlane MD

## 2020-08-05 RX ORDER — HYDROXYCHLOROQUINE SULFATE 200 MG/1
200 TABLET, FILM COATED ORAL DAILY
Qty: 90 TABLET | Refills: 2 | Status: SHIPPED | OUTPATIENT
Start: 2020-08-05 | End: 2021-02-09

## 2020-08-14 NOTE — TELEPHONE ENCOUNTER
FMLA forms received 8/3/2020. Filled out and sent to [employer].    Richard Mcfarlane MD  8/14/2020

## 2020-08-26 ENCOUNTER — OFFICE VISIT (OUTPATIENT)
Dept: RHEUMATOLOGY | Facility: CLINIC | Age: 55
End: 2020-08-26
Payer: COMMERCIAL

## 2020-08-26 VITALS
OXYGEN SATURATION: 98 % | DIASTOLIC BLOOD PRESSURE: 72 MMHG | WEIGHT: 167 LBS | RESPIRATION RATE: 16 BRPM | HEART RATE: 72 BPM | SYSTOLIC BLOOD PRESSURE: 128 MMHG | BODY MASS INDEX: 26.95 KG/M2

## 2020-08-26 DIAGNOSIS — M25.511 CHRONIC RIGHT SHOULDER PAIN: ICD-10-CM

## 2020-08-26 DIAGNOSIS — M75.42 IMPINGEMENT SYNDROME OF LEFT SHOULDER: Primary | ICD-10-CM

## 2020-08-26 DIAGNOSIS — G89.29 CHRONIC RIGHT SHOULDER PAIN: ICD-10-CM

## 2020-08-26 DIAGNOSIS — M05.9 SEROPOSITIVE RHEUMATOID ARTHRITIS (H): ICD-10-CM

## 2020-08-26 PROCEDURE — 20610 DRAIN/INJ JOINT/BURSA W/O US: CPT | Mod: LT | Performed by: INTERNAL MEDICINE

## 2020-08-26 RX ORDER — METHYLPREDNISOLONE ACETATE 40 MG/ML
40 INJECTION, SUSPENSION INTRA-ARTICULAR; INTRALESIONAL; INTRAMUSCULAR; SOFT TISSUE ONCE
Status: COMPLETED | OUTPATIENT
Start: 2020-08-26 | End: 2020-08-26

## 2020-08-26 RX ORDER — METHYLPREDNISOLONE ACETATE 40 MG/ML
40 INJECTION, SUSPENSION INTRA-ARTICULAR; INTRALESIONAL; INTRAMUSCULAR; SOFT TISSUE ONCE
Qty: 1 ML | Refills: 0 | OUTPATIENT
Start: 2020-08-26 | End: 2020-08-26

## 2020-08-26 RX ADMIN — METHYLPREDNISOLONE ACETATE 40 MG: 40 INJECTION, SUSPENSION INTRA-ARTICULAR; INTRALESIONAL; INTRAMUSCULAR; SOFT TISSUE at 07:35

## 2020-08-26 ASSESSMENT — PAIN SCALES - GENERAL: PAINLEVEL: MODERATE PAIN (4)

## 2020-08-26 NOTE — PATIENT INSTRUCTIONS
Dr. Mcfarlane s Rheumatology Clinics  Locations Clinic Hours Telephone Number     [facility]  [address] [place] [address]     Wednesday: 7:20AM - 4:00PM  Thursday:     7:20AM - 4:00PM     Friday:          7:20AM - 11:00AM       To schedule an appointment with  Dr. Mcfarlane,  please contact  Specialty [phone]       [facility]  [address]  [place] [address]       Monday:       7:20AM - 4:00PM        [facility]  [address] [place] [address]       Tuesday:      7:20AM - 4:00PM

## 2020-08-26 NOTE — PROGRESS NOTES
Rheumatology Clinic Visit      Katie Aponte MRN# [id number]   YOB: 1965 Age: 54 year old      Date of visit: 8/26/20   Referring provider: Dr. Francis Capps  PCP: Dr. Karlee Birmingham    Chief Complaint   Left shoulder injection    Assessment and Plan     1. Left shoulder pain: >2 mo.  Reports having had a steroid injection in the right shoulder in the past, prior to RA treatment, and it did not last very long.  Her symptoms are degenerative and inflammatory in nature of the left.  Note that previous documentation documented right shoulder pain.  Question if there is rotator cuff pathology.  2018 MRI report reviewed (scanned document in the EMR) showing synovitis, and degenerative changes.  Suspect that she will benefit more from a steroid injection at this time as she is on RA treatment.  Steroid injection today as documented in the procedure section.       Ms. Aponte verbalized agreement with and understanding of the rational for the diagnosis and treatment plan.  All questions were answered to best of my ability and the patient's satisfaction. Ms. Aponte was advised to contact the clinic with any questions that may arise after the clinic visit.      Thank you for involving me in the care of the patient    Return to clinic: 3 months      HPI   Katie Aponte is a 54 year old female with a past medical history significant for acne rosacea, dermatitis, leukopenia, allergic bronchitis, medial meniscal tear, right shoulder impingement syndrome, cervical radiculopathy, and rheumatoid arthritis who is seen for follow-up for right shoulder injection.     Left shoulder pain >2mo, worse with abduction     Denies fevers, chills, nausea, vomiting, constipation, diarrhea. No abdominal pain. No chest pain/pressure, palpitations, or shortness of breath. No LE swelling. No neck pain. No oral or nasal sores.  No rash. No sicca symptoms.      Tobacco: None  EtOH: 0-4 drinks per week  Drugs: None    ROS   GEN:  No fevers or chills  SKIN: No rash  CV: No chest pain, pressure, palpitations, or dyspnea on exertion.  PULM: See HPI    Active Problem List     Patient Active Problem List   Diagnosis     Knee pain     Abnormal uterine bleeding     CARDIOVASCULAR SCREENING; LDL GOAL LESS THAN 160     Acne rosacea     Dermatitis     Leukopenia     Allergic bronchitis, moderate persistent, uncomplicated     Mild persistent asthma with acute exacerbation     JESENIA (stress urinary incontinence, female)     Class 1 obesity due to excess calories without serious comorbidity with body mass index (BMI) of 30.0 to 30.9 in adult     Medial meniscus tear     Pain in joint, ankle and foot     Tear of medial meniscus of knee     Pain in joint, upper arm, right     Seropositive rheumatoid arthritis (H)     Past Medical History     Past Medical History:   Diagnosis Date     Herpes     Under eye     Joint pain      Seasonal allergies      Thrombocytopenia (H) 2015     Uncomplicated asthma     very mild     Past Surgical History     Past Surgical History:   Procedure Laterality Date      SECTION       COLONOSCOPY       CYSTOSCOPY, SLING TRANSVAGINAL N/A 2017    Procedure: CYSTOSCOPY, SLING TRANSVAGINAL;  TRANSVAGINAL TAPING, CYSTOSCOPY, MID URETHRAL SLING;  Surgeon: Jett Montes MD;  Location:  OR     DILATE CERVIX, ABLATE ENDOMETRIUM THERMACHOICE, COMBINED  4/10/2014    Procedure: COMBINED DILATE CERVIX, ABLATE ENDOMETRIUM THERMACHOICE;;  Surgeon: Jett Montes MD;  Location: [facility]     DILATION AND CURETTAGE, HYSTEROSCOPY, ABLATE ENDOMETRIUM NOVASURE, COMBINED  4/10/2014    Procedure: COMBINED DILATION AND CURETTAGE, HYSTEROSCOPY, ABLATE ENDOMETRIUM NOVASURE;  HYSTEROSCOPY, DILATION AND CURETTAGE, NOVASURE ABLATION ATTEMPTED, THERMACHOICE ABLATION ATTEMPTED;  Surgeon: Jett Montes MD;  Location: [facility]     LAPAROSCOPIC ASSISTED HYSTERECTOMY VAGINAL, BILATERAL SALPINGO-OOPHORECTOMY, COMBINED  2014    Procedure:  COMBINED LAPAROSCOPIC ASSISTED HYSTERECTOMY VAGINAL, SALPINGO-OOPHORECTOMY;  Surgeon: Jett Montes MD;  Location: [facility]     ORTHOPEDIC SURGERY      L KNEE MENISCUS,ankle surgery, left knee replacement     Allergy     Allergies   Allergen Reactions     Droperidol Itching     Feels jumpy     Percocet [Oxycodone-Acetaminophen] Nausea and Vomiting and GI Disturbance     Current Medication List     Current Outpatient Medications   Medication Sig     adalimumab (HUMIRA *CF*) 40 MG/0.4ML pen kit Inject 0.4 mLs (40 mg) Subcutaneous every 14 days . Hold for signs of infection, then seek medical attention.     albuterol (PROAIR HFA) 108 (90 Base) MCG/ACT inhaler Inhale 2 puffs into the lungs every 6 hours as needed for shortness of breath / dyspnea or wheezing     albuterol (PROVENTIL) (2.5 MG/3ML) 0.083% neb solution Take 1 vial (2.5 mg) by nebulization 4 times daily as needed for shortness of breath / dyspnea or wheezing     Cetirizine-Pseudoephedrine (ZYRTEC-D PO)      desonide (DESOWEN) 0.05 % ointment Apply topically 2 times daily     Estrogens Conjugated (PREMARIN PO) Take 0.9 mg by mouth daily     estrogens conjugated (PREMARIN) 0.9 MG TABS Take 0.3 mg by mouth daily     Fluocinolone Acetonide Scalp 0.01 % OIL oil Apply topically 2 times daily as needed     fluticasone (FLONASE) 50 MCG/ACT spray Spray 1-2 sprays into both nostrils daily     fluticasone (FLOVENT HFA) 44 MCG/ACT inhaler Inhale 2 puffs into the lungs 2 times daily     hydroxychloroquine (PLAQUENIL) 200 MG tablet Take 1 tablet (200 mg) by mouth daily     ibuprofen (ADVIL/MOTRIN) 800 MG tablet Take 1 tablet (800 mg) by mouth every 8 hours as needed for moderate pain     ipratropium - albuterol 0.5 mg/2.5 mg/3 mL (DUONEB) 0.5-2.5 (3) MG/3ML neb solution Take 1 vial (3 mLs) by nebulization every 6 hours as needed for shortness of breath / dyspnea or wheezing     multivitamin, therapeutic with minerals (MULTI-VITAMIN) TABS tablet Take 1 tablet by mouth  "daily     phentermine 30 MG capsule Take 30 mg by mouth every morning     predniSONE (DELTASONE) 5 MG tablet 4tab=20 mg qd x 5 days, 3tab=15 mg qd x 5 days, 2tab=10 mg qd x 5 days, 1 tab=5 mg qd daily     triamcinolone (KENALOG) 0.1 % cream Apply sparingly to affected area three times daily as needed     valACYclovir (VALTREX) 500 MG tablet Take 500 mg by mouth as needed Reported on 4/11/2017     benzonatate (TESSALON) 100 MG capsule Take 1 capsule (100 mg) by mouth 3 times daily as needed for cough     order for DME Equipment being ordered: Aerosol mask. Use with nebulizer.     order for DME Equipment being ordered:  Knee walker     order for DME Equipment being ordered: Knee walker      order for DME Equipment being ordered: Nebulizer with tubing and instructions     order for DME Equipment being ordered: [employer] Bed Buddy- heated neck roll     order for DME Equipment being ordered: TENS     No current facility-administered medications for this visit.          Social History   See HPI    Family History     Family History   Problem Relation Age of Onset     Cancer Mother         patient is unsure of what kind     Physical Exam     Temp Readings from Last 3 Encounters:   01/06/20 98.3  F (36.8  C) (Oral)   09/24/19 99.4  F (37.4  C) (Oral)   06/26/19 98.2  F (36.8  C) (Oral)     BP Readings from Last 5 Encounters:   02/04/20 138/85   01/06/20 134/85   10/15/19 100/68   09/24/19 122/72   06/26/19 118/75     Pulse Readings from Last 1 Encounters:   02/04/20 80     Resp Readings from Last 1 Encounters:   02/04/20 16     Estimated body mass index is 28.08 kg/m  as calculated from the following:    Height as of 2/4/20: 1.676 m (5' 6\").    Weight as of 2/4/20: 78.9 kg (174 lb).      GEN: NAD. Healthy appearing adult.   HEENT: MMM.  Anicteric, noninjected sclera. No obvious external lesions of the ear and nose. Hearing intact.  PULM: No increased work of breathing; no use of accessory muscles.  MSK:  . Shoulders with " normal ROM; but left shoulder pain with abduction above 90 degrees; crepitation of the left shoulder; no swelling or increased warmth of the left shoulder  SKIN: No rash or jaundice seen  PSYCH: Alert. Appropriate.        Labs / Imaging (select studies)     RF/CCP  Recent Labs   Lab Test 08/31/18  1220 06/29/18  1301   CCPIGG >340*  --    RHF  --  84*     CBC  Recent Labs   Lab Test 04/29/20  1343 10/15/19  1552 06/18/19  1453   WBC 3.4* 3.2* 3.3*   RBC 4.69 4.74 4.56   HGB 13.0 13.1 13.1   HCT 39.8 40.2 39.2   MCV 85 85 86   RDW 12.8 12.8 13.8   * 206 193   MCH 27.7 27.6 28.7   MCHC 32.7 32.6 33.4   NEUTROPHIL 21.0 24.0 35.8   LYMPH 57.0 60.0 40.8   MONOCYTE 12.0 9.0 11.4   EOSINOPHIL 9.0 6.0 11.1   BASOPHIL 1.0 1.0 0.9   ANEU 0.7* 0.8* 1.2*   ALYM 2.0 1.9 1.4   FIDE 0.4 0.3 0.4   AEOS 0.3 0.2 0.4   ABAS 0.0 0.0 0.0     CMP  Recent Labs   Lab Test 04/29/20  1343 10/15/19  1552 05/17/19  1249  09/25/15  1540 09/18/15  1001  08/01/14  0749   NA  --   --   --   --  139 136  --   --    POTASSIUM  --   --   --   --  3.9 4.6  --   --    CHLORIDE  --   --   --   --  104 103  --   --    CO2  --   --   --   --  30 31  --   --    ANIONGAP  --   --   --   --  5 2*  --   --    GLC  --   --   --   --  99 96  --  101*   BUN  --   --   --   --  9 11  --   --    CR 0.76 0.85 0.82   < > 0.77 0.83   < >  --    GFRESTIMATED 88 78 82   < > 80 73   < >  --    GFRESTBLACK >90 90 >90   < > >90   GFR Calc   89   < >  --    NATALYA  --   --   --   --  8.0* 9.3  --   --    BILITOTAL 0.5 0.4  --   --  0.4  --   --   --    ALBUMIN 3.4 3.4 3.8   < > 3.5  --   --   --    PROTTOTAL 7.7 7.6  --   --  7.6  --   --   --    ALKPHOS 52 63  --   --  63  --   --   --    AST 17 10 14   < > 14  --   --   --    ALT 18 16 18   < > 24  --   --   --     < > = values in this interval not displayed.     Calcium/VitaminD  Recent Labs   Lab Test 06/18/19  1453 09/25/15  1540 09/18/15  1001   NATALYA  --  8.0* 9.3   VITDT 33  --   --       ESR/CRP  Recent Labs   Lab Test 04/29/20  1343 08/31/18  1220 06/29/18  1301   SED 17 19 17   CRP <2.9 <2.9  --      Hepatitis B  Recent Labs   Lab Test 08/31/18  1220 09/25/15  1540   AUSAB  --  0.09   HBCAB Nonreactive Nonreactive   HEPBANG Nonreactive Nonreactive     Hepatitis C  Recent Labs   Lab Test 08/31/18  1220 03/29/18  0913   HCVAB Nonreactive Nonreactive     Lyme ab screening  Recent Labs   Lab Test 06/29/18  1301   LYMEGM 0.15     Tuberculosis Screening  Recent Labs   Lab Test 06/18/19  1453 08/31/18  1220   TBRES Negative Negative     HIV Screening  Recent Labs   Lab Test 09/25/15  1540   HIAGAB Nonreactive   HIV-1 p24 Ag & HIV-1/HIV-2 Ab Not Detected         Immunization History     Immunization History   Administered Date(s) Administered     Influenza (IIV3) PF 10/20/2009     Tdap (Adacel,Boostrix) 01/25/2010     Procedure     Procedure: Steroid injections of the left shoulder  Indication: Pain, OA, impingement of the left shoulder; RA    The procedure was explained in detail. Risks including infection, pain, structural damage such as cartilage damage and tendon rupture, fat atrophy, skin hyper-/hypo-pigmentation, and medication reaction was explained. The need for rest of the affected joint for one week after the procedure was explained.  The option of not doing the procedure was also provided. All questions were answered and the patient consented to the procedure.     A time-out was performed and the correct patient, procedure, and laterality were verified.    The left shoulder was examined and location for injection was identified - posterior approach. The area was cleaned with chlorhexidine, twice.  Ethyl chloride was then used for topical anaesthetic.  Then a mixture of lidocaine 1% 2 mL and Depo-Medrol 40mg was injected into the intra-articular space.     The patient tolerated the procedure well. No complications.    1% Lidocaine  : [employer]   Lot #:  [id number]  EXPIRATION DATE: 03/2021   NDC: 0160-7966-16    MEDICATION: Methylprednisolone  LOT #: [id number]  :  [employer]  EXPIRATION DATE:  03/21  NDC#: 1992-3224-19           Chart documentation done in part with Dragon Voice recognition Software. Although reviewed after completion, some word and grammatical error may remain.    Richard Mcfarlane MD

## 2020-08-29 ENCOUNTER — OFFICE VISIT (OUTPATIENT)
Dept: URGENT CARE | Facility: URGENT CARE | Age: 55
End: 2020-08-29
Payer: COMMERCIAL

## 2020-08-29 VITALS
WEIGHT: 166.25 LBS | OXYGEN SATURATION: 96 % | TEMPERATURE: 97.5 F | DIASTOLIC BLOOD PRESSURE: 87 MMHG | BODY MASS INDEX: 26.83 KG/M2 | SYSTOLIC BLOOD PRESSURE: 131 MMHG | RESPIRATION RATE: 12 BRPM | HEART RATE: 87 BPM

## 2020-08-29 DIAGNOSIS — N76.0 BV (BACTERIAL VAGINOSIS): ICD-10-CM

## 2020-08-29 DIAGNOSIS — B96.89 BV (BACTERIAL VAGINOSIS): ICD-10-CM

## 2020-08-29 DIAGNOSIS — B37.31 YEAST INFECTION OF THE VAGINA: ICD-10-CM

## 2020-08-29 DIAGNOSIS — N89.8 VAGINAL DISCHARGE: Primary | ICD-10-CM

## 2020-08-29 LAB
SPECIMEN SOURCE: ABNORMAL
WET PREP SPEC: ABNORMAL

## 2020-08-29 PROCEDURE — 87591 N.GONORRHOEAE DNA AMP PROB: CPT | Performed by: FAMILY MEDICINE

## 2020-08-29 PROCEDURE — 87491 CHLMYD TRACH DNA AMP PROBE: CPT | Performed by: FAMILY MEDICINE

## 2020-08-29 PROCEDURE — 87210 SMEAR WET MOUNT SALINE/INK: CPT | Performed by: FAMILY MEDICINE

## 2020-08-29 PROCEDURE — 99214 OFFICE O/P EST MOD 30 MIN: CPT | Performed by: FAMILY MEDICINE

## 2020-08-29 RX ORDER — FLUCONAZOLE 150 MG/1
150 TABLET ORAL ONCE
Qty: 1 TABLET | Refills: 1 | Status: SHIPPED | OUTPATIENT
Start: 2020-08-29 | End: 2020-08-29

## 2020-08-29 RX ORDER — CLINDAMYCIN HCL 300 MG
300 CAPSULE ORAL 2 TIMES DAILY
Qty: 14 CAPSULE | Refills: 0 | Status: SHIPPED | OUTPATIENT
Start: 2020-08-29 | End: 2020-09-05

## 2020-08-29 ASSESSMENT — ENCOUNTER SYMPTOMS
VOMITING: 0
DIARRHEA: 0
DYSURIA: 0
DIFFICULTY URINATING: 0
COUGH: 0
SORE THROAT: 0
CHILLS: 0
RHINORRHEA: 0
SHORTNESS OF BREATH: 0
NAUSEA: 0
FEVER: 0
HEADACHES: 0
HEMATURIA: 0

## 2020-08-29 NOTE — LETTER
August 30, 2020      Katie Aponte  [address]  [place] [address]        Dear ,    We are writing to inform you of your recent test results.     Your test results fall within the expected ranges. Negative gonorrhea and chlamydia.    Please continue with current treatment plan. Enclosed is a copy of these results.    If you have any questions or concerns, please call the clinic at the number listed above.   Thank you for choosing [facility].        Sincerely,        Manjinder Ramos MD      Resulted Orders   Wet prep   Result Value Ref Range    Specimen Description Vagina     Wet Prep WBC'S seen  Moderate       Wet Prep Yeast seen  Moderate   (A)     Wet Prep Clue cells seen  Few   (A)     Wet Prep No Trichomonas seen    Chlamydia trachomatis PCR   Result Value Ref Range    Specimen Description Vagina     Chlamydia Trachomatis PCR Negative NEG^Negative      Comment:      Negative for C. trachomatis rRNA by transcription mediated amplification.  A negative result by transcription mediated amplification does not preclude   the presence of C. trachomatis infection because results are dependent on   proper and adequate collection, absence of inhibitors, and sufficient rRNA to   be detected.     Neisseria gonorrhoeae PCR   Result Value Ref Range    Specimen Descrip Vagina     N Gonorrhea PCR Negative NEG^Negative      Comment:      Negative for N. gonorrhoeae rRNA by transcription mediated amplification.  A negative result by transcription mediated amplification does not preclude   the presence of N. gonorrhoeae infection because results are dependent on   proper and adequate collection, absence of inhibitors, and sufficient rRNA to   be detected.

## 2020-08-29 NOTE — PROGRESS NOTES
SUBJECTIVE:   Katie Aponte is a 54 year old female presenting with a chief complaint of   Chief Complaint   Patient presents with     Vaginal Problem     Patient has whitish/yellowish vaginal discharge and itching since this past Wednesday night     STD Testing     Patient would like STD testing at this time     Katie is a 54-year-old female presenting today with concerns of vaginal discharge over the past 3 days her discharge is white or yellow in color.  She is also noticed a vaginal odor.      She is a monogamous faithful relationship with her boyfriend since January.        PMH   Knee replacement partial replacement on the left.   Hysterectomy 9 years ago           Review of Systems   Constitutional: Negative for chills and fever.   HENT: Negative for congestion, ear pain, rhinorrhea and sore throat.    Respiratory: Negative for cough and shortness of breath.    Gastrointestinal: Negative for diarrhea, nausea and vomiting.   Genitourinary: Positive for vaginal discharge. Negative for difficulty urinating, dysuria, genital sores (does have HSV and has not had outbreak in over a year ), hematuria, vaginal bleeding and vaginal pain (vaginal itch per hpi ).   Musculoskeletal:        Left knee pain at times    Neurological: Negative for headaches.       Patient Active Problem List   Diagnosis     Knee pain     Abnormal uterine bleeding     CARDIOVASCULAR SCREENING; LDL GOAL LESS THAN 160     Acne rosacea     Dermatitis     Leukopenia     Allergic bronchitis, moderate persistent, uncomplicated     Mild persistent asthma with acute exacerbation     JESENIA (stress urinary incontinence, female)     Class 1 obesity due to excess calories without serious comorbidity with body mass index (BMI) of 30.0 to 30.9 in adult     Medial meniscus tear     Pain in joint, ankle and foot     Tear of medial meniscus of knee     Pain in joint, upper arm, right     Seropositive rheumatoid arthritis (H)      Past Medical History:    Diagnosis Date     Herpes     Under eye     Joint pain      Seasonal allergies      Thrombocytopenia (H) 9/25/2015     Uncomplicated asthma     very mild     Family History   Problem Relation Age of Onset     Cancer Mother         patient is unsure of what kind     Current Outpatient Medications   Medication Sig Dispense Refill     adalimumab (HUMIRA *CF*) 40 MG/0.4ML pen kit Inject 0.4 mLs (40 mg) Subcutaneous every 14 days . Hold for signs of infection, then seek medical attention. 0.8 mL 4     albuterol (PROAIR HFA) 108 (90 Base) MCG/ACT inhaler Inhale 2 puffs into the lungs every 6 hours as needed for shortness of breath / dyspnea or wheezing 1 Inhaler 11     albuterol (PROVENTIL) (2.5 MG/3ML) 0.083% neb solution Take 1 vial (2.5 mg) by nebulization 4 times daily as needed for shortness of breath / dyspnea or wheezing 50 vial 2     Cetirizine-Pseudoephedrine (ZYRTEC-D PO)        desonide (DESOWEN) 0.05 % ointment Apply topically 2 times daily 30 g 0     Estrogens Conjugated (PREMARIN PO) Take 0.9 mg by mouth daily       estrogens conjugated (PREMARIN) 0.9 MG TABS Take 0.3 mg by mouth daily       Fluocinolone Acetonide Scalp 0.01 % OIL oil Apply topically 2 times daily as needed 118 mL 0     fluticasone (FLOVENT HFA) 44 MCG/ACT inhaler Inhale 2 puffs into the lungs 2 times daily 1 Inhaler 1     hydroxychloroquine (PLAQUENIL) 200 MG tablet Take 1 tablet (200 mg) by mouth daily 90 tablet 2     multivitamin, therapeutic with minerals (MULTI-VITAMIN) TABS tablet Take 1 tablet by mouth daily       order for DME Equipment being ordered: Nebulizer with tubing and instructions 1 Device 0     phentermine 30 MG capsule Take 30 mg by mouth every morning       valACYclovir (VALTREX) 500 MG tablet Take 500 mg by mouth as needed Reported on 4/11/2017       benzonatate (TESSALON) 100 MG capsule Take 1 capsule (100 mg) by mouth 3 times daily as needed for cough (Patient not taking: Reported on 8/29/2020) 42 capsule 1      fluticasone (FLONASE) 50 MCG/ACT spray Spray 1-2 sprays into both nostrils daily (Patient not taking: Reported on 8/29/2020) 1 Bottle 11     ibuprofen (ADVIL/MOTRIN) 800 MG tablet Take 1 tablet (800 mg) by mouth every 8 hours as needed for moderate pain (Patient not taking: Reported on 8/29/2020) 60 tablet 1     ipratropium - albuterol 0.5 mg/2.5 mg/3 mL (DUONEB) 0.5-2.5 (3) MG/3ML neb solution Take 1 vial (3 mLs) by nebulization every 6 hours as needed for shortness of breath / dyspnea or wheezing (Patient not taking: Reported on 8/29/2020) 1 vial 0     order for DME Equipment being ordered: Aerosol mask. Use with nebulizer. (Patient not taking: Reported on 8/29/2020) 1 each 0     order for DME Equipment being ordered:  Knee walker (Patient not taking: Reported on 8/29/2020) 1 Units 0     order for DME Equipment being ordered: Knee walker  (Patient not taking: Reported on 8/29/2020) 1 Device 0     order for DME Equipment being ordered: TENS (Patient not taking: Reported on 8/29/2020) 1 Units 0     predniSONE (DELTASONE) 5 MG tablet 4tab=20 mg qd x 5 days, 3tab=15 mg qd x 5 days, 2tab=10 mg qd x 5 days, 1 tab=5 mg qd daily (Patient not taking: Reported on 8/29/2020) 70 tablet 2     triamcinolone (KENALOG) 0.1 % cream Apply sparingly to affected area three times daily as needed (Patient not taking: Reported on 8/29/2020) 80 g 1     Social History     Tobacco Use     Smoking status: Never Smoker     Smokeless tobacco: Never Used   Substance Use Topics     Alcohol use: Yes     Alcohol/week: 0.0 standard drinks     Comment: SOCIAL - 1 glass of wine twice a week; 2 drinks on the weekend       OBJECTIVE  /87 (BP Location: Left arm, Patient Position: Chair, Cuff Size: Adult Regular)   Pulse 87   Temp 97.5  F (36.4  C) (Tympanic)   Resp 12   Wt 75.4 kg (166 lb 4 oz)   LMP 03/30/2014   SpO2 96%   Breastfeeding No   BMI 26.83 kg/m      Physical Exam  Neck:      Musculoskeletal: Normal range of motion.    Cardiovascular:      Rate and Rhythm: Normal rate.   Pulmonary:      Effort: Pulmonary effort is normal.   Genitourinary:     Comments: Deferred   Neurological:      Mental Status: She is alert.         Results for orders placed or performed in visit on 08/29/20   Wet prep     Status: Abnormal    Specimen: Vagina   Result Value Ref Range    Specimen Description Vagina     Wet Prep WBC'S seen  Moderate       Wet Prep Yeast seen  Moderate   (A)     Wet Prep Clue cells seen  Few   (A)     Wet Prep No Trichomonas seen       ASSESSMENT:    ICD-10-CM    1. Vaginal discharge  N89.8 Wet prep     Chlamydia trachomatis PCR     Neisseria gonorrhoeae PCR   2. Yeast infection of the vagina  B37.3 fluconazole (DIFLUCAN) 150 MG tablet   3. BV (bacterial vaginosis)  N76.0 clindamycin (CLEOCIN) 300 MG capsule    B96.89       PLAN:  Will call with other lab results in 1-2 days time when the return.   Patient educational/instructional material provided including reasons for follow-up   Manjinder Ramos MD

## 2020-08-30 LAB
C TRACH DNA SPEC QL NAA+PROBE: NEGATIVE
N GONORRHOEA DNA SPEC QL NAA+PROBE: NEGATIVE
SPECIMEN SOURCE: NORMAL
SPECIMEN SOURCE: NORMAL

## 2020-10-22 ENCOUNTER — MYC MEDICAL ADVICE (OUTPATIENT)
Dept: RHEUMATOLOGY | Facility: CLINIC | Age: 55
End: 2020-10-22

## 2020-11-03 ENCOUNTER — VIRTUAL VISIT (OUTPATIENT)
Dept: RHEUMATOLOGY | Facility: CLINIC | Age: 55
End: 2020-11-03
Payer: COMMERCIAL

## 2020-11-03 ENCOUNTER — TELEPHONE (OUTPATIENT)
Dept: RHEUMATOLOGY | Facility: CLINIC | Age: 55
End: 2020-11-03

## 2020-11-03 DIAGNOSIS — Z79.899 HIGH RISK MEDICATIONS (NOT ANTICOAGULANTS) LONG-TERM USE: ICD-10-CM

## 2020-11-03 DIAGNOSIS — I73.00 RAYNAUD'S PHENOMENON WITHOUT GANGRENE: ICD-10-CM

## 2020-11-03 DIAGNOSIS — M19.031 PRIMARY OSTEOARTHRITIS OF RIGHT WRIST: ICD-10-CM

## 2020-11-03 DIAGNOSIS — M05.9 SEROPOSITIVE RHEUMATOID ARTHRITIS (H): Primary | ICD-10-CM

## 2020-11-03 PROCEDURE — 99214 OFFICE O/P EST MOD 30 MIN: CPT | Mod: 95 | Performed by: INTERNAL MEDICINE

## 2020-11-03 NOTE — TELEPHONE ENCOUNTER
PA Initiation    Medication: humira  Insurance Company: [employer] - Phone [phone] Fax [phone]  [facility] [place] [place] [facility] [address]  Filling Pharmacy Phone:    Filling Pharmacy Fax:    Start Date: 11/3/2020

## 2020-11-03 NOTE — PROGRESS NOTES
"Katie Aponte is a 54 year old female who is being evaluated via a billable video visit.      The patient has been notified of following:     \"This video visit will be conducted via a call between you and your physician/provider. We have found that certain health care needs can be provided without the need for an in-person physical exam.  This service lets us provide the care you need with a video conversation.  If a prescription is necessary we can send it directly to your pharmacy.  If lab work is needed we can place an order for that and you can then stop by our lab to have the test done at a later time.    Video visits are billed at different rates depending on your insurance coverage.  Please reach out to your insurance provider with any questions.    If during the course of the call the physician/provider feels a video visit is not appropriate, you will not be charged for this service.\"    Patient has given verbal consent for Video visit? Yes  How would you like to obtain your AVS? MyChart  If you are dropped from the video visit, the video invite should be resent to: Text to cell phone: [phone]  Will anyone else be joining your video visit? No      Noy Burt CMA Rheumatology  11/3/2020 1:15 PM         Rheumatology Video Visit      Katie Aponte MRN# [id number]   YOB: 1965 Age: 54 year old      Date of visit: 11/03/20   Referring provider: Dr. Francis Capps  PCP: Dr. Karlee Birmingham    Chief Complaint   Patient presents with:  Arthritis: Overall doing ok.    Assessment and Plan     1.  Seropositive nonerosive rheumatoid arthritis (RF 84, CCP >340): Diagnosed in 2018.  Previously followed at the [facility].  Currently on HCQ 200mg daily and Humira 40mg SQ every 14 days (started June 2019). Note that methotrexate, leflunomide, and sulfasalazine are avoided because of chronic neutropenia.  Tylenol 325-650mg once 30 min before Humira, and icing injection site before and " after Humira injection recommended for the injection site reaction that is mild.  Still having mild intermittent symptoms though and may benefit from using Humira every 7 days instead of every 14 days.   If Humira every 7 days is not approved then will change to Enbrel every 7 days.  Also advised trying Voltaren gel topically for the right wrist pain when it is degenerative in nature.  For the general general joint aches we also discussed the possible benefit of turmeric, fish oil, and vitamin D 1000 IU daily  - Change Humira to be 40 mg SQ every 7 days    - Continue hydroxychloroquine to 200 mg daily (last eye exam was performed on 3/11/2019; advised that this needs to be a yearly eye exam)  - Start diclofenac (VOLTAREN) 1 % topical gel; Apply up to 2 grams of 1% gel to right wrist up to 4 times daily as needed for joint pain (maximum: 8 g per upper extremity per day)  Dispense: 200 g; Refill: 2  - Labs in 3 months: CBC, Creatinine, Hepatic Panel, ESR, CRP    # Etanercept (Enbrel) Risks and Benefits: The risks and benefits of etanercept were discussed in detail and the patient verbalized understanding.  The risks discussed include, but are not limited to, the risk for hypersensitivity, anaphylaxis, anaphylactoid reactions, an increased risk for serious infections leading to hospitalization or death, a possible increased risk for lymphoma and other malignancies, a possible worsening of demyelinating diseases, a possible worsening of heart failure, possible reactivation of hepatitis B, and possible reactivation of latent tuberculosis.  Subcutaneous injections may result in injection site reactions and/or pain at the site of injection.  It was discussed that the medication would need to be discontinued if a serious infection develops.  It was discussed that live vaccinations should not be received while using etanercept or within 30 days prior to starting etanercept.  I encouraged reviewing the package insert and  asking any questions about the medication.        2. Bone health: post-menopausal s/p HEMALATHA; was on prednisone for RA.  DEXA ordered previously; advised to schedule    3. Raynaud's Phenomenon; positive IRIS: Reportedly started at the age of 15 years old.  IRIS is positive but she does not have other symptoms than an inflammatory arthritis to support an IRIS-associated rheumatologic disorder.  No other symptoms to suggest systemic sclerosis.  Reviewed the diagnosis of Raynaud's phenomenon and the treatment options.  She is doing well with cold avoidance at this time    4. Lower back pain: doing physical therapy exercises.  Following in the sports medicine clinic.    5. Bilateral knee pain: Degenerative in nature. PT exercises    6. Right shoulder pain, history: Previously degenerative and inflammatory in nature.  Steroid injection resolve this issue.      7.  Vaccinations: Vaccinations reviewed with Ms. Aponte.  Risks and benefits of vaccinations were discussed.    - Influenza: advised receiving  - Tetanus: advised receiving    # Relevant labs and imaging were reviewed with the patient    # High risk medication toxicity monitoring: discussion and labs reviewed; appropriate labs ordered. See above.  Instructed that if confirmed to have COVID-19 or exposure to someone with confirmed COVID-19 to call this clinic for directions on DMARD management.    # Note that this is a virtual visit to reduce the risk of COVID-19 exposure during this current pandemic.      # Considered to be at high risk of complications from the COVID-19 virus.  It is recommended to limit contact with other people and if possible to work remotely or provide a leave of absence to reduce the risk for COVID-19.      MsJenna Aponte verbalized agreement with and understanding of the rational for the diagnosis and treatment plan.  All questions were answered to best of my ability and the patient's satisfaction. MsJenna Aponte was advised to contact the clinic with any  questions that may arise after the clinic visit.      Thank you for involving me in the care of the patient    Return to clinic: 3 months      HPI   Katie Aponte is a 54 year old female with a past medical history significant for acne rosacea, dermatitis, leukopenia, allergic bronchitis, medial meniscal tear, right shoulder impingement syndrome, cervical radiculopathy, and rheumatoid arthritis who is seen for follow-up of RA.     5/17/2019 [facility] rheumatology clinic note by Dr. Johnnie Medley documents seropositive rheumatoid arthritis with a positive rheumatoid factor and a positive CCP.  History of left knee partial replacement 4.5 years ago.  IRIS 1: 40.  CCP >340.  Rheumatoid factor 84.  Negative IRIS and dsDNA; normal C3 and C4.  Negative hepatitis B/C, TB.  OCT testing 3/11/2019 normal.  Symptoms started in June 2018 with pain in the left heel.  She was seen by podiatry.  Later developed right ankle swelling.  Symptoms worsened over time to include both ankles and both Achilles tendons.  Also with pain in the second-fourth fingers of the left hand and morning stiffness for couple hours.  Also history of Raynaud's phenomenon.  Sicca symptoms possibly related to phentermine use.  Plan was to continue Plaquenil 200 mg twice daily and add x-rays of her hands and feet to look for inflammatory arthritis.    6/18/2019:  Ms. Aponte reported that she was diagnosed with rheumatoid arthritis in 2018.  She initially had pain at her Achilles tendon, then ankles, other than both ankles and both Achilles tendons, that her hands and knees.  She was found to have elevated rheumatoid factor and CCP by her podiatrist and was subsequently referred to rheumatology.  She was seen at the [facility] where hydroxychloroquine was started and she felt improvement with this.  She continues to have pain at her right foot that is worse with increased ambulation; she has been following with podiatry.  She  has a history of right shoulder impingement syndrome that did not improve with 2 steroid injections; she is followed in the sports medicine clinic and plans to follow-up there again.  She is also gone to physical therapy without improvement.  Left first MCP and bilateral first CMC's bother her.  Also with some pain at the right second PIP.  History of left partial knee replacement.  Morning stiffness for approximately 7 hours; she wakes up at 5:15 AM and is improved by noon.  Raynaud's phenomenon diagnosed at the age of 15 years old and is successfully treated with cold avoidance; typically just affects her fingers.  She has had dysphagia twice in her life.  No pain or difficulty with swallowing otherwise.  No skin thickening or skin tightening.      10/15/2019: Feels better since starting Humira.  Tolerating injections.  Still with some shoulder pain but it is improving.  Morning stiffness for approximately 1-2 hours.  Biggest issue right now she has sinusitis treated with 2 antibiotics without improvement and now she has a cough.  She is going to follow-up with her PCP for the sinusitis and cough.  She has not held Humira during these infections.    5/5/2020: Tolerating hydroxychloroquine; mild redness at Humira site.. Right ankle and right wrist pain that is mild, improves with activity; no swelling of increase warmth. Ibuprofen resolves this pain. Otherwise doing well.     8/4/2020: Toes, and wrists with morning stiffness for a few hours each day.  Knees ache all the time; worse with activity such as walking on flat ground >20min. Stairs are okay. Knee pain improves with rest and with an ice pain.  Hasn't done PT for her knees but is willing to do so.  Right shoulder aches; worse with over the head activity; recalls steroid injection prior to RA tx wasn't very helpful; worse in the past 2 months. Chronic back pain being addressed by sports medicine.     Today, she reports that the steroid injection of her  shoulder resolved the shoulder pain.  Still having mild intermittent MCP and wrist pain that is of short duration and typically occurs just before Humira dosing.  Morning stiffness for approximately 30 minutes, sometimes up to 1 hour.  Positive gelling phenomenon.  Lower back and knee pain is improved with physical therapy exercises.    Denies fevers, chills, nausea, vomiting, constipation, diarrhea. No abdominal pain. No chest pain/pressure, palpitations, or shortness of breath. No LE swelling. No neck pain. No oral or nasal sores.  No rash. No sicca symptoms.      Tobacco: None  EtOH: 0-4 drinks per week  Drugs: None    ROS   GEN: No fevers, chills, night sweats, or weight change  SKIN: No itching, rashes, sores; see HPI  HEENT: No oral or nasal ulcers.  CV: No chest pain, pressure, palpitations, or dyspnea on exertion.  PULM: See HPI  GI: No nausea, vomiting, constipation, diarrhea. No blood in stool. No abdominal pain.  : No blood in urine.  MSK: See HPI.  NEURO: No numbness or tingling  ENDO: No heat/cold intolerance.  EXT: No LE swelling  PSYCH: Negative    Active Problem List     Patient Active Problem List   Diagnosis     Knee pain     Abnormal uterine bleeding     CARDIOVASCULAR SCREENING; LDL GOAL LESS THAN 160     Acne rosacea     Dermatitis     Leukopenia     Allergic bronchitis, moderate persistent, uncomplicated     Mild persistent asthma with acute exacerbation     JESENIA (stress urinary incontinence, female)     Class 1 obesity due to excess calories without serious comorbidity with body mass index (BMI) of 30.0 to 30.9 in adult     Medial meniscus tear     Pain in joint, ankle and foot     Tear of medial meniscus of knee     Pain in joint, upper arm, right     Seropositive rheumatoid arthritis (H)     Past Medical History     Past Medical History:   Diagnosis Date     Herpes     Under eye     Joint pain      Seasonal allergies      Thrombocytopenia (H) 9/25/2015     Uncomplicated asthma     very mild      Past Surgical History     Past Surgical History:   Procedure Laterality Date      SECTION       COLONOSCOPY       CYSTOSCOPY, SLING TRANSVAGINAL N/A 2017    Procedure: CYSTOSCOPY, SLING TRANSVAGINAL;  TRANSVAGINAL TAPING, CYSTOSCOPY, MID URETHRAL SLING;  Surgeon: Jett Montes MD;  Location:  OR     DILATE CERVIX, ABLATE ENDOMETRIUM THERMACHOICE, COMBINED  4/10/2014    Procedure: COMBINED DILATE CERVIX, ABLATE ENDOMETRIUM THERMACHOICE;;  Surgeon: Jett Montes MD;  Location: [facility]     DILATION AND CURETTAGE, HYSTEROSCOPY, ABLATE ENDOMETRIUM NOVASURE, COMBINED  4/10/2014    Procedure: COMBINED DILATION AND CURETTAGE, HYSTEROSCOPY, ABLATE ENDOMETRIUM NOVASURE;  HYSTEROSCOPY, DILATION AND CURETTAGE, NOVASURE ABLATION ATTEMPTED, THERMACHOICE ABLATION ATTEMPTED;  Surgeon: Jett Montes MD;  Location: [facility]     LAPAROSCOPIC ASSISTED HYSTERECTOMY VAGINAL, BILATERAL SALPINGO-OOPHORECTOMY, COMBINED  2014    Procedure: COMBINED LAPAROSCOPIC ASSISTED HYSTERECTOMY VAGINAL, SALPINGO-OOPHORECTOMY;  Surgeon: Jett Montes MD;  Location: [facility]     ORTHOPEDIC SURGERY      L KNEE MENISCUS,ankle surgery, left knee replacement     Allergy     Allergies   Allergen Reactions     Droperidol Itching     Feels jumpy     Percocet [Oxycodone-Acetaminophen] Nausea and Vomiting and GI Disturbance     Current Medication List     Current Outpatient Medications   Medication Sig     adalimumab (HUMIRA *CF*) 40 MG/0.4ML pen kit Inject 0.4 mLs (40 mg) Subcutaneous every 14 days . Hold for signs of infection, then seek medical attention.     albuterol (PROAIR HFA) 108 (90 Base) MCG/ACT inhaler Inhale 2 puffs into the lungs every 6 hours as needed for shortness of breath / dyspnea or wheezing     albuterol (PROVENTIL) (2.5 MG/3ML) 0.083% neb solution Take 1 vial (2.5 mg) by nebulization 4 times daily as needed for shortness of breath / dyspnea or wheezing     Cetirizine-Pseudoephedrine (ZYRTEC-D PO)      desonide  (DESOWEN) 0.05 % ointment Apply topically 2 times daily     Estrogens Conjugated (PREMARIN PO) Take 0.9 mg by mouth daily     estrogens conjugated (PREMARIN) 0.9 MG TABS Take 0.3 mg by mouth daily     Fluocinolone Acetonide Scalp 0.01 % OIL oil Apply topically 2 times daily as needed     fluticasone (FLOVENT HFA) 44 MCG/ACT inhaler Inhale 2 puffs into the lungs 2 times daily     hydroxychloroquine (PLAQUENIL) 200 MG tablet Take 1 tablet (200 mg) by mouth daily     multivitamin, therapeutic with minerals (MULTI-VITAMIN) TABS tablet Take 1 tablet by mouth daily     phentermine 30 MG capsule Take 30 mg by mouth every morning     benzonatate (TESSALON) 100 MG capsule Take 1 capsule (100 mg) by mouth 3 times daily as needed for cough (Patient not taking: Reported on 8/29/2020)     fluticasone (FLONASE) 50 MCG/ACT spray Spray 1-2 sprays into both nostrils daily (Patient not taking: Reported on 8/29/2020)     ibuprofen (ADVIL/MOTRIN) 800 MG tablet Take 1 tablet (800 mg) by mouth every 8 hours as needed for moderate pain (Patient not taking: Reported on 8/29/2020)     ipratropium - albuterol 0.5 mg/2.5 mg/3 mL (DUONEB) 0.5-2.5 (3) MG/3ML neb solution Take 1 vial (3 mLs) by nebulization every 6 hours as needed for shortness of breath / dyspnea or wheezing (Patient not taking: Reported on 8/29/2020)     order for DME Equipment being ordered: Aerosol mask. Use with nebulizer. (Patient not taking: Reported on 8/29/2020)     order for DME Equipment being ordered:  Knee walker (Patient not taking: Reported on 8/29/2020)     order for DME Equipment being ordered: Knee walker  (Patient not taking: Reported on 8/29/2020)     order for DME Equipment being ordered: Nebulizer with tubing and instructions     order for DME Equipment being ordered: TENS (Patient not taking: Reported on 8/29/2020)     predniSONE (DELTASONE) 5 MG tablet 4tab=20 mg qd x 5 days, 3tab=15 mg qd x 5 days, 2tab=10 mg qd x 5 days, 1 tab=5 mg qd daily  "(Patient not taking: Reported on 8/29/2020)     triamcinolone (KENALOG) 0.1 % cream Apply sparingly to affected area three times daily as needed (Patient not taking: Reported on 8/29/2020)     valACYclovir (VALTREX) 500 MG tablet Take 500 mg by mouth as needed Reported on 4/11/2017     No current facility-administered medications for this visit.          Social History   See HPI    Family History     Family History   Problem Relation Age of Onset     Cancer Mother         patient is unsure of what kind     Physical Exam     Temp Readings from Last 3 Encounters:   08/29/20 97.5  F (36.4  C) (Tympanic)   01/06/20 98.3  F (36.8  C) (Oral)   09/24/19 99.4  F (37.4  C) (Oral)     BP Readings from Last 5 Encounters:   08/29/20 131/87   08/26/20 128/72   02/04/20 138/85   01/06/20 134/85   10/15/19 100/68     Pulse Readings from Last 1 Encounters:   08/29/20 87     Resp Readings from Last 1 Encounters:   08/29/20 12     Estimated body mass index is 26.83 kg/m  as calculated from the following:    Height as of 2/4/20: 1.676 m (5' 6\").    Weight as of 8/29/20: 75.4 kg (166 lb 4 oz).      GEN: NAD. Healthy appearing adult.   HEENT: MMM.  Anicteric, noninjected sclera. No obvious external lesions of the ear and nose. Hearing intact.  PULM: No increased work of breathing; no use of accessory muscles.  MSK:  Hands and wrists without swelling. Elbows without swelling. Shoulders with normal ROM. Knees without swelling. Feet without swelling. Joints without overlying erythema.   SKIN: No rash or jaundice seen  PSYCH: Alert. Appropriate. Oriented to person, place, and time.       Labs / Imaging (select studies)     RF/CCP  Recent Labs   Lab Test 08/31/18  1220 06/29/18  1301   CCPIGG >340*  --    RHF  --  84*     CBC  Recent Labs   Lab Test 04/29/20  1343 10/15/19  1552 06/18/19  1453   WBC 3.4* 3.2* 3.3*   RBC 4.69 4.74 4.56   HGB 13.0 13.1 13.1   HCT 39.8 40.2 39.2   MCV 85 85 86   RDW 12.8 12.8 13.8   * 206 193   MCH 27.7 " 27.6 28.7   MCHC 32.7 32.6 33.4   NEUTROPHIL 21.0 24.0 35.8   LYMPH 57.0 60.0 40.8   MONOCYTE 12.0 9.0 11.4   EOSINOPHIL 9.0 6.0 11.1   BASOPHIL 1.0 1.0 0.9   ANEU 0.7* 0.8* 1.2*   ALYM 2.0 1.9 1.4   FIDE 0.4 0.3 0.4   AEOS 0.3 0.2 0.4   ABAS 0.0 0.0 0.0     CMP  Recent Labs   Lab Test 04/29/20  1343 10/15/19  1552 05/17/19  1249 09/25/15  1540 09/25/15  1540 09/18/15  1001 08/01/14  0749 08/01/14  0749   NA  --   --   --   --  139 136  --   --    POTASSIUM  --   --   --   --  3.9 4.6  --   --    CHLORIDE  --   --   --   --  104 103  --   --    CO2  --   --   --   --  30 31  --   --    ANIONGAP  --   --   --   --  5 2*  --   --    GLC  --   --   --   --  99 96  --  101*   BUN  --   --   --   --  9 11  --   --    CR 0.76 0.85 0.82   < > 0.77 0.83   < >  --    GFRESTIMATED 88 78 82   < > 80 73   < >  --    GFRESTBLACK >90 90 >90   < > >90   GFR Calc   89   < >  --    NATALYA  --   --   --   --  8.0* 9.3  --   --    BILITOTAL 0.5 0.4  --   --  0.4  --   --   --    ALBUMIN 3.4 3.4 3.8   < > 3.5  --   --   --    PROTTOTAL 7.7 7.6  --   --  7.6  --   --   --    ALKPHOS 52 63  --   --  63  --   --   --    AST 17 10 14   < > 14  --   --   --    ALT 18 16 18   < > 24  --   --   --     < > = values in this interval not displayed.     Calcium/VitaminD  Recent Labs   Lab Test 06/18/19  1453 09/25/15  1540 09/18/15  1001   NATALYA  --  8.0* 9.3   VITDT 33  --   --      ESR/CRP  Recent Labs   Lab Test 04/29/20  1343 08/31/18  1220 06/29/18  1301   SED 17 19 17   CRP <2.9 <2.9  --      Hepatitis B  Recent Labs   Lab Test 08/31/18  1220 09/25/15  1540   AUSAB  --  0.09   HBCAB Nonreactive Nonreactive   HEPBANG Nonreactive Nonreactive     Hepatitis C  Recent Labs   Lab Test 08/31/18  1220 03/29/18  0913   HCVAB Nonreactive Nonreactive     Lyme ab screening  Recent Labs   Lab Test 06/29/18  1301   LYMEGM 0.15     Tuberculosis Screening  Recent Labs   Lab Test 06/18/19  1453 08/31/18  1220   TBRES Negative Negative     HIV  Screening  Recent Labs   Lab Test 09/25/15  1540   HIAGAB Nonreactive   HIV-1 p24 Ag & HIV-1/HIV-2 Ab Not Detected         Immunization History     Immunization History   Administered Date(s) Administered     Influenza (IIV3) PF 10/20/2009     Tdap (Adacel,Boostrix) 01/25/2010          Chart documentation done in part with Dragon Voice recognition Software. Although reviewed after completion, some word and grammatical error may remain.    Video-Visit Details    Type of service:  Video Visit    Video Start Time: 2PM  Video End Time: 2:15 PM    Originating Location (pt. Location): [place], [place]    Distant Location (provider location):  [place]    Platform used for Video Visit: Viki Mcfarlane MD

## 2020-11-06 NOTE — TELEPHONE ENCOUNTER
Prior Authorization Approval    Authorization Effective Date: 11/5/2020  Authorization Expiration Date: 11/5/2021  Medication: humira  Approved Dose/Quantity: 4/28ds  Reference #: [id number]   Insurance Company: [employer] - Phone [phone] Fax [phone]  Expected CoPay: na     CoPay Card Available: Yes    Foundation Assistance Needed: na  Which Pharmacy is filling the prescription (Not needed for infusion/clinic administered): [place], [place] - [address]  Pharmacy Notified: Yes  Patient Notified: Yes

## 2021-01-10 ENCOUNTER — HEALTH MAINTENANCE LETTER (OUTPATIENT)
Age: 56
End: 2021-01-10
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
## 2021-02-09 ENCOUNTER — VIRTUAL VISIT (OUTPATIENT)
Dept: RHEUMATOLOGY | Facility: CLINIC | Age: 56
End: 2021-02-09
Payer: COMMERCIAL

## 2021-02-09 DIAGNOSIS — M05.9 SEROPOSITIVE RHEUMATOID ARTHRITIS (H): Primary | ICD-10-CM

## 2021-02-09 DIAGNOSIS — I73.00 RAYNAUD'S PHENOMENON WITHOUT GANGRENE: ICD-10-CM

## 2021-02-09 DIAGNOSIS — Z79.899 HIGH RISK MEDICATIONS (NOT ANTICOAGULANTS) LONG-TERM USE: ICD-10-CM

## 2021-02-09 PROCEDURE — 99214 OFFICE O/P EST MOD 30 MIN: CPT | Mod: 95 | Performed by: INTERNAL MEDICINE

## 2021-02-09 RX ORDER — HYDROXYCHLOROQUINE SULFATE 200 MG/1
200 TABLET, FILM COATED ORAL DAILY
Qty: 90 TABLET | Refills: 2 | Status: SHIPPED | OUTPATIENT
Start: 2021-02-09 | End: 2021-09-15

## 2021-02-09 NOTE — PROGRESS NOTES
Katie Aponte  is a 55 year old year old female who is being evaluated via a billable video visit.      How would you like to obtain your AVS? MyChart  If the video visit is dropped, the invitation should be resent by: Text to cell phone: [phone]  Will anyone else be joining your video visit? No      Rheumatology Video Visit      Katie Aponte MRN# [id number]   YOB: 1965 Age: 55 year old      Date of visit: 2/09/21   PCP: Dr. Karlee Birmingham    Chief Complaint   Patient presents with:  Arthritis: RA    Assessment and Plan     1.  Seropositive nonerosive rheumatoid arthritis (RF 84, CCP >340): Diagnosed in 2018.  Previously followed at the [facility].  Currently on HCQ 200mg daily and Humira 40mg SQ every 7 days (started June 2019, and changed to every 7 days in 2020). Note that methotrexate, leflunomide, and sulfasalazine are avoided because of chronic neutropenia.  Tylenol 325-650mg once 30 min before Humira, and icing injection site before and after Humira injection recommended for the injection site reaction that is mild.  Doing well at this time.  She would like to maintain on her current medications.  Chronic illness, stable  - Continue Humira 40 mg SQ every 7 days    - Continue hydroxychloroquine to 200 mg daily (last eye exam was performed on 3/11/2019; advised that this needs to be a yearly eye exam)  - Continue  diclofenac (VOLTAREN) 1 % topical gel; Apply up to 2 grams of 1% gel to right wrist up to 4 times daily as needed for joint pain (maximum: 8 g per upper extremity per day)    - Ophthalmology referral for hydroxychloroquine toxicity monitoring   - Labs by the end of the month:  CBC, Creatinine, Hepatic Panel, ESR, CRP    2. Bone health: post-menopausal s/p HEMALATHA; was on prednisone for RA.  DEXA ordered previously; advised to schedule    3. Raynaud's Phenomenon; positive IRIS: Reportedly started at the age of 15 years old.  IRIS is positive but she does not have other  symptoms than an inflammatory arthritis to support an IRIS-associated rheumatologic disorder.  No other symptoms to suggest systemic sclerosis.  Reviewed the diagnosis of Raynaud's phenomenon and the treatment options.  She is doing well with cold avoidance at this time.  Chronic illness, stable    4. Lower back pain: doing physical therapy exercises.  Following in the sports medicine clinic.    5. Bilateral knee pain: Degenerative in nature. PT exercises    6. Right shoulder pain, history: Previously degenerative and inflammatory in nature.  Steroid injection resolve this issue.      7.  Vaccinations: Vaccinations reviewed with Ms. Aponte.  Risks and benefits of vaccinations were discussed.    - Influenza: advised receiving  - Tetanus: advised receiving    # Discussed the available mRNA COVID-19 vaccines available from [employer]. At this time the vaccine from Pfizer and Moderna for COVID19 is recommended based on our knowledge of this vaccine and vaccines in the past.  However, there is no data currently available to establish safety and efficacy for this vaccine in immunocompromised people.    #  Instructed that if confirmed to have COVID-19 or exposure to someone with confirmed COVID-19 to call this clinic for directions on DMARD management.    # This is a virtual visit to reduce the risk of COVID-19 exposure during this current pandemic.      # Considered to be at high risk of complications from the COVID-19 virus.  It is recommended to limit contact with other people and if possible to work remotely or provide a leave of absence to reduce the risk for COVID-19.      Total minutes spent in evaluation with patient, documentation, , and review of pertinent studies and chart notes: 18     Ms. Aponte verbalized agreement with and understanding of the rational for the diagnosis and treatment plan.  All questions were answered to best of my ability and the patient's satisfaction. Ms. Aponte was  advised to contact the clinic with any questions that may arise after the clinic visit.      Thank you for involving me in the care of the patient    Return to clinic: 3 months      HPI   Katie Aponte is a 55 year old female with a past medical history significant for acne rosacea, dermatitis, leukopenia, allergic bronchitis, medial meniscal tear, right shoulder impingement syndrome, cervical radiculopathy, and rheumatoid arthritis who is seen for follow-up of RA.     5/17/2019 [facility] rheumatology clinic note by Dr. Johnnie Medley documents seropositive rheumatoid arthritis with a positive rheumatoid factor and a positive CCP.  History of left knee partial replacement 4.5 years ago.  IRIS 1: 40.  CCP >340.  Rheumatoid factor 84.  Negative IRIS and dsDNA; normal C3 and C4.  Negative hepatitis B/C, TB.  OCT testing 3/11/2019 normal.  Symptoms started in June 2018 with pain in the left heel.  She was seen by podiatry.  Later developed right ankle swelling.  Symptoms worsened over time to include both ankles and both Achilles tendons.  Also with pain in the second-fourth fingers of the left hand and morning stiffness for couple hours.  Also history of Raynaud's phenomenon.  Sicca symptoms possibly related to phentermine use.  Plan was to continue Plaquenil 200 mg twice daily and add x-rays of her hands and feet to look for inflammatory arthritis.    6/18/2019:  Ms. Aponte reported that she was diagnosed with rheumatoid arthritis in 2018.  She initially had pain at her Achilles tendon, then ankles, other than both ankles and both Achilles tendons, that her hands and knees.  She was found to have elevated rheumatoid factor and CCP by her podiatrist and was subsequently referred to rheumatology.  She was seen at the [facility] where hydroxychloroquine was started and she felt improvement with this.  She continues to have pain at her right foot that is worse with increased ambulation; she  has been following with podiatry.  She has a history of right shoulder impingement syndrome that did not improve with 2 steroid injections; she is followed in the sports medicine clinic and plans to follow-up there again.  She is also gone to physical therapy without improvement.  Left first MCP and bilateral first CMC's bother her.  Also with some pain at the right second PIP.  History of left partial knee replacement.  Morning stiffness for approximately 7 hours; she wakes up at 5:15 AM and is improved by noon.  Raynaud's phenomenon diagnosed at the age of 15 years old and is successfully treated with cold avoidance; typically just affects her fingers.  She has had dysphagia twice in her life.  No pain or difficulty with swallowing otherwise.  No skin thickening or skin tightening.      10/15/2019: Feels better since starting Humira.  Tolerating injections.  Still with some shoulder pain but it is improving.  Morning stiffness for approximately 1-2 hours.  Biggest issue right now she has sinusitis treated with 2 antibiotics without improvement and now she has a cough.  She is going to follow-up with her PCP for the sinusitis and cough.  She has not held Humira during these infections.    5/5/2020: Tolerating hydroxychloroquine; mild redness at Humira site.. Right ankle and right wrist pain that is mild, improves with activity; no swelling of increase warmth. Ibuprofen resolves this pain. Otherwise doing well.     8/4/2020: Toes, and wrists with morning stiffness for a few hours each day.  Knees ache all the time; worse with activity such as walking on flat ground >20min. Stairs are okay. Knee pain improves with rest and with an ice pain.  Hasn't done PT for her knees but is willing to do so.  Right shoulder aches; worse with over the head activity; recalls steroid injection prior to RA tx wasn't very helpful; worse in the past 2 months. Chronic back pain being addressed by sports medicine.     11/3/2020: she  reports that the steroid injection of her shoulder resolved the shoulder pain.  Still having mild intermittent MCP and wrist pain that is of short duration and typically occurs just before Humira dosing.  Morning stiffness for approximately 30 minutes, sometimes up to 1 hour.  Positive gelling phenomenon.  Lower back and knee pain is improved with physical therapy exercises.    Today, 2/9/2021: Doing well.  Occasional joint aches and pains that does not bother her to the point where she would like to change medications.  Tolerating Humira every 7 days.  Tolerating hydroxychloroquine.  Happy with how she is doing.  Morning stiffness for no more than 1 hour.    Denies fevers, chills, nausea, vomiting, constipation, diarrhea. No abdominal pain. No chest pain/pressure, palpitations, or shortness of breath. No LE swelling. No neck pain. No oral or nasal sores.  No rash. No sicca symptoms.      Tobacco: None  EtOH: 0-4 drinks per week  Drugs: None    ROS   GEN: No fevers, chills, night sweats, or weight change  SKIN: No itching, rashes, sores; see HPI  HEENT: No oral or nasal ulcers.  CV: No chest pain, pressure, palpitations, or dyspnea on exertion.  PULM: See HPI  GI: No nausea, vomiting, constipation, diarrhea. No blood in stool. No abdominal pain.  : No blood in urine.  MSK: See HPI.  NEURO: No numbness or tingling  ENDO: No heat/cold intolerance.  EXT: No LE swelling  PSYCH: Negative    Active Problem List     Patient Active Problem List   Diagnosis     Knee pain     Abnormal uterine bleeding     CARDIOVASCULAR SCREENING; LDL GOAL LESS THAN 160     Acne rosacea     Dermatitis     Leukopenia     Allergic bronchitis, moderate persistent, uncomplicated     Mild persistent asthma with acute exacerbation     JESENIA (stress urinary incontinence, female)     Class 1 obesity due to excess calories without serious comorbidity with body mass index (BMI) of 30.0 to 30.9 in adult     Medial meniscus tear     Pain in joint, ankle  and foot     Tear of medial meniscus of knee     Pain in joint, upper arm, right     Seropositive rheumatoid arthritis (H)     Past Medical History     Past Medical History:   Diagnosis Date     Herpes     Under eye     Joint pain      Seasonal allergies      Thrombocytopenia (H) 2015     Uncomplicated asthma     very mild     Past Surgical History     Past Surgical History:   Procedure Laterality Date      SECTION       COLONOSCOPY       CYSTOSCOPY, SLING TRANSVAGINAL N/A 2017    Procedure: CYSTOSCOPY, SLING TRANSVAGINAL;  TRANSVAGINAL TAPING, CYSTOSCOPY, MID URETHRAL SLING;  Surgeon: Jett Montes MD;  Location:  OR     DILATE CERVIX, ABLATE ENDOMETRIUM THERMACHOICE, COMBINED  4/10/2014    Procedure: COMBINED DILATE CERVIX, ABLATE ENDOMETRIUM THERMACHOICE;;  Surgeon: Jett Montes MD;  Location: [facility]     DILATION AND CURETTAGE, HYSTEROSCOPY, ABLATE ENDOMETRIUM NOVASURE, COMBINED  4/10/2014    Procedure: COMBINED DILATION AND CURETTAGE, HYSTEROSCOPY, ABLATE ENDOMETRIUM NOVASURE;  HYSTEROSCOPY, DILATION AND CURETTAGE, NOVASURE ABLATION ATTEMPTED, THERMACHOICE ABLATION ATTEMPTED;  Surgeon: Jett Montes MD;  Location: [facility]     LAPAROSCOPIC ASSISTED HYSTERECTOMY VAGINAL, BILATERAL SALPINGO-OOPHORECTOMY, COMBINED  2014    Procedure: COMBINED LAPAROSCOPIC ASSISTED HYSTERECTOMY VAGINAL, SALPINGO-OOPHORECTOMY;  Surgeon: Jett Montes MD;  Location: [facility]     ORTHOPEDIC SURGERY      L KNEE MENISCUS,ankle surgery, left knee replacement     Allergy     Allergies   Allergen Reactions     Droperidol Itching     Feels jumpy     Percocet [Oxycodone-Acetaminophen] Nausea and Vomiting and GI Disturbance     Current Medication List     Current Outpatient Medications   Medication Sig     adalimumab (HUMIRA *CF*) 40 MG/0.4ML pen kit Inject 0.4 mLs (40 mg) Subcutaneous every 7 days . Hold for signs of infection, then seek medical attention.     albuterol (PROAIR HFA) 108 (90 Base) MCG/ACT inhaler  Inhale 2 puffs into the lungs every 6 hours as needed for shortness of breath / dyspnea or wheezing     albuterol (PROVENTIL) (2.5 MG/3ML) 0.083% neb solution Take 1 vial (2.5 mg) by nebulization 4 times daily as needed for shortness of breath / dyspnea or wheezing     Cetirizine-Pseudoephedrine (ZYRTEC-D PO)      desonide (DESOWEN) 0.05 % ointment Apply topically 2 times daily     diclofenac (VOLTAREN) 1 % topical gel Apply up to 2 grams of 1% gel to right wrist up to 4 times daily as needed for joint pain (maximum: 8 g per upper extremity per day)     Estrogens Conjugated (PREMARIN PO) Take 0.9 mg by mouth daily     estrogens conjugated (PREMARIN) 0.9 MG TABS Take 0.3 mg by mouth daily     Fluocinolone Acetonide Scalp 0.01 % OIL oil Apply topically 2 times daily as needed     fluticasone (FLONASE) 50 MCG/ACT spray Spray 1-2 sprays into both nostrils daily     fluticasone (FLOVENT HFA) 44 MCG/ACT inhaler Inhale 2 puffs into the lungs 2 times daily     hydroxychloroquine (PLAQUENIL) 200 MG tablet Take 1 tablet (200 mg) by mouth daily     multivitamin, therapeutic with minerals (MULTI-VITAMIN) TABS tablet Take 1 tablet by mouth daily     phentermine 30 MG capsule Take 30 mg by mouth every morning     benzonatate (TESSALON) 100 MG capsule Take 1 capsule (100 mg) by mouth 3 times daily as needed for cough (Patient not taking: Reported on 8/29/2020)     ibuprofen (ADVIL/MOTRIN) 800 MG tablet Take 1 tablet (800 mg) by mouth every 8 hours as needed for moderate pain (Patient not taking: Reported on 8/29/2020)     ipratropium - albuterol 0.5 mg/2.5 mg/3 mL (DUONEB) 0.5-2.5 (3) MG/3ML neb solution Take 1 vial (3 mLs) by nebulization every 6 hours as needed for shortness of breath / dyspnea or wheezing (Patient not taking: Reported on 8/29/2020)     order for DME Equipment being ordered: Aerosol mask. Use with nebulizer. (Patient not taking: Reported on 8/29/2020)     order for DME Equipment being ordered:  Knee walker  "(Patient not taking: Reported on 8/29/2020)     order for DME Equipment being ordered: Knee walker  (Patient not taking: Reported on 8/29/2020)     order for DME Equipment being ordered: Nebulizer with tubing and instructions     order for DME Equipment being ordered: TENS (Patient not taking: Reported on 8/29/2020)     predniSONE (DELTASONE) 5 MG tablet 4tab=20 mg qd x 5 days, 3tab=15 mg qd x 5 days, 2tab=10 mg qd x 5 days, 1 tab=5 mg qd daily (Patient not taking: Reported on 8/29/2020)     triamcinolone (KENALOG) 0.1 % cream Apply sparingly to affected area three times daily as needed (Patient not taking: Reported on 8/29/2020)     valACYclovir (VALTREX) 500 MG tablet Take 500 mg by mouth as needed Reported on 4/11/2017     No current facility-administered medications for this visit.          Social History   See HPI    Family History     Family History   Problem Relation Age of Onset     Cancer Mother         patient is unsure of what kind     Physical Exam     Temp Readings from Last 3 Encounters:   08/29/20 97.5  F (36.4  C) (Tympanic)   01/06/20 98.3  F (36.8  C) (Oral)   09/24/19 99.4  F (37.4  C) (Oral)     BP Readings from Last 5 Encounters:   08/29/20 131/87   08/26/20 128/72   02/04/20 138/85   01/06/20 134/85   10/15/19 100/68     Pulse Readings from Last 1 Encounters:   08/29/20 87     Resp Readings from Last 1 Encounters:   08/29/20 12     Estimated body mass index is 26.83 kg/m  as calculated from the following:    Height as of 2/4/20: 1.676 m (5' 6\").    Weight as of 8/29/20: 75.4 kg (166 lb 4 oz).      No vitals taken today because this is a virtual visit    GEN: NAD. Healthy appearing adult.   HEENT: MMM.  Anicteric, noninjected sclera. No obvious external lesions of the ear and nose. Hearing intact.  PULM: No increased work of breathing  MSK:  Hands and wrists without swelling.   SKIN: No rash or jaundice seen  PSYCH: Alert. Appropriate.        Labs / Imaging (select studies)   RF/CCP  Recent " Labs   Lab Test 08/31/18  1220 06/29/18  1301   CCPIGG >340*  --    RHF  --  84*     CBC  Recent Labs   Lab Test 04/29/20  1343 10/15/19  1552 06/18/19  1453   WBC 3.4* 3.2* 3.3*   RBC 4.69 4.74 4.56   HGB 13.0 13.1 13.1   HCT 39.8 40.2 39.2   MCV 85 85 86   RDW 12.8 12.8 13.8   * 206 193   MCH 27.7 27.6 28.7   MCHC 32.7 32.6 33.4   NEUTROPHIL 21.0 24.0 35.8   LYMPH 57.0 60.0 40.8   MONOCYTE 12.0 9.0 11.4   EOSINOPHIL 9.0 6.0 11.1   BASOPHIL 1.0 1.0 0.9   ANEU 0.7* 0.8* 1.2*   ALYM 2.0 1.9 1.4   FIDE 0.4 0.3 0.4   AEOS 0.3 0.2 0.4   ABAS 0.0 0.0 0.0     CMP  Recent Labs   Lab Test 04/29/20  1343 10/15/19  1552 05/17/19  1249 09/25/15  1540 09/25/15  1540 09/18/15  1001 08/01/14  0749 08/01/14  0749   NA  --   --   --   --  139 136  --   --    POTASSIUM  --   --   --   --  3.9 4.6  --   --    CHLORIDE  --   --   --   --  104 103  --   --    CO2  --   --   --   --  30 31  --   --    ANIONGAP  --   --   --   --  5 2*  --   --    GLC  --   --   --   --  99 96  --  101*   BUN  --   --   --   --  9 11  --   --    CR 0.76 0.85 0.82   < > 0.77 0.83   < >  --    GFRESTIMATED 88 78 82   < > 80 73   < >  --    GFRESTBLACK >90 90 >90   < > >90   GFR Calc   89   < >  --    NATALYA  --   --   --   --  8.0* 9.3  --   --    BILITOTAL 0.5 0.4  --   --  0.4  --   --   --    ALBUMIN 3.4 3.4 3.8   < > 3.5  --   --   --    PROTTOTAL 7.7 7.6  --   --  7.6  --   --   --    ALKPHOS 52 63  --   --  63  --   --   --    AST 17 10 14   < > 14  --   --   --    ALT 18 16 18   < > 24  --   --   --     < > = values in this interval not displayed.     Calcium/VitaminD  Recent Labs   Lab Test 06/18/19  1453 09/25/15  1540 09/18/15  1001   NATALYA  --  8.0* 9.3   VITDT 33  --   --      ESR/CRP  Recent Labs   Lab Test 04/29/20  1343 08/31/18  1220 06/29/18  1301   SED 17 19 17   CRP <2.9 <2.9  --      Hepatitis B  Recent Labs   Lab Test 08/31/18  1220 09/25/15  1540   AUSAB  --  0.09   HBCAB Nonreactive Nonreactive   HEPBANG Nonreactive  Nonreactive     Hepatitis C  Recent Labs   Lab Test 08/31/18  1220 03/29/18  0913   HCVAB Nonreactive Nonreactive     Lyme ab screening  Recent Labs   Lab Test 06/29/18  1301   LYMEGM 0.15     Tuberculosis Screening  Recent Labs   Lab Test 06/18/19  1453 08/31/18  1220   TBRES Negative Negative     HIV Screening  Recent Labs   Lab Test 09/25/15  1540   HIAGAB Nonreactive   HIV-1 p24 Ag & HIV-1/HIV-2 Ab Not Detected       Immunization History     Immunization History   Administered Date(s) Administered     Influenza (IIV3) PF 10/20/2009     Tdap (Adacel,Boostrix) 01/25/2010          Chart documentation done in part with Dragon Voice recognition Software. Although reviewed after completion, some word and grammatical error may remain.      Video-Visit Details    Type of service:  Video Visit    Video Start Time: 10:01 AM    Video End Time:10:14 AM    Originating Location (pt. Location): [place], [place]    Distant Location (provider location):  Home    Platform used for Video Visit: Nancie Mcfarlane MD

## 2021-02-09 NOTE — PATIENT INSTRUCTIONS
RHEUMATOLOGY    Dr. Richard Mcfarlane    [facility]  [address] [place] [address]    Our new phone number for Rheumatology is [phone], this number will be able to help you schedule appointments for Dr. Mcfarlane or if you have any message you would like sent to us.    Thank you for choosing [facility]!  Noy Burt [facility] Rheumatology

## 2021-02-23 ENCOUNTER — MYC MEDICAL ADVICE (OUTPATIENT)
Dept: RHEUMATOLOGY | Facility: CLINIC | Age: 56
End: 2021-02-23

## 2021-02-24 NOTE — TELEPHONE ENCOUNTER
Forms have been faxed to [phone].  Noy Burt [facility] Rheumatology  2/24/2021 4:14 PM

## 2021-02-25 ENCOUNTER — MYC MEDICAL ADVICE (OUTPATIENT)
Dept: RHEUMATOLOGY | Facility: CLINIC | Age: 56
End: 2021-02-25

## 2021-03-01 NOTE — TELEPHONE ENCOUNTER
Pages 2,4 and 6 were not faxed to Dr. Mcfarlane when RN faxed the forms, paper was double sided but only one side was sent to provider. Form has been finished by Dr. Mcfarlane and re-faxed, copy mailed to patient and copy to sent to abstracting.    Noy Burt CMA Rheumatology  3/1/2021 12:37 PM

## 2021-03-05 ENCOUNTER — OFFICE VISIT (OUTPATIENT)
Dept: OPHTHALMOLOGY | Facility: CLINIC | Age: 56
End: 2021-03-05
Attending: INTERNAL MEDICINE
Payer: COMMERCIAL

## 2021-03-05 DIAGNOSIS — Z79.899 HIGH RISK MEDICATIONS (NOT ANTICOAGULANTS) LONG-TERM USE: ICD-10-CM

## 2021-03-05 DIAGNOSIS — M05.9 SEROPOSITIVE RHEUMATOID ARTHRITIS (H): ICD-10-CM

## 2021-03-05 DIAGNOSIS — Z79.899 HIGH RISK MEDICATIONS (NOT ANTICOAGULANTS) LONG-TERM USE: Primary | ICD-10-CM

## 2021-03-05 LAB
ALBUMIN SERPL-MCNC: 3.6 G/DL (ref 3.4–5)
ALP SERPL-CCNC: 51 U/L (ref 40–150)
ALT SERPL W P-5'-P-CCNC: 16 U/L (ref 0–50)
AST SERPL W P-5'-P-CCNC: 11 U/L (ref 0–45)
BASOPHILS # BLD AUTO: 0 10E9/L (ref 0–0.2)
BASOPHILS NFR BLD AUTO: 0.6 %
BILIRUB DIRECT SERPL-MCNC: 0.2 MG/DL (ref 0–0.2)
BILIRUB SERPL-MCNC: 0.6 MG/DL (ref 0.2–1.3)
CALCIUM SERPL-MCNC: 9.3 MG/DL (ref 8.5–10.1)
CREAT SERPL-MCNC: 0.79 MG/DL (ref 0.52–1.04)
CRP SERPL-MCNC: <2.9 MG/L (ref 0–8)
DIFFERENTIAL METHOD BLD: ABNORMAL
EOSINOPHIL # BLD AUTO: 0.2 10E9/L (ref 0–0.7)
EOSINOPHIL NFR BLD AUTO: 7.5 %
ERYTHROCYTE [DISTWIDTH] IN BLOOD BY AUTOMATED COUNT: 13.3 % (ref 10–15)
ERYTHROCYTE [SEDIMENTATION RATE] IN BLOOD BY WESTERGREN METHOD: 13 MM/H (ref 0–30)
GFR SERPL CREATININE-BSD FRML MDRD: 84 ML/MIN/{1.73_M2}
HCT VFR BLD AUTO: 39.7 % (ref 35–47)
HGB BLD-MCNC: 13 G/DL (ref 11.7–15.7)
LYMPHOCYTES # BLD AUTO: 1.7 10E9/L (ref 0.8–5.3)
LYMPHOCYTES NFR BLD AUTO: 52.3 %
MCH RBC QN AUTO: 27.7 PG (ref 26.5–33)
MCHC RBC AUTO-ENTMCNC: 32.7 G/DL (ref 31.5–36.5)
MCV RBC AUTO: 85 FL (ref 78–100)
MONOCYTES # BLD AUTO: 0.4 10E9/L (ref 0–1.3)
MONOCYTES NFR BLD AUTO: 13.7 %
NEUTROPHILS # BLD AUTO: 0.8 10E9/L (ref 1.6–8.3)
NEUTROPHILS NFR BLD AUTO: 25.9 %
PLATELET # BLD AUTO: 78 10E9/L (ref 150–450)
PROT SERPL-MCNC: 7.7 G/DL (ref 6.8–8.8)
RBC # BLD AUTO: 4.69 10E12/L (ref 3.8–5.2)
WBC # BLD AUTO: 3.2 10E9/L (ref 4–11)

## 2021-03-05 PROCEDURE — 82306 VITAMIN D 25 HYDROXY: CPT | Performed by: INTERNAL MEDICINE

## 2021-03-05 PROCEDURE — 85652 RBC SED RATE AUTOMATED: CPT | Performed by: INTERNAL MEDICINE

## 2021-03-05 PROCEDURE — 36415 COLL VENOUS BLD VENIPUNCTURE: CPT | Performed by: INTERNAL MEDICINE

## 2021-03-05 PROCEDURE — 82565 ASSAY OF CREATININE: CPT | Performed by: INTERNAL MEDICINE

## 2021-03-05 PROCEDURE — 92083 EXTENDED VISUAL FIELD XM: CPT | Performed by: STUDENT IN AN ORGANIZED HEALTH CARE EDUCATION/TRAINING PROGRAM

## 2021-03-05 PROCEDURE — 85025 COMPLETE CBC W/AUTO DIFF WBC: CPT | Performed by: INTERNAL MEDICINE

## 2021-03-05 PROCEDURE — 80076 HEPATIC FUNCTION PANEL: CPT | Performed by: INTERNAL MEDICINE

## 2021-03-05 PROCEDURE — 82310 ASSAY OF CALCIUM: CPT | Performed by: INTERNAL MEDICINE

## 2021-03-05 PROCEDURE — 92014 COMPRE OPH EXAM EST PT 1/>: CPT | Performed by: STUDENT IN AN ORGANIZED HEALTH CARE EDUCATION/TRAINING PROGRAM

## 2021-03-05 PROCEDURE — 86140 C-REACTIVE PROTEIN: CPT | Performed by: INTERNAL MEDICINE

## 2021-03-05 PROCEDURE — 92134 CPTRZ OPH DX IMG PST SGM RTA: CPT | Performed by: STUDENT IN AN ORGANIZED HEALTH CARE EDUCATION/TRAINING PROGRAM

## 2021-03-05 ASSESSMENT — SLIT LAMP EXAM - LIDS
COMMENTS: NORMAL
COMMENTS: NORMAL

## 2021-03-05 ASSESSMENT — TONOMETRY
OS_IOP_MMHG: 21
IOP_METHOD: APPLANATION
OD_IOP_MMHG: 22

## 2021-03-05 ASSESSMENT — EXTERNAL EXAM - LEFT EYE: OS_EXAM: NORMAL

## 2021-03-05 ASSESSMENT — VISUAL ACUITY
OS_SC: 20/25
METHOD: SNELLEN - LINEAR
OD_SC: 20/25
OS_SC+: -2

## 2021-03-05 ASSESSMENT — CUP TO DISC RATIO
OS_RATIO: 0.45
OD_RATIO: 0.45

## 2021-03-05 ASSESSMENT — EXTERNAL EXAM - RIGHT EYE: OD_EXAM: NORMAL

## 2021-03-05 NOTE — PROGRESS NOTES
Current Eye Medications:  Hydroxychloroquine 200 mg daily      Subjective:  VF, OCT, and IOP for Plaquenil. Vision is doing fine both eyes in distance. Has to use readers +2.00 at near, and that works OK. No eye pain or discomfort in either eye.     Previously saw Dr. Coronado. Started Plaquenil in Nov 2018.   No previous eye injuries or surgeries.      Objective:  See Ophthalmology Exam.      Assessment:  Katie Aponte is a 55 year old female who presents with:   Encounter Diagnoses   Name Primary?     High risk medications (not anticoagulants) long-term use Plaquenil since Nov 2018 (just over 2 years now).    Macular SD-OCT within normal limits both eyes.    Bhatt visual field (HVF) 10-2 within normal limits both eyes (mild scattered loss left eye>right eye)    No signs of Plaquenil retinal toxicity at present.        Seropositive rheumatoid arthritis (H)        Plan:  Normal Plaquenil eye tests today.    Jacinta Vides MD  [phone]

## 2021-03-05 NOTE — LETTER
3/5/2021       RE: Katie Aponte  [address]  [place] [address]      Dear Dr. Mcfarlane,    Thank you for referring your patient, Katie Aponte, to the [facility]. Her Plaquenil eye tests were normal today. Plan to repeat tests in 1 year. Please see a copy of my visit note below.     Current Eye Medications:  Hydroxychloroquine 200 mg daily      Subjective:  VF, OCT, and IOP for Plaquenil. Vision is doing fine both eyes in distance. Has to use readers +2.00 at near, and that works OK. No eye pain or discomfort in either eye.     Previously saw Dr. Coronado. Started Plaquenil in Nov 2018.   No previous eye injuries or surgeries.      Objective:  See Ophthalmology Exam.      Assessment:  Katie Aponte is a 55 year old female who presents with:   Encounter Diagnoses   Name Primary?     High risk medications (not anticoagulants) long-term use Plaquenil since Nov 2018 (just over 2 years now).    Macular SD-OCT within normal limits both eyes.    Bhatt visual field (HVF) 10-2 within normal limits both eyes (mild scattered loss left eye>right eye)    No signs of Plaquenil retinal toxicity at present.        Seropositive rheumatoid arthritis (H)        Plan:  Normal Plaquenil eye tests today.    Jacinta Vides MD  [phone]          Again, thank you for allowing me to participate in the care of your patient.        Sincerely,        Jacinta Vides MD

## 2021-03-07 DIAGNOSIS — D69.6 THROMBOCYTOPENIA (H): Primary | ICD-10-CM

## 2021-03-07 NOTE — RESULT ENCOUNTER NOTE
"RN: Please call to notify Katie Aponte of the information below to ensure she receives it:    Progression message sent:  \"Ms. Aponte,    The white blood cell count remains reduced, similar to previous labs.  However, the platelet count is lower than in the past.  Please have a repeat lab within the next 2 weeks to recheck the platelet count.     Sincerely,  Richard Mcfarlane MD  3/7/2021\""

## 2021-03-08 LAB — DEPRECATED CALCIDIOL+CALCIFEROL SERPL-MC: 55 UG/L (ref 20–75)

## 2021-03-22 ENCOUNTER — DOCUMENTATION ONLY (OUTPATIENT)
Dept: LAB | Facility: CLINIC | Age: 56
End: 2021-03-22

## 2021-03-22 DIAGNOSIS — D69.6 THROMBOCYTOPENIA (H): ICD-10-CM

## 2021-03-22 PROCEDURE — 36415 COLL VENOUS BLD VENIPUNCTURE: CPT | Performed by: INTERNAL MEDICINE

## 2021-03-22 PROCEDURE — 85049 AUTOMATED PLATELET COUNT: CPT | Performed by: INTERNAL MEDICINE

## 2021-03-22 NOTE — PROGRESS NOTES
RBC Morphology is abnormal upon slide review. Please let us know if CBC with differential needs to be added.     Thanks,  [facility]

## 2021-03-23 ENCOUNTER — MYC MEDICAL ADVICE (OUTPATIENT)
Dept: RHEUMATOLOGY | Facility: CLINIC | Age: 56
End: 2021-03-23

## 2021-03-23 LAB — PLATELET # BLD AUTO: 167 10E9/L (ref 150–450)

## 2021-03-24 NOTE — TELEPHONE ENCOUNTER
RN: Please call to notify Katie Aponte that the repeat platelet count was normal.  No additional testing is needed at this time.  I think there was some confusion regarding the platelets in the past versus now; I had concern that the platelets were clumping on her last lab and that caused the platelet count to be artificially read as low so she had repeat testing using a sodium citrate tube to prevent the platelets from clumping so that they could be accurately read and on this repeat blood test the platelets were normal.    Richard Mcfarlane MD  3/24/2021 10:38 AM

## 2021-03-24 NOTE — TELEPHONE ENCOUNTER
Called pt and let her know the below message. She verbalized understanding.    Conchis MEZA RN Specialty Triage 3/24/2021 11:51 AM

## 2021-03-24 NOTE — PROGRESS NOTES
CBC with diff not needed at this time.      Thank you,  Richard Mcfarlane MD  3/24/2021 10:35 AM

## 2021-03-30 ENCOUNTER — VIRTUAL VISIT (OUTPATIENT)
Dept: URGENT CARE | Facility: CLINIC | Age: 56
End: 2021-03-30
Payer: COMMERCIAL

## 2021-03-30 DIAGNOSIS — J01.91 ACUTE RECURRENT SINUSITIS, UNSPECIFIED LOCATION: Primary | ICD-10-CM

## 2021-03-30 DIAGNOSIS — Z20.822 EXPOSURE TO COVID-19 VIRUS: ICD-10-CM

## 2021-03-30 PROCEDURE — 99213 OFFICE O/P EST LOW 20 MIN: CPT | Mod: TEL

## 2021-03-30 RX ORDER — FLUCONAZOLE 150 MG/1
150 TABLET ORAL ONCE
Qty: 2 TABLET | Refills: 0 | Status: SHIPPED | OUTPATIENT
Start: 2021-03-30 | End: 2021-03-30

## 2021-03-30 NOTE — PROGRESS NOTES
"  Katie Aponte is a 55 year old female who is being evaluated via a billable telephone visit.      The patient has been notified of following:     \"This telephone visit will be conducted via a phone call between you and your physician/provider. We have found that certain health care needs can be provided without the need for a physical exam.  This service lets us provide the care you need with a phone conversation.  If a prescription is necessary we can send it directly to your pharmacy.  If lab work is needed we can place an order for that and you can then stop by our lab to have the test done at a later time.\"     Patient has given verbal consent for telephone visit?  Yes    SUBJECTIVE:  Katie Aponte is an 55 year old female who presents for concern for sinus infection.  Has a lot of pressure in face and around eyes and nose.  Has frontal headache.  Some ear pain and plugging.  Her sxs feel like a sinus infection to her.  Usually gets a couple sinus infections a year.  Current symptoms started several days ago.  Not improving.  No fevers but a little chill.  Has post nasal drainage.  No cough except to clear throat.  No n/v/d.  No known covid exposures.  Mild body aches.  No skin rashes.  Took some mucinex and advil which help some for a while.  No shortness of breath or trouble breathing.    PMH:   has a past medical history of Herpes, Joint pain, Seasonal allergies, Thrombocytopenia (H) (9/25/2015), and Uncomplicated asthma.  Patient Active Problem List   Diagnosis     Knee pain     Abnormal uterine bleeding     CARDIOVASCULAR SCREENING; LDL GOAL LESS THAN 160     Acne rosacea     Dermatitis     Leukopenia     Allergic bronchitis, moderate persistent, uncomplicated     Mild persistent asthma with acute exacerbation     JESENIA (stress urinary incontinence, female)     Class 1 obesity due to excess calories without serious comorbidity with body mass index (BMI) of 30.0 to 30.9 in adult     Medial meniscus tear "     Pain in joint, ankle and foot     Tear of medial meniscus of knee     Pain in joint, upper arm, right     Seropositive rheumatoid arthritis (H)     Social History     Socioeconomic History     Marital status: Single     Spouse name: Not on file     Number of children: Not on file     Years of education: Not on file     Highest education level: Not on file   Occupational History     Not on file   Social Needs     Financial resource strain: Not on file     Food insecurity     Worry: Not on file     Inability: Not on file     Transportation needs     Medical: Not on file     Non-medical: Not on file   Tobacco Use     Smoking status: Never Smoker     Smokeless tobacco: Never Used   Substance and Sexual Activity     Alcohol use: Yes     Alcohol/week: 0.0 standard drinks     Comment: SOCIAL - 1 glass of wine twice a week; 2 drinks on the weekend     Drug use: No     Sexual activity: Yes     Partners: Male   Lifestyle     Physical activity     Days per week: Not on file     Minutes per session: Not on file     Stress: Not on file   Relationships     Social connections     Talks on phone: Not on file     Gets together: Not on file     Attends Jewish service: Not on file     Active member of club or organization: Not on file     Attends meetings of clubs or organizations: Not on file     Relationship status: Not on file     Intimate partner violence     Fear of current or ex partner: Not on file     Emotionally abused: Not on file     Physically abused: Not on file     Forced sexual activity: Not on file   Other Topics Concern     Parent/sibling w/ CABG, MI or angioplasty before 65F 55M? 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     Family History   Problem Relation Age of Onset     Cancer Mother         patient is unsure of what kind       ALLERGIES:  Droperidol and Percocet [oxycodone-acetaminophen]    Current Outpatient Medications   Medication     adalimumab (HUMIRA *CF*) 40  MG/0.4ML pen kit     albuterol (PROAIR HFA) 108 (90 Base) MCG/ACT inhaler     albuterol (PROVENTIL) (2.5 MG/3ML) 0.083% neb solution     benzonatate (TESSALON) 100 MG capsule     Cetirizine-Pseudoephedrine (ZYRTEC-D PO)     desonide (DESOWEN) 0.05 % ointment     diclofenac (VOLTAREN) 1 % topical gel     Estrogens Conjugated (PREMARIN PO)     estrogens conjugated (PREMARIN) 0.9 MG TABS     Fluocinolone Acetonide Scalp 0.01 % OIL oil     fluticasone (FLONASE) 50 MCG/ACT spray     fluticasone (FLOVENT HFA) 44 MCG/ACT inhaler     hydroxychloroquine (PLAQUENIL) 200 MG tablet     ibuprofen (ADVIL/MOTRIN) 800 MG tablet     ipratropium - albuterol 0.5 mg/2.5 mg/3 mL (DUONEB) 0.5-2.5 (3) MG/3ML neb solution     multivitamin, therapeutic with minerals (MULTI-VITAMIN) TABS tablet     order for DME     order for DME     order for DME     order for DME     order for DME     phentermine 30 MG capsule     predniSONE (DELTASONE) 5 MG tablet     triamcinolone (KENALOG) 0.1 % cream     valACYclovir (VALTREX) 500 MG tablet     No current facility-administered medications for this visit.          ROS:  ROS is done and is negative for general/constitutional, eye, ENT, Respiratory, cardiovascular, GI, , Skin, musculoskeletal except as noted elsewhere.  All other review of systems negative except as noted elsewhere.      OBJECTIVE:  LMP 03/30/2014   GENERAL : Alert and oriented.  Did not seem to be in distress.   RESP: No respiratory distress detected during phone conversation         ASSESSMENT/PLAN:    ASSESSMENT / PLAN:  (J01.91) Acute recurrent sinusitis, unspecified location  (primary encounter diagnosis)  Comment: has hx of periodic sinusitis and current sxs are c/w sinusitis.  Also consider covid, so will test.  Plan: amoxicillin-clavulanate (AUGMENTIN) 875-125 MG         tablet, Symptomatic COVID-19 Virus         (Coronavirus) by PCR, fluconazole (DIFLUCAN)         150 MG tablet        Reviewed medication instructions and side  effects. Follow up if experiences side effects.. Reviewed testing scheduling process.  Reviewed quarantine I reviewed supportive care, otc meds to use if needed, expected course, and signs of concern.  Follow up as needed or if she does not improve within 1 week(s) or if worsens in any way.  Reviewed red flag symptoms and is to go to the ER if experiences any of these.    (Z20.822) Exposure to COVID-19 virus  Comment: no known exposure but as is community spread currently, may have had unknown exposure  Plan: Symptomatic COVID-19 Virus (Coronavirus) by PCR        Reviewed testing scheduling process.  Reviewed quarantine          See [facility] for orders, medications, letters, patient instructions    Karen Becerril MD  3/30/2021, 5:02 PM      Phone call duration:  13 minutes

## 2021-03-31 DIAGNOSIS — J01.91 ACUTE RECURRENT SINUSITIS, UNSPECIFIED LOCATION: ICD-10-CM

## 2021-03-31 DIAGNOSIS — Z20.822 EXPOSURE TO COVID-19 VIRUS: ICD-10-CM

## 2021-03-31 LAB
LABORATORY COMMENT REPORT: ABNORMAL
SARS-COV-2 RNA RESP QL NAA+PROBE: NORMAL
SARS-COV-2 RNA RESP QL NAA+PROBE: POSITIVE
SPECIMEN SOURCE: ABNORMAL
SPECIMEN SOURCE: NORMAL

## 2021-03-31 PROCEDURE — U0005 INFEC AGEN DETEC AMPLI PROBE: HCPCS | Performed by: INTERNAL MEDICINE

## 2021-03-31 PROCEDURE — U0003 INFECTIOUS AGENT DETECTION BY NUCLEIC ACID (DNA OR RNA); SEVERE ACUTE RESPIRATORY SYNDROME CORONAVIRUS 2 (SARS-COV-2) (CORONAVIRUS DISEASE [COVID-19]), AMPLIFIED PROBE TECHNIQUE, MAKING USE OF HIGH THROUGHPUT TECHNOLOGIES AS DESCRIBED BY CMS-2020-01-R: HCPCS | Performed by: INTERNAL MEDICINE

## 2021-04-13 ENCOUNTER — IMMUNIZATION (OUTPATIENT)
Dept: NURSING | Facility: CLINIC | Age: 56
End: 2021-04-13
Payer: COMMERCIAL

## 2021-04-13 PROCEDURE — 91300 PR COVID VAC PFIZER DIL RECON 30 MCG/0.3 ML IM: CPT

## 2021-04-13 PROCEDURE — 0001A PR COVID VAC PFIZER DIL RECON 30 MCG/0.3 ML IM: CPT

## 2021-05-04 ENCOUNTER — IMMUNIZATION (OUTPATIENT)
Dept: NURSING | Facility: CLINIC | Age: 56
End: 2021-05-04
Attending: INTERNAL MEDICINE
Payer: COMMERCIAL

## 2021-05-04 PROCEDURE — 0002A PR COVID VAC PFIZER DIL RECON 30 MCG/0.3 ML IM: CPT

## 2021-05-04 PROCEDURE — 91300 PR COVID VAC PFIZER DIL RECON 30 MCG/0.3 ML IM: CPT

## 2021-05-06 ENCOUNTER — OFFICE VISIT (OUTPATIENT)
Dept: OPHTHALMOLOGY | Facility: CLINIC | Age: 56
End: 2021-05-06
Payer: COMMERCIAL

## 2021-05-06 DIAGNOSIS — H52.03 HYPEROPIA OF BOTH EYES WITH REGULAR ASTIGMATISM AND PRESBYOPIA: ICD-10-CM

## 2021-05-06 DIAGNOSIS — M05.9 SEROPOSITIVE RHEUMATOID ARTHRITIS (H): ICD-10-CM

## 2021-05-06 DIAGNOSIS — H52.4 HYPEROPIA OF BOTH EYES WITH REGULAR ASTIGMATISM AND PRESBYOPIA: ICD-10-CM

## 2021-05-06 DIAGNOSIS — Z79.899 HIGH RISK MEDICATIONS (NOT ANTICOAGULANTS) LONG-TERM USE: ICD-10-CM

## 2021-05-06 DIAGNOSIS — Z01.00 EXAMINATION OF EYES AND VISION: Primary | ICD-10-CM

## 2021-05-06 DIAGNOSIS — H52.223 HYPEROPIA OF BOTH EYES WITH REGULAR ASTIGMATISM AND PRESBYOPIA: ICD-10-CM

## 2021-05-06 PROCEDURE — 92014 COMPRE OPH EXAM EST PT 1/>: CPT | Performed by: STUDENT IN AN ORGANIZED HEALTH CARE EDUCATION/TRAINING PROGRAM

## 2021-05-06 PROCEDURE — 92015 DETERMINE REFRACTIVE STATE: CPT | Performed by: STUDENT IN AN ORGANIZED HEALTH CARE EDUCATION/TRAINING PROGRAM

## 2021-05-06 ASSESSMENT — CONF VISUAL FIELD
OS_NORMAL: 1
OD_NORMAL: 1

## 2021-05-06 ASSESSMENT — REFRACTION_MANIFEST
OS_CYLINDER: +0.50
OS_SPHERE: +0.25
OD_CYLINDER: +0.50
OS_ADD: +2.50
OD_AXIS: 180
OD_SPHERE: +0.50
OD_ADD: +2.50
OS_AXIS: 180

## 2021-05-06 ASSESSMENT — REFRACTION_WEARINGRX
OS_SPHERE: +2.00
SPECS_TYPE: OTC
OD_SPHERE: +2.00

## 2021-05-06 ASSESSMENT — VISUAL ACUITY
OS_CC: J1
METHOD: SNELLEN - LINEAR
OD_SC+: +2
OD_CC: J1
OS_SC: 20/30
OD_SC: 20/30-1

## 2021-05-06 ASSESSMENT — TONOMETRY
OD_IOP_MMHG: 18
OS_IOP_MMHG: 20
IOP_METHOD: APPLANATION

## 2021-05-06 ASSESSMENT — CUP TO DISC RATIO
OD_RATIO: 0.45
OS_RATIO: 0.45

## 2021-05-06 ASSESSMENT — SLIT LAMP EXAM - LIDS
COMMENTS: NORMAL
COMMENTS: NORMAL

## 2021-05-06 ASSESSMENT — EXTERNAL EXAM - LEFT EYE: OS_EXAM: NORMAL

## 2021-05-06 ASSESSMENT — EXTERNAL EXAM - RIGHT EYE: OD_EXAM: NORMAL

## 2021-05-06 NOTE — PATIENT INSTRUCTIONS
Glasses prescription given or try increasing the power of your over the counter reading glasses (closer to +2.75)    Jacinta Vides MD  [phone]

## 2021-05-06 NOTE — PROGRESS NOTES
Current Eye Medications:  Plaquenil     Subjective:  Complete eye exam    Patient complains that she is having difficulty with her near vision.  Distance seems to be ok.     Objective:  See Ophthalmology Exam.      Assessment:  Katie Aponte is a 55 year old adult who presents with:   Encounter Diagnoses   Name Primary?     Examination of eyes and vision        Hyperopia of both eyes with regular astigmatism and presbyopia        High risk medications (not anticoagulants) long-term use Plaquenil since Nov 2018.      Seropositive rheumatoid arthritis (H)        Plan:  Glasses prescription given or try increasing the power of your over the counter reading glasses (closer to +2.75)    Jacinta Vides MD  [phone]

## 2021-05-06 NOTE — LETTER
5/6/2021         RE: Katie Aponte  [address]  [place] [address]        Dear Colleague,    Thank you for referring your patient, Katie Aponte, to the [facility]. Please see a copy of my visit note below.     Current Eye Medications:  Plaquenil     Subjective:  Complete eye exam    Patient complains that she is having difficulty with her near vision.  Distance seems to be ok.     Objective:  See Ophthalmology Exam.      Assessment:  Katie Aponte is a 55 year old adult who presents with:   Encounter Diagnoses   Name Primary?     Examination of eyes and vision        Hyperopia of both eyes with regular astigmatism and presbyopia        High risk medications (not anticoagulants) long-term use Plaquenil since Nov 2018.      Seropositive rheumatoid arthritis (H)        Plan:  Glasses prescription given or try increasing the power of your over the counter reading glasses (closer to +2.75)    Jacinta Vides MD  [phone]          Again, thank you for allowing me to participate in the care of your patient.        Sincerely,        Jacinta Vides MD

## 2021-05-18 ENCOUNTER — VIRTUAL VISIT (OUTPATIENT)
Dept: RHEUMATOLOGY | Facility: CLINIC | Age: 56
End: 2021-05-18
Payer: COMMERCIAL

## 2021-05-18 DIAGNOSIS — I73.00 RAYNAUD'S PHENOMENON WITHOUT GANGRENE: ICD-10-CM

## 2021-05-18 DIAGNOSIS — M05.9 SEROPOSITIVE RHEUMATOID ARTHRITIS (H): Primary | ICD-10-CM

## 2021-05-18 DIAGNOSIS — G56.02 CARPAL TUNNEL SYNDROME OF LEFT WRIST: ICD-10-CM

## 2021-05-18 DIAGNOSIS — Z78.0 POST-MENOPAUSAL: ICD-10-CM

## 2021-05-18 DIAGNOSIS — M19.031 PRIMARY OSTEOARTHRITIS OF RIGHT WRIST: ICD-10-CM

## 2021-05-18 DIAGNOSIS — Z13.820 OSTEOPOROSIS SCREENING: ICD-10-CM

## 2021-05-18 PROCEDURE — 99214 OFFICE O/P EST MOD 30 MIN: CPT | Mod: 95 | Performed by: INTERNAL MEDICINE

## 2021-05-18 NOTE — PATIENT INSTRUCTIONS
RHEUMATOLOGY    Dr. Richard Mcfarlane    [facility]  [address] [address]    Our new phone number for Rheumatology is [phone], this number will be able to help you schedule appointments for Dr. Mcfarlane or if you have any message you would like sent to us.    Thank you for choosing [facility]!    [facility]

## 2021-05-18 NOTE — PROGRESS NOTES
Katie Aponte  is a 55 year old year old choose not to disclose who is being evaluated via a billable video visit.      What state will you be in during your video visit?  [place]  How would you like to obtain your AVS? [facility]  If the video visit is dropped, the invitation should be resent by: Text to cell phone: [phone]  Will anyone else be joining your video visit? No    Rheumatology Video Visit      Katie Aponte MRN# [id number]   YOB: 1965 Age: 55 year old      Date of visit: 5/18/21   PCP: Dr. Karlee Birmingham    Chief Complaint   Patient presents with:  Arthritis: RA, little more stiff    Assessment and Plan     1.  Seropositive nonerosive rheumatoid arthritis (RF 84, CCP >340): Diagnosed in 2018.  Previously followed at the [facility].  Currently on HCQ 200mg daily and Humira 40mg SQ every 7 days (started June 2019, and changed to every 7 days in 2020). Note that methotrexate, leflunomide, and sulfasalazine are avoided because of chronic neutropenia.  Tylenol 325-650mg once 30 min before Humira, and icing injection site before and after Humira injection recommended for the injection site reaction that is mild.  RA doing well at this time.  Chronic illness, stable  - Continue Humira 40 mg SQ every 7 days    - Continue hydroxychloroquine to 200 mg daily (last eye exam was performed on 3/5/2021 with Dr. Vides)  - Continue  diclofenac (VOLTAREN) 1 % topical gel; Apply up to 2 grams of 1% gel to right wrist up to 4 times daily as needed for joint pain (maximum: 8 g per upper extremity per day)    - Ophthalmology referral for hydroxychloroquine toxicity monitoring     2. Bone health: post-menopausal s/p HEMALATHA; was on prednisone for RA.  DEXA ordered previously; advised to schedule    3. Raynaud's Phenomenon; positive IRIS: Reportedly started at the age of 15 years old.  IRIS is positive but she does not have other symptoms than an inflammatory arthritis to support an IRIS-associated  rheumatologic disorder.  No other symptoms to suggest systemic sclerosis.  Reviewed the diagnosis of Raynaud's phenomenon and the treatment options.  She is doing well with cold avoidance at this time.  Chronic illness, stable    4. Lower back pain: doing physical therapy exercises.  Following in the sports medicine clinic.    5. Bilateral knee pain: Degenerative in nature. PT exercises help    6. Right shoulder pain, history: Previously degenerative and inflammatory in nature.  Steroid injection resolve this issue.  Not a problem at this time.     7.  Vaccinations: Vaccinations reviewed with Ms. Aponte.  Risks and benefits of vaccinations were discussed.    - Tetanus: advised receiving    8. Left carpal tunnel syndrome: based on description. Advised carpal tunnel wrist splint and if not improved after a few weeks then she is to notify this clinic for an EMG order to be placed.     9. Platelet clumping on labs: use sodium citrate tubes to prevent the platelet clumping.      s/p 2 doses of the Pfizer COVID19 vaccination, last received 5/4/2021    Total minutes spent in evaluation with patient, documentation, , and review of pertinent studies and chart notes: 20     Ms. Aponte verbalized agreement with and understanding of the rational for the diagnosis and treatment plan.  All questions were answered to best of my ability and the patient's satisfaction. Ms. Aponte was advised to contact the clinic with any questions that may arise after the clinic visit.      Thank you for involving me in the care of the patient    Return to clinic: 3 months      HPI   Katie Aponte is a 55 year old female with a past medical history significant for acne rosacea, dermatitis, leukopenia, allergic bronchitis, medial meniscal tear, right shoulder impingement syndrome, cervical radiculopathy, and rheumatoid arthritis who is seen for follow-up of RA.     5/17/2019 [facility] rheumatology clinic note by Dr. Blair  Galindo documents seropositive rheumatoid arthritis with a positive rheumatoid factor and a positive CCP.  History of left knee partial replacement 4.5 years ago.  IRIS 1: 40.  CCP >340.  Rheumatoid factor 84.  Negative IRIS and dsDNA; normal C3 and C4.  Negative hepatitis B/C, TB.  OCT testing 3/11/2019 normal.  Symptoms started in June 2018 with pain in the left heel.  She was seen by podiatry.  Later developed right ankle swelling.  Symptoms worsened over time to include both ankles and both Achilles tendons.  Also with pain in the second-fourth fingers of the left hand and morning stiffness for couple hours.  Also history of Raynaud's phenomenon.  Sicca symptoms possibly related to phentermine use.  Plan was to continue Plaquenil 200 mg twice daily and add x-rays of her hands and feet to look for inflammatory arthritis.    6/18/2019:  Ms. Aponte reported that she was diagnosed with rheumatoid arthritis in 2018.  She initially had pain at her Achilles tendon, then ankles, other than both ankles and both Achilles tendons, that her hands and knees.  She was found to have elevated rheumatoid factor and CCP by her podiatrist and was subsequently referred to rheumatology.  She was seen at the [facility] where hydroxychloroquine was started and she felt improvement with this.  She continues to have pain at her right foot that is worse with increased ambulation; she has been following with podiatry.  She has a history of right shoulder impingement syndrome that did not improve with 2 steroid injections; she is followed in the sports medicine clinic and plans to follow-up there again.  She is also gone to physical therapy without improvement.  Left first MCP and bilateral first CMC's bother her.  Also with some pain at the right second PIP.  History of left partial knee replacement.  Morning stiffness for approximately 7 hours; she wakes up at 5:15 AM and is improved by noon.  Raynaud's phenomenon diagnosed  at the age of 15 years old and is successfully treated with cold avoidance; typically just affects her fingers.  She has had dysphagia twice in her life.  No pain or difficulty with swallowing otherwise.  No skin thickening or skin tightening.      10/15/2019: Feels better since starting Humira.  Tolerating injections.  Still with some shoulder pain but it is improving.  Morning stiffness for approximately 1-2 hours.  Biggest issue right now she has sinusitis treated with 2 antibiotics without improvement and now she has a cough.  She is going to follow-up with her PCP for the sinusitis and cough.  She has not held Humira during these infections.    5/5/2020: Tolerating hydroxychloroquine; mild redness at Humira site.. Right ankle and right wrist pain that is mild, improves with activity; no swelling of increase warmth. Ibuprofen resolves this pain. Otherwise doing well.     8/4/2020: Toes, and wrists with morning stiffness for a few hours each day.  Knees ache all the time; worse with activity such as walking on flat ground >20min. Stairs are okay. Knee pain improves with rest and with an ice pain.  Hasn't done PT for her knees but is willing to do so.  Right shoulder aches; worse with over the head activity; recalls steroid injection prior to RA tx wasn't very helpful; worse in the past 2 months. Chronic back pain being addressed by sports medicine.     11/3/2020: she reports that the steroid injection of her shoulder resolved the shoulder pain.  Still having mild intermittent MCP and wrist pain that is of short duration and typically occurs just before Humira dosing.  Morning stiffness for approximately 30 minutes, sometimes up to 1 hour.  Positive gelling phenomenon.  Lower back and knee pain is improved with physical therapy exercises.    2/9/2021: Doing well.  Occasional joint aches and pains that does not bother her to the point where she would like to change medications.  Tolerating Humira every 7 days.   Tolerating hydroxychloroquine.  Happy with how she is doing.  Morning stiffness for no more than 1 hour.    Today, 2021: intermittent left 2-4th finger and sometimes 1st finger numbness/tingling, present when she wakes up and sometimes with increased activity. Hasn't used a carpal tunnel wrist splint.  Medications tolerated.  Morning stiffness for 30-60 min. +gelling.  No rash.     Denies fevers, chills, nausea, vomiting, constipation, diarrhea. No abdominal pain. No chest pain/pressure, palpitations, or shortness of breath. No LE swelling. No neck pain. No oral or nasal sores.  No rash. No sicca symptoms.      Tobacco: None  EtOH: 0-4 drinks per week  Drugs: None    ROS   12 point review of system was completed and negative except as noted in the HPI     Active Problem List     Patient Active Problem List   Diagnosis     Knee pain     Abnormal uterine bleeding     CARDIOVASCULAR SCREENING; LDL GOAL LESS THAN 160     Acne rosacea     Dermatitis     Leukopenia     Allergic bronchitis, moderate persistent, uncomplicated     Mild persistent asthma with acute exacerbation     JESENIA (stress urinary incontinence, female)     Class 1 obesity due to excess calories without serious comorbidity with body mass index (BMI) of 30.0 to 30.9 in adult     Medial meniscus tear     Pain in joint, ankle and foot     Tear of medial meniscus of knee     Pain in joint, upper arm, right     Seropositive rheumatoid arthritis (H)     Past Medical History     Past Medical History:   Diagnosis Date     Herpes     Under eye     Joint pain      Seasonal allergies      Thrombocytopenia (H) 2015     Uncomplicated asthma     very mild     Past Surgical History     Past Surgical History:   Procedure Laterality Date      SECTION       COLONOSCOPY       CYSTOSCOPY, SLING TRANSVAGINAL N/A 2017    Procedure: CYSTOSCOPY, SLING TRANSVAGINAL;  TRANSVAGINAL TAPING, CYSTOSCOPY, MID URETHRAL SLING;  Surgeon: Jett Montes MD;   Location:  OR     DILATE CERVIX, ABLATE ENDOMETRIUM THERMACHOICE, COMBINED  4/10/2014    Procedure: COMBINED DILATE CERVIX, ABLATE ENDOMETRIUM THERMACHOICE;;  Surgeon: Jett Montes MD;  Location: [facility]     DILATION AND CURETTAGE, HYSTEROSCOPY, ABLATE ENDOMETRIUM NOVASURE, COMBINED  4/10/2014    Procedure: COMBINED DILATION AND CURETTAGE, HYSTEROSCOPY, ABLATE ENDOMETRIUM NOVASURE;  HYSTEROSCOPY, DILATION AND CURETTAGE, NOVASURE ABLATION ATTEMPTED, THERMACHOICE ABLATION ATTEMPTED;  Surgeon: Jett Montes MD;  Location: [facility]     LAPAROSCOPIC ASSISTED HYSTERECTOMY VAGINAL, BILATERAL SALPINGO-OOPHORECTOMY, COMBINED  7/31/2014    Procedure: COMBINED LAPAROSCOPIC ASSISTED HYSTERECTOMY VAGINAL, SALPINGO-OOPHORECTOMY;  Surgeon: Jett Montes MD;  Location: [facility]     ORTHOPEDIC SURGERY      L KNEE MENISCUS,ankle surgery, left knee replacement     Allergy     Allergies   Allergen Reactions     Droperidol Itching     Feels jumpy     Percocet [Oxycodone-Acetaminophen] Nausea and Vomiting and GI Disturbance     Current Medication List     Current Outpatient Medications   Medication Sig     adalimumab (HUMIRA *CF*) 40 MG/0.4ML pen kit Inject 0.4 mLs (40 mg) Subcutaneous every 7 days . Hold for signs of infection, then seek medical attention.     albuterol (PROAIR HFA) 108 (90 Base) MCG/ACT inhaler Inhale 2 puffs into the lungs every 6 hours as needed for shortness of breath / dyspnea or wheezing     albuterol (PROVENTIL) (2.5 MG/3ML) 0.083% neb solution USE 1 VIAL VIA NEBULIZER FOUR TIMES DAILY AS NEEDED FOR SHORTNESS OF BREATH/DYSPNEA OR WHEEZING     Cetirizine-Pseudoephedrine (ZYRTEC-D PO)      desonide (DESOWEN) 0.05 % ointment Apply topically 2 times daily     diclofenac (VOLTAREN) 1 % topical gel Apply up to 2 grams of 1% gel to right wrist up to 4 times daily as needed for joint pain (maximum: 8 g per upper extremity per day)     Estrogens Conjugated (PREMARIN PO) Take 0.9 mg by mouth daily     fluticasone (FLOVENT  HFA) 44 MCG/ACT inhaler Inhale 2 puffs into the lungs 2 times daily     hydroxychloroquine (PLAQUENIL) 200 MG tablet Take 1 tablet (200 mg) by mouth daily     multivitamin, therapeutic with minerals (MULTI-VITAMIN) TABS tablet Take 1 tablet by mouth daily     benzonatate (TESSALON) 100 MG capsule Take 1 capsule (100 mg) by mouth 3 times daily as needed for cough (Patient not taking: Reported on 8/29/2020)     estrogens conjugated (PREMARIN) 0.9 MG TABS Take 0.3 mg by mouth daily     Fluocinolone Acetonide Scalp 0.01 % OIL oil Apply topically 2 times daily as needed     ibuprofen (ADVIL/MOTRIN) 800 MG tablet Take 1 tablet (800 mg) by mouth every 8 hours as needed for moderate pain (Patient not taking: Reported on 8/29/2020)     ipratropium - albuterol 0.5 mg/2.5 mg/3 mL (DUONEB) 0.5-2.5 (3) MG/3ML neb solution Take 1 vial (3 mLs) by nebulization every 6 hours as needed for shortness of breath / dyspnea or wheezing (Patient not taking: Reported on 8/29/2020)     order for DME Equipment being ordered: Aerosol mask. Use with nebulizer. (Patient not taking: Reported on 8/29/2020)     order for DME Equipment being ordered:  Knee walker (Patient not taking: Reported on 8/29/2020)     order for DME Equipment being ordered: Knee walker  (Patient not taking: Reported on 8/29/2020)     order for DME Equipment being ordered: Nebulizer with tubing and instructions     order for DME Equipment being ordered: TENS (Patient not taking: Reported on 8/29/2020)     phentermine 30 MG capsule Take 30 mg by mouth every morning     predniSONE (DELTASONE) 5 MG tablet 4tab=20 mg qd x 5 days, 3tab=15 mg qd x 5 days, 2tab=10 mg qd x 5 days, 1 tab=5 mg qd daily (Patient not taking: Reported on 8/29/2020)     triamcinolone (KENALOG) 0.1 % cream Apply sparingly to affected area three times daily as needed (Patient not taking: Reported on 8/29/2020)     valACYclovir (VALTREX) 500 MG tablet Take 500 mg by mouth as needed Reported on 4/11/2017  "    No current facility-administered medications for this visit.          Social History   See HPI    Family History     Family History   Problem Relation Age of Onset     Cancer Mother         patient is unsure of what kind     Physical Exam     Temp Readings from Last 3 Encounters:   08/29/20 97.5  F (36.4  C) (Tympanic)   01/06/20 98.3  F (36.8  C) (Oral)   09/24/19 99.4  F (37.4  C) (Oral)     BP Readings from Last 5 Encounters:   08/29/20 131/87   08/26/20 128/72   02/04/20 138/85   01/06/20 134/85   10/15/19 100/68     Pulse Readings from Last 1 Encounters:   08/29/20 87     Resp Readings from Last 1 Encounters:   08/29/20 12     Estimated body mass index is 26.83 kg/m  as calculated from the following:    Height as of 2/4/20: 1.676 m (5' 6\").    Weight as of 8/29/20: 75.4 kg (166 lb 4 oz).      No vitals taken today because this is a virtual visit    GEN: NAD. Healthy appearing adult.   HEENT: MMM.  Anicteric, noninjected sclera. No obvious external lesions of the ear and nose. Hearing intact.  PULM: No increased work of breathing  MSK:  Hands and wrists without swelling. Able to make a fist bilaterally.   SKIN: No rash or jaundice seen  PSYCH: Alert. Appropriate.        Labs / Imaging (select studies)     RF/CCP  Recent Labs   Lab Test 08/31/18  1220 06/29/18  1301   CCPIGG >340*  --    RHF  --  84*     CBC  Recent Labs   Lab Test 03/22/21  1202 03/05/21  1049 04/29/20  1343 10/15/19  1552   WBC  --  3.2* 3.4* 3.2*   RBC  --  4.69 4.69 4.74   HGB  --  13.0 13.0 13.1   HCT  --  39.7 39.8 40.2   MCV  --  85 85 85   RDW  --  13.3 12.8 12.8    78* 139* 206   MCH  --  27.7 27.7 27.6   MCHC  --  32.7 32.7 32.6   NEUTROPHIL  --  25.9 21.0 24.0   LYMPH  --  52.3 57.0 60.0   MONOCYTE  --  13.7 12.0 9.0   EOSINOPHIL  --  7.5 9.0 6.0   BASOPHIL  --  0.6 1.0 1.0   ANEU  --  0.8* 0.7* 0.8*   ALYM  --  1.7 2.0 1.9   FIDE  --  0.4 0.4 0.3   AEOS  --  0.2 0.3 0.2   ABAS  --  0.0 0.0 0.0     CMP  Recent Labs   Lab " Test 03/05/21  1049 04/29/20  1343 10/15/19  1552 09/25/15  1540 09/25/15  1540 09/18/15  1001 09/18/15  1001 08/01/14  0749 08/01/14  0749   NA  --   --   --   --  139  --  136  --   --    POTASSIUM  --   --   --   --  3.9  --  4.6  --   --    CHLORIDE  --   --   --   --  104  --  103  --   --    CO2  --   --   --   --  30  --  31  --   --    ANIONGAP  --   --   --   --  5  --  2*  --   --    GLC  --   --   --   --  99  --  96  --  101*   BUN  --   --   --   --  9  --  11  --   --    CR 0.79 0.76 0.85   < > 0.77  --  0.83   < >  --    GFRESTIMATED 84 88 78   < > 80  --  73   < >  --    GFRESTBLACK >90 >90 90   < > >90   GFR Calc    --  89   < >  --    NAATLYA 9.3  --   --   --  8.0*  --  9.3  --   --    BILITOTAL 0.6 0.5 0.4  --  0.4   < >  --   --   --    ALBUMIN 3.6 3.4 3.4   < > 3.5  --   --   --   --    PROTTOTAL 7.7 7.7 7.6  --  7.6   < >  --   --   --    ALKPHOS 51 52 63  --  63   < >  --   --   --    AST 11 17 10   < > 14  --   --   --   --    ALT 16 18 16   < > 24  --   --   --   --     < > = values in this interval not displayed.     Calcium/VitaminD  Recent Labs   Lab Test 03/05/21  1049 06/18/19  1453 09/25/15  1540 09/18/15  1001   NATALYA 9.3  --  8.0* 9.3   VITDT 55 33  --   --      ESR/CRP  Recent Labs   Lab Test 03/05/21  1049 04/29/20  1343 08/31/18  1220   SED 13 17 19   CRP <2.9 <2.9 <2.9     Hepatitis B  Recent Labs   Lab Test 08/31/18  1220 09/25/15  1540   AUSAB  --  0.09   HBCAB Nonreactive Nonreactive   HEPBANG Nonreactive Nonreactive     Hepatitis C  Recent Labs   Lab Test 08/31/18  1220 03/29/18  0913   HCVAB Nonreactive Nonreactive     Lyme ab screening  Recent Labs   Lab Test 06/29/18  1301   LYMEGM 0.15     Tuberculosis Screening  Recent Labs   Lab Test 06/18/19  1453 08/31/18  1220   TBRES Negative Negative     HIV Screening  Recent Labs   Lab Test 09/25/15  1540   HIAGAB Nonreactive   HIV-1 p24 Ag & HIV-1/HIV-2 Ab Not Detected         Immunization History     Immunization  History   Administered Date(s) Administered     COVID-19,PF,Pfizer 04/13/2021, 05/04/2021     Influenza (IIV3) PF 10/20/2009     Tdap (Adacel,Boostrix) 01/25/2010          Chart documentation done in part with Dragon Voice recognition Software. Although reviewed after completion, some word and grammatical error may remain.        Video-Visit Details    Type of service:  Video Visit    Video Start Time: 1:35 PM    Video End Time:1:54 PM    Originating Location (pt. Location): [place], [place]    Distant Location (provider location):  Home    Platform used for Video Visit: gillian Mcfarlane MD

## 2021-05-26 ENCOUNTER — ANCILLARY PROCEDURE (OUTPATIENT)
Dept: GENERAL RADIOLOGY | Facility: CLINIC | Age: 56
End: 2021-05-26
Attending: PHYSICIAN ASSISTANT
Payer: COMMERCIAL

## 2021-05-26 ENCOUNTER — OFFICE VISIT (OUTPATIENT)
Dept: FAMILY MEDICINE | Facility: CLINIC | Age: 56
End: 2021-05-26
Payer: COMMERCIAL

## 2021-05-26 VITALS
TEMPERATURE: 97.5 F | SYSTOLIC BLOOD PRESSURE: 114 MMHG | OXYGEN SATURATION: 99 % | BODY MASS INDEX: 26.63 KG/M2 | WEIGHT: 165 LBS | DIASTOLIC BLOOD PRESSURE: 82 MMHG | HEART RATE: 84 BPM

## 2021-05-26 DIAGNOSIS — M94.0 COSTOCHONDRITIS: ICD-10-CM

## 2021-05-26 DIAGNOSIS — M25.571 PAIN IN JOINT INVOLVING ANKLE AND FOOT, RIGHT: ICD-10-CM

## 2021-05-26 DIAGNOSIS — S06.0X9D CONCUSSION WITH LOSS OF CONSCIOUSNESS, SUBSEQUENT ENCOUNTER: Primary | ICD-10-CM

## 2021-05-26 DIAGNOSIS — Z12.31 ENCOUNTER FOR SCREENING MAMMOGRAM FOR BREAST CANCER: ICD-10-CM

## 2021-05-26 DIAGNOSIS — S89.91XD INJURY OF RIGHT KNEE, SUBSEQUENT ENCOUNTER: ICD-10-CM

## 2021-05-26 PROCEDURE — 99214 OFFICE O/P EST MOD 30 MIN: CPT | Performed by: PHYSICIAN ASSISTANT

## 2021-05-26 PROCEDURE — 73610 X-RAY EXAM OF ANKLE: CPT | Mod: RT | Performed by: RADIOLOGY

## 2021-05-26 RX ORDER — INDOMETHACIN 50 MG/1
50 CAPSULE ORAL 2 TIMES DAILY WITH MEALS
Qty: 30 CAPSULE | Refills: 1 | Status: SHIPPED | OUTPATIENT
Start: 2021-05-26 | End: 2021-06-10

## 2021-05-26 RX ORDER — ESTROGENS, CONJUGATED 1.25 MG
TABLET ORAL
COMMUNITY
Start: 2021-05-03

## 2021-05-26 ASSESSMENT — PAIN SCALES - GENERAL: PAINLEVEL: SEVERE PAIN (6)

## 2021-05-26 NOTE — PATIENT INSTRUCTIONS
At [facility], we strive to deliver an exceptional experience to you, every time we see you. If you receive a survey, please complete it as we do value your feedback.  If you have MyChart, you can expect to receive results automatically within 24 hours of their completion.  Your provider will send a note interpreting your results as well.   If you do not have MyChart, you should receive your results in about a week by mail.    Your care team:                            Family Medicine Internal Medicine   MD Adan Moreno MD Shantel Branch-Fleming, MD Srinivasa Vaka, MD Katya Belousova, PANICOLETTE De Dios, APRN CNP    Rolf Scott, MD Pediatrics   Joo Ibrahim, PANICOLETTE Irby, CNP MD Camille Oliver APRN CNP   MD Nai Palumbo MD Deborah Mielke, MD Vidya Birmingham, APRN [facility]      Clinic hours: Monday - Thursday 7 am-6 pm; Fridays 7 am-5 pm.   Urgent care: Monday - Friday 10 am- 8 pm; Saturday and Sunday 9 am-5 pm.    Clinic: [phone]       [facility]: Monday - Thursday 8 am - 7 pm; Friday 8 am - 6 pm  [facility]: [phone]     Use [email or web address] for 24/7 diagnosis and treatment of dozens of conditions.    Patient Education   Concussion Discharge Instructions  You were seen today for signs of a concussion. The symptoms will vary, depending on the nature of your injury and your health. You may have: headache, confusion, nausea (feel sick to your stomach), vomiting (throwing up) and problems with memory, concentrating or sleep. You may feel dizzy, irritable, and tired.   Children and teens may need help from their parents, teachers and coaches to watch for symptoms as they recover.  Follow-up  It is important for you to see a doctor for follow-up care to see how you are recovering. Please see your primary doctor within the next 5 to 7 days. You may have also  "been referred to the Concussion  service. They will contact you and arrange a follow-up visit if needed. If you need help sooner, you may call them at [phone].  Warning signs  Call your doctor or come back to Emergency if you suddenly have any of these symptoms:    Headaches that get worse    Feeling more and more drowsy    You keep repeating yourself    Strange behavior    Seizures    Repeat vomiting (throwing up)    Trouble walking    Growing confusion    Feeling more irritable    Neck pain that gets worse    Slurred speech    Weakness or numbness    Loss of consciousness    Fluid or blood coming from ears or nose  Self-care    Get lots of rest and get enough sleep at night. Take daytime naps or rest if you feel tired.    Limit physical activity and \"thinking\" activities. These can make symptoms worse.  ? Physical activity includes gym, sports, weight training, running, exercise and heavy lifting.  ? Thinking activities include homework, class work, job-related work and screen time (phone, computer, tablet, TV and video games).    Stick to a healthy diet and drink lots of fluids.    As symptoms improve, you may slowly return to your daily activities. If symptoms get worse   or return, reduce your activity.    Know that it is normal to feel sad and frustrated when you do not feel right and are less active.  Going back to work    Your care team will tell you when you are ready to return to work.    Limit the amount of work you do soon after your injury. This may speed healing. Take breaks if your symptoms get worse. You should also reduce your physical activity as well as activities that require a lot of thinking until you see your doctor.    You may need shorter work days and a lighter workload.    Avoid heavy lifting, working with machinery, driving and working at heights until your symptoms are gone or you are cleared by a doctor.  Returning to sports    Never return to play if you have any " "symptoms. A full recovery will reduce the chances of getting hurt again. Remember, it is better to miss one or two games than a whole season.    You should rest from all physical activity until you see your doctor. Generally, if all symptoms have completely cleared, your doctor can help guide you to slowly return to sports. If symptoms return or worsen, stop the activity and see your doctor.    Important: If you are in an organized sport and under age 18, you will need written consent from a healthcare provider before you return to sports. Typically, this will be your primary care or sports medicine doctor. Please make an appointment.  Going back to school    If you are still having symptoms, you may need extra help at school.    Tell your teachers and school nurse about your injury and symptoms. Ask them to watch for problems with learning, memory and concentrating. Symptoms may get worse when you do schoolwork, and you may become more irritable.    You may need shorter school days, a reduced workload, and to postpone testing.    Do not drive or take gym class (physical activity) until cleared by a doctor.    For informational purposes only. Not to replace the advice of your health care provider.   2009 [employer]. Used with permission. This form is adapted from the \"Heads Up: Brain Injury in Your Practice\" tool kit developed by the Centers for Disease Control and Prevention (CDC). All rights reserved. [facility]. Clinically reviewed by [employer]. [employer] 170217 - Rev 11/20.       Patient Education     Treating Ankle Sprains  Treatment will depend on how bad your sprain is. For a severe sprain, healing may take 3 months or more.  Right after your injury: Use R.I.C.E.    BIG: Rest: At first, keep weight off the ankle as much as you can. You may be given crutches to help you walk without putting weight on the ankle.    BIG: Ice: Put an ice pack on " the ankle for 20 minutes. Remove the pack and wait at least 30 minutes. Repeat for up to 3 days. This helps reduce swelling.    BIG: Compression: To reduce swelling and keep the joint stable, you may need to wrap the ankle with an elastic bandage. For more severe sprains, you may need an ankle brace, a boot, or a cast.    BIG: Elevation: To reduce swelling, keep your ankle raised above your heart when you sit or lie down.  Medicine  Your healthcare provider may suggest oral nonsteroidal anti-inflammatory medicine (NSAIDs), such as ibuprofen. This relieves the pain and helps reduce swelling. Be sure to take your medicine as directed.  Exercises    After about 2 to 3 weeks, you may be given exercises to strengthen the ligaments and muscles in the ankle. Doing these exercises will help prevent another ankle sprain. Exercises may include standing on your toes and then on your heels and doing ankle curls.    Sit on the edge of a sturdy table or lie on your back.    Pull your toes toward you. Then point them away from you. Repeat for 2 to 3 minutes.  [employer] last reviewed this educational content on 1/1/2018 2000-2021 [employer]. All rights reserved. This information is not intended as a substitute for professional medical care. Always follow your healthcare professional's instructions.           Patient Education     Chest Wall Pain: Costochondritis    The chest pain that you have had today is caused by costochondritis. This condition is caused by an inflammation of the cartilage joining your ribs to your breastbone. It's not caused by heart or lung problems. Your healthcare team has made sure that the chest pain you feel is not from a life threatening cause of chest pain such as heart attack, collapsed lung, blood clot in the lung, tear in the aorta, or esophageal rupture. The inflammation may have been brought on by a blow to the chest, lifting heavy objects, intense exercise, or an illness that made  you cough and sneeze a lot. It often occurs during times of emotional stress. It can be painful, but it's not dangerous. It usually goes away in 1 to 2 weeks. But it may happen again. Rarely, a more serious condition may cause symptoms similar to costochondritis. That s why it s important to watch for the warning signs listed below.   Home care  Follow these guidelines when caring for yourself at home:    If you feel that emotional stress is a cause of your condition, try to figure out the sources of that stress. It may not be obvious. Learn ways to deal with the stress in your life. This can include regular exercise, muscle relaxation, meditation, or simply taking time out for yourself.    You may use acetaminophen, ibuprofen, or naproxen to control pain, unless another pain medicine was prescribed. If you have liver or kidney disease or ever had a stomach ulcer, talk with your healthcare provider before using these medicines.    You can also help ease pain by using a hot, wet compress or heating pad. Use this with or without a medicated skin cream that helps relieves pain.    Do stretching exercise as advised by your provider. Typically rest is beneficial for the first few days. Avoid strenuous activity that worsens the pain.    Take any prescribed medicines as directed.  Follow-up care  Follow up with your healthcare provider, or as advised.   When to seek medical advice  Call your healthcare provider right away if any of these occur:    A change in the type of pain. Call if it feels different, becomes more serious, lasts longer, or spreads into your shoulder, arm, neck, jaw, or back.    Shortness of breath or pain gets worse when you breathe    Weakness, dizziness, or fainting    Cough with dark-colored sputum (phlegm) or blood    Abdominal pain    Dark red or black stools    Fever of 100.4 F (38 C) or higher, or as directed by your healthcare provider  Fred last reviewed this educational content on  6/1/2019 2000-2021 [employer]. All rights reserved. This information is not intended as a substitute for professional medical care. Always follow your healthcare professional's instructions.           i  Knee Pain [Uncertain Cause]    There are several common causes for knee pain. These include a sprain of the ligaments that support the joint; an injury to the cartilage lining of the joint; arthritis from wear-and-tear or inflammation; and other causes. There may also be swelling, reduced movement of the knee joint and pain with walking. A definite diagnosis was not made today. If your symptoms do not improve, further follow-up and testing may be needed.  Home Care:  1. Stay off the injured leg as much as possible until pain improves.  2. Apply an ice pack (ice cubes in a plastic bag, wrapped in a towel) over the injured area for 20 minutes every 1-2 hours the first day. Continue with ice packs 3-4 times a day for the next two days, then as needed for the relief of pain and swelling.  3. You may use acetaminophen (Tylenol) or ibuprofen (Motrin, Advil) to control pain, unless another pain medicine was prescribed. [NOTE: If you have chronic liver or kidney disease or ever had a stomach ulcer or GI bleeding, talk with your doctor before using these medicines.]  4. If CRUTCHES or a walker have been recommended, do not bear full weight on the injured leg until you can do so without pain. Check with your doctor before returning to sports or full work duties.  5. If you have a VELCRO KNEE BRACE:    You may open the splint to apply ice.    You may remove the splint to bathe and sleep, unless told otherwise.  Follow Up  with your doctor within 1-2 weeks or as advised if not improving.  [NOTE: If X-rays were taken, they will be reviewed by a radiologist. You will be notified of any new findings that may affect your care.]  Get Prompt Medical Attention  if any of the following occur:    Toes or foot becomes  swollen, cold, blue, numb or tingly    Pain or swelling increases in the knee or calf    Warmth or redness appears over the knee or calf    Other joints become painful    Fever or rash appears    Shortness of breath or chest pain    Fever of 100.4 F (38 C) or higher, or as directed by your healthcare provider    [phone] Jean Ibarra, [address] [place] [address], [place] [address]. All rights reserved. This information is not intended as a substitute for professional medical care. Always follow your healthcare professional's instructions.
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
## 2021-05-26 NOTE — LETTER
[address] [address]  Phone: [phone]    May 26, 2021        Katie Aponte  [address] [facility] [address]          To whom it may concern:    RE: Katie Aponte    Patient was seen and treated today at our clinic.  Patient excused from work from 5/24/14 through and including 6/6/621.    Patient may return to work 6/7/21 without restrictions.        Please contact me for questions or concerns.      Sincerely,        CHAYITO Perez

## 2021-06-10 ENCOUNTER — OFFICE VISIT (OUTPATIENT)
Dept: FAMILY MEDICINE | Facility: CLINIC | Age: 56
End: 2021-06-10
Payer: COMMERCIAL

## 2021-06-10 VITALS
WEIGHT: 183 LBS | DIASTOLIC BLOOD PRESSURE: 78 MMHG | SYSTOLIC BLOOD PRESSURE: 118 MMHG | BODY MASS INDEX: 29.41 KG/M2 | OXYGEN SATURATION: 100 % | HEIGHT: 66 IN | HEART RATE: 78 BPM | RESPIRATION RATE: 16 BRPM | TEMPERATURE: 96.9 F

## 2021-06-10 DIAGNOSIS — M25.561 RIGHT KNEE PAIN, UNSPECIFIED CHRONICITY: ICD-10-CM

## 2021-06-10 DIAGNOSIS — F07.81 POST CONCUSSION SYNDROME: ICD-10-CM
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
## 2021-06-10 DIAGNOSIS — G44.309 POST-CONCUSSION HEADACHE: ICD-10-CM

## 2021-06-10 DIAGNOSIS — M62.838 NECK MUSCLE SPASM: Primary | ICD-10-CM
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
## 2021-06-10 PROCEDURE — 99214 OFFICE O/P EST MOD 30 MIN: CPT | Performed by: FAMILY MEDICINE

## 2021-06-10 ASSESSMENT — MIFFLIN-ST. JEOR: SCORE: 1441.58

## 2021-06-10 ASSESSMENT — PAIN SCALES - GENERAL: PAINLEVEL: SEVERE PAIN (7)
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
## 2021-06-10 NOTE — LETTER
[address] [address]  Phone: [phone]    Scarlett 10, 2021        Katie Aponte  [address] [facility] [address]          To whom it may concern:    RE: Katie Aponte    Patient may return to work 6/10/2021  with the following:  May need to have frequent breaks in the day and limited hours for the next month due to post concussion symptoms. Follow up in 1 month.     Please contact me for questions or concerns.      Sincerely,        Laura Muñoz MD

## 2021-06-10 NOTE — PATIENT INSTRUCTIONS
Patient Education     Coping with Concussion  Concussion is also known as mild traumatic brain injury (MTBI). It is often caused by a blow to the head, or a fall. You may have been unconscious for a few seconds or minutes after the injury. Or maybe you were dazed, confused, or  saw stars.  After this, you thought you were OK. Now, weeks or months later, you re having symptoms that may be caused by a concussion. The good news is that, in most people,  these symptoms will likely go away on their own. Most people with a concussion recover fully, with no need for treatment.     A cold compress can help relieve a headache.    What is a concussion?  A concussion is a mild form of brain injury. In some cases, the effects of a concussion go away within days of the injury. In others, symptoms may continue for a few months. Fortunately, a concussion is temporary. Even when symptoms stay for months, they do go away over time. If they don't, or if your symptoms are worse, contact your healthcare provider.  Symptoms of a concussion  You may have noticed some of these symptoms:    Headaches    Irritability and other changes in behavior    Problems remembering or concentrating    Dizziness or lack of coordination    Fatigue    Problems sleeping    Sensitivity to light and sound    Vision changes  NOTE: If you have severe symptoms or trouble functioning, talk with your healthcare provider right away. If you had a more serious head injury than a concussion, you likely need treatment. Be sure to see your healthcare provider for an evaluation.  What you can do  Since the effects of a concussion go away over time, there isn t a lot you need to do. Be assured that this problem is temporary. You ll likely have a full recovery. In the meantime, talk with your healthcare provider about ways to relieve any symptoms that are bothering you. These tips may help:    Don't return to sports or any activity that could cause you to hit your head  until all symptoms are gone and you have been cleared by your doctor. A second head injury before fully recovering from the first one can lead to serious brain injury.    Return to normal activities of daily living and normal social interaction is encouraged to speed recovery.    Stress can make symptoms worse. Help calm yourself by resting in a quiet place and imagining a peaceful scene. Relax your muscles by soaking in a hot bath or taking a hot shower.    Take over-the-counter  acetaminophen to relieve headache pain. Take them as directed on the package. Don't take ibuprofen or aspirin after a head injury.    If you become dizzy, sit or lie down in a safe place until the sensation passes. Don t drive when you feel dizzy or disoriented.    If you re having trouble sleeping, try to keep a regular sleep schedule. Go to bed and get up at the same time each day. Avoid or limit caffeine and nicotine. Also don't drink alcohol. It may help you sleep at first, but your sleep will not be restful.    Give yourself time to heal. Your recovery will take some time. When you have symptoms, remember that you won t feel this way forever. In time the symptoms will go away and you ll be back to yourself.  If you re not feeling better  The effects of a concussion often go away in 7 to 10 days and the vast majority of people who have had a concussion have recovered after 3 months. If you re not feeling better as time passes, there may be something else going on. If your symptoms don t go away or you notice new ones, talk with your healthcare provider. He or she can help you get the treatment you need.  [employer] last reviewed this educational content on 1/1/2018 2000-2021 [employer]. All rights reserved. This information is not intended as a substitute for professional medical care. Always follow your healthcare professional's instructions.           Patient Education     Neck Spasm   A spasm of the neck muscles can  happen after a sudden awkward neck movement. Sleeping with your neck in a crooked position can also cause spasm. Some people respond to emotional stress by tensing the muscles of their neck, shoulders, and upper back. If neck spasm lasts long enough, it can cause a headache.  The treatment described below will usually help the pain to go away in 5 to 7 days. Pain that continues may need further evaluation or other types of treatment such as physical therapy.  Home care    Rest and relax the muscles. Use a comfortable pillow that supports the head and keeps the spine in a neutral position. The position of the head should not be tilted forward or backward. A rolled up towel may help for a custom fit.    Some people find relief with heat. Heat can be applied with either a warm shower or bath or a moist towel heated in the microwave and massage. Others prefer cold packs. You can make an ice pack by filling a plastic bag that seals at the top with ice cubes or crushed ice and then wrapping it with a thin towel. Try both and use the method that feels best for 15 to 20 minutes, several times a day.    Whether using ice or heat, be careful that you don't injure your skin. Never put ice directly on the skin. Always wrap the ice in a towel or other type of cloth. This is very important, especially in people with poor skin sensation.    Try to reduce your stress level. Emotional stress can lead to neck muscle tension and get in the way of or delay the healing process.    You may use over-the-counter pain medicine to control pain, unless another medicine was prescribed. If you have chronic liver or kidney disease or ever had a stomach ulcer or gastrointestinal bleeding, talk with your healthcare provider before using these medicines.    Follow-up care  Follow up with your healthcare provider if your symptoms don't show signs of improvement after one week. Physical therapy or further tests may be needed.  If X-rays, CT scans, or  MRI scans were taken, you will be told of any new findings that may affect your care.  Call 911  Call 911 if you have:    Sudden weakness or numbness in one or both arms or legs    Neck swelling, trouble with or painful swallowing    Trouble breathing    Chest pain  When to seek medical advice  Call your healthcare provider right away if any of these occur:    Pain becomes worse or spreads into one or both arms or legs    Increasing headache with nausea or vomiting    Fever of 100.4 F (38 C) or higher, or as directed by your healthcare provider    Loyda Ibarra last reviewed this educational content on 5/1/2018 2000-2021 [employer]. All rights reserved. This information is not intended as a substitute for professional medical care. Always follow your healthcare professional's instructions.

## 2021-06-13 DIAGNOSIS — J45.31 MILD PERSISTENT ASTHMA WITH ACUTE EXACERBATION: ICD-10-CM

## 2021-06-14 NOTE — TELEPHONE ENCOUNTER
"Requested Prescriptions   Pending Prescriptions Disp Refills    albuterol (PROAIR HFA/PROVENTIL HFA/VENTOLIN HFA) 108 (90 Base) MCG/ACT inhaler [Pharmacy Med Name: ALBUTEROL HFA INH(200 [id number]] 18 g      Sig: INHALE 2 PUFFS INTO THE LUNGS EVERY 6 HOURS AS NEEDED FOR SHORTNESS OF BREATH OR DIFFICULT BREATHING OR WHEEZING       Asthma Maintenance Inhalers - Anticholinergics Failed - 6/13/2021  6:49 PM        Failed - Asthma control assessment score within normal limits in last 6 months     Please review ACT score.           Passed - Patient is age 12 years or older        Passed - Medication is active on med list        Passed - Recent (6 mo) or future (30 days) visit within the authorizing provider's specialty     Patient had office visit in the last 6 months or has a visit in the next 30 days with authorizing provider or within the authorizing provider's specialty.  See \"Patient Info\" tab in inbasket, or \"Choose Columns\" in Meds & Orders section of the refill encounter.           Short-Acting Beta Agonist Inhalers Protocol  Failed - 6/13/2021  6:49 PM        Failed - Asthma control assessment score within normal limits in last 6 months     Please review ACT score.           Passed - Patient is age 12 or older        Passed - Medication is active on med list        Passed - Recent (6 mo) or future (30 days) visit within the authorizing provider's specialty     Patient had office visit in the last 6 months or has a visit in the next 30 days with authorizing provider or within the authorizing provider's specialty.  See \"Patient Info\" tab in [employer], or \"Choose Columns\" in Meds & Orders section of the refill encounter.                 "

## 2021-06-15 RX ORDER — ALBUTEROL SULFATE 90 UG/1
AEROSOL, METERED RESPIRATORY (INHALATION)
Qty: 18 G | Refills: 1 | Status: SHIPPED | OUTPATIENT
Start: 2021-06-15 | End: 2022-05-03

## 2021-06-17 ENCOUNTER — THERAPY VISIT (OUTPATIENT)
Dept: PHYSICAL THERAPY | Facility: CLINIC | Age: 56
End: 2021-06-17
Attending: FAMILY MEDICINE
Payer: COMMERCIAL

## 2021-06-17 DIAGNOSIS — M62.838 NECK MUSCLE SPASM: ICD-10-CM

## 2021-06-17 DIAGNOSIS — M25.561 RIGHT KNEE PAIN, UNSPECIFIED CHRONICITY: ICD-10-CM

## 2021-06-17 DIAGNOSIS — M25.561 ACUTE PAIN OF RIGHT KNEE: ICD-10-CM

## 2021-06-17 DIAGNOSIS — M54.2 NECK PAIN: ICD-10-CM
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
## 2021-06-17 PROCEDURE — 97162 PT EVAL MOD COMPLEX 30 MIN: CPT | Mod: GP | Performed by: PHYSICAL THERAPIST

## 2021-06-17 PROCEDURE — 97140 MANUAL THERAPY 1/> REGIONS: CPT | Mod: GP | Performed by: PHYSICAL THERAPIST

## 2021-06-17 PROCEDURE — 97110 THERAPEUTIC EXERCISES: CPT | Mod: GP | Performed by: PHYSICAL THERAPIST

## 2021-06-17 ASSESSMENT — ACTIVITIES OF DAILY LIVING (ADL)
GO DOWN STAIRS: ACTIVITY IS FAIRLY DIFFICULT
HOW_WOULD_YOU_RATE_THE_CURRENT_FUNCTION_OF_YOUR_KNEE_DURING_YOUR_USUAL_DAILY_ACTIVITIES_ON_A_SCALE_FROM_0_TO_100_WITH_100_BEING_YOUR_LEVEL_OF_KNEE_FUNCTION_PRIOR_TO_YOUR_INJURY_AND_0_BEING_THE_INABILITY_TO_PERFORM_ANY_OF_YOUR_USUAL_DAILY_ACTIVITIES?: 50
STIFFNESS: THE SYMPTOM AFFECTS MY ACTIVITY MODERATELY
WEAKNESS: THE SYMPTOM AFFECTS MY ACTIVITY MODERATELY
GIVING WAY, BUCKLING OR SHIFTING OF KNEE: THE SYMPTOM AFFECTS MY ACTIVITY MODERATELY
PAIN: THE SYMPTOM AFFECTS MY ACTIVITY MODERATELY
GO UP STAIRS: ACTIVITY IS FAIRLY DIFFICULT
KNEE_ACTIVITY_OF_DAILY_LIVING_SUM: 28
LIMPING: THE SYMPTOM AFFECTS MY ACTIVITY MODERATELY
KNEE_ACTIVITY_OF_DAILY_LIVING_SCORE: 40
RISE FROM A CHAIR: ACTIVITY IS SOMEWHAT DIFFICULT
AS_A_RESULT_OF_YOUR_KNEE_INJURY,_HOW_WOULD_YOU_RATE_YOUR_CURRENT_LEVEL_OF_DAILY_ACTIVITY?: ABNORMAL
KNEEL ON THE FRONT OF YOUR KNEE: I AM UNABLE TO DO THE ACTIVITY
HOW_WOULD_YOU_RATE_THE_OVERALL_FUNCTION_OF_YOUR_KNEE_DURING_YOUR_USUAL_DAILY_ACTIVITIES?: ABNORMAL
WALK: ACTIVITY IS SOMEWHAT DIFFICULT
SIT WITH YOUR KNEE BENT: ACTIVITY IS SOMEWHAT DIFFICULT
RAW_SCORE: 28
SWELLING: THE SYMPTOM AFFECTS MY ACTIVITY MODERATELY
SQUAT: I AM UNABLE TO DO THE ACTIVITY
STAND: ACTIVITY IS SOMEWHAT DIFFICULT
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
## 2021-06-25 ENCOUNTER — THERAPY VISIT (OUTPATIENT)
Dept: PHYSICAL THERAPY | Facility: CLINIC | Age: 56
End: 2021-06-25
Payer: COMMERCIAL

## 2021-06-25 DIAGNOSIS — M25.561 ACUTE PAIN OF RIGHT KNEE: ICD-10-CM

## 2021-06-25 DIAGNOSIS — M54.2 NECK PAIN: ICD-10-CM

## 2021-06-25 PROCEDURE — 97110 THERAPEUTIC EXERCISES: CPT | Mod: GP | Performed by: PHYSICAL THERAPIST

## 2021-06-25 PROCEDURE — 97140 MANUAL THERAPY 1/> REGIONS: CPT | Mod: GP | Performed by: PHYSICAL THERAPIST

## 2021-06-25 PROCEDURE — 97014 ELECTRIC STIMULATION THERAPY: CPT | Performed by: PHYSICAL THERAPIST

## 2021-06-30 ENCOUNTER — THERAPY VISIT (OUTPATIENT)
Dept: PHYSICAL THERAPY | Facility: CLINIC | Age: 56
End: 2021-06-30
Payer: COMMERCIAL

## 2021-06-30 DIAGNOSIS — M54.2 NECK PAIN: ICD-10-CM

## 2021-06-30 DIAGNOSIS — M25.561 ACUTE PAIN OF RIGHT KNEE: ICD-10-CM

## 2021-06-30 PROCEDURE — 97140 MANUAL THERAPY 1/> REGIONS: CPT | Mod: GP | Performed by: PHYSICAL THERAPY ASSISTANT

## 2021-06-30 PROCEDURE — 97014 ELECTRIC STIMULATION THERAPY: CPT | Performed by: PHYSICAL THERAPY ASSISTANT

## 2021-06-30 PROCEDURE — 97110 THERAPEUTIC EXERCISES: CPT | Mod: GP | Performed by: PHYSICAL THERAPY ASSISTANT

## 2021-07-07 ENCOUNTER — THERAPY VISIT (OUTPATIENT)
Dept: PHYSICAL THERAPY | Facility: CLINIC | Age: 56
End: 2021-07-07
Payer: COMMERCIAL

## 2021-07-07 DIAGNOSIS — M54.2 NECK PAIN: ICD-10-CM

## 2021-07-07 DIAGNOSIS — M25.561 ACUTE PAIN OF RIGHT KNEE: ICD-10-CM

## 2021-07-07 PROCEDURE — 97110 THERAPEUTIC EXERCISES: CPT | Mod: GP | Performed by: PHYSICAL THERAPY ASSISTANT

## 2021-07-07 PROCEDURE — 97140 MANUAL THERAPY 1/> REGIONS: CPT | Mod: GP | Performed by: PHYSICAL THERAPY ASSISTANT

## 2021-07-07 PROCEDURE — 97014 ELECTRIC STIMULATION THERAPY: CPT | Performed by: PHYSICAL THERAPY ASSISTANT

## 2021-07-13 ENCOUNTER — THERAPY VISIT (OUTPATIENT)
Dept: PHYSICAL THERAPY | Facility: CLINIC | Age: 56
End: 2021-07-13
Payer: COMMERCIAL

## 2021-07-13 DIAGNOSIS — M54.2 NECK PAIN: ICD-10-CM

## 2021-07-13 DIAGNOSIS — M25.561 ACUTE PAIN OF RIGHT KNEE: ICD-10-CM

## 2021-07-13 PROCEDURE — 97110 THERAPEUTIC EXERCISES: CPT | Mod: GP | Performed by: PHYSICAL THERAPY ASSISTANT

## 2021-07-13 PROCEDURE — 97014 ELECTRIC STIMULATION THERAPY: CPT | Performed by: PHYSICAL THERAPY ASSISTANT

## 2021-07-13 PROCEDURE — 97140 MANUAL THERAPY 1/> REGIONS: CPT | Mod: GP | Performed by: PHYSICAL THERAPY ASSISTANT

## 2021-07-19 ENCOUNTER — OFFICE VISIT (OUTPATIENT)
Dept: FAMILY MEDICINE | Facility: CLINIC | Age: 56
End: 2021-07-19
Payer: COMMERCIAL

## 2021-07-19 VITALS
BODY MASS INDEX: 26.77 KG/M2 | SYSTOLIC BLOOD PRESSURE: 106 MMHG | WEIGHT: 165.8 LBS | HEART RATE: 75 BPM | TEMPERATURE: 97.8 F | OXYGEN SATURATION: 98 % | DIASTOLIC BLOOD PRESSURE: 71 MMHG

## 2021-07-19 DIAGNOSIS — H61.23 BILATERAL IMPACTED CERUMEN: ICD-10-CM

## 2021-07-19 DIAGNOSIS — M25.561 ACUTE PAIN OF RIGHT KNEE: ICD-10-CM

## 2021-07-19 DIAGNOSIS — M54.2 NECK PAIN: Primary | ICD-10-CM
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
## 2021-07-19 PROCEDURE — 99214 OFFICE O/P EST MOD 30 MIN: CPT | Performed by: NURSE PRACTITIONER

## 2021-07-19 RX ORDER — METHOCARBAMOL 500 MG/1
500 TABLET, FILM COATED ORAL 3 TIMES DAILY
Qty: 30 TABLET | Refills: 1 | Status: SHIPPED | OUTPATIENT
Start: 2021-07-19 | End: 2022-05-11

## 2021-07-19 ASSESSMENT — PAIN SCALES - GENERAL: PAINLEVEL: SEVERE PAIN (6)

## 2021-07-19 NOTE — NURSING NOTE
Patient identified using two patient identifiers.  Ear exam showing wax occlusion completed by provider.  H202/H20 was placed in the bilateral ear(s) via irrigation tool: elephant ear.    Cecilia Sloan MA
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
## 2021-07-19 NOTE — LETTER
[address] [address]  [phone]          July 19, 2021    RE:  Katie Aponte                                                                                                                                                       [id number] [facility] [id number]            To whom it may concern:      RE: Katie Aponte    Patient may return to work with the following:  May need to have frequent breaks in the day and limited/ hours for the next month due to post concussion symptoms. Follow up in 1 month.     Please contact me for questions or concerns.        Sincerely,        Karlee PERERA, CNP

## 2021-07-19 NOTE — PATIENT INSTRUCTIONS
At [facility], we strive to deliver an exceptional experience to you, every time we see you. If you receive a survey, please complete it as we do value your feedback.  If you have MyChart, you can expect to receive results automatically within 24 hours of their completion.  Your provider will send a note interpreting your results as well.   If you do not have MyChart, you should receive your results in about a week by mail.    Your care team:                            Family Medicine Internal Medicine   MD Adan Moreno MD Shantel Branch-Fleming, MD Srinivasa Vaka, MD Katya Belousova, PANICOLETTE De Dios, APRN CNP    Rolf Scott, MD Pediatrics   Joo Ibrahim, PANICOLETTE Irby, CNP MD Camille Oliver APRN CNP   MD Nai Palumbo MD Deborah Mielke, MD Vidya Birmingham, APRN [facility]      Clinic hours: Monday - Thursday 7 am-6 pm; Fridays 7 am-5 pm.   Urgent care: Monday - Friday 10 am- 8 pm; Saturday and Sunday 9 am-5 pm.    Clinic: [phone]       [facility]: Monday - Thursday 8 am - 7 pm; Friday 8 am - 6 pm  [facility]: [phone]     Use [email or web address] for 24/7 diagnosis and treatment of dozens of conditions.    Patient Education       Earwax, Home Treatment    Everyone produces earwax from the lining of the ear canal. It serves to lubricate and protect the ear. The wax that forms in the canal naturally moves toward the outside of the ear and falls out. Sometimes the ear canal may contain too much wax. This can cause a blockage and loss of hearing. Directions are given below for home treatment.  Home care  If your doctor has advised you to remove a wax blockage yourself, follow these directions:    Unless a medicine was prescribed, you may use an over-the-counter product made for clearing earwax. These contain carbamide peroxide. Lie down with the blocked ear facing  upward. Apply one dropper full of medicine and wait a few minutes. Grasp the outer ear and wiggle it to help the solution enter the canal.    Lean over a sink or basin with the blocked ear facing downward. Use a bulb syringe filled with warm (not hot or cold) water to rinse the ear several times. Use gentle pressure only.    If you are having trouble draining the water out of your ear canal, put a few drops of rubbing alcohol (isopropyl alcohol) into the ear canal. This will help remove the remaining water.    Repeat this procedure once a day for up to three days, or until your hearing is back to normal. Do not use this treatment for more than three days in a row.  Don ts    Don t use cold water to rinse the ear. This will make you dizzy.    Don t perform this procedure if you have an ear infection.    Don t perform this procedure if you have a ruptured eardrum.    Don t use cotton swabs, matches, hairpins, keys, or other objects to  clean  the ear canal. This can cause infection of the ear canal or rupture the eardrum. Because of their size and shape, cotton swabs can push earwax deeper into the ear canal instead of removing it.  Follow-up care  Follow up with your health care provider if you are not improving after three cleaning attempts, or as advised.  When to seek medical advice  Call your health care provider right away if any of these occur:    Worsening ear pain    Fever of 101 F (38.3 C) or higher, or as directed by your health care provider    Hearing does not return to normal after three days of treatment    Fluid drainage or bleeding from the ear canal    Swelling, redness, or tenderness of the outer ear    Headache, neck pain, or stiff neck    [id number] [employer]. [address] [place] [address]. All rights reserved. This information is not intended as a substitute for professional medical care. Always follow your healthcare professional's instructions.

## 2021-07-20 ENCOUNTER — THERAPY VISIT (OUTPATIENT)
Dept: PHYSICAL THERAPY | Facility: CLINIC | Age: 56
End: 2021-07-20
Payer: COMMERCIAL

## 2021-07-20 DIAGNOSIS — M25.561 ACUTE PAIN OF RIGHT KNEE: ICD-10-CM

## 2021-07-20 DIAGNOSIS — M54.2 NECK PAIN: ICD-10-CM

## 2021-07-20 PROCEDURE — 97140 MANUAL THERAPY 1/> REGIONS: CPT | Mod: GP | Performed by: PHYSICAL THERAPIST

## 2021-07-20 PROCEDURE — 97110 THERAPEUTIC EXERCISES: CPT | Mod: GP | Performed by: PHYSICAL THERAPIST

## 2021-07-27 ENCOUNTER — THERAPY VISIT (OUTPATIENT)
Dept: PHYSICAL THERAPY | Facility: CLINIC | Age: 56
End: 2021-07-27
Payer: COMMERCIAL

## 2021-07-27 DIAGNOSIS — M25.561 ACUTE PAIN OF RIGHT KNEE: ICD-10-CM

## 2021-07-27 DIAGNOSIS — M54.2 NECK PAIN: ICD-10-CM

## 2021-07-27 PROCEDURE — 97140 MANUAL THERAPY 1/> REGIONS: CPT | Mod: GP | Performed by: PHYSICAL THERAPIST

## 2021-07-27 PROCEDURE — 97110 THERAPEUTIC EXERCISES: CPT | Mod: GP | Performed by: PHYSICAL THERAPIST

## 2021-08-04 ENCOUNTER — THERAPY VISIT (OUTPATIENT)
Dept: PHYSICAL THERAPY | Facility: CLINIC | Age: 56
End: 2021-08-04
Payer: COMMERCIAL

## 2021-08-04 ENCOUNTER — VIRTUAL VISIT (OUTPATIENT)
Dept: NEUROLOGY | Facility: CLINIC | Age: 56
End: 2021-08-04
Payer: COMMERCIAL

## 2021-08-04 DIAGNOSIS — M54.2 NECK PAIN: Primary | ICD-10-CM

## 2021-08-04 DIAGNOSIS — F41.9 ANXIETY: ICD-10-CM

## 2021-08-04 DIAGNOSIS — M54.2 NECK PAIN: ICD-10-CM

## 2021-08-04 DIAGNOSIS — M25.561 ACUTE PAIN OF RIGHT KNEE: ICD-10-CM

## 2021-08-04 PROCEDURE — 99205 OFFICE O/P NEW HI 60 MIN: CPT | Mod: 95 | Performed by: NURSE PRACTITIONER

## 2021-08-04 PROCEDURE — 97110 THERAPEUTIC EXERCISES: CPT | Mod: GP | Performed by: PHYSICAL THERAPY ASSISTANT

## 2021-08-04 PROCEDURE — 97140 MANUAL THERAPY 1/> REGIONS: CPT | Mod: GP | Performed by: PHYSICAL THERAPY ASSISTANT

## 2021-08-04 RX ORDER — DIAZEPAM 2 MG
2 TABLET ORAL
Qty: 2 TABLET | Refills: 0 | Status: SHIPPED | OUTPATIENT
Start: 2021-08-04 | End: 2021-08-17
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
## 2021-08-04 NOTE — PROGRESS NOTES
PROGRESS  REPORT    Progress reporting period is from 6/17/21 to 8/4/21.       SUBJECTIVE  Subjective changes noted by patient:   Pt reports neck is hurting today. Had a neurologist appt this morning and lots of info coming at her. Will be having MRI for her neck. Also work restrictions to go from 5 days to 3 days a week. Overall today is a tougher day due to the overwhelming amount of  information she has gotten today. Does cotninue to note decreased pain and increased ROM with PT. Does continue to have difficulty with sititng and working as her pain increases after 1 hour.     In regards to her knee at this time feels she is able to complete her HEP but her knee is not changing. Feels there is more wrong with her knee then PT can change. Will be following up with MD on 8/17/21 for further evaluation.    Current Pain level: 5/10.      Initial Pain level: 6/10.   Changes in function:  Yes, slow progress.  (See Goal flowsheet attached for changes in current functional level)  Adverse reaction to treatment or activity: activity - increased pain after 1 hour of work    OBJECTIVE  Changes noted in objective findings:  Yes, improved flexion and extension ROM. No change in R rotation. Continued pain with all ROM.    Today's Objective: CROM Flex min loss PDM; Ext mod loss PDM; L rot mod loss PDM; R rot mod/aashish loss PDM PL ext and R rot 5/10      ASSESSMENT/PLAN  Updated problem list and treatment plan: Diagnosis 1:  Neck pain   Pain -  electric stimulation, manual therapy, self management, education, directional preference exercise and home program  Decreased ROM/flexibility - manual therapy, therapeutic exercise and home program  Decreased joint mobility - manual therapy, therapeutic exercise and home program  Decreased strength - therapeutic exercise, therapeutic activities and home program  Decreased function - therapeutic activities and home program  Impaired posture - neuro re-education and home program  Diagnosis 2:  Knee pain Pain:   self management, education, directional preference exercise and home program  Decreased ROM/flexibility - therapeutic exercise and home program  Decreased joint mobility -therapeutic exercise and home program  Decreased strength - therapeutic exercise, therapeutic activities and home program  Decreased function - therapeutic activities and home program  STG/LTGs have been met or progress has been made towards goals:  Yes (See Goal flow sheet completed today.)  Assessment of Progress: The patient's condition is slowly improving for ROM. Minimal progress towards goals and pain for neck.   The patient's condition for her knee has plateaued.   Patient is very slowly making progress towardshort term goals and is progressing towards long term goals.  Self Management Plans:  Patient has been instructed in a home treatment program.  Patient  has been instructed in self management of symptoms.  I have discussed this patient with primary PT and find that the nature, scope, duration and intensity of the therapy is appropriate for the medical condition of the patient.  Katie continues to require the following intervention to meet STG and LTG's:  PT    Recommendations:  This patient would benefit from continued therapy.     Frequency:  1 X week, once daily  Duration:  for 8 weeks or until follow up after MRI and future plan of care is established based on the results.    This patient would benefit from further evaluation for knee pain. Has follow up set up for 8/17/21.     The progress note/discharge summary was written in collaboration with and reviewed by the physical therapist.    Please refer to the daily flowsheet for treatment today, total treatment time and time spent performing 1:1 timed codes.

## 2021-08-06 DIAGNOSIS — F07.81 POST CONCUSSION SYNDROME: Primary | ICD-10-CM

## 2021-08-17 ENCOUNTER — OFFICE VISIT (OUTPATIENT)
Dept: FAMILY MEDICINE | Facility: CLINIC | Age: 56
End: 2021-08-17
Payer: COMMERCIAL

## 2021-08-17 VITALS
HEART RATE: 75 BPM | RESPIRATION RATE: 20 BRPM | TEMPERATURE: 97 F | SYSTOLIC BLOOD PRESSURE: 115 MMHG | BODY MASS INDEX: 26.71 KG/M2 | OXYGEN SATURATION: 100 % | DIASTOLIC BLOOD PRESSURE: 81 MMHG | WEIGHT: 166.2 LBS | HEIGHT: 66 IN

## 2021-08-17 DIAGNOSIS — J45.31 MILD PERSISTENT ASTHMA WITH ACUTE EXACERBATION: ICD-10-CM

## 2021-08-17 DIAGNOSIS — S06.0X0D CONCUSSION WITHOUT LOSS OF CONSCIOUSNESS, SUBSEQUENT ENCOUNTER: Primary | ICD-10-CM

## 2021-08-17 PROCEDURE — 99214 OFFICE O/P EST MOD 30 MIN: CPT | Performed by: NURSE PRACTITIONER

## 2021-08-17 RX ORDER — FLUTICASONE PROPIONATE 110 UG/1
1 AEROSOL, METERED RESPIRATORY (INHALATION) 2 TIMES DAILY
Qty: 12 G | Refills: 3 | Status: SHIPPED | OUTPATIENT
Start: 2021-08-17 | End: 2022-05-03

## 2021-08-17 RX ORDER — FLUTICASONE PROPIONATE 44 UG/1
2 AEROSOL, METERED RESPIRATORY (INHALATION) 2 TIMES DAILY
Status: CANCELLED | OUTPATIENT
Start: 2021-08-17

## 2021-08-17 RX ORDER — HYDROCODONE BITARTRATE AND ACETAMINOPHEN 5; 325 MG/1; MG/1
TABLET ORAL
COMMUNITY
Start: 2021-08-07 | End: 2022-08-10

## 2021-08-17 ASSESSMENT — PAIN SCALES - GENERAL: PAINLEVEL: MODERATE PAIN (4)

## 2021-08-17 ASSESSMENT — MIFFLIN-ST. JEOR: SCORE: 1371.01

## 2021-08-17 NOTE — PATIENT INSTRUCTIONS
At [facility], we strive to deliver an exceptional experience to you, every time we see you. If you receive a survey, please complete it as we do value your feedback.  If you have MyChart, you can expect to receive results automatically within 24 hours of their completion.  Your provider will send a note interpreting your results as well.   If you do not have MyChart, you should receive your results in about a week by mail.    Your care team:                            Family Medicine Internal Medicine   MD Adan Moreno MD Shantel Branch-Fleming, MD Srinivasa Vaka, MD Katya Belousova, PANICOLETTE De Dios, APRN CNP    Rolf Scott, MD Pediatrics   Joo Ibrahim, PANICOLETTE Irby, CNP MD Camille Oliver APRN CNP   MD Nai Palumbo MD Deborah Mielke, MD Vidya Birmingham, APRN [facility]      Clinic hours: Monday - Thursday 7 am-6 pm; Fridays 7 am-5 pm.   Urgent care: Monday - Friday 10 am- 8 pm; Saturday and Sunday 9 am-5 pm.    Clinic: [phone]       [facility]: Monday - Thursday 8 am - 7 pm; Friday 8 am - 6 pm  [facility]: [phone]     Use www.[email or web address] for 24/7 diagnosis and treatment of dozens of conditions.    Patient Education   Concussion Discharge Instructions  You were seen today for signs of a concussion. The symptoms will vary, depending on the nature of your injury and your health. You may have: headache, confusion, nausea (feel sick to your stomach), vomiting (throwing up) and problems with memory, concentrating or sleep. You may feel dizzy, irritable, and tired.   Children and teens may need help from their parents, teachers and coaches to watch for symptoms as they recover.  Follow-up  It is important for you to see a doctor for follow-up care to see how you are recovering. Please see your primary doctor within the next 5 to 7 days. You may have also  "been referred to the Concussion  service. They will contact you and arrange a follow-up visit if needed. If you need help sooner, you may call them at [phone].  Warning signs  Call your doctor or come back to Emergency if you suddenly have any of these symptoms:    Headaches that get worse    Feeling more and more drowsy    You keep repeating yourself    Strange behavior    Seizures    Repeat vomiting (throwing up)    Trouble walking    Growing confusion    Feeling more irritable    Neck pain that gets worse    Slurred speech    Weakness or numbness    Loss of consciousness    Fluid or blood coming from ears or nose  Self-care    Get lots of rest and get enough sleep at night. Take daytime naps or rest if you feel tired.    Limit physical activity and \"thinking\" activities. These can make symptoms worse.  ? Physical activity includes gym, sports, weight training, running, exercise and heavy lifting.  ? Thinking activities include homework, class work, job-related work and screen time (phone, computer, tablet, TV and video games).    Stick to a healthy diet and drink lots of fluids.    As symptoms improve, you may slowly return to your daily activities. If symptoms get worse   or return, reduce your activity.    Know that it is normal to feel sad and frustrated when you do not feel right and are less active.  Going back to work    Your care team will tell you when you are ready to return to work.    Limit the amount of work you do soon after your injury. This may speed healing. Take breaks if your symptoms get worse. You should also reduce your physical activity as well as activities that require a lot of thinking until you see your doctor.    You may need shorter work days and a lighter workload.    Avoid heavy lifting, working with machinery, driving and working at heights until your symptoms are gone or you are cleared by a doctor.  Returning to sports    Never return to play if you have any " "symptoms. A full recovery will reduce the chances of getting hurt again. Remember, it is better to miss one or two games than a whole season.    You should rest from all physical activity until you see your doctor. Generally, if all symptoms have completely cleared, your doctor can help guide you to slowly return to sports. If symptoms return or worsen, stop the activity and see your doctor.    Important: If you are in an organized sport and under age 18, you will need written consent from a healthcare provider before you return to sports. Typically, this will be your primary care or sports medicine doctor. Please make an appointment.  Going back to school    If you are still having symptoms, you may need extra help at school.    Tell your teachers and school nurse about your injury and symptoms. Ask them to watch for problems with learning, memory and concentrating. Symptoms may get worse when you do schoolwork, and you may become more irritable.    You may need shorter school days, a reduced workload, and to postpone testing.    Do not drive or take gym class (physical activity) until cleared by a doctor.    For informational purposes only. Not to replace the advice of your health care provider.   2009 [employer]. Used with permission. This form is adapted from the \"Heads Up: Brain Injury in Your Practice\" tool kit developed by the Centers for Disease Control and Prevention (CDC). All rights reserved. [facility]. Clinically reviewed by [employer]. [employer] 659555 - Rev 11/20.       "
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
## 2021-08-18 ASSESSMENT — ASTHMA QUESTIONNAIRES: ACT_TOTALSCORE: 19

## 2021-08-31 ENCOUNTER — THERAPY VISIT (OUTPATIENT)
Dept: PHYSICAL THERAPY | Facility: CLINIC | Age: 56
End: 2021-08-31
Payer: COMMERCIAL

## 2021-08-31 DIAGNOSIS — M25.561 ACUTE PAIN OF RIGHT KNEE: ICD-10-CM

## 2021-08-31 DIAGNOSIS — M54.2 NECK PAIN: ICD-10-CM

## 2021-08-31 PROCEDURE — 97110 THERAPEUTIC EXERCISES: CPT | Mod: GP | Performed by: PHYSICAL THERAPIST

## 2021-08-31 PROCEDURE — 97140 MANUAL THERAPY 1/> REGIONS: CPT | Mod: GP | Performed by: PHYSICAL THERAPIST

## 2021-09-01 ENCOUNTER — VIRTUAL VISIT (OUTPATIENT)
Dept: NEUROLOGY | Facility: CLINIC | Age: 56
End: 2021-09-01
Payer: COMMERCIAL

## 2021-09-01 DIAGNOSIS — G44.309 POST-CONCUSSION HEADACHE: Primary | ICD-10-CM

## 2021-09-01 DIAGNOSIS — F07.81 POST CONCUSSION SYNDROME: Primary | ICD-10-CM

## 2021-09-01 DIAGNOSIS — F07.81 POST CONCUSSION SYNDROME: ICD-10-CM

## 2021-09-01 PROCEDURE — 99215 OFFICE O/P EST HI 40 MIN: CPT | Mod: GT | Performed by: NURSE PRACTITIONER
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
## 2021-09-07 ENCOUNTER — THERAPY VISIT (OUTPATIENT)
Dept: PHYSICAL THERAPY | Facility: CLINIC | Age: 56
End: 2021-09-07
Payer: COMMERCIAL

## 2021-09-07 DIAGNOSIS — S06.0X0A CONCUSSION WITHOUT LOSS OF CONSCIOUSNESS, INITIAL ENCOUNTER: ICD-10-CM

## 2021-09-07 PROCEDURE — 97162 PT EVAL MOD COMPLEX 30 MIN: CPT | Mod: GP | Performed by: PHYSICAL THERAPIST

## 2021-09-07 PROCEDURE — 97112 NEUROMUSCULAR REEDUCATION: CPT | Mod: GP | Performed by: PHYSICAL THERAPIST

## 2021-09-07 PROCEDURE — 97140 MANUAL THERAPY 1/> REGIONS: CPT | Mod: GP | Performed by: PHYSICAL THERAPIST
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
## 2021-09-08 ENCOUNTER — THERAPY VISIT (OUTPATIENT)
Dept: PHYSICAL THERAPY | Facility: CLINIC | Age: 56
End: 2021-09-08
Payer: COMMERCIAL

## 2021-09-08 DIAGNOSIS — S06.0X0A CONCUSSION WITHOUT LOSS OF CONSCIOUSNESS, INITIAL ENCOUNTER: ICD-10-CM

## 2021-09-08 PROCEDURE — 97112 NEUROMUSCULAR REEDUCATION: CPT | Mod: GP | Performed by: PHYSICAL THERAPIST

## 2021-09-08 PROCEDURE — 97140 MANUAL THERAPY 1/> REGIONS: CPT | Mod: GP | Performed by: PHYSICAL THERAPIST

## 2021-09-14 ENCOUNTER — THERAPY VISIT (OUTPATIENT)
Dept: PHYSICAL THERAPY | Facility: CLINIC | Age: 56
End: 2021-09-14
Payer: COMMERCIAL

## 2021-09-14 DIAGNOSIS — M25.561 ACUTE PAIN OF RIGHT KNEE: ICD-10-CM

## 2021-09-14 DIAGNOSIS — M54.2 NECK PAIN: ICD-10-CM

## 2021-09-14 PROCEDURE — 97110 THERAPEUTIC EXERCISES: CPT | Mod: GP | Performed by: PHYSICAL THERAPIST

## 2021-09-14 PROCEDURE — 97140 MANUAL THERAPY 1/> REGIONS: CPT | Mod: GP | Performed by: PHYSICAL THERAPIST

## 2021-09-16 ENCOUNTER — MYC MEDICAL ADVICE (OUTPATIENT)
Dept: FAMILY MEDICINE | Facility: CLINIC | Age: 56
End: 2021-09-16

## 2021-09-16 ENCOUNTER — LAB (OUTPATIENT)
Dept: LAB | Facility: CLINIC | Age: 56
End: 2021-09-16
Payer: COMMERCIAL

## 2021-09-16 ENCOUNTER — OFFICE VISIT (OUTPATIENT)
Dept: RHEUMATOLOGY | Facility: CLINIC | Age: 56
End: 2021-09-16
Payer: COMMERCIAL

## 2021-09-16 VITALS
HEIGHT: 66 IN | OXYGEN SATURATION: 96 % | WEIGHT: 167.2 LBS | HEART RATE: 75 BPM | BODY MASS INDEX: 26.87 KG/M2 | SYSTOLIC BLOOD PRESSURE: 123 MMHG | DIASTOLIC BLOOD PRESSURE: 85 MMHG

## 2021-09-16 DIAGNOSIS — M05.9 SEROPOSITIVE RHEUMATOID ARTHRITIS (H): ICD-10-CM

## 2021-09-16 DIAGNOSIS — M05.9 SEROPOSITIVE RHEUMATOID ARTHRITIS (H): Primary | ICD-10-CM

## 2021-09-16 DIAGNOSIS — Z79.899 HIGH RISK MEDICATIONS (NOT ANTICOAGULANTS) LONG-TERM USE: ICD-10-CM

## 2021-09-16 LAB
ALBUMIN SERPL-MCNC: 3.6 G/DL (ref 3.4–5)
ALP SERPL-CCNC: 49 U/L (ref 40–150)
ALT SERPL W P-5'-P-CCNC: 18 U/L (ref 0–50)
AST SERPL W P-5'-P-CCNC: 11 U/L (ref 0–45)
BASOPHILS # BLD AUTO: 0 10E3/UL (ref 0–0.2)
BASOPHILS NFR BLD AUTO: 1 %
BILIRUB DIRECT SERPL-MCNC: 0.1 MG/DL (ref 0–0.2)
BILIRUB SERPL-MCNC: 0.7 MG/DL (ref 0.2–1.3)
CREAT SERPL-MCNC: 0.83 MG/DL (ref 0.52–1.04)
CRP SERPL-MCNC: <2.9 MG/L (ref 0–8)
EOSINOPHIL # BLD AUTO: 0.3 10E3/UL (ref 0–0.7)
EOSINOPHIL NFR BLD AUTO: 10 %
ERYTHROCYTE [DISTWIDTH] IN BLOOD BY AUTOMATED COUNT: 13.1 % (ref 10–15)
ERYTHROCYTE [SEDIMENTATION RATE] IN BLOOD BY WESTERGREN METHOD: 29 MM/HR (ref 0–30)
GFR SERPL CREATININE-BSD FRML MDRD: 80 ML/MIN/1.73M2
HCT VFR BLD AUTO: 41.7 % (ref 35–47)
HGB BLD-MCNC: 13.9 G/DL (ref 11.7–15.7)
IMM GRANULOCYTES # BLD: 0 10E3/UL
IMM GRANULOCYTES NFR BLD: 0 %
LYMPHOCYTES # BLD AUTO: 1.5 10E3/UL (ref 0.8–5.3)
LYMPHOCYTES NFR BLD AUTO: 54 %
MCH RBC QN AUTO: 27.8 PG (ref 26.5–33)
MCHC RBC AUTO-ENTMCNC: 33.3 G/DL (ref 31.5–36.5)
MCV RBC AUTO: 83 FL (ref 78–100)
MONOCYTES # BLD AUTO: 0.4 10E3/UL (ref 0–1.3)
MONOCYTES NFR BLD AUTO: 13 %
NEUTROPHILS # BLD AUTO: 0.6 10E3/UL (ref 1.6–8.3)
NEUTROPHILS NFR BLD AUTO: 22 %
NRBC # BLD AUTO: 0 10E3/UL
NRBC BLD AUTO-RTO: 0 /100
PLATELET # BLD AUTO: 135 10E3/UL (ref 150–450)
PROT SERPL-MCNC: 8 G/DL (ref 6.8–8.8)
RBC # BLD AUTO: 5 10E6/UL (ref 3.8–5.2)
WBC # BLD AUTO: 2.9 10E3/UL (ref 4–11)

## 2021-09-16 PROCEDURE — 85652 RBC SED RATE AUTOMATED: CPT

## 2021-09-16 PROCEDURE — 82565 ASSAY OF CREATININE: CPT

## 2021-09-16 PROCEDURE — 86481 TB AG RESPONSE T-CELL SUSP: CPT

## 2021-09-16 PROCEDURE — 99214 OFFICE O/P EST MOD 30 MIN: CPT | Performed by: INTERNAL MEDICINE

## 2021-09-16 PROCEDURE — 86140 C-REACTIVE PROTEIN: CPT

## 2021-09-16 PROCEDURE — 36415 COLL VENOUS BLD VENIPUNCTURE: CPT

## 2021-09-16 PROCEDURE — 85025 COMPLETE CBC W/AUTO DIFF WBC: CPT

## 2021-09-16 PROCEDURE — 80076 HEPATIC FUNCTION PANEL: CPT

## 2021-09-16 RX ORDER — HYDROXYCHLOROQUINE SULFATE 200 MG/1
200 TABLET, FILM COATED ORAL DAILY
Qty: 90 TABLET | Refills: 2 | Status: SHIPPED | OUTPATIENT
Start: 2021-09-16 | End: 2022-05-05

## 2021-09-16 ASSESSMENT — MIFFLIN-ST. JEOR: SCORE: 1375.56
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
## 2021-09-16 NOTE — NURSING NOTE
RAPID3 (0-30) Cumulative Score  12.2          RAPID3 Weighted Score (divide #4 by 3 and that is the weighted score)  4.0

## 2021-09-17 LAB
GAMMA INTERFERON BACKGROUND BLD IA-ACNC: 0.05 IU/ML
M TB IFN-G BLD-IMP: NEGATIVE
M TB IFN-G CD4+ BCKGRND COR BLD-ACNC: 9.95 IU/ML
MITOGEN IGNF BCKGRD COR BLD-ACNC: 0.03 IU/ML
MITOGEN IGNF BCKGRD COR BLD-ACNC: 0.04 IU/ML
QUANTIFERON MITOGEN: 10 IU/ML
QUANTIFERON NIL TUBE: 0.05 IU/ML
QUANTIFERON TB1 TUBE: 0.09 IU/ML
QUANTIFERON TB2 TUBE: 0.08

## 2021-09-22 ENCOUNTER — THERAPY VISIT (OUTPATIENT)
Dept: PHYSICAL THERAPY | Facility: CLINIC | Age: 56
End: 2021-09-22
Payer: COMMERCIAL

## 2021-09-22 DIAGNOSIS — S06.0X0D CONCUSSION WITHOUT LOSS OF CONSCIOUSNESS, SUBSEQUENT ENCOUNTER: ICD-10-CM

## 2021-09-22 PROCEDURE — 97112 NEUROMUSCULAR REEDUCATION: CPT | Mod: GP | Performed by: PHYSICAL THERAPIST

## 2021-09-22 PROCEDURE — 97140 MANUAL THERAPY 1/> REGIONS: CPT | Mod: GP | Performed by: PHYSICAL THERAPIST

## 2021-09-28 ENCOUNTER — THERAPY VISIT (OUTPATIENT)
Dept: PHYSICAL THERAPY | Facility: CLINIC | Age: 56
End: 2021-09-28
Payer: COMMERCIAL

## 2021-09-28 DIAGNOSIS — M25.561 ACUTE PAIN OF RIGHT KNEE: ICD-10-CM

## 2021-09-28 DIAGNOSIS — M54.2 NECK PAIN: ICD-10-CM

## 2021-09-28 PROCEDURE — 97140 MANUAL THERAPY 1/> REGIONS: CPT | Mod: GP | Performed by: PHYSICAL THERAPIST

## 2021-09-28 PROCEDURE — 97110 THERAPEUTIC EXERCISES: CPT | Mod: GP | Performed by: PHYSICAL THERAPIST

## 2021-10-05 ENCOUNTER — THERAPY VISIT (OUTPATIENT)
Dept: PHYSICAL THERAPY | Facility: CLINIC | Age: 56
End: 2021-10-05
Payer: COMMERCIAL

## 2021-10-05 DIAGNOSIS — S06.0X0D CONCUSSION WITHOUT LOSS OF CONSCIOUSNESS, SUBSEQUENT ENCOUNTER: ICD-10-CM

## 2021-10-05 PROCEDURE — 97110 THERAPEUTIC EXERCISES: CPT | Mod: GP | Performed by: PHYSICAL THERAPIST

## 2021-10-05 PROCEDURE — 97140 MANUAL THERAPY 1/> REGIONS: CPT | Mod: GP | Performed by: PHYSICAL THERAPIST

## 2021-10-13 ENCOUNTER — THERAPY VISIT (OUTPATIENT)
Dept: PHYSICAL THERAPY | Facility: CLINIC | Age: 56
End: 2021-10-13
Payer: COMMERCIAL

## 2021-10-13 ENCOUNTER — OFFICE VISIT (OUTPATIENT)
Dept: NEUROLOGY | Facility: CLINIC | Age: 56
End: 2021-10-13
Payer: COMMERCIAL

## 2021-10-13 DIAGNOSIS — S06.0X0D CONCUSSION WITHOUT LOSS OF CONSCIOUSNESS, SUBSEQUENT ENCOUNTER: ICD-10-CM

## 2021-10-13 DIAGNOSIS — F07.81 POST CONCUSSION SYNDROME: Primary | ICD-10-CM

## 2021-10-13 PROCEDURE — 97110 THERAPEUTIC EXERCISES: CPT | Mod: GP | Performed by: PHYSICAL THERAPIST

## 2021-10-13 PROCEDURE — 97140 MANUAL THERAPY 1/> REGIONS: CPT | Mod: GP | Performed by: PHYSICAL THERAPIST

## 2021-10-13 PROCEDURE — 99215 OFFICE O/P EST HI 40 MIN: CPT | Performed by: NURSE PRACTITIONER
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
## 2021-10-13 NOTE — PROGRESS NOTES
Research shows that Ibuprofen can sometimes cause a rebound headache (headache improves for a short time but then comes back worse). Tylenol/Excedrin in studies have shown to be more beneficial. Excedrin does contain caffeine so do not take up to 6 hours before going to sleep     Wearing a baseball hat or any hat with a brim, when outside or inside when there is overhead lighting    Wear blue blocking lenses- glasses that have a blue/pink tint to the lens (can be found at places like [employer], or [employer])   There is a Kiosk in the [employer] ([address] [facility]) on the lower level next to the carousel that sells sunglasses 2 pair for 10 dollars. [employer] is a web site wear you can pick out frames and have the tint added to the lens (make sure you are tinting the lenses not the frames)      -a light pair for when inside, looking at a computer or night time driving      - a darker pair for when outside and it is chris    Eye drops - saline solution for dry eyes, put 2 drops in each eye multiple times a day to help with the dry tired eyes    Take out any overhead lighting and replace with lamps    Work in an office or at home when available     Make sure you are taking breaks before symptoms worsen, I suggest a 2-5 minute break every hour and then again if symptoms feel to be worsening. You are better taking shorter breaks more frequently then you are taking fewer long breaks. If you need a hour break or need to take a nap to help your symptoms, you have waited too long to take a break.    Breaks are breaks from stimulation- an area where you can't hear, see, smell, taste or feel anything     Homeopathic Remedies   Sleep-  Melatonin 1-5 mg  Magnesium, 400 mg (will also help with leg cramping)  Valerian Root  Lithium Orotate  CBD oil (careful make sure it does not contain THC)    Pain-  Deep Blue Rub (essential oil lotion, can be purchased off amazon)  CBD lotion    Places to  "purchase CBD oil that other patients have liked are    [employer] [address]  [place], [place]    [employer] (2 locations)      -[address] [place], [place]     -[address], [address]                [place], [place]    [employer]  Order on line      Limit physical activity as well as activities that require a lot of thinking or concentration. These activities can make symptoms worse and recovery time longer. In some cases, your doctor may prescribe time that you completely eliminate these activities to allow complete \"brain rest.\"  Physical activity includes going to the gym, sports practices, weight-training, running, exercising, heavy lifting, etc.    Thinking and concentration activities (e.g., cell phone texting, computer games, movies, parties, loud music and in severe cases may include limiting your time at work).    Drink lots of fluids and eat carbohydrates or protein to main appropriate blood sugar levels.    As symptoms decrease, with consent from your doctor, you may begin to gradually return to your daily activities. If symptoms worsen or return, lessen your activities, then try again to increase your activities gradually.     During recovery, it is normal to feel frustrated and sad when you do not feel right and you can't be as active as usual.    Repeated evaluation of your symptoms is recommended to help guide recovery. Please follow up as recommended by your doctor to ensure a safe and healthy recovery.    Watch for and go to the Emergency Department if you have any of the following symptoms:  Headaches that significantly worsen  Looks very drowsy or can't be awakened  Can't recognize people or places  Worsening neck pain  Seizures  Repeated vomiting  Increasing confusion or irritability  Unusual behavioral change  Slurred speech  Weakness or numbness in arms/legs  Change in state of consciousness    For more information, please visit on the " Internet:  http://www.cdc.gov/concussion/get_help.html   http://www.cdc.gov/concussion/pdf/Facts_about_Concussion_TBI-a.pdf      General Information:  Today you had your appointment with Yessi Thakkar CNP     If lab work was done today as part of your evaluation you will generally be contacted via My Chart, mail, or phone with the results within 1-5 days. If there is an alarming result we will contact you by phone. Lab results come back at varying times, I generally wait until all labs are resulted before making comments on results. Please note labs are automatically released to My Chart once available.     If you need refills please contact your pharmacist. They will send a refill request to me to review. Please allow 3 business days for us to process all refill requests.     Please call or send a medical message through My Chart, with any questions or concerns    If you need any paperwork completed please fax forms to [phone]. Please state if you would like a copy of the completed paperwork, mailed or faxed back to the patient and a fax number to fax the paperwork to. Please allow up to 10 days for paperwork to be completed.
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
## 2021-10-15 ENCOUNTER — THERAPY VISIT (OUTPATIENT)
Dept: PHYSICAL THERAPY | Facility: CLINIC | Age: 56
End: 2021-10-15
Payer: COMMERCIAL

## 2021-10-15 DIAGNOSIS — S06.0X0D CONCUSSION WITHOUT LOSS OF CONSCIOUSNESS, SUBSEQUENT ENCOUNTER: ICD-10-CM

## 2021-10-15 PROCEDURE — 97140 MANUAL THERAPY 1/> REGIONS: CPT | Mod: GP | Performed by: PHYSICAL THERAPIST

## 2021-10-20 ENCOUNTER — THERAPY VISIT (OUTPATIENT)
Dept: PHYSICAL THERAPY | Facility: CLINIC | Age: 56
End: 2021-10-20
Payer: COMMERCIAL

## 2021-10-20 DIAGNOSIS — S06.0X0D CONCUSSION WITHOUT LOSS OF CONSCIOUSNESS, SUBSEQUENT ENCOUNTER: ICD-10-CM

## 2021-10-20 PROCEDURE — 97112 NEUROMUSCULAR REEDUCATION: CPT | Mod: GP | Performed by: PHYSICAL THERAPIST

## 2021-10-20 PROCEDURE — 97140 MANUAL THERAPY 1/> REGIONS: CPT | Mod: GP | Performed by: PHYSICAL THERAPIST

## 2021-10-23 ENCOUNTER — HEALTH MAINTENANCE LETTER (OUTPATIENT)
Age: 56
End: 2021-10-23

## 2021-10-27 ENCOUNTER — THERAPY VISIT (OUTPATIENT)
Dept: PHYSICAL THERAPY | Facility: CLINIC | Age: 56
End: 2021-10-27
Payer: COMMERCIAL

## 2021-10-27 DIAGNOSIS — S06.0X0D CONCUSSION WITHOUT LOSS OF CONSCIOUSNESS, SUBSEQUENT ENCOUNTER: ICD-10-CM

## 2021-10-27 PROCEDURE — 97110 THERAPEUTIC EXERCISES: CPT | Mod: GP | Performed by: PHYSICAL THERAPIST

## 2021-10-27 PROCEDURE — 97140 MANUAL THERAPY 1/> REGIONS: CPT | Mod: GP | Performed by: PHYSICAL THERAPIST

## 2021-11-02 ENCOUNTER — TELEPHONE (OUTPATIENT)
Dept: RHEUMATOLOGY | Facility: CLINIC | Age: 56
End: 2021-11-02

## 2021-11-02 NOTE — TELEPHONE ENCOUNTER
Prior Authorization Approval    Authorization Effective Date: 10/27/2021  Authorization Expiration Date: 10/27/2022  Medication: humira   Approved Dose/Quantity: 4/28ds  Reference #: [id number]   Insurance Company: [employer] - Phone [phone] Fax [phone]  Expected CoPay: na     CoPay Card Available: Yes    Foundation Assistance Needed: na  Which Pharmacy is filling the prescription (Not needed for infusion/clinic administered): [place], [place] - [address] [place] [address]

## 2021-11-03 ENCOUNTER — THERAPY VISIT (OUTPATIENT)
Dept: PHYSICAL THERAPY | Facility: CLINIC | Age: 56
End: 2021-11-03
Payer: COMMERCIAL

## 2021-11-03 DIAGNOSIS — S06.0X0D CONCUSSION WITHOUT LOSS OF CONSCIOUSNESS, SUBSEQUENT ENCOUNTER: ICD-10-CM

## 2021-11-03 PROCEDURE — 97140 MANUAL THERAPY 1/> REGIONS: CPT | Mod: GP | Performed by: PHYSICAL THERAPIST

## 2021-11-03 PROCEDURE — 97110 THERAPEUTIC EXERCISES: CPT | Mod: GP | Performed by: PHYSICAL THERAPIST

## 2021-11-10 ENCOUNTER — OFFICE VISIT (OUTPATIENT)
Dept: NEUROLOGY | Facility: CLINIC | Age: 56
End: 2021-11-10
Payer: COMMERCIAL

## 2021-11-10 DIAGNOSIS — M54.2 NECK PAIN: ICD-10-CM

## 2021-11-10 DIAGNOSIS — F41.9 ANXIETY: ICD-10-CM

## 2021-11-10 DIAGNOSIS — F07.81 POST CONCUSSION SYNDROME: Primary | ICD-10-CM

## 2021-11-10 PROCEDURE — 99215 OFFICE O/P EST HI 40 MIN: CPT | Performed by: NURSE PRACTITIONER
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
## 2021-11-18 ENCOUNTER — THERAPY VISIT (OUTPATIENT)
Dept: PHYSICAL THERAPY | Facility: CLINIC | Age: 56
End: 2021-11-18
Payer: COMMERCIAL

## 2021-11-18 DIAGNOSIS — S06.0X0D CONCUSSION WITHOUT LOSS OF CONSCIOUSNESS, SUBSEQUENT ENCOUNTER: ICD-10-CM

## 2021-11-18 PROCEDURE — 97140 MANUAL THERAPY 1/> REGIONS: CPT | Mod: GP | Performed by: PHYSICAL THERAPIST

## 2021-11-18 PROCEDURE — 97110 THERAPEUTIC EXERCISES: CPT | Mod: GP | Performed by: PHYSICAL THERAPIST

## 2021-11-24 ENCOUNTER — THERAPY VISIT (OUTPATIENT)
Dept: PHYSICAL THERAPY | Facility: CLINIC | Age: 56
End: 2021-11-24
Payer: COMMERCIAL

## 2021-11-24 DIAGNOSIS — S06.0X0D CONCUSSION WITHOUT LOSS OF CONSCIOUSNESS, SUBSEQUENT ENCOUNTER: ICD-10-CM

## 2021-11-24 PROCEDURE — 97110 THERAPEUTIC EXERCISES: CPT | Mod: GP | Performed by: PHYSICAL THERAPIST

## 2021-11-24 PROCEDURE — 97140 MANUAL THERAPY 1/> REGIONS: CPT | Mod: GP | Performed by: PHYSICAL THERAPIST

## 2021-12-16 ENCOUNTER — THERAPY VISIT (OUTPATIENT)
Dept: PHYSICAL THERAPY | Facility: CLINIC | Age: 56
End: 2021-12-16
Payer: COMMERCIAL

## 2021-12-16 DIAGNOSIS — S06.0X0D CONCUSSION WITHOUT LOSS OF CONSCIOUSNESS, SUBSEQUENT ENCOUNTER: ICD-10-CM

## 2021-12-16 PROCEDURE — 97110 THERAPEUTIC EXERCISES: CPT | Mod: GP | Performed by: PHYSICAL THERAPIST

## 2021-12-16 PROCEDURE — 97140 MANUAL THERAPY 1/> REGIONS: CPT | Mod: GP | Performed by: PHYSICAL THERAPIST

## 2021-12-22 ENCOUNTER — THERAPY VISIT (OUTPATIENT)
Dept: PHYSICAL THERAPY | Facility: CLINIC | Age: 56
End: 2021-12-22
Payer: COMMERCIAL

## 2021-12-22 DIAGNOSIS — S06.0X0D CONCUSSION WITHOUT LOSS OF CONSCIOUSNESS, SUBSEQUENT ENCOUNTER: ICD-10-CM

## 2021-12-22 PROCEDURE — 97112 NEUROMUSCULAR REEDUCATION: CPT | Mod: GP | Performed by: PHYSICAL THERAPIST

## 2021-12-22 PROCEDURE — 97140 MANUAL THERAPY 1/> REGIONS: CPT | Mod: GP | Performed by: PHYSICAL THERAPIST

## 2021-12-30 DIAGNOSIS — L30.9 DERMATITIS: ICD-10-CM

## 2022-01-04 ENCOUNTER — THERAPY VISIT (OUTPATIENT)
Dept: PHYSICAL THERAPY | Facility: CLINIC | Age: 57
End: 2022-01-04
Payer: COMMERCIAL

## 2022-01-04 DIAGNOSIS — S06.0X0D CONCUSSION WITHOUT LOSS OF CONSCIOUSNESS, SUBSEQUENT ENCOUNTER: ICD-10-CM

## 2022-01-04 PROCEDURE — 97140 MANUAL THERAPY 1/> REGIONS: CPT | Mod: GP | Performed by: PHYSICAL THERAPIST

## 2022-01-04 PROCEDURE — 97110 THERAPEUTIC EXERCISES: CPT | Mod: GP | Performed by: PHYSICAL THERAPIST

## 2022-01-11 ENCOUNTER — THERAPY VISIT (OUTPATIENT)
Dept: PHYSICAL THERAPY | Facility: CLINIC | Age: 57
End: 2022-01-11
Payer: COMMERCIAL

## 2022-01-11 ENCOUNTER — TELEPHONE (OUTPATIENT)
Dept: NEUROLOGY | Facility: CLINIC | Age: 57
End: 2022-01-11
Payer: COMMERCIAL

## 2022-01-11 ENCOUNTER — MEDICAL CORRESPONDENCE (OUTPATIENT)
Dept: HEALTH INFORMATION MANAGEMENT | Facility: CLINIC | Age: 57
End: 2022-01-11
Payer: COMMERCIAL

## 2022-01-11 DIAGNOSIS — S06.0X0D CONCUSSION WITHOUT LOSS OF CONSCIOUSNESS, SUBSEQUENT ENCOUNTER: ICD-10-CM

## 2022-01-11 PROCEDURE — 97110 THERAPEUTIC EXERCISES: CPT | Mod: GP | Performed by: PHYSICAL THERAPIST

## 2022-01-11 PROCEDURE — 97140 MANUAL THERAPY 1/> REGIONS: CPT | Mod: GP | Performed by: PHYSICAL THERAPIST

## 2022-01-11 NOTE — PROGRESS NOTES
PROGRESS  REPORT    Progress reporting period is from 9.7 to 1.11.22.       SUBJECTIVE  Subjective changes noted by patient:  Everything is less, decreasing/improving .  Subjective: my Rt neck remains tight/painful , I do my exs often, working at computer aggrivates my pain/tightness    Current pain level is 5/10 Current Pain level: 4/10.     Previous pain level was  5/10 Initial Pain level: 7/10.   Changes in function:  Yes (See Goal flowsheet attached for changes in current functional level)  Adverse reaction to treatment or activity: None    OBJECTIVE  Changes noted in objective findings:  Yes, incr'd CS ROM, improving concussion sxs on all issues, HAs and light sensitivity persist   Objective: +++TTP Rt levat/sara, ++suboccip      ASSESSMENT/PLAN  Updated problem list and treatment plan: Diagnosis 1:  Post concussion synd   Pain -  manual therapy, self management and home program  Decreased ROM/flexibility - manual therapy, therapeutic exercise and home program  Impaired muscle performance - neuro re-education  Decreased function - therapeutic activities  STG/LTGs have been met or progress has been made towards goals:  Yes (See Goal flow sheet completed today.)  Assessment of Progress: The patient's condition is improving.  Patient is meeting short term goals and is progressing towards long term goals.  Self Management Plans:  Patient has been instructed in a home treatment program.  Patient  has been instructed in self management of symptoms.  I have re-evaluated this patient and find that the nature, scope, duration and intensity of the therapy is appropriate for the medical condition of the patient.  Katie continues to require the following intervention to meet STG and LTG's:  PT    Recommendations:  This patient would benefit from continued therapy.     Frequency:  1 X week, once daily  Duration:  for 4 weeks        Please refer to the daily flowsheet for treatment today, total treatment time and time  spent performing 1:1 timed codes.

## 2022-01-11 NOTE — TELEPHONE ENCOUNTER
[facility] Call Center    Phone Message    May a detailed message be left on voicemail: yes     Reason for Call: Form or Letter   Type or form/letter needing completion: FMLA   Provider: ALINA DE LOS SANTOS  Date form needed: ASAP  Once completed: upload to [email or web address]    Per pt, she have not receive the workability form that was to be completed during her last visit. Please complete the workability form ASAP. Pt will also be faxing in a new FMLA form. Once completed call pt.  [phone]    Action Taken: Other: [id number]    Travel Screening: Not Applicable

## 2022-01-13 ENCOUNTER — OFFICE VISIT (OUTPATIENT)
Dept: RHEUMATOLOGY | Facility: CLINIC | Age: 57
End: 2022-01-13
Payer: COMMERCIAL

## 2022-01-13 VITALS
HEART RATE: 58 BPM | SYSTOLIC BLOOD PRESSURE: 118 MMHG | HEIGHT: 66 IN | WEIGHT: 175.6 LBS | BODY MASS INDEX: 28.22 KG/M2 | DIASTOLIC BLOOD PRESSURE: 80 MMHG

## 2022-01-13 DIAGNOSIS — G89.29 CHRONIC MIDLINE LOW BACK PAIN, UNSPECIFIED WHETHER SCIATICA PRESENT: ICD-10-CM

## 2022-01-13 DIAGNOSIS — M25.561 BILATERAL CHRONIC KNEE PAIN: ICD-10-CM

## 2022-01-13 DIAGNOSIS — M05.9 SEROPOSITIVE RHEUMATOID ARTHRITIS (H): Primary | ICD-10-CM

## 2022-01-13 DIAGNOSIS — M25.562 BILATERAL CHRONIC KNEE PAIN: ICD-10-CM

## 2022-01-13 DIAGNOSIS — M54.50 CHRONIC MIDLINE LOW BACK PAIN, UNSPECIFIED WHETHER SCIATICA PRESENT: ICD-10-CM

## 2022-01-13 DIAGNOSIS — Z79.899 HIGH RISK MEDICATIONS (NOT ANTICOAGULANTS) LONG-TERM USE: ICD-10-CM

## 2022-01-13 DIAGNOSIS — G89.29 BILATERAL CHRONIC KNEE PAIN: ICD-10-CM

## 2022-01-13 PROCEDURE — 99214 OFFICE O/P EST MOD 30 MIN: CPT | Performed by: INTERNAL MEDICINE

## 2022-01-13 RX ORDER — MEDROXYPROGESTERONE ACETATE 150 MG/ML
50 INJECTION, SUSPENSION INTRAMUSCULAR WEEKLY
Qty: 4 ML | Refills: 4 | Status: SHIPPED | OUTPATIENT
Start: 2022-01-13 | End: 2022-05-05

## 2022-01-13 ASSESSMENT — MIFFLIN-ST. JEOR: SCORE: 1408.67

## 2022-01-13 NOTE — PATIENT INSTRUCTIONS
RHEUMATOLOGY    Dr. Richard Mcfarlane    [address] [place] [address]  Phone number: [phone]  Fax number: [phone]      Thank you for choosing [facility]!    Noy Burt CMA Rheumatology
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
## 2022-01-14 ENCOUNTER — TELEPHONE (OUTPATIENT)
Dept: RHEUMATOLOGY | Facility: CLINIC | Age: 57
End: 2022-01-14
Payer: COMMERCIAL

## 2022-01-17 NOTE — TELEPHONE ENCOUNTER
I was not able to find the FMLA form. Will double check with Mick on Weds.    JENNIFER Gillespie on 1/17/2022 at 7:08 AM

## 2022-01-18 ENCOUNTER — THERAPY VISIT (OUTPATIENT)
Dept: PHYSICAL THERAPY | Facility: CLINIC | Age: 57
End: 2022-01-18
Payer: COMMERCIAL

## 2022-01-18 DIAGNOSIS — S06.0X0D CONCUSSION WITHOUT LOSS OF CONSCIOUSNESS, SUBSEQUENT ENCOUNTER: ICD-10-CM

## 2022-01-18 PROCEDURE — 97140 MANUAL THERAPY 1/> REGIONS: CPT | Mod: GP | Performed by: PHYSICAL THERAPIST

## 2022-01-18 NOTE — TELEPHONE ENCOUNTER
I was able to locate the FMLA form. It is in Yessi's to sign folder. Will fax once completed.    JENNIFER Gillespie on 1/18/2022 at 9:52 AM

## 2022-01-24 NOTE — TELEPHONE ENCOUNTER
Prior Authorization Approval    Authorization Effective Date: 1/22/2022  Authorization Expiration Date:  UFN  Medication: enbrel  Approved Dose/Quantity: 4/28ds  Reference #: [id number]   Insurance Company: [employer] - Phone [phone] Fax [phone]  Expected CoPay: na     CoPay Card Available: Yes    Foundation Assistance Needed: na  Which Pharmacy is filling the prescription (Not needed for infusion/clinic administered): [place], [place] - [address]  Pharmacy Notified: Yes  Patient Notified: Yes

## 2022-01-25 ENCOUNTER — THERAPY VISIT (OUTPATIENT)
Dept: PHYSICAL THERAPY | Facility: CLINIC | Age: 57
End: 2022-01-25
Payer: COMMERCIAL

## 2022-01-25 DIAGNOSIS — S06.0X0D CONCUSSION WITHOUT LOSS OF CONSCIOUSNESS, SUBSEQUENT ENCOUNTER: ICD-10-CM

## 2022-01-25 PROCEDURE — 97140 MANUAL THERAPY 1/> REGIONS: CPT | Mod: GP | Performed by: PHYSICAL THERAPIST

## 2022-01-25 PROCEDURE — 97110 THERAPEUTIC EXERCISES: CPT | Mod: GP | Performed by: PHYSICAL THERAPIST

## 2022-01-25 NOTE — TELEPHONE ENCOUNTER
Called and informed the pt that her form was faxed and a copy has been scanned into her chart. Per pt asked to fax her last work note along with the forms. I printed off the work note and re faxed along with the forms. Per pt also requested to get a copy of her form and letter in the mail as well. I informed her that I will mail a copy of them and mail it out to her home address. No further questions.    JENNIFER Gillespie on 1/25/2022 at 3:30 PM

## 2022-02-03 ENCOUNTER — THERAPY VISIT (OUTPATIENT)
Dept: PHYSICAL THERAPY | Facility: CLINIC | Age: 57
End: 2022-02-03
Payer: COMMERCIAL

## 2022-02-03 DIAGNOSIS — S06.0X0D CONCUSSION WITHOUT LOSS OF CONSCIOUSNESS, SUBSEQUENT ENCOUNTER: ICD-10-CM

## 2022-02-03 PROCEDURE — 97140 MANUAL THERAPY 1/> REGIONS: CPT | Mod: GP | Performed by: PHYSICAL THERAPIST

## 2022-02-03 PROCEDURE — 97110 THERAPEUTIC EXERCISES: CPT | Mod: GP | Performed by: PHYSICAL THERAPIST

## 2022-02-03 PROCEDURE — 97112 NEUROMUSCULAR REEDUCATION: CPT | Mod: GP | Performed by: PHYSICAL THERAPIST

## 2022-02-03 NOTE — PROGRESS NOTES
PROGRESS  REPORT    Progress reporting period is from 9.7 to 2.3.22.       SUBJECTIVE  Subjective changes noted by patient:  .  Subjective: no HA lately, 2/10 3days ago, tightness in back of head and neck are primary concern  doing my exs, sitting can aggrivate     Current pain level is 3/10 Current Pain level: 4/10.     Previous pain level was  7/10 Initial Pain level: 7/10.   Changes in function:  Yes (See Goal flowsheet attached for changes in current functional level)  Adverse reaction to treatment or activity: None    OBJECTIVE  Changes noted in objective findings:  Yes, functional gains made in all areas.   Objective: ++suboccip, A/O LOM min +++TTP, full PROM, mild concusion sxs persist but fading      ASSESSMENT/PLAN  Updated problem list and treatment plan: Diagnosis 1:  Post concussion synd  Pain -  manual therapy, home program and CST   Decreased ROM/flexibility - manual therapy, therapeutic exercise and home program  Impaired muscle performance - neuro re-education and home program  Decreased function - therapeutic activities    STG/LTGs have been met or progress has been made towards goals:  Yes (See Goal flow sheet completed today.)  Assessment of Progress: The patient's condition is improving.  Patient is meeting short term goals and is progressing towards long term goals.  Self Management Plans:  Patient has been instructed in a home treatment program.  Patient  has been instructed in self management of symptoms.  I have re-evaluated this patient and find that the nature, scope, duration and intensity of the therapy is appropriate for the medical condition of the patient.  Katie continues to require the following intervention to meet STG and LTG's:  PT    Recommendations:  This patient would benefit from continued therapy.     Frequency:  1 X week, once daily every other week   Duration:  for 8 weeks        Please refer to the daily flowsheet for treatment today, total treatment time and time spent  performing 1:1 timed codes.

## 2022-02-09 ENCOUNTER — OFFICE VISIT (OUTPATIENT)
Dept: NEUROLOGY | Facility: CLINIC | Age: 57
End: 2022-02-09
Payer: COMMERCIAL

## 2022-02-09 DIAGNOSIS — F41.9 ANXIETY: ICD-10-CM

## 2022-02-09 DIAGNOSIS — L30.9 DERMATITIS: ICD-10-CM

## 2022-02-09 DIAGNOSIS — M54.2 NECK PAIN: ICD-10-CM

## 2022-02-09 DIAGNOSIS — F07.81 POST CONCUSSION SYNDROME: Primary | ICD-10-CM

## 2022-02-09 DIAGNOSIS — H92.02 LEFT EAR PAIN: ICD-10-CM

## 2022-02-09 PROCEDURE — 99215 OFFICE O/P EST HI 40 MIN: CPT | Performed by: NURSE PRACTITIONER
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
## 2022-02-12 ENCOUNTER — HEALTH MAINTENANCE LETTER (OUTPATIENT)
Age: 57
End: 2022-02-12

## 2022-02-16 ENCOUNTER — THERAPY VISIT (OUTPATIENT)
Dept: PHYSICAL THERAPY | Facility: CLINIC | Age: 57
End: 2022-02-16
Payer: COMMERCIAL

## 2022-02-16 DIAGNOSIS — S06.0X0D CONCUSSION WITHOUT LOSS OF CONSCIOUSNESS, SUBSEQUENT ENCOUNTER: ICD-10-CM

## 2022-02-16 PROCEDURE — 97110 THERAPEUTIC EXERCISES: CPT | Mod: GP | Performed by: PHYSICAL THERAPIST

## 2022-02-16 PROCEDURE — 97140 MANUAL THERAPY 1/> REGIONS: CPT | Mod: GP | Performed by: PHYSICAL THERAPIST

## 2022-03-03 ENCOUNTER — THERAPY VISIT (OUTPATIENT)
Dept: PHYSICAL THERAPY | Facility: CLINIC | Age: 57
End: 2022-03-03
Payer: COMMERCIAL

## 2022-03-03 DIAGNOSIS — S06.0X0D CONCUSSION WITHOUT LOSS OF CONSCIOUSNESS, SUBSEQUENT ENCOUNTER: ICD-10-CM

## 2022-03-03 PROCEDURE — 97140 MANUAL THERAPY 1/> REGIONS: CPT | Mod: GP | Performed by: PHYSICAL THERAPIST

## 2022-03-03 PROCEDURE — 97112 NEUROMUSCULAR REEDUCATION: CPT | Mod: GP | Performed by: PHYSICAL THERAPIST

## 2022-03-09 ENCOUNTER — THERAPY VISIT (OUTPATIENT)
Dept: PHYSICAL THERAPY | Facility: CLINIC | Age: 57
End: 2022-03-09
Payer: COMMERCIAL

## 2022-03-09 DIAGNOSIS — S06.0X0D CONCUSSION WITHOUT LOSS OF CONSCIOUSNESS, SUBSEQUENT ENCOUNTER: ICD-10-CM

## 2022-03-09 PROCEDURE — 97110 THERAPEUTIC EXERCISES: CPT | Mod: GP | Performed by: PHYSICAL THERAPIST

## 2022-03-09 PROCEDURE — 97140 MANUAL THERAPY 1/> REGIONS: CPT | Mod: GP | Performed by: PHYSICAL THERAPIST

## 2022-03-16 ENCOUNTER — THERAPY VISIT (OUTPATIENT)
Dept: PHYSICAL THERAPY | Facility: CLINIC | Age: 57
End: 2022-03-16
Payer: COMMERCIAL

## 2022-03-16 DIAGNOSIS — S06.0X0D CONCUSSION WITHOUT LOSS OF CONSCIOUSNESS, SUBSEQUENT ENCOUNTER: ICD-10-CM

## 2022-03-16 PROCEDURE — 97110 THERAPEUTIC EXERCISES: CPT | Mod: GP | Performed by: PHYSICAL THERAPIST

## 2022-03-16 PROCEDURE — 97140 MANUAL THERAPY 1/> REGIONS: CPT | Mod: GP | Performed by: PHYSICAL THERAPIST

## 2022-03-24 ENCOUNTER — THERAPY VISIT (OUTPATIENT)
Dept: PHYSICAL THERAPY | Facility: CLINIC | Age: 57
End: 2022-03-24
Payer: COMMERCIAL

## 2022-03-24 DIAGNOSIS — S06.0X0D CONCUSSION WITHOUT LOSS OF CONSCIOUSNESS, SUBSEQUENT ENCOUNTER: ICD-10-CM

## 2022-03-24 PROCEDURE — 97140 MANUAL THERAPY 1/> REGIONS: CPT | Mod: GP | Performed by: PHYSICAL THERAPIST

## 2022-03-24 PROCEDURE — 97110 THERAPEUTIC EXERCISES: CPT | Mod: GP | Performed by: PHYSICAL THERAPIST

## 2022-03-31 ENCOUNTER — THERAPY VISIT (OUTPATIENT)
Dept: PHYSICAL THERAPY | Facility: CLINIC | Age: 57
End: 2022-03-31
Payer: COMMERCIAL

## 2022-03-31 DIAGNOSIS — S06.0X0D CONCUSSION WITHOUT LOSS OF CONSCIOUSNESS, SUBSEQUENT ENCOUNTER: ICD-10-CM

## 2022-03-31 PROCEDURE — 97110 THERAPEUTIC EXERCISES: CPT | Mod: GP | Performed by: PHYSICAL THERAPIST

## 2022-03-31 PROCEDURE — 97140 MANUAL THERAPY 1/> REGIONS: CPT | Mod: GP | Performed by: PHYSICAL THERAPIST

## 2022-04-07 ENCOUNTER — THERAPY VISIT (OUTPATIENT)
Dept: PHYSICAL THERAPY | Facility: CLINIC | Age: 57
End: 2022-04-07
Payer: COMMERCIAL

## 2022-04-07 DIAGNOSIS — M25.561 ACUTE PAIN OF RIGHT KNEE: ICD-10-CM

## 2022-04-07 DIAGNOSIS — M54.2 NECK PAIN: Primary | ICD-10-CM

## 2022-04-07 PROCEDURE — 97110 THERAPEUTIC EXERCISES: CPT | Mod: GP | Performed by: PHYSICAL THERAPIST

## 2022-04-07 PROCEDURE — 97140 MANUAL THERAPY 1/> REGIONS: CPT | Mod: GP | Performed by: PHYSICAL THERAPIST

## 2022-04-13 ENCOUNTER — THERAPY VISIT (OUTPATIENT)
Dept: PHYSICAL THERAPY | Facility: CLINIC | Age: 57
End: 2022-04-13
Payer: COMMERCIAL

## 2022-04-13 DIAGNOSIS — S06.0X0D CONCUSSION WITHOUT LOSS OF CONSCIOUSNESS, SUBSEQUENT ENCOUNTER: Primary | ICD-10-CM

## 2022-04-13 PROCEDURE — 97112 NEUROMUSCULAR REEDUCATION: CPT | Mod: GP | Performed by: PHYSICAL THERAPIST

## 2022-04-13 PROCEDURE — 97140 MANUAL THERAPY 1/> REGIONS: CPT | Mod: GP | Performed by: PHYSICAL THERAPIST

## 2022-04-21 ENCOUNTER — THERAPY VISIT (OUTPATIENT)
Dept: PHYSICAL THERAPY | Facility: CLINIC | Age: 57
End: 2022-04-21
Payer: COMMERCIAL

## 2022-04-21 ENCOUNTER — TRANSFERRED RECORDS (OUTPATIENT)
Dept: MULTI SPECIALTY CLINIC | Facility: CLINIC | Age: 57
End: 2022-04-21

## 2022-04-21 DIAGNOSIS — S06.0X0A CONCUSSION WITHOUT LOSS OF CONSCIOUSNESS: Primary | ICD-10-CM

## 2022-04-21 PROCEDURE — 97110 THERAPEUTIC EXERCISES: CPT | Mod: GP | Performed by: PHYSICAL THERAPIST

## 2022-04-21 PROCEDURE — 97140 MANUAL THERAPY 1/> REGIONS: CPT | Mod: GP | Performed by: PHYSICAL THERAPIST

## 2022-04-28 ENCOUNTER — THERAPY VISIT (OUTPATIENT)
Dept: PHYSICAL THERAPY | Facility: CLINIC | Age: 57
End: 2022-04-28
Payer: COMMERCIAL

## 2022-04-28 ENCOUNTER — LAB (OUTPATIENT)
Dept: LAB | Facility: CLINIC | Age: 57
End: 2022-04-28
Payer: COMMERCIAL

## 2022-04-28 ENCOUNTER — TELEPHONE (OUTPATIENT)
Dept: FAMILY MEDICINE | Facility: CLINIC | Age: 57
End: 2022-04-28

## 2022-04-28 DIAGNOSIS — M25.561 ACUTE PAIN OF RIGHT KNEE: ICD-10-CM

## 2022-04-28 DIAGNOSIS — M54.2 NECK PAIN: Primary | ICD-10-CM

## 2022-04-28 DIAGNOSIS — M05.9 SEROPOSITIVE RHEUMATOID ARTHRITIS (H): ICD-10-CM

## 2022-04-28 DIAGNOSIS — Z79.899 HIGH RISK MEDICATIONS (NOT ANTICOAGULANTS) LONG-TERM USE: ICD-10-CM

## 2022-04-28 LAB
ALBUMIN SERPL-MCNC: 3.6 G/DL (ref 3.4–5)
ALP SERPL-CCNC: 47 U/L (ref 40–150)
ALT SERPL W P-5'-P-CCNC: 18 U/L (ref 0–50)
AST SERPL W P-5'-P-CCNC: 17 U/L (ref 0–45)
BASOPHILS # BLD AUTO: 0.1 10E3/UL (ref 0–0.2)
BASOPHILS NFR BLD AUTO: 1 %
BILIRUB DIRECT SERPL-MCNC: 0.2 MG/DL (ref 0–0.2)
BILIRUB SERPL-MCNC: 0.8 MG/DL (ref 0.2–1.3)
CREAT SERPL-MCNC: 0.88 MG/DL (ref 0.52–1.04)
CRP SERPL-MCNC: <2.9 MG/L (ref 0–8)
EOSINOPHIL # BLD AUTO: 0.3 10E3/UL (ref 0–0.7)
EOSINOPHIL NFR BLD AUTO: 7 %
ERYTHROCYTE [DISTWIDTH] IN BLOOD BY AUTOMATED COUNT: 13 % (ref 10–15)
ERYTHROCYTE [SEDIMENTATION RATE] IN BLOOD BY WESTERGREN METHOD: 16 MM/HR (ref 0–30)
GFR SERPL CREATININE-BSD FRML MDRD: 77 ML/MIN/1.73M2
HCT VFR BLD AUTO: 41 % (ref 35–47)
HGB BLD-MCNC: 13.5 G/DL (ref 11.7–15.7)
IMM GRANULOCYTES # BLD: 0 10E3/UL
IMM GRANULOCYTES NFR BLD: 0 %
LYMPHOCYTES # BLD AUTO: 2.4 10E3/UL (ref 0.8–5.3)
LYMPHOCYTES NFR BLD AUTO: 58 %
MCH RBC QN AUTO: 28.1 PG (ref 26.5–33)
MCHC RBC AUTO-ENTMCNC: 32.9 G/DL (ref 31.5–36.5)
MCV RBC AUTO: 85 FL (ref 78–100)
MONOCYTES # BLD AUTO: 0.6 10E3/UL (ref 0–1.3)
MONOCYTES NFR BLD AUTO: 15 %
NEUTROPHILS # BLD AUTO: 0.8 10E3/UL (ref 1.6–8.3)
NEUTROPHILS NFR BLD AUTO: 19 %
NRBC # BLD AUTO: 0 10E3/UL
NRBC BLD AUTO-RTO: 0 /100
PLATELET # BLD AUTO: 164 10E3/UL (ref 150–450)
PROT SERPL-MCNC: 7.7 G/DL (ref 6.8–8.8)
RBC # BLD AUTO: 4.8 10E6/UL (ref 3.8–5.2)
WBC # BLD AUTO: 4.2 10E3/UL (ref 4–11)

## 2022-04-28 PROCEDURE — 97110 THERAPEUTIC EXERCISES: CPT | Mod: GP | Performed by: PHYSICAL THERAPIST

## 2022-04-28 PROCEDURE — 86140 C-REACTIVE PROTEIN: CPT

## 2022-04-28 PROCEDURE — 36415 COLL VENOUS BLD VENIPUNCTURE: CPT

## 2022-04-28 PROCEDURE — 80076 HEPATIC FUNCTION PANEL: CPT

## 2022-04-28 PROCEDURE — 85025 COMPLETE CBC W/AUTO DIFF WBC: CPT

## 2022-04-28 PROCEDURE — 97140 MANUAL THERAPY 1/> REGIONS: CPT | Mod: GP | Performed by: PHYSICAL THERAPIST

## 2022-04-28 PROCEDURE — 85652 RBC SED RATE AUTOMATED: CPT

## 2022-04-28 PROCEDURE — 82565 ASSAY OF CREATININE: CPT

## 2022-04-28 NOTE — TELEPHONE ENCOUNTER
Patient Quality Outreach    Patient is due for the following:   Colon Cancer Screening -  FIT and Colonoscopy  Breast Cancer Screening - Mammogram    NEXT STEPS:   Schedule colon cancer screening and mammogram    Type of outreach:    Sent [employer] message.    Next Steps:  Reach out within 90 days via Letter.    Max number of attempts reached: Yes. Will try again in 90 days if patient still on fail list.    Questions for provider review:    None     Keya Huffman RN

## 2022-04-28 NOTE — LETTER
Dear Katie Aponte    At [facility] we care about your health and are committed to providing quality patient care, which includes staying current on preventative cancer screenings.  You can increase your chances of finding and treating cancers through regular screenings.      Our records show that you are due for the following screening(s):      -Colon Cancer Screening- Recommended every 5-10 years, depending on your history, in order to prevent and detect colon cancer at its earliest stages.  Colon cancer is now the second leading cause of death in the [place] for both men and women and there are over 130,000 new cases and 50,000 deaths per year from colon cancer.  Colonoscopies can prevent 90-95% of these deaths.  Problem lesions can be removed before they ever become cancer.  This test is not only looking for cancer, but also getting rid of precancerious lesions.  You are usually given some sedation which makes the test very comfortable for most people.      If you do not wish to do a colonoscopy or cannot afford to do one, at this time, there is another option. It is called a FIT test or Fecal Immunochemical Occult Blood Test (take home stool sample kit).  It does not replace the colonoscopy for colorectal cancer screening, but it can detect hidden bleeding in the lower colon.  It does need to be repeated every year and if a positive result is obtained, you would be referred for a colonoscopy. The FIT test is really easy to do and does not require any diet or medication restrictions and involves only one collection sample.      If you have completed either one of these tests or had a flexible sigmoidoscopy in the past five years at another facility, please have the records sent to our clinic so that we can best coordinate your care.  Please call us ([phone]) if you have questions or would like arrange either to do a colonoscopy or obtain the necessary test kit for the Fecal Occult  Test.     Mammogram for breast cancer   Recommended every 1-2 years for women age 50 and older  Mammograms help detect breast cancer, which is the most common cancer among women in the [place].  You may need to start having mammograms earlier and more often if you have had breast cancer, breast problems, or a family history of breast cancer.       You may contact the [facility] at [phone] to schedule the screening test(s) at your earliest convenience.    Your partners in [facility],    [facility]

## 2022-05-03 ENCOUNTER — OFFICE VISIT (OUTPATIENT)
Dept: FAMILY MEDICINE | Facility: CLINIC | Age: 57
End: 2022-05-03
Payer: COMMERCIAL

## 2022-05-03 VITALS
WEIGHT: 177.6 LBS | SYSTOLIC BLOOD PRESSURE: 125 MMHG | DIASTOLIC BLOOD PRESSURE: 85 MMHG | BODY MASS INDEX: 27.88 KG/M2 | HEIGHT: 67 IN | TEMPERATURE: 97.8 F | OXYGEN SATURATION: 98 %

## 2022-05-03 DIAGNOSIS — Z00.00 ROUTINE HISTORY AND PHYSICAL EXAMINATION OF ADULT: Primary | ICD-10-CM

## 2022-05-03 DIAGNOSIS — Z12.11 SCREEN FOR COLON CANCER: ICD-10-CM

## 2022-05-03 DIAGNOSIS — Z79.899 ENCOUNTER FOR LONG-TERM (CURRENT) USE OF MEDICATIONS: ICD-10-CM

## 2022-05-03 DIAGNOSIS — Z01.00 VISIT FOR EYE AND VISION EXAM: ICD-10-CM

## 2022-05-03 DIAGNOSIS — Z28.20 VACCINE REFUSED BY PATIENT: ICD-10-CM

## 2022-05-03 DIAGNOSIS — J45.31 MILD PERSISTENT ASTHMA WITH ACUTE EXACERBATION: ICD-10-CM

## 2022-05-03 PROCEDURE — 99214 OFFICE O/P EST MOD 30 MIN: CPT | Mod: 25 | Performed by: NURSE PRACTITIONER

## 2022-05-03 PROCEDURE — 99396 PREV VISIT EST AGE 40-64: CPT | Performed by: NURSE PRACTITIONER

## 2022-05-03 RX ORDER — ALBUTEROL SULFATE 0.83 MG/ML
SOLUTION RESPIRATORY (INHALATION)
Qty: 150 ML | Refills: 5 | Status: SHIPPED | OUTPATIENT
Start: 2022-05-03 | End: 2023-05-09

## 2022-05-03 RX ORDER — ALBUTEROL SULFATE 90 UG/1
AEROSOL, METERED RESPIRATORY (INHALATION)
Qty: 18 G | Refills: 1 | Status: SHIPPED | OUTPATIENT
Start: 2022-05-03 | End: 2023-06-01

## 2022-05-03 RX ORDER — FLUTICASONE PROPIONATE 110 UG/1
1 AEROSOL, METERED RESPIRATORY (INHALATION) 2 TIMES DAILY
Qty: 12 G | Refills: 3 | Status: SHIPPED | OUTPATIENT
Start: 2022-05-03 | End: 2022-07-12

## 2022-05-03 ASSESSMENT — ASTHMA QUESTIONNAIRES: ACT_TOTALSCORE: 16

## 2022-05-03 NOTE — LETTER
Opioid / Opioid Plus Controlled Substance Agreement    This is an agreement between you and your provider about the safe and appropriate use of controlled substance/opioids prescribed by your care team. Controlled substances are medicines that can cause physical and mental dependence (abuse).    There are strict laws about having and using these medicines. We here at [facility] are committing to working with you in your efforts to get better. To support you in this work, we ll help you schedule regular office appointments for medicine refills. If we must cancel or change your appointment for any reason, we ll make sure you have enough medicine to last until your next appointment.     As a Provider, I will:    Listen carefully to your concerns and treat you with respect.     Recommend a treatment plan that I believe is in your best interest. This plan may involve therapies other than opioid pain medication.     Talk with you often about the possible benefits, and the risk of harm of any medicine that we prescribe for you.     Provide a plan on how to taper (discontinue or go off) using this medicine if the decision is made to stop its use.    As a Patient, I understand that opioid(s):     Are a controlled substance prescribed by my care team to help me function or work and manage my condition(s).     Are strong medicines and can cause serious side effects such as:    Drowsiness, which can seriously affect my driving ability    A lower breathing rate, enough to cause death    Harm to my thinking ability     Depression     Abuse of and addiction to this medicine    Need to be taken exactly as prescribed. Combining opioids with certain medicines or chemicals (such as illegal drugs, sedatives, sleeping pills, and benzodiazepines) can be dangerous or even fatal. If I stop opioids suddenly, I may have severe withdrawal symptoms.    Do not work for all types of pain nor for all patients. If they re not helpful, I may  be asked to stop them.        The risks, benefits and side effects of these medicine(s) were explained to me. I agree that:  1. I will take part in other treatments as advised by my care team. This may be psychiatry or counseling, physical therapy, behavioral therapy, group treatment or a referral to a specialist.     2. I will keep all my appointments. I understand that this is part of the monitoring of opioids. My care team may require an office visit for EVERY opioid/controlled substance refill. If I miss appointments or don t follow instructions, my care team may stop my medicine.    3. I will take my medicines as prescribed. I will not change the dose or schedule unless my care team tells me to. There will be no refills if I run out early.     4. I may be asked to come to the clinic and complete a urine drug test or complete a pill count at any time. If I don t give a urine sample or participate in a pill count, the care team may stop my medicine.    5. I will only receive prescriptions from this clinic for chronic pain. If I am treated by another provider for acute pain issues, I will tell them that I am taking opioid pain medication for chronic pain and that I have a treatment agreement with this provider. I will inform my [facility] care team within one business day if I am given a prescription for any pain medication by another healthcare provider. My [facility] care team can contact other providers and pharmacists about my use of any medicines.    6. It is up to me to make sure that I don t run out of my medicines on weekends or holidays. If my care team is willing to refill my opioid prescription without a visit, I must request refills only during office hours. Refills may take up to 3 business days to process. I will use one pharmacy to fill all my opioid and other controlled substance prescriptions. I will notify the clinic about any changes to my insurance or medication  availability.    7. I am responsible for my prescriptions. If the medicine/prescription is lost, stolen or destroyed, it will not be replaced. I also agree not to share controlled substance medicines with anyone.    8. I am aware I should not use any illegal or recreational drugs. I agree not to drink alcohol unless my care team says I can.       9. If I enroll in the [place] [facility] program, I will tell my care team prior to my next refill.     10. I will tell my care team right away if I become pregnant, have a new medical problem treated outside of my regular clinic, or have a change in my medications.    11. I understand that this medicine can affect my thinking, judgment and reaction time. Alcohol and drugs affect the brain and body, which can affect the safety of my driving. Being under the influence of alcohol or drugs can affect my decision-making, behaviors, personal safety, and the safety of others. Driving while impaired (DWI) can occur if a person is driving, operating, or in physical control of a car, motorcycle, boat, snowmobile, ATV, motorbike, off-road vehicle, or any other motor vehicle ([place] Statute 169A.20). I understand the risk if I choose to drive or operate any vehicle or machinery.    I understand that if I do not follow any of the conditions above, my prescriptions or treatment may be stopped or changed.          Opioids  What You Need to Know    What are opioids?   Opioids are pain medicines that must be prescribed by a doctor. They are also known as narcotics.     Examples are:   1. morphine (MS Contin, Marlena)  2. oxycodone (Oxycontin)  3. oxycodone and acetaminophen (Percocet)  4. hydrocodone and acetaminophen (Vicodin, Norco)   5. fentanyl patch (Duragesic)   6. hydromorphone (Dilaudid)   7. methadone  8. codeine (Tylenol #3)     What do opioids do well?   Opioids are best for severe short-term pain such as after a surgery or injury. They may work well for cancer pain. They may  help some people with long-lasting (chronic) pain.     What do opioids NOT do well?   Opioids never get rid of pain entirely, and they don t work well for most patients with chronic pain. Opioids don t reduce swelling, one of the causes of pain.                                    Other ways to manage chronic pain and improve function include:       Treat the health problem that may be causing pain    Anti-inflammation medicines, which reduce swelling and tenderness, such as ibuprofen (Advil, Motrin) or naproxen (Aleve)    Acetaminophen (Tylenol)    Antidepressants and anti-seizure medicines, especially for nerve pain    Topical treatments such as patches or creams    Injections or nerve blocks    Chiropractic or osteopathic treatment    Acupuncture, massage, deep breathing, meditation, visual imagery, aromatherapy    Use heat or ice at the pain site    Physical therapy     Exercise    Stop smoking    Take part in therapy       Risks and side effects     Talk to your doctor before you start or decide to keep taking opioids. Possible side effects include:      Lowering your breathing rate enough to cause death    Overdose, including death, especially if taking higher than prescribed doses    Worse depression symptoms; less pleasure in things you usually enjoy    Feeling tired or sluggish    Slower thoughts or cloudy thinking    Being more sensitive to pain over time; pain is harder to control    Trouble sleeping or restless sleep    Changes in hormone levels (for example, less testosterone)    Changes in sex drive or ability to have sex    Constipation    Unsafe driving    Itching and sweating    Dizziness    Nausea, throwing up and dry mouth    What else should I know about opioids?    Opioids may lead to dependence, tolerance, or addiction.      Dependence means that if you stop or reduce the medicine too quickly, you will have withdrawal symptoms. These include loose poop (diarrhea), jitters, flu-like symptoms,  nervousness and tremors. Dependence is not the same as addiction.                       Tolerance means needing higher doses over time to get the same effect. This may increase the chance of serious side effects.      Addiction is when people improperly use a substance that harms their body, their mind or their relations with others. Use of opiates can cause a relapse of addiction if you have a history of drug or alcohol abuse.      People who have used opioids for a long time may have a lower quality of life, worse depression, higher levels of pain and more visits to doctors.    You can overdose on opioids. Take these steps to lower your risk of overdose:    1. Recognize the signs:  Signs of overdose include decrease or loss of consciousness (blackout), slowed breathing, trouble waking up and blue lips. If someone is worried about overdose, they should call 911.    2. Talk to your doctor about Narcan (naloxone).   If you are at risk for overdose, you may be given a prescription for Narcan. This medicine very quickly reverses the effects of opioids.   If you overdose, a friend or family member can give you Narcan while waiting for the ambulance. They need to know the signs of overdose and how to give Narcan.     3. Don't use alcohol or street drugs.   Taking them with opioids can cause death.    4. Do not take any of these medicines unless your doctor says it s OK. Taking these with opioids can cause death:    Benzodiazepines, such as lorazepam (Ativan), alprazolam (Xanax) or diazepam (Valium)    Muscle relaxers, such as cyclobenzaprine (Flexeril)    Sleeping pills like zolpidem (Ambien)     Other opioids      How to keep you and other people safe while taking opioids:    1. Never share your opioids with others.  Opioid medicines are regulated by the Drug Enforcement Agency (ANDRI[place]). Selling or sharing medications is a criminal act.    2. Be sure to store opioids in a secure place, locked up if possible. Young children  can easily swallow them and overdose.    3. When you are traveling with your medicines, keep them in the original bottles. If you use a pill box, be sure you also carry a copy of your medicine list from your clinic or pharmacy.    4. Safe disposal of opioids    Most pharmacies have places to get rid of medicine, called disposal kiosks. Medicine disposal options are also available in every [facility]. Search your county and  medication disposal  to find more options. You can find more details at:  [email or web address]-[email or web address]     I agree that my provider, clinic care team, and pharmacy may work with any city, state or federal law enforcement agency that investigates the misuse, sale, or other diversion of my controlled medicine. I will allow my provider to discuss my care with, or share a copy of, this agreement with any other treating provider, pharmacy or emergency room where I receive care.    I have read this agreement and have asked questions about anything I did not understand.    _______________________________________________________  Patient Signature - Katie Aponte _____________________                   Date     _______________________________________________________  Provider Signature - TREVER Kim CNP   _____________________                   Date     _______________________________________________________  Witness Signature (required if provider not present while patient signing)   _____________________                   Date

## 2022-05-03 NOTE — PROGRESS NOTES
"  Assessment & Plan     Routine history and physical examination of adult    - Glucose    Mild persistent asthma with acute exacerbation  Patient declines to change controller medication but agrees to add Nasonex to help with allergies. She'll send me a message in a month or so to let me know how it is working.  - albuterol (PROAIR HFA/PROVENTIL HFA/VENTOLIN HFA) 108 (90 Base) MCG/ACT inhaler  Dispense: 18 g; Refill: 1  - fluticasone (FLOVENT HFA) 110 MCG/ACT inhaler  Dispense: 12 g; Refill: 3  - albuterol (PROVENTIL) (2.5 MG/3ML) 0.083% neb solution  Dispense: 150 mL; Refill: 5  Encounter for long-term (current) use of medications  CSA signed, checking UDS today  - [id number] - Urine Drug Confirmation Panel (Comprehensive)    Screen for colon cancer    - Adult Gastro Ref - Procedure Only      Visit for eye and vision exam    - OPTOMETRY REFERRAL      Vaccine refused by patient  She declined TDAP, COVID #4, and Shingrix vaccines today, will consider getting them at a later date.    .Ordering of each unique test  Prescription drug management  38 minutes spent on the date of the encounter doing chart review, history and exam, documentation and further activities per the note       BMI:   Estimated body mass index is 27.82 kg/m  as calculated from the following:    Height as of this encounter: 1.702 m (5' 7\").    Weight as of this encounter: 80.6 kg (177 lb 9.6 oz).   Weight management plan: Discussed healthy diet and exercise guidelines    Work on weight loss  Regular exercise  See Patient Instructions    Return in about 3 months (around 8/3/2022), or if symptoms worsen or fail to improve, for Routine preventive.    TREVER Kim CNP  [facility] HUBER Wilson is a 56 year old who presents for the following health issues     History of Present Illness     Asthma:  She presents for follow up of asthma.  She has no cough, some wheezing, and some shortness of breath. She is " "using a relief medication a few times a week. She typically misses taking her controller medication 2 time(s) per week.Patient is aware of the following triggers: cold air, humidity and pollens. The patient has not had a visit to the Emergency Room, Urgent Care or Hospital due to asthma since the last clinic visit.     She eats 2-3 servings of fruits and vegetables daily.She consumes 2 sweetened beverage(s) daily.She exercises with enough effort to increase her heart rate 20 to 29 minutes per day.  She exercises with enough effort to increase her heart rate 3 or less days per week.   She is taking medications regularly.     ACT Total Scores 9/24/2019 8/17/2021 5/3/2022   ACT TOTAL SCORE (Goal Greater than or Equal to 20) 20 19 16   In the past 12 months, how many times did you visit the emergency room for your asthma without being admitted to the hospital? 0 0 0   In the past 12 months, how many times were you hospitalized overnight because of your asthma? 0 0 0     Annual Wellness Visit    Patient has been advised of split billing requirements and indicates understanding: Yes     Are you in the first 12 months of your Medicare Part B coverage?  No    Physical Health:    In general, how would you rate your overall physical health? good    Outside of work, how many days during the week do you exercise?2-3 days/week    Outside of work, approximately how many minutes a day do you exercise?15-30 minutes    If you drink alcohol do you typically have >3 drinks per day or >7 drinks per week? No    Do you usually eat at least 4 servings of fruit and vegetables a day, include whole grains & fiber and avoid regularly eating high fat or \"junk\" foods? NO    Do you have any problems taking medications regularly? YES-occasionally misses her flovent    Do you have any side effects from medications? none    Needs assistance for the following daily activities: no assistance needed    Which of the following safety concerns are " "present in your home?  none identified     Hearing impairment: No    In the past 6 months, have you been bothered by leaking of urine? no    Mental Health:    In general, how would you rate your overall mental or emotional health? good  PHQ-2 Score: 0    Do you feel safe in your environment? Yes    Have you ever done Advance Care Planning? (For example, a Health Directive, POLST, or a discussion with a medical provider or your loved ones about your wishes)? No, advance care planning information given to patient to review.  Patient declined advance care planning discussion at this time.    Fall risk:  Fall Risk Assessment not completed.  click delete button to remove this line now      Do you have sleep apnea, excessive snoring or daytime drowsiness?: no    Current providers sharing in care for this patient include:   Patient Care Team:  Karlee Birmingham APRN CNP as PCP - General (Nurse Practitioner - Family)  Sheron Milner PA-C as Physician Assistant (Physician Assistant)  Nora Crandall MD as MD (Otolaryngology)  Abel Ibanez NP as Referring Physician (Nurse Practitioner - Family)  Laura Mendoza PsyD (Psychology)  Jacinta Vides MD as Assigned Surgical Provider  Karlee Birmingham APRN CNP as Assigned PCP  Richard Mcfarlane MD as Assigned Rheumatology Provider    Patient has been advised of split billing requirements and indicates understanding: Yes    Review of Systems   Constitutional, HEENT, cardiovascular, pulmonary, gi and gu systems are negative, except as otherwise noted.      Objective    /85 (BP Location: Left arm, Patient Position: Sitting, Cuff Size: Adult Large)   Temp 97.8  F (36.6  C) (Tympanic)   Ht 1.702 m (5' 7\")   Wt 80.6 kg (177 lb 9.6 oz)   LMP 03/30/2014   BMI 27.82 kg/m    Body mass index is 27.82 kg/m .  Physical Exam   GENERAL: healthy, alert and no distress  EYES: Eyes grossly normal to inspection, PERRL and conjunctivae and sclerae " normal  HENT: ear canals and TM's normal, nose and mouth without ulcers or lesions  NECK: no adenopathy, no asymmetry, masses, or scars and thyroid normal to palpation  RESP: lungs clear to auscultation - no rales, rhonchi or wheezes  CV: regular rate and rhythm, normal S1 S2, no S3 or S4, no murmur, click or rub, no peripheral edema and peripheral pulses strong  ABDOMEN: soft, nontender, no hepatosplenomegaly, no masses and bowel sounds normal   (female): deferred  MS: no gross musculoskeletal defects noted, no edema  SKIN: no suspicious lesions or rashes  NEURO: Normal strength and tone, mentation intact and speech normal  PSYCH: mentation appears normal, affect normal/bright  LYMPH: no cervical, supraclavicular, axillary, or inguinal adenopathy    No results found for this or any previous visit (from the past 24 hour(s)).

## 2022-05-03 NOTE — PATIENT INSTRUCTIONS
At [facility], we strive to deliver an exceptional experience to you, every time we see you. If you receive a survey, please complete it as we do value your feedback.  If you have MyChart, you can expect to receive results automatically within 24 hours of their completion.  Your provider will send a note interpreting your results as well.   If you do not have MyChart, you should receive your results in about a week by mail.    Your care team:                            Family Medicine Internal Medicine   MD Adan Moreno MD Shantel Branch-Fleming, MD Srinivasa Vaka, MD Katya Belousova, TREVER Thomason CNP, MD ([facility]   Joo Ibrahim, MD Hui Rosenberg MD Amelia Massimini APRN CNP Kim Thein, APRN CNP Bethany Templen, MD             Clinic hours: Monday - Thursday 7 am-6 pm; Fridays 7 am-5 pm.   Urgent care: Monday - Friday 10 am- 8 pm; Saturday and Sunday 9 am-5 pm.    Clinic: [phone]       [facility]: Monday - Thursday 8 am - 7 pm; Friday 8 am - 6 pm  [facility]: [phone]

## 2022-05-05 ENCOUNTER — OFFICE VISIT (OUTPATIENT)
Dept: RHEUMATOLOGY | Facility: CLINIC | Age: 57
End: 2022-05-05
Payer: COMMERCIAL

## 2022-05-05 VITALS
BODY MASS INDEX: 27.97 KG/M2 | DIASTOLIC BLOOD PRESSURE: 77 MMHG | OXYGEN SATURATION: 98 % | SYSTOLIC BLOOD PRESSURE: 119 MMHG | HEART RATE: 67 BPM | WEIGHT: 178.6 LBS

## 2022-05-05 DIAGNOSIS — M25.571 CHRONIC PAIN OF RIGHT ANKLE: ICD-10-CM

## 2022-05-05 DIAGNOSIS — M05.9 SEROPOSITIVE RHEUMATOID ARTHRITIS (H): Primary | ICD-10-CM

## 2022-05-05 DIAGNOSIS — M25.562 CHRONIC PAIN OF BOTH KNEES: ICD-10-CM

## 2022-05-05 DIAGNOSIS — G89.29 CHRONIC PAIN OF RIGHT ANKLE: ICD-10-CM

## 2022-05-05 DIAGNOSIS — G89.29 CHRONIC PAIN OF BOTH KNEES: ICD-10-CM

## 2022-05-05 DIAGNOSIS — M25.561 CHRONIC PAIN OF BOTH KNEES: ICD-10-CM

## 2022-05-05 PROCEDURE — 99214 OFFICE O/P EST MOD 30 MIN: CPT | Performed by: INTERNAL MEDICINE

## 2022-05-05 RX ORDER — MEDROXYPROGESTERONE ACETATE 150 MG/ML
50 INJECTION, SUSPENSION INTRAMUSCULAR WEEKLY
Qty: 4 ML | Refills: 4 | Status: SHIPPED | OUTPATIENT
Start: 2022-05-05 | End: 2022-08-11

## 2022-05-05 RX ORDER — HYDROXYCHLOROQUINE SULFATE 200 MG/1
200 TABLET, FILM COATED ORAL DAILY
Qty: 90 TABLET | Refills: 2 | Status: SHIPPED | OUTPATIENT
Start: 2022-05-05 | End: 2023-02-16

## 2022-05-05 NOTE — PATIENT INSTRUCTIONS
RHEUMATOLOGY    Dr. Richard Mcfarlane    [facility] [place]  [address] [place] [address]  [place] [address]  Phone number: [phone]  Fax number: [phone]      Thank you for choosing [facility]!    Noy Burt CMA Rheumatology        ----------------------------    COVID-19 vaccination: A 4th dose (1st booster) of the mRNA COVID-19 vaccination is recommended to be received 3 months after the third mRNA COVID-19 vaccination was received.  A 5th mRNA vaccine (2nd booster) may be received at least 4 months after the 4th dose.     -------------------------    Please call to schedule an opthalmology appointment for hydroxychloroquine toxicity monitoring.
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
## 2022-05-05 NOTE — NURSING NOTE
RAPID3 (0-30) Cumulative Score  6.0          RAPID3 Weighted Score (divide #4 by 3 and that is the weighted score)  2.0

## 2022-05-05 NOTE — LETTER
[facility]  [address] [place] [address]  Phone: [phone]  Fax: [phone]         May 5, 2022    Regarding: Katie Aponte                                                          [address] [place] [address]    To Whom It May Concern:    Katie Aponte is followed in the [facility].  She has rheumatoid arthritis and chronic low back pain.  She would benefit from having a sit-stand desk to use while working; please consider providing her with this sit-stand desk.     Sincerely,        Richard Mcfarlane MD   Rheumatology

## 2022-05-10 ENCOUNTER — TELEPHONE (OUTPATIENT)
Dept: FAMILY MEDICINE | Facility: CLINIC | Age: 57
End: 2022-05-10
Payer: COMMERCIAL

## 2022-05-10 NOTE — LETTER
[address] [address]  [phone]  Dept: [phone]    May 10, 2022    Katie Aponte  [address] [address]    Dear Katie Aponte,     At [facility] we care about your health and are committed to providing quality patient care.    Which includes staying current on preventive cancer screenings.  You can increase your chances of finding and treating cancers through regular screenings.      Our records indicate you may be due for the following preventive screening(s):    Colonoscopy    Colonoscopy is recommended every ten years for everyone age 50 and older. Please take a moment to read over the enclosed information packet about colon cancer screening. We strongly urge our patient's to consider having a colonoscopy done, which is the best screening test available and only needs to be done every 10 years if normal. If you are unwilling or unable to have a colonoscopy then we recommend the annual stool testing for blood. This test is called a FIT test and it looks for blood in the stool.     To schedule an appointment for your colonoscopy, please see the attached referral.     Mammogram    Mammogram for breast cancer   Recommended every 1-2 years for women age 50 and older  Mammograms help detect breast cancer, which is the most common cancer among women in the [place].  You may need to start having mammograms earlier and more often if you have had breast cancer, breast problems, or a family history of breast cancer.   Immunizations    To schedule an appointment or discuss this screening further, you may contact us by phone at the [facility] at [phone] or online through the patient [email or web address] @ [email or web address]    If you have had any of the screenings listed above at another facility, please call us so that we may update your chart.      Your partners  in health,      Quality Committee at [facility]

## 2022-05-11 ENCOUNTER — OFFICE VISIT (OUTPATIENT)
Dept: NEUROLOGY | Facility: CLINIC | Age: 57
End: 2022-05-11
Payer: COMMERCIAL

## 2022-05-11 DIAGNOSIS — F07.81 POST CONCUSSION SYNDROME: Primary | ICD-10-CM

## 2022-05-11 PROCEDURE — 99214 OFFICE O/P EST MOD 30 MIN: CPT | Performed by: NURSE PRACTITIONER
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
## 2022-05-12 ENCOUNTER — THERAPY VISIT (OUTPATIENT)
Dept: PHYSICAL THERAPY | Facility: CLINIC | Age: 57
End: 2022-05-12
Payer: COMMERCIAL

## 2022-05-12 DIAGNOSIS — S06.0X0D CONCUSSION WITHOUT LOSS OF CONSCIOUSNESS, SUBSEQUENT ENCOUNTER: Primary | ICD-10-CM

## 2022-05-12 PROCEDURE — 97140 MANUAL THERAPY 1/> REGIONS: CPT | Mod: GP | Performed by: PHYSICAL THERAPIST

## 2022-05-12 PROCEDURE — 97110 THERAPEUTIC EXERCISES: CPT | Mod: GP | Performed by: PHYSICAL THERAPIST

## 2022-05-19 ENCOUNTER — THERAPY VISIT (OUTPATIENT)
Dept: PHYSICAL THERAPY | Facility: CLINIC | Age: 57
End: 2022-05-19
Payer: COMMERCIAL

## 2022-05-19 ENCOUNTER — ANCILLARY PROCEDURE (OUTPATIENT)
Dept: GENERAL RADIOLOGY | Facility: CLINIC | Age: 57
End: 2022-05-19
Attending: INTERNAL MEDICINE
Payer: COMMERCIAL

## 2022-05-19 ENCOUNTER — MYC MEDICAL ADVICE (OUTPATIENT)
Dept: RHEUMATOLOGY | Facility: CLINIC | Age: 57
End: 2022-05-19

## 2022-05-19 DIAGNOSIS — M25.571 CHRONIC PAIN OF RIGHT ANKLE: ICD-10-CM

## 2022-05-19 DIAGNOSIS — G89.29 CHRONIC MIDLINE LOW BACK PAIN, UNSPECIFIED WHETHER SCIATICA PRESENT: ICD-10-CM

## 2022-05-19 DIAGNOSIS — G89.29 CHRONIC PAIN OF BOTH KNEES: Primary | ICD-10-CM

## 2022-05-19 DIAGNOSIS — S06.0X0D CONCUSSION WITHOUT LOSS OF CONSCIOUSNESS, SUBSEQUENT ENCOUNTER: Primary | ICD-10-CM

## 2022-05-19 DIAGNOSIS — G89.29 CHRONIC PAIN OF RIGHT ANKLE: ICD-10-CM

## 2022-05-19 DIAGNOSIS — M25.561 CHRONIC PAIN OF BOTH KNEES: Primary | ICD-10-CM

## 2022-05-19 DIAGNOSIS — M25.562 CHRONIC PAIN OF BOTH KNEES: Primary | ICD-10-CM

## 2022-05-19 DIAGNOSIS — M54.50 CHRONIC MIDLINE LOW BACK PAIN, UNSPECIFIED WHETHER SCIATICA PRESENT: ICD-10-CM

## 2022-05-19 PROCEDURE — 73610 X-RAY EXAM OF ANKLE: CPT | Mod: TC | Performed by: RADIOLOGY

## 2022-05-19 PROCEDURE — 97140 MANUAL THERAPY 1/> REGIONS: CPT | Mod: GP | Performed by: PHYSICAL THERAPIST

## 2022-05-20 NOTE — TELEPHONE ENCOUNTER
RN: Please notify Katie Aponte that the PT referral has been placed.     Richard Mcfarlane MD  5/19/2022

## 2022-05-30 DIAGNOSIS — G89.29 CHRONIC PAIN OF RIGHT ANKLE: Primary | ICD-10-CM

## 2022-05-30 DIAGNOSIS — M25.571 CHRONIC PAIN OF RIGHT ANKLE: Primary | ICD-10-CM

## 2022-06-02 ENCOUNTER — THERAPY VISIT (OUTPATIENT)
Dept: PHYSICAL THERAPY | Facility: CLINIC | Age: 57
End: 2022-06-02
Payer: COMMERCIAL

## 2022-06-02 ENCOUNTER — TELEPHONE (OUTPATIENT)
Dept: GASTROENTEROLOGY | Facility: CLINIC | Age: 57
End: 2022-06-02
Payer: COMMERCIAL

## 2022-06-02 DIAGNOSIS — S06.0X0D CONCUSSION WITHOUT LOSS OF CONSCIOUSNESS, SUBSEQUENT ENCOUNTER: Primary | ICD-10-CM

## 2022-06-02 PROCEDURE — 97110 THERAPEUTIC EXERCISES: CPT | Mod: GP | Performed by: PHYSICAL THERAPIST

## 2022-06-02 PROCEDURE — 97140 MANUAL THERAPY 1/> REGIONS: CPT | Mod: GP | Performed by: PHYSICAL THERAPIST

## 2022-06-02 NOTE — TELEPHONE ENCOUNTER
Screening Questions    BlueKIND OF PREP RedLOCATION [review exclusion criteria] GreenSEDATION TYPE      1. Are you active on mychart? Y    2. What insurance is in the chart? PO     3.   Ordering/Referring Provider: Karlee Birmingham APRN CNP      4. BMI   (If greater than 40 review exclusion criteria [PAC APPT IF [MAC] @ [facility])  26.6  [If yes, BMI OVER 40-EXTENDED PREP]      **(Sedation review/consideration needed)**  Do you have a legal guardian or Medical Power of    and/or are you able to give consent for your medical care?     SELF     5. Have you had a positive covid test in the last 90 days?   N     6.  Are you currently on dialysis?   N [ If yes, G-PREP & HOSPITAL setting ONLY]     7.  Do you have chronic kidney disease?  N [ If yes, G-PREP ]    8.   Do you have a diagnosis of diabetes?   N   [ If yes, G-PREP ]    9.  On a regular basis do you go 3-5 days between bowel movements?   N   [ If yes, EXTENDED PREP]    10.  Are you taking any prescription pain medications on a routine schedule?    N  [ If yes, EXTENDED PREP] [If yes, MAC]      11.   Do you have any chemical dependencies such as alcohol, street drugs, or methadone?    N [If yes, MAC]    12.   Do you have any history of post-traumatic stress syndrome, severe anxiety or history of psychosis?    N  [If yes, MAC]    13.  [FEMALES] Are you currently pregnant?     If yes, how many weeks?       Respiratory/Heart Screening:  [If yes to any of the following HOSPITAL setting only]     14. Do you have Pulmonary Hypertension [Lungs]?   N       15. Do you have UNCONTROLLED asthma?   N     16.  Do you use daily home oxygen?  N      17. Do you have mod to severe Obstructive Sleep Apnea?         (OKAY @ [facility]  [facility]  SH  PH  RI  MG - if pt is not on OXYGEN)  N      18.   Have you had a heart or lung transplant?   N      19.   Have you had a stroke or Transient ischemic attack (TIA - aka  mini stroke ) within 6 months?  (If yes, please review exclusion  criteria)  N     20.   In the past 6 months, have you had any heart related issues including cardiomyopathy or heart attack?   N           If yes, did it require cardiac stenting or other implantable device?   N      21.   Do you have any implantable devices in your body (pacemaker, defib, LVAD)? (If yes, please review exclusion criteria)  N     22. Do you take nitroglycerin?   N           If yes, how often? N  (if yes, HOSPITAL setting ONLY)    23.  Are you currently taking any blood thinners?    [If yes, INFORM patient to follw up w/ ORDERING PROVIDER FOR BRIDGING INSTRUCTIONS]     N - UNSURE     24.   Do you transfer independently?                (If NO, please HOSPITAL setting ONLY)  Y     25.   Preferred LOCAL Pharmacy for Pre Prescription:         [id number] - [place], [place] - [address] AT [facility]      Scheduling Details  (Please ask for phone number if not scheduled by patient)      Caller : Katie Aponte   Date of Procedure: 07/12/2022  Surgeon: EVONNE   Location: [facility]; [address] [place] [address], [address] Floor, [place] [id number]      Sedation Type: CS  l PER PROTOCOL   Conscious Sedation- Needs  for 6 hours after the procedure  MAC/General-Needs  for 24 hours after procedure    N :[Pre-op Required] at [facility]  SH  MG and OR for MAC sedation   (advise patient they will need a pre-op WITH IN 30 DAYS of procedure date)     Type of Procedure Scheduled:   Lower Endoscopy [Colonoscopy]    Which Colonoscopy Prep was Sent?:   SM - PER PROTOCOL     [facility] PATIENTS & [facility] PATIENTS NEEDS EXTENDED PREP       Informed patient they will need an adult  Y  Cannot take any type of public or medical transportation alone    Pre-Procedure Covid test to be completed at [facility] or Externally: Y    Confirmed Nurse will call to complete assessment Y    Additional comments:  Y - PT IS UNSURE ABOUT BLOOD MEDS

## 2022-06-06 ENCOUNTER — OFFICE VISIT (OUTPATIENT)
Dept: PODIATRY | Facility: CLINIC | Age: 57
End: 2022-06-06
Attending: INTERNAL MEDICINE
Payer: COMMERCIAL

## 2022-06-06 ENCOUNTER — MYC MEDICAL ADVICE (OUTPATIENT)
Dept: RHEUMATOLOGY | Facility: CLINIC | Age: 57
End: 2022-06-06

## 2022-06-06 VITALS — DIASTOLIC BLOOD PRESSURE: 80 MMHG | SYSTOLIC BLOOD PRESSURE: 119 MMHG | HEART RATE: 82 BPM

## 2022-06-06 DIAGNOSIS — M72.2 PLANTAR FASCIITIS, RIGHT: ICD-10-CM

## 2022-06-06 DIAGNOSIS — M19.071 ARTHRITIS OF RIGHT FOOT: ICD-10-CM

## 2022-06-06 DIAGNOSIS — M21.41 ACQUIRED PES PLANUS, RIGHT: ICD-10-CM

## 2022-06-06 DIAGNOSIS — M25.571 SINUS TARSITIS OF RIGHT FOOT: Primary | ICD-10-CM

## 2022-06-06 PROCEDURE — 20605 DRAIN/INJ JOINT/BURSA W/O US: CPT | Mod: RT | Performed by: PODIATRIST

## 2022-06-06 PROCEDURE — 99203 OFFICE O/P NEW LOW 30 MIN: CPT | Mod: 25 | Performed by: PODIATRIST

## 2022-06-06 RX ORDER — TRIAMCINOLONE ACETONIDE 40 MG/ML
40 INJECTION, SUSPENSION INTRA-ARTICULAR; INTRAMUSCULAR ONCE
Status: COMPLETED | OUTPATIENT
Start: 2022-06-06 | End: 2022-06-06

## 2022-06-06 RX ORDER — DEXAMETHASONE SODIUM PHOSPHATE 4 MG/ML
4 INJECTION, SOLUTION INTRA-ARTICULAR; INTRALESIONAL; INTRAMUSCULAR; INTRAVENOUS; SOFT TISSUE ONCE
Status: COMPLETED | OUTPATIENT
Start: 2022-06-06 | End: 2022-06-06

## 2022-06-06 RX ORDER — BUPIVACAINE HYDROCHLORIDE 5 MG/ML
1 INJECTION, SOLUTION PERINEURAL ONCE
Status: COMPLETED | OUTPATIENT
Start: 2022-06-06 | End: 2022-06-06

## 2022-06-06 RX ADMIN — DEXAMETHASONE SODIUM PHOSPHATE 4 MG: 4 INJECTION, SOLUTION INTRA-ARTICULAR; INTRALESIONAL; INTRAMUSCULAR; INTRAVENOUS; SOFT TISSUE at 08:33

## 2022-06-06 RX ADMIN — BUPIVACAINE HYDROCHLORIDE 5 MG: 5 INJECTION, SOLUTION PERINEURAL at 08:32

## 2022-06-06 RX ADMIN — TRIAMCINOLONE ACETONIDE 40 MG: 40 INJECTION, SUSPENSION INTRA-ARTICULAR; INTRAMUSCULAR at 08:33

## 2022-06-06 NOTE — PROGRESS NOTES
Assessment:      ICD-10-CM    1. Sinus tarsitis of right foot  M25.571 Orthopedic  Referral     dexamethasone (DECADRON) injection 4 mg     triamcinolone (KENALOG-40) injection 40 mg     Bupivacaine 0.5 % Injection     DRAIN/INJECT MEDIUM JOINT/BURSA     [facility] DME Orthotic; Foot Orthotics   2. Plantar fasciitis, right  M72.2 diclofenac (VOLTAREN) 50 MG EC tablet     [facility] DME Orthotic; Foot Orthotics   3. Acquired pes planus, right  M21.41    4. Arthritis of right foot  M19.071 DRAIN/INJECT MEDIUM JOINT/BURSA     [facility] DME Orthotic; Foot Orthotics          Plan:  Orders Placed This Encounter   Procedures     DRAIN/INJECT MEDIUM JOINT/BURSA     Orthotics and Prost[facility] DME Orthotic; Foot Orthotics       Discussed the etiology and treatment of the condition with the patient.  Imaging studies reviewed and discussed with the patient.  Discussed surgical and conservative options.    STJ pain > plantar fasciitis  H/o prior peroneal tendon repair as child    -Injection-  Procedure:  injection  PARQ session held, verbal consent obtained.  Sterile skin prep.    Location:  Right STJ  Contents:  3ml total, marcaine, kenalog-40, dexamethasone phosphate.  Dressing applied.    Post-injection instructions reviewed including close attention to amount and duration of pain relief.      -NSAID- po Diclofenac.  Stop topical when taking  -Orthoses-  Custom - Rx today to limit STJ motion  -PT - if needed  -WB x-rays if not improved      Return:  No follow-ups on file.                Chief Complaint:     Patient presents with:  Right Foot - Pain, Foot Pain: Ongoing since RA   Swelling and pain can not rotate her foot as much  Arthritis: RA     right ankle pain    HPI:  Katie Aponte is a 56 year old year old female who presents for evaluation of ankle pain.    Pain location- sinus tarsi R  Also bottom of heel R  Peroneal tendon repair as kid on R - states material  in there, will occasionally swell  Occastional anterior ankle pain per pt, on & off, but not consistently.    RA, on weekly injection x2-3 yrs    Not on po NSAID currently  R knee arthritis, h/o TKA on R  Likes to play softball      Past Medical & Surgical History:  Past Medical History:   Diagnosis Date     Herpes     Under eye     Joint pain      Seasonal allergies      Thrombocytopenia (H) 2015     Uncomplicated asthma     very mild      Past Surgical History:   Procedure Laterality Date      SECTION       COLONOSCOPY       CYSTOSCOPY, SLING TRANSVAGINAL N/A 2017    Procedure: CYSTOSCOPY, SLING TRANSVAGINAL;  TRANSVAGINAL TAPING, CYSTOSCOPY, MID URETHRAL SLING;  Surgeon: Jett Montes MD;  Location:  OR     DILATE CERVIX, ABLATE ENDOMETRIUM THERMACHOICE, COMBINED  4/10/2014    Procedure: COMBINED DILATE CERVIX, ABLATE ENDOMETRIUM THERMACHOICE;;  Surgeon: Jett Montes MD;  Location: [facility]     DILATION AND CURETTAGE, HYSTEROSCOPY, ABLATE ENDOMETRIUM NOVASURE, COMBINED  4/10/2014    Procedure: COMBINED DILATION AND CURETTAGE, HYSTEROSCOPY, ABLATE ENDOMETRIUM NOVASURE;  HYSTEROSCOPY, DILATION AND CURETTAGE, NOVASURE ABLATION ATTEMPTED, THERMACHOICE ABLATION ATTEMPTED;  Surgeon: Jett Montes MD;  Location: [facility]     LAPAROSCOPIC ASSISTED HYSTERECTOMY VAGINAL, BILATERAL SALPINGO-OOPHORECTOMY, COMBINED  2014    Procedure: COMBINED LAPAROSCOPIC ASSISTED HYSTERECTOMY VAGINAL, SALPINGO-OOPHORECTOMY;  Surgeon: Jett Montes MD;  Location: [facility]     ORTHOPEDIC SURGERY      L KNEE MENISCUS,ankle surgery, left knee replacement      Family History   Problem Relation Age of Onset     Cancer Mother         patient is unsure of what kind        Social History:  ?  History   Smoking Status     Never Smoker   Smokeless Tobacco     Never Used     History   Drug Use No     Social History    Substance and Sexual Activity      Alcohol use: Yes        Alcohol/week: 0.0 standard drinks        Comment:  SOCIAL - 1 glass of wine twice a week; 2 drinks on the weekend      Allergies:  ?   Allergies   Allergen Reactions     Droperidol Itching     Feels jumpy     Morphine Sulfate (Concentrate) Nausea and Nausea and Vomiting     Other reaction(s): Vomiting        Medications:    Current Outpatient Medications   Medication     diclofenac (VOLTAREN) 50 MG EC tablet     albuterol (PROAIR HFA/PROVENTIL HFA/VENTOLIN HFA) 108 (90 Base) MCG/ACT inhaler     albuterol (PROVENTIL) (2.5 MG/3ML) 0.083% neb solution     Cetirizine-Pseudoephedrine (ZYRTEC-D PO)     desonide (DESOWEN) 0.05 % ointment     diclofenac (VOLTAREN) 1 % topical gel     etanercept (ENBREL SURECLICK) 50 MG/ML autoinjector     Fluocinolone Acetonide Scalp 0.01 % OIL oil     fluticasone (FLOVENT HFA) 110 MCG/ACT inhaler     HYDROcodone-acetaminophen (NORCO) 5-325 MG tablet     hydroxychloroquine (PLAQUENIL) 200 MG tablet     ipratropium - albuterol 0.5 mg/2.5 mg/3 mL (DUONEB) 0.5-2.5 (3) MG/3ML neb solution     order for DME     order for DME     order for DME     PREMARIN 1.25 MG tablet     valACYclovir (VALTREX) 500 MG tablet     Current Facility-Administered Medications   Medication     Bupivacaine 0.5 % Injection     dexamethasone (DECADRON) injection 4 mg     triamcinolone (KENALOG-40) injection 40 mg         Physical Exam:  ?  Vitals:  /80   Pulse 82   LMP 03/30/2014    General:  WD/WN, in NAD.  A&O x3.  Dermatologic:    Skin is intact, open lesions absent.   Skin texture, turgor is normal.  Vascular:  Pulses palpable bilateral.  Digital capillary refill time normal bilateral.  Skin temperature is normal bilateral.  Generalized edema- trace bilateral.  Focal edema- mild sinus tarsi, peroneals, plantar heel, right.  Neurologic:    Gross sensation normal.  Gait and balance abnormal, antalgic, R.  Musculoskeletal:  Maximal pain to palpation of sinus tarsi, right.  moderate pain to palpation of plantar heel, peroneals right.  Mild pain / palpable spur  lateral talar neck at ant ankle  Ankle ROM full, reduced in DF, smooth, pain free right.    Anterior drawer stable, Talar tilt stable,right.  Manual Muscle Testing of PB  painful  4/5  right.    Stance:  RCSP Valgus R > L.  Foot type:  Flexible valgus    Imaging:   x-ray independently reviewed and interpreted by myself today.  NON-Weight-bearing views right foot & ankle dated 5/2022, reveal mild STJ, CCJ arthritis, anterior tibia / talar neck spurring but otherwise preserved ankle joint space.  NWB, cannot comment on architecture.

## 2022-06-06 NOTE — PATIENT INSTRUCTIONS
"PATIENT INSTRUCTIONS - Podiatry / Foot & Ankle Surgery    Aftercare for Steroid Injection  Activity:  -Resume normal activity as tolerated.  -Tendon, ligament, fascia injectionons- avoid high-impact activity for 1 week.  Icing:  -May apply to the injection site if needed.  Infection:  -Risk is generally low (<1%), but must be aware of the signs/symptoms.    -Both infection and an inflammatory reaction to the steroid (\"steroid flare\") will cause redness, warmth, and increased pain.   -If these symptoms develop within 12-24h & resolve within 2-3 days- \"steroid flare\"- ice (non-worrisome)  -If these symptoms develop after 24-48h, progressively get worse, & are associated with a fever- possible infection; call the clinic or present to urgent care immediately      [facility] LOC[facility]  [facility]  [address]  [place] [address]  Phone: [phone]  Fax: [phone] [facility]  [address] [place], [place] [address]  [place] [address]  Phone: [phone]   Fax: [phone]   [facility]  [address]  [place] [address]  Phone: [phone]  Fax: [phone] [facility]  [address]  [place] [address]  Phone: [phone]   Fax: [phone]   [facility]   [address]  [place] [address]  Phone: [phone]  Fax: [phone] * Please call any location listed to make an appointment for a casting/fitting. Your referral was sent to their central office and they will all have the order on file.             Diclofenac- 1 pill twice daily x1 week.  Then take a 1 week break.  Repeat as needed.  Take with food & an acid blocker if stomach upset occurs.  Stop all other NSAIDs (aspirin, ibuprofen/Motrin, naproxen/ Aleve).          "

## 2022-06-06 NOTE — LETTER
6/6/2022         RE: Katie Aponte  [address]  [place] [address]        Dear Colleague,    Thank you for referring your patient, Katie Aponte, to the [facility]. Please see a copy of my visit note below.      Assessment:      ICD-10-CM    1. Sinus tarsitis of right foot  M25.571 Orthopedic  Referral     dexamethasone (DECADRON) injection 4 mg     triamcinolone (KENALOG-40) injection 40 mg     Bupivacaine 0.5 % Injection     DRAIN/INJECT MEDIUM JOINT/BURSA     Orthotics and Prosthetics DME Orthotic; Foot Orthotics   2. Plantar fasciitis, right  M72.2 diclofenac (VOLTAREN) 50 MG EC tablet     Orthotics and Prosthetics DME Orthotic; Foot Orthotics   3. Acquired pes planus, right  M21.41    4. Arthritis of right foot  M19.071 DRAIN/INJECT MEDIUM JOINT/BURSA     Orthotics and Prosthetics DME Orthotic; Foot Orthotics          Plan:  Orders Placed This Encounter   Procedures     DRAIN/INJECT MEDIUM JOINT/BURSA     Orthotics and Prosthetics DME Orthotic; Foot Orthotics       Discussed the etiology and treatment of the condition with the patient.  Imaging studies reviewed and discussed with the patient.  Discussed surgical and conservative options.    STJ pain > plantar fasciitis  H/o prior peroneal tendon repair as child    -Injection-  Procedure:  injection  PARQ session held, verbal consent obtained.  Sterile skin prep.    Location:  Right STJ  Contents:  3ml total, marcaine, kenalog-40, dexamethasone phosphate.  Dressing applied.    Post-injection instructions reviewed including close attention to amount and duration of pain relief.      -NSAID- po Diclofenac.  Stop topical when taking  -Orthoses-  Custom - Rx today to limit STJ motion  -PT - if needed  -WB x-rays if not improved      Return:  No follow-ups on file.                Chief Complaint:     Patient presents with:  Right Foot - Pain, Foot Pain: Ongoing since RA   Swelling and pain can not rotate her foot as  much  Arthritis: RA     right ankle pain    HPI:  Katie Aponte is a 56 year old year old female who presents for evaluation of ankle pain.    Pain location- sinus tarsi R  Also bottom of heel R  Peroneal tendon repair as kid on R - states material in there, will occasionally swell  Occastional anterior ankle pain per pt, on & off, but not consistently.    RA, on weekly injection x2-3 yrs    Not on po NSAID currently  R knee arthritis, h/o TKA on R  Likes to play softball      Past Medical & Surgical History:  Past Medical History:   Diagnosis Date     Herpes     Under eye     Joint pain      Seasonal allergies      Thrombocytopenia (H) 2015     Uncomplicated asthma     very mild      Past Surgical History:   Procedure Laterality Date      SECTION       COLONOSCOPY       CYSTOSCOPY, SLING TRANSVAGINAL N/A 2017    Procedure: CYSTOSCOPY, SLING TRANSVAGINAL;  TRANSVAGINAL TAPING, CYSTOSCOPY, MID URETHRAL SLING;  Surgeon: Jett Montes MD;  Location:  OR     DILATE CERVIX, ABLATE ENDOMETRIUM THERMACHOICE, COMBINED  4/10/2014    Procedure: COMBINED DILATE CERVIX, ABLATE ENDOMETRIUM THERMACHOICE;;  Surgeon: Jett Montes MD;  Location: [facility]     DILATION AND CURETTAGE, HYSTEROSCOPY, ABLATE ENDOMETRIUM NOVASURE, COMBINED  4/10/2014    Procedure: COMBINED DILATION AND CURETTAGE, HYSTEROSCOPY, ABLATE ENDOMETRIUM NOVASURE;  HYSTEROSCOPY, DILATION AND CURETTAGE, NOVASURE ABLATION ATTEMPTED, THERMACHOICE ABLATION ATTEMPTED;  Surgeon: Jett Montes MD;  Location: [facility]     LAPAROSCOPIC ASSISTED HYSTERECTOMY VAGINAL, BILATERAL SALPINGO-OOPHORECTOMY, COMBINED  2014    Procedure: COMBINED LAPAROSCOPIC ASSISTED HYSTERECTOMY VAGINAL, SALPINGO-OOPHORECTOMY;  Surgeon: Jett Montes MD;  Location: [facility]     ORTHOPEDIC SURGERY      L KNEE MENISCUS,ankle surgery, left knee replacement      Family History   Problem Relation Age of Onset     Cancer Mother         patient is unsure of what kind         Social History:  ?  History   Smoking Status     Never Smoker   Smokeless Tobacco     Never Used     History   Drug Use No     Social History    Substance and Sexual Activity      Alcohol use: Yes        Alcohol/week: 0.0 standard drinks        Comment: SOCIAL - 1 glass of wine twice a week; 2 drinks on the weekend      Allergies:  ?   Allergies   Allergen Reactions     Droperidol Itching     Feels jumpy     Morphine Sulfate (Concentrate) Nausea and Nausea and Vomiting     Other reaction(s): Vomiting        Medications:    Current Outpatient Medications   Medication     diclofenac (VOLTAREN) 50 MG EC tablet     albuterol (PROAIR HFA/PROVENTIL HFA/VENTOLIN HFA) 108 (90 Base) MCG/ACT inhaler     albuterol (PROVENTIL) (2.5 MG/3ML) 0.083% neb solution     Cetirizine-Pseudoephedrine (ZYRTEC-D PO)     desonide (DESOWEN) 0.05 % ointment     diclofenac (VOLTAREN) 1 % topical gel     etanercept (ENBREL SURECLICK) 50 MG/ML autoinjector     Fluocinolone Acetonide Scalp 0.01 % OIL oil     fluticasone (FLOVENT HFA) 110 MCG/ACT inhaler     HYDROcodone-acetaminophen (NORCO) 5-325 MG tablet     hydroxychloroquine (PLAQUENIL) 200 MG tablet     ipratropium - albuterol 0.5 mg/2.5 mg/3 mL (DUONEB) 0.5-2.5 (3) MG/3ML neb solution     order for DME     order for DME     order for DME     PREMARIN 1.25 MG tablet     valACYclovir (VALTREX) 500 MG tablet     Current Facility-Administered Medications   Medication     Bupivacaine 0.5 % Injection     dexamethasone (DECADRON) injection 4 mg     triamcinolone (KENALOG-40) injection 40 mg         Physical Exam:  ?  Vitals:  /80   Pulse 82   LMP 03/30/2014    General:  WD/WN, in NAD.  A&O x3.  Dermatologic:    Skin is intact, open lesions absent.   Skin texture, turgor is normal.  Vascular:  Pulses palpable bilateral.  Digital capillary refill time normal bilateral.  Skin temperature is normal bilateral.  Generalized edema- trace bilateral.  Focal edema- mild sinus tarsi, peroneals,  plantar heel, right.  Neurologic:    Gross sensation normal.  Gait and balance abnormal, antalgic, R.  Musculoskeletal:  Maximal pain to palpation of sinus tarsi, right.  moderate pain to palpation of plantar heel, peroneals right.  Mild pain / palpable spur lateral talar neck at ant ankle  Ankle ROM full, reduced in DF, smooth, pain free right.    Anterior drawer stable, Talar tilt stable,right.  Manual Muscle Testing of PB  painful  4/5  right.    Stance:  RCSP Valgus R > L.  Foot type:  Flexible valgus    Imaging:   x-ray independently reviewed and interpreted by myself today.  NON-Weight-bearing views right foot & ankle dated 5/2022, reveal mild STJ, CCJ arthritis, anterior tibia / talar neck spurring but otherwise preserved ankle joint space.  NWB, cannot comment on architecture.              Again, thank you for allowing me to participate in the care of your patient.        Sincerely,        Chelsie Lackey DPM

## 2022-06-07 ENCOUNTER — TELEPHONE (OUTPATIENT)
Dept: RHEUMATOLOGY | Facility: CLINIC | Age: 57
End: 2022-06-07
Payer: COMMERCIAL

## 2022-06-07 NOTE — TELEPHONE ENCOUNTER
Reason for Call:  Form, our goal is to have forms completed with 72 hours, however, some forms may require a visit or additional information.    Type of letter, form or note:  FMLA    Who is the form from?: Patient    Where did the form come from: Patient or family brought in       What clinic location was the form placed at?: [facility]    Where the form was placed: Put in doctors mail box Box/Folder    What number is listed as a contact on the form?: [phone]       Additional comments: Please fax to [phone] and mail a copy to her home for her file.    Call taken on 6/7/2022 at 10:11 AM by Celine Gamez

## 2022-06-08 NOTE — TELEPHONE ENCOUNTER
Form completed and returned to [facility] to send in and notify patient that it was completed.     Richard Mcfarlane MD  6/8/2022

## 2022-06-08 NOTE — TELEPHONE ENCOUNTER
Forms given to Dr. Mcfarlane.  Noy Burt, [facility] Rheumatology  6/8/2022

## 2022-06-09 NOTE — TELEPHONE ENCOUNTER
FMLA forms are faxed and copy made and mailed to patient. Notifying patient by mychart that they have been faxed. Copy is in my desk, will hold for awhile incase they did not receive them.  Noy Burt [facility] Rheumatology  6/9/2022

## 2022-06-09 NOTE — TELEPHONE ENCOUNTER
Forms faxed, patient notified and copy mailed to patient. Copy is in my desk for awhile incase something needs to be redone. Will send to abstracting.  Noy Burt CMA Rheumatology  6/9/2022

## 2022-06-16 ENCOUNTER — THERAPY VISIT (OUTPATIENT)
Dept: PHYSICAL THERAPY | Facility: CLINIC | Age: 57
End: 2022-06-16
Payer: COMMERCIAL

## 2022-06-16 DIAGNOSIS — M25.561 ACUTE PAIN OF RIGHT KNEE: ICD-10-CM

## 2022-06-16 DIAGNOSIS — M54.2 NECK PAIN: Primary | ICD-10-CM

## 2022-06-16 PROCEDURE — 97110 THERAPEUTIC EXERCISES: CPT | Mod: GP | Performed by: PHYSICAL THERAPIST

## 2022-06-16 PROCEDURE — 97140 MANUAL THERAPY 1/> REGIONS: CPT | Mod: GP | Performed by: PHYSICAL THERAPIST

## 2022-06-21 ENCOUNTER — THERAPY VISIT (OUTPATIENT)
Dept: PHYSICAL THERAPY | Facility: CLINIC | Age: 57
End: 2022-06-21
Payer: COMMERCIAL

## 2022-06-21 DIAGNOSIS — S06.0X0D CONCUSSION WITHOUT LOSS OF CONSCIOUSNESS, SUBSEQUENT ENCOUNTER: Primary | ICD-10-CM

## 2022-06-21 PROCEDURE — 97110 THERAPEUTIC EXERCISES: CPT | Mod: GP | Performed by: PHYSICAL THERAPIST

## 2022-06-21 PROCEDURE — 97140 MANUAL THERAPY 1/> REGIONS: CPT | Mod: GP | Performed by: PHYSICAL THERAPIST

## 2022-06-23 ENCOUNTER — THERAPY VISIT (OUTPATIENT)
Dept: PHYSICAL THERAPY | Facility: CLINIC | Age: 57
End: 2022-06-23
Payer: COMMERCIAL

## 2022-06-23 DIAGNOSIS — G89.29 CHRONIC MIDLINE LOW BACK PAIN, UNSPECIFIED WHETHER SCIATICA PRESENT: ICD-10-CM

## 2022-06-23 DIAGNOSIS — G89.29 CHRONIC PAIN OF BOTH KNEES: ICD-10-CM

## 2022-06-23 DIAGNOSIS — M25.561 CHRONIC PAIN OF BOTH KNEES: ICD-10-CM

## 2022-06-23 DIAGNOSIS — M25.562 CHRONIC PAIN OF BOTH KNEES: ICD-10-CM

## 2022-06-23 DIAGNOSIS — M54.50 CHRONIC MIDLINE LOW BACK PAIN, UNSPECIFIED WHETHER SCIATICA PRESENT: ICD-10-CM

## 2022-06-23 PROCEDURE — 97140 MANUAL THERAPY 1/> REGIONS: CPT | Mod: GP | Performed by: PHYSICAL THERAPIST

## 2022-06-23 PROCEDURE — 97161 PT EVAL LOW COMPLEX 20 MIN: CPT | Mod: GP | Performed by: PHYSICAL THERAPIST

## 2022-06-23 PROCEDURE — 97110 THERAPEUTIC EXERCISES: CPT | Mod: GP | Performed by: PHYSICAL THERAPIST

## 2022-06-23 NOTE — PROGRESS NOTES
Physical Therapy Initial Evaluation  Subjective:  The history is provided by the patient.   Patient Health History  Katie Aponte being seen for bilat knee pain and chronic LBP .     Date of Onset: chronic,    Problem occurred: RhA, DJD    Pain is reported as 8/10 on pain scale.  General health as reported by patient is good.  Pertinent medical history includes: concussions/dizziness, migraines/headaches, rheumatoid arthritis and osteoarthritis.   Red flags:  None as reported by patient.         Current medications:  Hormone replacement therapy and pain medication.    Current occupation  .   Primary job tasks include:  Computer work, prolonged sitting and repetitive tasks.                  Therapist Generated HPI Evaluation  Problem details: Pt reports years of pain and dysfunction due to RhA. She had femoral component TKA in 2015 Left and ankle surgery. LBP is constant at left LB and upper left glut, SI area. Her gait is antalgic w/ noticeable limp. .         Type of problem:  Sacroiliac and lumbar (bilat knees ).    This is a chronic condition.  Condition occurred with:  Degenerative joint disease.  Where condition occurred: at home.  Patient reports pain:  Lumbar spine left and SI joint left.  Pain is described as aching, stabbing and burning and is constant.  Pain radiates to:  Gluteals left. Pain is the same all the time.  Since onset symptoms are gradually worsening.  Associated symptoms:  Loss of motion/stiffness and loss of strength. Symptoms are exacerbated by sitting, standing, walking, bending and lifting  and relieved by nothing.    Previous treatment includes chiropractic.   Restrictions due to condition include:  Working in normal job without restrictions.  Barriers include:  None as reported by patient.                        Objective:  System         Lumbar/SI Evaluation  ROM:    AROM Lumbar:   Flexion:          Mod loss   Ext:                    Mjr loss    Side Bend:        Left:      Right:   Rotation:           Left:  Mod loss     Right:  Modloss   Side Glide:        Left:     Right:           Lumbar Myotomes:  normal            Lumbar DTR's:  not assessed      Cord Signs:  not assessed    Lumbar Dermtomes:  not assessed                Neural Tension/Mobility:  Lumbar:  Normal        Lumbar Palpation:    Tenderness present at Left:    Quadratus Lumborum; Piriformis; PSIS; Iliac Crest; Gluteus Medius and Greater Trochanter  Tenderness present at Right: Quadratus Lumborum and Piriformis  Tenderness not present at Right:  PSIS        SI joint/Sacrum:          Left negative at:    Ilium Ventromedial  Right positive at:    Ilium Ventromedial    Sacral conclusion right:  Posterior inominate, locked, upslip and inflare                                       Knee Evaluation:  ROM:  Strength:  Normal (hip abd -4/5 bilat, extens hip -4/5 Rt, 4/5 left )  AROM      Extension:  Left: 0    Right:  0  Flexion: Left: 122    Right: 130          Ligament Testing:    Varus 0:  Left:  Gr I       Valgus 0:  Left:  Gr I      Anterior Drawer:  Left:  Gr I      Posterior Drawer: Left:  Gr I      Special Tests:   Left knee positive for the following special tests:  Patellar Compression; Rotary Instability-Anterior Medial; Rotary Instability-Anterior Lateral; Rotary Instability-Posterior Medial; Rotary Instability-Posterior Lateral; Danielle's and IT Band Friction  Right knee positive for the following tests:  Danielle's and IT Band Friction  Right knee negative for the following special tests:  Meniscal  Palpation:    Left knee tenderness present at:  Medial Joint Line; Patellar Tendon; IT Band; Gluteus Medius; Patellar Superior and Patellar Inferior  Right knee tenderness present at:  Medial Joint Line; Patellar Tendon; IT Band; Gluteus Medius; Patellar Superior and Patellar Inferior  Edema:  Normal    Mobility Testing:    Proximal Tib-Fib:  Left: normal    Right: normal  Patellofemoral Medial:  Left: hypermobile    Right:  hypermobile  Patellofemoral Lateral:  Left: hypermobile    Right: hypermobile      Functional Testing:        Core Strength:    Single Leg Bridge:  Left:  5/20 reps    Right: 2/20 reps    % of Uninvolved:  25%             General     ROS    Assessment/Plan:    Patient is a 56 year old female with lumbar, sacral, both sides hip and both sides knee complaints.    Patient has the following significant findings with corresponding treatment plan.                Diagnosis 1:  Right SI dysfunction  Pain -  manual therapy, self management and home program  Decreased joint mobility - manual therapy, therapeutic exercise and home program  Impaired muscle performance - neuro re-education  Decreased function - therapeutic activities  Diagnosis 2:  LBP left    Pain -  manual therapy, self management and home program  Decreased ROM/flexibility - manual therapy, therapeutic exercise and home program  Decreased joint mobility - manual therapy and therapeutic exercise  Decreased strength - therapeutic exercise and therapeutic activities  Decreased proprioception - neuro re-education and therapeutic activities  Impaired muscle performance - neuro re-education  Decreased function - therapeutic activities  Diagnosis 3:  bilat PF/knee pain   Pain -  hot/cold therapy, US, manual therapy, self management and home program  Decreased strength - therapeutic exercise, therapeutic activities and home program  Decreased proprioception - neuro re-education and therapeutic activities  Inflammation - cold therapy and US  Impaired muscle performance - neuro re-education and home program  Decreased function - therapeutic activities  Instability -  Therapeutic Activity  Therapeutic Exercise  Neuromuscular Re-education  Splinting/Taping/Bracing/Orthotic  home program     Therapy Evaluation Codes:   1) History comprised of:   Personal factors that impact the plan of care:      Coping style, Overall behavior pattern and Past/current experiences.     Comorbidity factors that impact the plan of care are:      Implanted device, Osteoarthritis and Rheumatoid arthritis.     Medications impacting care: Anti-inflammatory and Pain.  2) Examination of Body Systems comprised of:   Body structures and functions that impact the plan of care:      Hip, Knee, Lumbar spine and Sacral illiac joint.   Activity limitations that impact the plan of care are:      Bending, Cooking, Lifting, Sitting, Squatting/kneeling, Stairs, Standing and Walking.  3) Clinical presentation characteristics are:   Stable/Uncomplicated.  4) Decision-Making    Low complexity using standardized patient assessment instrument and/or measureable assessment of functional outcome.  Cumulative Therapy Evaluation is: Low complexity.    Previous and current functional limitations:  (See Goal Flow Sheet for this information)    Short term and Long term goals: (See Goal Flow Sheet for this information)     Communication ability:  Patient appears to be able to clearly communicate and understand verbal and written communication and follow directions correctly.  Treatment Explanation - The following has been discussed with the patient:   RX ordered/plan of care  Anticipated outcomes  Possible risks and side effects  This patient would benefit from PT intervention to resume normal activities.   Rehab potential is good.    Frequency:  1 X week, once daily  Duration:  for 12 weeks  Discharge Plan:  Achieve all LTG.  Independent in home treatment program.  Reach maximal therapeutic benefit.    Please refer to the daily flowsheet for treatment today, total treatment time and time spent performing 1:1 timed codes.

## 2022-06-24 ENCOUNTER — DOCUMENTATION ONLY (OUTPATIENT)
Dept: PODIATRY | Facility: CLINIC | Age: 57
End: 2022-06-24

## 2022-06-24 DIAGNOSIS — M72.2 PLANTAR FASCIITIS, RIGHT: ICD-10-CM

## 2022-06-24 DIAGNOSIS — M21.41 ACQUIRED PES PLANUS, RIGHT: ICD-10-CM

## 2022-06-24 DIAGNOSIS — M19.071 ARTHRITIS OF RIGHT FOOT: Primary | ICD-10-CM

## 2022-06-24 DIAGNOSIS — M25.571 SINUS TARSITIS OF RIGHT FOOT: ICD-10-CM

## 2022-06-24 NOTE — PROGRESS NOTES
Please review and sign pended orders.    Emily Galdamez [facility]

## 2022-06-29 ENCOUNTER — ALLIED HEALTH/NURSE VISIT (OUTPATIENT)
Dept: NURSING | Facility: CLINIC | Age: 57
End: 2022-06-29
Payer: COMMERCIAL

## 2022-06-29 ENCOUNTER — THERAPY VISIT (OUTPATIENT)
Dept: PHYSICAL THERAPY | Facility: CLINIC | Age: 57
End: 2022-06-29
Payer: COMMERCIAL

## 2022-06-29 DIAGNOSIS — M54.59 MECHANICAL LOW BACK PAIN: Primary | ICD-10-CM

## 2022-06-29 DIAGNOSIS — Z71.9 COUNSELED BY NURSE: Primary | ICD-10-CM

## 2022-06-29 PROCEDURE — 97110 THERAPEUTIC EXERCISES: CPT | Mod: GP | Performed by: PHYSICAL THERAPIST

## 2022-06-29 PROCEDURE — 99207 PR NO CHARGE NURSE ONLY: CPT

## 2022-06-29 PROCEDURE — 97140 MANUAL THERAPY 1/> REGIONS: CPT | Mod: GP | Performed by: PHYSICAL THERAPIST

## 2022-06-29 NOTE — PROGRESS NOTES
Pt walked into clinic presenting at the check in desk on the first floor. Pt asked to talk with Rheumatology team. Writer placed pt on nurse schedule and brought pt to room. Pt descries that Zackl called her to let her know she has exhausted all of her options to help with payment. Advised that [employer] message was received and writer verbalized understanding. Advised pt of process and pt would like follow up either by phone or Mychart. To pharmacy liaison to address if there are any other options for the pt. If not then a discussion with Dr read will be next which l/t a clinic appt to discuss med change. Dr Read was updated verbally and was in agreeable.    Conchis MEZA RN Specialty Triage 6/29/2022 9:48 AM

## 2022-06-30 ENCOUNTER — THERAPY VISIT (OUTPATIENT)
Dept: PHYSICAL THERAPY | Facility: CLINIC | Age: 57
End: 2022-06-30
Payer: COMMERCIAL

## 2022-06-30 DIAGNOSIS — S06.0X0D CONCUSSION WITHOUT LOSS OF CONSCIOUSNESS, SUBSEQUENT ENCOUNTER: ICD-10-CM

## 2022-06-30 DIAGNOSIS — M54.59 MECHANICAL LOW BACK PAIN: Primary | ICD-10-CM

## 2022-06-30 PROCEDURE — 97140 MANUAL THERAPY 1/> REGIONS: CPT | Mod: GP | Performed by: PHYSICAL THERAPIST

## 2022-06-30 PROCEDURE — 97110 THERAPEUTIC EXERCISES: CPT | Mod: GP | Performed by: PHYSICAL THERAPIST

## 2022-07-12 ENCOUNTER — HOSPITAL ENCOUNTER (OUTPATIENT)
Facility: AMBULATORY SURGERY CENTER | Age: 57
Discharge: HOME | End: 2022-07-12
Attending: INTERNAL MEDICINE | Admitting: INTERNAL MEDICINE
Payer: COMMERCIAL

## 2022-07-12 VITALS
SYSTOLIC BLOOD PRESSURE: 122 MMHG | DIASTOLIC BLOOD PRESSURE: 84 MMHG | WEIGHT: 162 LBS | RESPIRATION RATE: 16 BRPM | OXYGEN SATURATION: 96 % | HEART RATE: 60 BPM | BODY MASS INDEX: 26.03 KG/M2 | TEMPERATURE: 97 F | HEIGHT: 66 IN

## 2022-07-12 LAB — COLONOSCOPY: NORMAL

## 2022-07-12 PROCEDURE — G8907 PT DOC NO EVENTS ON DISCHARG: HCPCS

## 2022-07-12 PROCEDURE — 45378 DIAGNOSTIC COLONOSCOPY: CPT

## 2022-07-12 PROCEDURE — G8918 PT W/O PREOP ORDER IV AB PRO: HCPCS

## 2022-07-12 RX ORDER — ONDANSETRON 2 MG/ML
4 INJECTION INTRAMUSCULAR; INTRAVENOUS EVERY 6 HOURS PRN
Status: DISCONTINUED | OUTPATIENT
Start: 2022-07-12 | End: 2022-07-13 | Stop reason: HOSPADM

## 2022-07-12 RX ORDER — FLUMAZENIL 0.1 MG/ML
0.2 INJECTION, SOLUTION INTRAVENOUS
Status: DISCONTINUED | OUTPATIENT
Start: 2022-07-12 | End: 2022-07-13 | Stop reason: HOSPADM

## 2022-07-12 RX ORDER — NALOXONE HYDROCHLORIDE 0.4 MG/ML
0.2 INJECTION, SOLUTION INTRAMUSCULAR; INTRAVENOUS; SUBCUTANEOUS
Status: DISCONTINUED | OUTPATIENT
Start: 2022-07-12 | End: 2022-07-13 | Stop reason: HOSPADM

## 2022-07-12 RX ORDER — ONDANSETRON 4 MG/1
4 TABLET, ORALLY DISINTEGRATING ORAL EVERY 6 HOURS PRN
Status: DISCONTINUED | OUTPATIENT
Start: 2022-07-12 | End: 2022-07-13 | Stop reason: HOSPADM

## 2022-07-12 RX ORDER — NALOXONE HYDROCHLORIDE 0.4 MG/ML
0.4 INJECTION, SOLUTION INTRAMUSCULAR; INTRAVENOUS; SUBCUTANEOUS
Status: DISCONTINUED | OUTPATIENT
Start: 2022-07-12 | End: 2022-07-13 | Stop reason: HOSPADM

## 2022-07-12 RX ORDER — LIDOCAINE 40 MG/G
CREAM TOPICAL
Status: DISCONTINUED | OUTPATIENT
Start: 2022-07-12 | End: 2022-07-13 | Stop reason: HOSPADM

## 2022-07-12 RX ORDER — FENTANYL CITRATE 50 UG/ML
INJECTION, SOLUTION INTRAMUSCULAR; INTRAVENOUS PRN
Status: DISCONTINUED | OUTPATIENT
Start: 2022-07-12 | End: 2022-07-12 | Stop reason: HOSPADM

## 2022-07-12 RX ORDER — PROCHLORPERAZINE MALEATE 10 MG
10 TABLET ORAL EVERY 6 HOURS PRN
Status: DISCONTINUED | OUTPATIENT
Start: 2022-07-12 | End: 2022-07-13 | Stop reason: HOSPADM

## 2022-07-12 RX ORDER — ONDANSETRON 2 MG/ML
4 INJECTION INTRAMUSCULAR; INTRAVENOUS
Status: DISCONTINUED | OUTPATIENT
Start: 2022-07-12 | End: 2022-07-13 | Stop reason: HOSPADM
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
## 2022-07-13 ENCOUNTER — THERAPY VISIT (OUTPATIENT)
Dept: PHYSICAL THERAPY | Facility: CLINIC | Age: 57
End: 2022-07-13
Payer: COMMERCIAL

## 2022-07-13 DIAGNOSIS — M54.59 MECHANICAL LOW BACK PAIN: Primary | ICD-10-CM

## 2022-07-13 DIAGNOSIS — S06.0X0D CONCUSSION WITHOUT LOSS OF CONSCIOUSNESS, SUBSEQUENT ENCOUNTER: ICD-10-CM

## 2022-07-13 PROCEDURE — 97140 MANUAL THERAPY 1/> REGIONS: CPT | Mod: GP | Performed by: PHYSICAL THERAPIST

## 2022-07-13 PROCEDURE — 97110 THERAPEUTIC EXERCISES: CPT | Mod: GP | Performed by: PHYSICAL THERAPIST

## 2022-07-20 ENCOUNTER — THERAPY VISIT (OUTPATIENT)
Dept: PHYSICAL THERAPY | Facility: CLINIC | Age: 57
End: 2022-07-20
Payer: COMMERCIAL

## 2022-07-20 DIAGNOSIS — M54.59 MECHANICAL LOW BACK PAIN: ICD-10-CM

## 2022-07-20 DIAGNOSIS — S06.0X0D CONCUSSION WITHOUT LOSS OF CONSCIOUSNESS, SUBSEQUENT ENCOUNTER: Primary | ICD-10-CM

## 2022-07-20 PROCEDURE — 97112 NEUROMUSCULAR REEDUCATION: CPT | Mod: GP | Performed by: PHYSICAL THERAPIST

## 2022-07-20 PROCEDURE — 97140 MANUAL THERAPY 1/> REGIONS: CPT | Mod: GP | Performed by: PHYSICAL THERAPIST

## 2022-07-26 ENCOUNTER — THERAPY VISIT (OUTPATIENT)
Dept: PHYSICAL THERAPY | Facility: CLINIC | Age: 57
End: 2022-07-26
Payer: COMMERCIAL

## 2022-07-26 DIAGNOSIS — M25.561 ACUTE PAIN OF RIGHT KNEE: ICD-10-CM

## 2022-07-26 DIAGNOSIS — M54.59 MECHANICAL LOW BACK PAIN: Primary | ICD-10-CM

## 2022-07-26 DIAGNOSIS — M54.2 NECK PAIN: ICD-10-CM

## 2022-07-26 PROCEDURE — 97110 THERAPEUTIC EXERCISES: CPT | Mod: GP | Performed by: PHYSICAL THERAPIST

## 2022-07-26 PROCEDURE — 97140 MANUAL THERAPY 1/> REGIONS: CPT | Mod: GP | Performed by: PHYSICAL THERAPIST

## 2022-08-01 ENCOUNTER — THERAPY VISIT (OUTPATIENT)
Dept: PHYSICAL THERAPY | Facility: CLINIC | Age: 57
End: 2022-08-01
Payer: COMMERCIAL

## 2022-08-01 DIAGNOSIS — M54.59 MECHANICAL LOW BACK PAIN: Primary | ICD-10-CM

## 2022-08-01 DIAGNOSIS — S06.0X0D CONCUSSION WITHOUT LOSS OF CONSCIOUSNESS, SUBSEQUENT ENCOUNTER: ICD-10-CM

## 2022-08-01 PROCEDURE — 97110 THERAPEUTIC EXERCISES: CPT | Mod: GP | Performed by: PHYSICAL THERAPIST

## 2022-08-01 PROCEDURE — 97140 MANUAL THERAPY 1/> REGIONS: CPT | Mod: GP | Performed by: PHYSICAL THERAPIST

## 2022-08-01 NOTE — PROGRESS NOTES
PROGRESS  REPORT    Progress reporting period is from 6.23 to 8.1.22.       SUBJECTIVE  Subjective changes noted by patient:  .  Subjective: can feel locked in LB at times, moving better, ready to play softball    Current pain level is 3/10  .     Previous pain level was  6/10 Initial Pain level: 8/10.   Changes in function:  Yes (See Goal flowsheet attached for changes in current functional level)  Adverse reaction to treatment or activity: None    OBJECTIVE  Changes noted in objective findings:  Yes, full LS ROM now, improved motor control, substitution of hip hike w/ hip flexn continues  Objective: hypo SI motion , neg SI tests bilat   ASSESSMENT/PLAN  Updated problem list and treatment plan: Diagnosis 1:  LBP  Pain -  manual therapy, self management and home program  Decreased ROM/flexibility - manual therapy, therapeutic exercise and home program  STG/LTGs have been met or progress has been made towards goals:  Yes (See Goal flow sheet completed today.)  Assessment of Progress: The patient's condition is improving.  Patient is meeting short term goals and is progressing towards long term goals.  Self Management Plans:  Patient has been instructed in a home treatment program.  Patient  has been instructed in self management of symptoms.  I have re-evaluated this patient and find that the nature, scope, duration and intensity of the therapy is appropriate for the medical condition of the patient.  Katie continues to require the following intervention to meet STG and LTG's:  PT    Recommendations:  This patient would benefit from continued therapy.     Frequency:  1 X week, once daily  Duration:  for 6 weeks        Please refer to the daily flowsheet for treatment today, total treatment time and time spent performing 1:1 timed codes.

## 2022-08-09 ENCOUNTER — THERAPY VISIT (OUTPATIENT)
Dept: PHYSICAL THERAPY | Facility: CLINIC | Age: 57
End: 2022-08-09
Payer: COMMERCIAL

## 2022-08-09 DIAGNOSIS — M54.59 MECHANICAL LOW BACK PAIN: Primary | ICD-10-CM

## 2022-08-09 DIAGNOSIS — S06.0X0D CONCUSSION WITHOUT LOSS OF CONSCIOUSNESS, SUBSEQUENT ENCOUNTER: ICD-10-CM

## 2022-08-09 PROCEDURE — 97110 THERAPEUTIC EXERCISES: CPT | Mod: GP | Performed by: PHYSICAL THERAPIST

## 2022-08-09 PROCEDURE — 97140 MANUAL THERAPY 1/> REGIONS: CPT | Mod: GP | Performed by: PHYSICAL THERAPIST

## 2022-08-10 ENCOUNTER — OFFICE VISIT (OUTPATIENT)
Dept: NEUROLOGY | Facility: CLINIC | Age: 57
End: 2022-08-10
Payer: COMMERCIAL

## 2022-08-10 VITALS — SYSTOLIC BLOOD PRESSURE: 129 MMHG | HEART RATE: 92 BPM | DIASTOLIC BLOOD PRESSURE: 90 MMHG

## 2022-08-10 DIAGNOSIS — M54.2 NECK PAIN: ICD-10-CM

## 2022-08-10 DIAGNOSIS — F07.81 POST CONCUSSION SYNDROME: Primary | ICD-10-CM

## 2022-08-10 PROCEDURE — 99214 OFFICE O/P EST MOD 30 MIN: CPT | Performed by: NURSE PRACTITIONER
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
## 2022-08-10 NOTE — NURSING NOTE
Chief Complaint   Patient presents with     Concussion     Follow up visit  Persisting symptoms
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
## 2022-08-11 ENCOUNTER — OFFICE VISIT (OUTPATIENT)
Dept: RHEUMATOLOGY | Facility: CLINIC | Age: 57
End: 2022-08-11
Payer: COMMERCIAL

## 2022-08-11 VITALS
BODY MASS INDEX: 26.47 KG/M2 | HEART RATE: 78 BPM | WEIGHT: 164 LBS | DIASTOLIC BLOOD PRESSURE: 81 MMHG | SYSTOLIC BLOOD PRESSURE: 131 MMHG

## 2022-08-11 DIAGNOSIS — M25.561 CHRONIC PAIN OF RIGHT KNEE: ICD-10-CM

## 2022-08-11 DIAGNOSIS — G89.29 CHRONIC PAIN OF RIGHT KNEE: ICD-10-CM

## 2022-08-11 DIAGNOSIS — Z79.899 HIGH RISK MEDICATIONS (NOT ANTICOAGULANTS) LONG-TERM USE: ICD-10-CM

## 2022-08-11 DIAGNOSIS — M05.9 SEROPOSITIVE RHEUMATOID ARTHRITIS (H): Primary | ICD-10-CM

## 2022-08-11 PROCEDURE — 99214 OFFICE O/P EST MOD 30 MIN: CPT | Performed by: INTERNAL MEDICINE

## 2022-08-11 RX ORDER — MEDROXYPROGESTERONE ACETATE 150 MG/ML
50 INJECTION, SUSPENSION INTRAMUSCULAR WEEKLY
Qty: 4 ML | Refills: 6 | Status: SHIPPED | OUTPATIENT
Start: 2022-08-11 | End: 2023-02-16
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
## 2022-08-11 NOTE — PATIENT INSTRUCTIONS
In anticipation of knee surgery:     Enbrel should not be taken at least 2 weeks prior to the surgery; and restarted no sooner than 2 weeks after the surgery is completed and only in the absence of infection, absence of delayed wound healing, and only after given approval to restart by the surgeon.

## 2022-08-29 ENCOUNTER — THERAPY VISIT (OUTPATIENT)
Dept: PHYSICAL THERAPY | Facility: CLINIC | Age: 57
End: 2022-08-29
Payer: COMMERCIAL

## 2022-08-29 DIAGNOSIS — S16.1XXA STRAIN OF NECK MUSCLE: ICD-10-CM

## 2022-08-29 DIAGNOSIS — M25.561 ACUTE PAIN OF RIGHT KNEE: ICD-10-CM

## 2022-08-29 DIAGNOSIS — M54.2 CERVICALGIA: ICD-10-CM

## 2022-08-29 DIAGNOSIS — M54.2 NECK PAIN: Primary | ICD-10-CM

## 2022-08-29 PROCEDURE — 97164 PT RE-EVAL EST PLAN CARE: CPT | Mod: GP | Performed by: PHYSICAL THERAPIST

## 2022-08-29 PROCEDURE — 97140 MANUAL THERAPY 1/> REGIONS: CPT | Mod: GP | Performed by: PHYSICAL THERAPIST
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
## 2022-08-31 ENCOUNTER — THERAPY VISIT (OUTPATIENT)
Dept: PHYSICAL THERAPY | Facility: CLINIC | Age: 57
End: 2022-08-31
Payer: COMMERCIAL

## 2022-08-31 DIAGNOSIS — S13.4XXA WHIPLASH: ICD-10-CM

## 2022-08-31 DIAGNOSIS — M54.59 MECHANICAL LOW BACK PAIN: Primary | ICD-10-CM

## 2022-08-31 DIAGNOSIS — S16.1XXA STRAIN OF NECK MUSCLE: ICD-10-CM

## 2022-08-31 PROCEDURE — 97140 MANUAL THERAPY 1/> REGIONS: CPT | Mod: GP | Performed by: PHYSICAL THERAPIST

## 2022-08-31 PROCEDURE — 97112 NEUROMUSCULAR REEDUCATION: CPT | Mod: GP | Performed by: PHYSICAL THERAPIST

## 2022-08-31 PROCEDURE — 97161 PT EVAL LOW COMPLEX 20 MIN: CPT | Mod: GP | Performed by: PHYSICAL THERAPIST

## 2022-09-07 ENCOUNTER — THERAPY VISIT (OUTPATIENT)
Dept: PHYSICAL THERAPY | Facility: CLINIC | Age: 57
End: 2022-09-07
Payer: COMMERCIAL

## 2022-09-07 DIAGNOSIS — M54.2 CERVICALGIA: Primary | ICD-10-CM

## 2022-09-07 PROCEDURE — 97110 THERAPEUTIC EXERCISES: CPT | Mod: GP | Performed by: PHYSICAL THERAPIST

## 2022-09-07 PROCEDURE — 97140 MANUAL THERAPY 1/> REGIONS: CPT | Mod: GP | Performed by: PHYSICAL THERAPIST

## 2022-09-14 ENCOUNTER — THERAPY VISIT (OUTPATIENT)
Dept: PHYSICAL THERAPY | Facility: CLINIC | Age: 57
End: 2022-09-14
Payer: COMMERCIAL

## 2022-09-14 DIAGNOSIS — S06.0X0D CONCUSSION WITHOUT LOSS OF CONSCIOUSNESS, SUBSEQUENT ENCOUNTER: ICD-10-CM

## 2022-09-14 DIAGNOSIS — M54.2 CERVICALGIA: Primary | ICD-10-CM

## 2022-09-14 DIAGNOSIS — S16.1XXD STRAIN OF NECK MUSCLE, SUBSEQUENT ENCOUNTER: ICD-10-CM

## 2022-09-14 PROBLEM — S16.1XXA STRAIN OF NECK MUSCLE: Status: ACTIVE | Noted: 2022-09-14

## 2022-09-14 PROCEDURE — 97110 THERAPEUTIC EXERCISES: CPT | Mod: GP | Performed by: PHYSICAL THERAPIST

## 2022-09-14 PROCEDURE — 97140 MANUAL THERAPY 1/> REGIONS: CPT | Mod: GP | Performed by: PHYSICAL THERAPIST

## 2022-09-26 ENCOUNTER — TELEPHONE (OUTPATIENT)
Dept: ANESTHESIOLOGY | Facility: CLINIC | Age: 57
End: 2022-09-26

## 2022-09-26 NOTE — TELEPHONE ENCOUNTER
I called patient to remind them of there appointment at 8:00 am to arrive 15-20 minutes prior allow time for parking    Also to complete there questionnaire prior to there appointment

## 2022-10-03 ENCOUNTER — TELEPHONE (OUTPATIENT)
Dept: FAMILY MEDICINE | Facility: CLINIC | Age: 57
End: 2022-10-03

## 2022-10-03 NOTE — TELEPHONE ENCOUNTER
Patient Quality Outreach    Patient is due for the following:   Breast Cancer Screening - Mammogram    Next Steps:   Patient had mammogram on 04/21/2022    Type of outreach:    Chart review performed, no outreach needed.      Questions for provider review:    None     Jm Blanco

## 2022-10-10 ENCOUNTER — HEALTH MAINTENANCE LETTER (OUTPATIENT)
Age: 57
End: 2022-10-10

## 2022-10-20 PROBLEM — S16.1XXA STRAIN OF NECK MUSCLE: Status: RESOLVED | Noted: 2022-09-14 | Resolved: 2022-10-20

## 2022-11-03 NOTE — PROGRESS NOTES
Discharge Note    Progress reporting period is from last progress note on   to Sep 14, 2022.    Katie failed to follow up and current status is unknown.  Please see information below for last relevant information on current status.  Patient seen for 4 visits.    SUBJECTIVE  Subjective changes noted by patient:  felt so good after last,  .  Current pain level is 4/10.     Previous pain level was  6/10.   Changes in function:  Yes (See Goal flowsheet attached for changes in current functional level)  Adverse reaction to treatment or activity: None    OBJECTIVE  Changes noted in objective findings: less tone     ASSESSMENT/PLAN  Diagnosis: Cervalgia   Updated problem list and treatment plan:   Pain - HEP  Decreased ROM/flexibility - HEP  Decreased function - HEP  Impaired muscle performance - HEP  Decreased joint mobility - HEP  STG/LTGs have been met or progress has been made towards goals:  Yes, please see goal flowsheet for most current information  Assessment of Progress: current status is unknown.    Last current status:     Self Management Plans:  HEP  I have re-evaluated this patient and find that the nature, scope, duration and intensity of the therapy is appropriate for the medical condition of the patient.  Katie continues to require the following intervention to meet STG and LTG's:  HEP.    Recommendations:  Discharge with current home program.  Patient to follow up with MD as needed.    Please refer to the daily flowsheet for treatment today, total treatment time and time spent performing 1:1 timed codes.

## 2022-11-09 ENCOUNTER — OFFICE VISIT (OUTPATIENT)
Dept: NEUROLOGY | Facility: CLINIC | Age: 57
End: 2022-11-09
Payer: COMMERCIAL

## 2022-11-09 VITALS — SYSTOLIC BLOOD PRESSURE: 112 MMHG | HEART RATE: 86 BPM | DIASTOLIC BLOOD PRESSURE: 75 MMHG

## 2022-11-09 DIAGNOSIS — F07.81 POST CONCUSSION SYNDROME: Primary | ICD-10-CM

## 2022-11-09 PROCEDURE — 99214 OFFICE O/P EST MOD 30 MIN: CPT | Performed by: NURSE PRACTITIONER

## 2022-11-09 NOTE — PATIENT INSTRUCTIONS
Support staff will set up your 6 month follow up visit with me    Concussion Consult Overview Information Sheet is located under Letters In My Chart    Work letters can be found under letters in My Chart, if you need anything added or changed in your letter just message me through My Chart    If you need me to fill out FMLA, short term disability, or any paperwork you can upload the paperwork to My Chart, you need to get the paperwork from your HR department.     Message me through My Chart if you have any problems, questions, updates, or concerns    If you have any problems with My Chart please call [phone] and they can help

## 2022-11-09 NOTE — NURSING NOTE
Chief Complaint   Patient presents with     Follow Up     Concussion       JENNIFER Gillespie on 11/9/2022 at 8:02 AM
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
## 2022-11-09 NOTE — NURSING NOTE
CONCUSSION SYMPTOMS ASSESSMENT 6/21/2022 8/30/2022 11/9/2022   Headache or Pressure In Head 1 - mild 3 - moderate 0 - none   Upset Stomach or Throwing Up 0 - none 0 - none 0 - none   Problems with Balance 0 - none 0 - none 0 - none   Feeling Dizzy 0 - none 1 - mild 0 - none   Sensitivity to Light 0 - none 0 - none 0 - none   Sensitivity to Noise 0 - none 0 - none 0 - none   Mood Changes 0 - none 0 - none 0 - none   Feeling sluggish, hazy, or foggy 0 - none 0 - none 3 - moderate   Trouble Concentrating, Lack of Focus 1 - mild 1 - mild 2 - mild to moderate   Motion Sickness 0 - none 0 - none 0 - none   Vision Changes 0 - none 1 - mild 0 - none   Memory Problems 1 - mild 1 - mild 0 - none   Feeling Confused 0 - none 1 - mild 0 - none   Neck Pain 3 - moderate 4 - moderate to severe 1 - mild   Trouble Sleeping 0 - none 2 - mild to moderate 4 - moderate to severe   Total Number of Symptoms 4 8 4   Symptom Severity Score 6 14 10
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
## 2022-12-05 PROBLEM — S06.0X0A CONCUSSION WITHOUT LOSS OF CONSCIOUSNESS: Status: RESOLVED | Noted: 2021-09-07 | Resolved: 2022-12-05

## 2023-01-03 ENCOUNTER — TELEPHONE (OUTPATIENT)
Dept: FAMILY MEDICINE | Facility: CLINIC | Age: 58
End: 2023-01-03
Payer: COMMERCIAL

## 2023-01-03 NOTE — TELEPHONE ENCOUNTER
Please abstract the following data from this visit with this patient into the appropriate field in Epic:    Tests that can be patient reported without a hard copy:    Mammogram done on this date: 04/21/2022 (approximately), by this group: [facility], results were Negative.     Other Tests found in the patient's chart through Chart Review/Care Everywhere:    [facility]    Note to Abstraction: If this section is blank, no results were found via Chart Review/Care Everywhere.

## 2023-01-12 ENCOUNTER — TELEPHONE (OUTPATIENT)
Dept: RHEUMATOLOGY | Facility: CLINIC | Age: 58
End: 2023-01-12

## 2023-01-12 NOTE — TELEPHONE ENCOUNTER
Reason for Call:  Form, our goal is to have forms completed with 72 hours, however, some forms may require a visit or additional information.    Type of letter, form or note:  employer    Who is the form from?: Patient    Where did the form come from: Patient or family brought in       What clinic location was the form placed at?: [facility]    Where the form was placed: Put in doctors mail box Box/Folder    What number is listed as a contact on the form?: [phone]       Additional comments: Patient would like for you to mail her a copy after you fax it to [facility].    Call taken on 1/12/2023 at 9:12 AM by Celine Gamez

## 2023-01-13 NOTE — TELEPHONE ENCOUNTER
2 copies placed in the mail, one copy sent to scan, original kept at desk. Pt updated via phone call.    Conchis MEZA RN Specialty Triage 1/13/2023 4:06 PM

## 2023-02-16 ENCOUNTER — OFFICE VISIT (OUTPATIENT)
Dept: RHEUMATOLOGY | Facility: CLINIC | Age: 58
End: 2023-02-16
Payer: COMMERCIAL

## 2023-02-16 ENCOUNTER — ANCILLARY PROCEDURE (OUTPATIENT)
Dept: GENERAL RADIOLOGY | Facility: CLINIC | Age: 58
End: 2023-02-16
Attending: INTERNAL MEDICINE
Payer: COMMERCIAL

## 2023-02-16 ENCOUNTER — TELEPHONE (OUTPATIENT)
Dept: RHEUMATOLOGY | Facility: CLINIC | Age: 58
End: 2023-02-16

## 2023-02-16 VITALS
HEART RATE: 78 BPM | BODY MASS INDEX: 27.12 KG/M2 | WEIGHT: 168 LBS | DIASTOLIC BLOOD PRESSURE: 78 MMHG | OXYGEN SATURATION: 98 % | SYSTOLIC BLOOD PRESSURE: 115 MMHG

## 2023-02-16 DIAGNOSIS — M25.511 CHRONIC RIGHT SHOULDER PAIN: ICD-10-CM

## 2023-02-16 DIAGNOSIS — M54.50 CHRONIC MIDLINE LOW BACK PAIN WITHOUT SCIATICA: ICD-10-CM

## 2023-02-16 DIAGNOSIS — Z79.899 HIGH RISK MEDICATIONS (NOT ANTICOAGULANTS) LONG-TERM USE: ICD-10-CM

## 2023-02-16 DIAGNOSIS — G89.29 CHRONIC MIDLINE LOW BACK PAIN WITHOUT SCIATICA: ICD-10-CM

## 2023-02-16 DIAGNOSIS — G89.29 CHRONIC RIGHT SHOULDER PAIN: ICD-10-CM

## 2023-02-16 DIAGNOSIS — M05.9 SEROPOSITIVE RHEUMATOID ARTHRITIS (H): Primary | ICD-10-CM

## 2023-02-16 LAB
ALBUMIN SERPL-MCNC: 3.5 G/DL (ref 3.4–5)
ALP SERPL-CCNC: 52 U/L (ref 40–150)
ALT SERPL W P-5'-P-CCNC: 18 U/L (ref 0–50)
AST SERPL W P-5'-P-CCNC: 15 U/L (ref 0–45)
BASOPHILS # BLD AUTO: 0.1 10E3/UL (ref 0–0.2)
BASOPHILS NFR BLD AUTO: 2 %
BILIRUB DIRECT SERPL-MCNC: 0.2 MG/DL (ref 0–0.2)
BILIRUB SERPL-MCNC: 0.6 MG/DL (ref 0.2–1.3)
CREAT SERPL-MCNC: 0.7 MG/DL (ref 0.52–1.04)
CRP SERPL-MCNC: <2.9 MG/L (ref 0–8)
EOSINOPHIL # BLD AUTO: 0.2 10E3/UL (ref 0–0.7)
EOSINOPHIL NFR BLD AUTO: 6 %
ERYTHROCYTE [DISTWIDTH] IN BLOOD BY AUTOMATED COUNT: 13.6 % (ref 10–15)
ERYTHROCYTE [SEDIMENTATION RATE] IN BLOOD BY WESTERGREN METHOD: 15 MM/HR (ref 0–30)
GFR SERPL CREATININE-BSD FRML MDRD: >90 ML/MIN/1.73M2
HCT VFR BLD AUTO: 40.5 % (ref 35–47)
HGB BLD-MCNC: 13.1 G/DL (ref 11.7–15.7)
IMM GRANULOCYTES # BLD: 0 10E3/UL
IMM GRANULOCYTES NFR BLD: 0 %
LYMPHOCYTES # BLD AUTO: 1.7 10E3/UL (ref 0.8–5.3)
LYMPHOCYTES NFR BLD AUTO: 53 %
MCH RBC QN AUTO: 27.2 PG (ref 26.5–33)
MCHC RBC AUTO-ENTMCNC: 32.3 G/DL (ref 31.5–36.5)
MCV RBC AUTO: 84 FL (ref 78–100)
MONOCYTES # BLD AUTO: 0.5 10E3/UL (ref 0–1.3)
MONOCYTES NFR BLD AUTO: 16 %
NEUTROPHILS # BLD AUTO: 0.7 10E3/UL (ref 1.6–8.3)
NEUTROPHILS NFR BLD AUTO: 23 %
NRBC # BLD AUTO: 0 10E3/UL
NRBC BLD AUTO-RTO: 0 /100
PLATELET # BLD AUTO: 171 10E3/UL (ref 150–450)
PROT SERPL-MCNC: 7.5 G/DL (ref 6.8–8.8)
RBC # BLD AUTO: 4.82 10E6/UL (ref 3.8–5.2)
WBC # BLD AUTO: 3.2 10E3/UL (ref 4–11)

## 2023-02-16 PROCEDURE — 36415 COLL VENOUS BLD VENIPUNCTURE: CPT | Performed by: INTERNAL MEDICINE

## 2023-02-16 PROCEDURE — 85652 RBC SED RATE AUTOMATED: CPT | Performed by: INTERNAL MEDICINE

## 2023-02-16 PROCEDURE — 82565 ASSAY OF CREATININE: CPT | Performed by: INTERNAL MEDICINE

## 2023-02-16 PROCEDURE — 99214 OFFICE O/P EST MOD 30 MIN: CPT | Performed by: INTERNAL MEDICINE

## 2023-02-16 PROCEDURE — 85025 COMPLETE CBC W/AUTO DIFF WBC: CPT | Performed by: INTERNAL MEDICINE

## 2023-02-16 PROCEDURE — 72100 X-RAY EXAM L-S SPINE 2/3 VWS: CPT | Mod: TC | Performed by: RADIOLOGY

## 2023-02-16 PROCEDURE — 86481 TB AG RESPONSE T-CELL SUSP: CPT | Performed by: INTERNAL MEDICINE

## 2023-02-16 PROCEDURE — 86140 C-REACTIVE PROTEIN: CPT | Performed by: INTERNAL MEDICINE

## 2023-02-16 PROCEDURE — 80076 HEPATIC FUNCTION PANEL: CPT | Performed by: INTERNAL MEDICINE

## 2023-02-16 RX ORDER — HYDROXYCHLOROQUINE SULFATE 200 MG/1
200 TABLET, FILM COATED ORAL DAILY
Qty: 90 TABLET | Refills: 1 | Status: SHIPPED | OUTPATIENT
Start: 2023-02-16 | End: 2023-02-16

## 2023-02-16 RX ORDER — HYDROXYCHLOROQUINE SULFATE 200 MG/1
200 TABLET, FILM COATED ORAL DAILY
Qty: 90 TABLET | Refills: 1 | Status: SHIPPED | OUTPATIENT
Start: 2023-02-16 | End: 2023-08-17

## 2023-02-16 RX ORDER — MEDROXYPROGESTERONE ACETATE 150 MG/ML
50 INJECTION, SUSPENSION INTRAMUSCULAR WEEKLY
Qty: 4 ML | Refills: 6 | Status: SHIPPED | OUTPATIENT
Start: 2023-02-16 | End: 2023-08-17

## 2023-02-16 NOTE — PATIENT INSTRUCTIONS
RHEUMATOLOGY    Dr. Richard Mcfarlane    [facility]  [address] [place] [address]  [place] [address]  Phone number: [phone]  Fax number: [phone]    You may schedule your FLU shot by calling [phone] or if you would like to get your shot at a [place] pharmacy you may schedule online at [email or web address].    Thank you for choosing [facility]!    Noy Burt CMA Rheumatology

## 2023-02-16 NOTE — TELEPHONE ENCOUNTER
PA Initiation    Medication: Enbrel PA Initiated  Insurance Company: [employer] ([facility]) - Phone [phone] Fax [phone]  Pharmacy Filling the Rx:    Filling Pharmacy Phone:    Filling Pharmacy Fax:    Start Date: 2/16/2023        Maritza Hernandez [facility] Liaison  [email or web address]  Phone: [phone]  Fax: [phone]
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
## 2023-02-17 NOTE — TELEPHONE ENCOUNTER
Prior Authorization Approval    Authorization Effective Date: 1/1/2023  Authorization Expiration Date: 3/31/2023  Medication: Enbrel PA Approval in place  Approved Dose/Quantity: 4ml per 28 days  Reference #: [id number]   Insurance Company: [employer] ([facility]) - Phone [phone] Fax [phone]  Expected CoPay:       CoPay Card Available:      Foundation Assistance Needed:    Which Pharmacy is filling the prescription (Not needed for infusion/clinic administered):    Pharmacy Notified:    Patient Notified:    Yes        Maritza Hernandez [facility] Specialty Pharmacy Liaison  [email or web address]  Phone: [phone]  Fax: [phone]

## 2023-02-19 LAB
GAMMA INTERFERON BACKGROUND BLD IA-ACNC: 0.03 IU/ML
M TB IFN-G BLD-IMP: NEGATIVE
M TB IFN-G CD4+ BCKGRND COR BLD-ACNC: 9.97 IU/ML
MITOGEN IGNF BCKGRD COR BLD-ACNC: -0.01 IU/ML
MITOGEN IGNF BCKGRD COR BLD-ACNC: 0.01 IU/ML
QUANTIFERON MITOGEN: 10 IU/ML
QUANTIFERON NIL TUBE: 0.03 IU/ML
QUANTIFERON TB1 TUBE: 0.04 IU/ML
QUANTIFERON TB2 TUBE: 0.02
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
## 2023-03-02 ENCOUNTER — MYC MEDICAL ADVICE (OUTPATIENT)
Dept: RHEUMATOLOGY | Facility: CLINIC | Age: 58
End: 2023-03-02
Payer: COMMERCIAL

## 2023-03-03 ENCOUNTER — TELEPHONE (OUTPATIENT)
Dept: RHEUMATOLOGY | Facility: CLINIC | Age: 58
End: 2023-03-03
Payer: COMMERCIAL

## 2023-03-03 NOTE — TELEPHONE ENCOUNTER
Reason for Call:  Form, our goal is to have forms completed with 72 hours, however, some forms may require a visit or additional information.    Type of letter, form or note:  medical    Who is the form from?: Patient    Where did the form come from: Patient or family brought in       What clinic location was the form placed at?: [facility]    Where the form was placed: Put in the doctor mail box Box/Folder    What number is listed as a contact on the form?: [phone]       Additional comments: Patient would like for you to call her to  form when completed.    Call taken on 3/3/2023 at 9:35 AM by Celine Gamez

## 2023-03-07 ENCOUNTER — MEDICAL CORRESPONDENCE (OUTPATIENT)
Dept: HEALTH INFORMATION MANAGEMENT | Facility: CLINIC | Age: 58
End: 2023-03-07

## 2023-04-17 ENCOUNTER — TELEPHONE (OUTPATIENT)
Dept: RHEUMATOLOGY | Facility: CLINIC | Age: 58
End: 2023-04-17
Payer: COMMERCIAL

## 2023-04-17 NOTE — TELEPHONE ENCOUNTER
Prior Authorization Approval    Authorization Effective Date: 1/22/2022  Authorization Expiration Date: 12/31/2023  Medication: Enbrel PA Approval in Place  Approved Dose/Quantity: 4ml per 28 days  Reference #: [id number]   Insurance Company: [employer] ([facility]) - Phone [phone] Fax [phone]  Expected CoPay:       CoPay Card Available:      Foundation Assistance Needed:    Which Pharmacy is filling the prescription (Not needed for infusion/clinic administered):    Pharmacy Notified:    Patient Notified:    No, renewal in place        Maritza Hernandez CphT  [facility] Liaison  [email or web address]  Phone: [phone]  Fax: [phone]

## 2023-04-20 ENCOUNTER — PATIENT OUTREACH (OUTPATIENT)
Dept: CARE COORDINATION | Facility: CLINIC | Age: 58
End: 2023-04-20
Payer: COMMERCIAL

## 2023-04-20 PROBLEM — M54.2 CERVICALGIA: Status: RESOLVED | Noted: 2021-06-17 | Resolved: 2023-04-20

## 2023-04-20 PROBLEM — M25.561 ACUTE PAIN OF RIGHT KNEE: Status: RESOLVED | Noted: 2021-06-17 | Resolved: 2023-04-20

## 2023-05-06 DIAGNOSIS — J45.31 MILD PERSISTENT ASTHMA WITH ACUTE EXACERBATION: ICD-10-CM

## 2023-05-09 RX ORDER — ALBUTEROL SULFATE 0.83 MG/ML
SOLUTION RESPIRATORY (INHALATION)
Qty: 150 ML | Refills: 5 | Status: SHIPPED | OUTPATIENT
Start: 2023-05-09 | End: 2023-06-01

## 2023-05-12 DIAGNOSIS — J45.31 MILD PERSISTENT ASTHMA WITH ACUTE EXACERBATION: ICD-10-CM

## 2023-05-12 RX ORDER — ALBUTEROL SULFATE 90 UG/1
AEROSOL, METERED RESPIRATORY (INHALATION)
Qty: 18 G | Refills: 1 | OUTPATIENT
Start: 2023-05-12

## 2023-05-31 ENCOUNTER — OFFICE VISIT (OUTPATIENT)
Dept: OPHTHALMOLOGY | Facility: CLINIC | Age: 58
End: 2023-05-31
Attending: INTERNAL MEDICINE
Payer: COMMERCIAL

## 2023-05-31 DIAGNOSIS — Z79.899 HIGH RISK MEDICATIONS (NOT ANTICOAGULANTS) LONG-TERM USE: Primary | ICD-10-CM

## 2023-05-31 DIAGNOSIS — H04.123 DRY EYE SYNDROME, BILATERAL: ICD-10-CM

## 2023-05-31 DIAGNOSIS — M05.9 SEROPOSITIVE RHEUMATOID ARTHRITIS (H): ICD-10-CM

## 2023-05-31 PROCEDURE — 99213 OFFICE O/P EST LOW 20 MIN: CPT | Performed by: STUDENT IN AN ORGANIZED HEALTH CARE EDUCATION/TRAINING PROGRAM

## 2023-05-31 PROCEDURE — 92083 EXTENDED VISUAL FIELD XM: CPT | Performed by: STUDENT IN AN ORGANIZED HEALTH CARE EDUCATION/TRAINING PROGRAM

## 2023-05-31 PROCEDURE — 92134 CPTRZ OPH DX IMG PST SGM RTA: CPT | Performed by: STUDENT IN AN ORGANIZED HEALTH CARE EDUCATION/TRAINING PROGRAM

## 2023-05-31 ASSESSMENT — EXTERNAL EXAM - LEFT EYE: OS_EXAM: NORMAL

## 2023-05-31 ASSESSMENT — EXTERNAL EXAM - RIGHT EYE: OD_EXAM: NORMAL

## 2023-05-31 ASSESSMENT — SLIT LAMP EXAM - LIDS
COMMENTS: NORMAL
COMMENTS: NORMAL

## 2023-05-31 ASSESSMENT — VISUAL ACUITY
OS_SC: 20/25
OS_SC+: -1
METHOD: SNELLEN - LINEAR
OD_SC+: -1
OD_SC: 20/30

## 2023-05-31 ASSESSMENT — CUP TO DISC RATIO
OD_RATIO: 0.45
OS_RATIO: 0.45

## 2023-05-31 NOTE — PROGRESS NOTES
" Current Eye Medications:  Artificial tears both eyes as needed.       Subjective:  Dr. Mcfarlane recommended a Plaquenil visual field and OCT with Dr. Vides.  Patient complains of continued decreasing near vision, without correction.  Distance vision is good without correction.  Her eyes feel dry.      Objective:  See Ophthalmology Exam.       Assessment:  Katie Aponte is a 57 year old female who presents with:   Encounter Diagnoses   Name Primary?     High risk medications (not anticoagulants) long-term use Plaquenil since Nov 2018 (4.5 years now)    Macular SD-OCT within normal limits both eyes.    Bhatt visual field (HVF) 10-2 within normal limits both eyes (mild scattered loss both eyes)    No signs of Plaquenil retinal toxicity at present.        Seropositive rheumatoid arthritis (H)      Dry eye syndrome, bilateral        Plan:  Normal Plaquenil eye tests today.    Continue artificial tears up to four times a day as needed (Refresh Optive or Systane Balance. Avoid \"get the red out\" drops).     Jacinta Vides MD  [phone]      "

## 2023-05-31 NOTE — LETTER
"    5/31/2023         RE: Katie Aponte  [address]  [place] [address]        Dear Colleague,    Thank you for referring your patient, Katie Aponte, to the [facility]. Please see a copy of my visit note below.     Current Eye Medications:  Artificial tears both eyes as needed.       Subjective:  Dr. Mcfarlane recommended a Plaquenil visual field and OCT with Dr. Vides.  Patient complains of continued decreasing near vision, without correction.  Distance vision is good without correction.  Her eyes feel dry.      Objective:  See Ophthalmology Exam.       Assessment:  Katie Aponte is a 57 year old female who presents with:   Encounter Diagnoses   Name Primary?     High risk medications (not anticoagulants) long-term use Plaquenil since Nov 2018 (4.5 years now)    Macular SD-OCT within normal limits both eyes.    Bhatt visual field (HVF) 10-2 within normal limits both eyes (mild scattered loss both eyes)    No signs of Plaquenil retinal toxicity at present.        Seropositive rheumatoid arthritis (H)      Dry eye syndrome, bilateral        Plan:  Normal Plaquenil eye tests today.    Continue artificial tears up to four times a day as needed (Refresh Optive or Systane Balance. Avoid \"get the red out\" drops).     Jacinta Vides MD  [phone]          Again, thank you for allowing me to participate in the care of your patient.        Sincerely,        Jacinta Vides MD    "

## 2023-05-31 NOTE — PATIENT INSTRUCTIONS
"Normal Plaquenil eye tests today.    Continue artificial tears up to four times a day as needed (Refresh Optive or Systane Balance. Avoid \"get the red out\" drops).     Jacinta Vides MD  [phone]    "

## 2023-06-01 ENCOUNTER — VIRTUAL VISIT (OUTPATIENT)
Dept: FAMILY MEDICINE | Facility: CLINIC | Age: 58
End: 2023-06-01
Payer: COMMERCIAL

## 2023-06-01 DIAGNOSIS — J45.31 MILD PERSISTENT ASTHMA WITH ACUTE EXACERBATION: Primary | ICD-10-CM

## 2023-06-01 PROBLEM — M17.11 LOCALIZED OSTEOARTHRITIS OF RIGHT KNEE: Status: ACTIVE | Noted: 2022-08-29

## 2023-06-01 PROCEDURE — 99213 OFFICE O/P EST LOW 20 MIN: CPT | Mod: VID | Performed by: NURSE PRACTITIONER

## 2023-06-01 RX ORDER — ALBUTEROL SULFATE 0.83 MG/ML
SOLUTION RESPIRATORY (INHALATION)
Qty: 150 ML | Refills: 1 | Status: SHIPPED | OUTPATIENT
Start: 2023-06-01 | End: 2024-07-01

## 2023-06-01 RX ORDER — ALBUTEROL SULFATE 90 UG/1
AEROSOL, METERED RESPIRATORY (INHALATION)
Qty: 18 G | Refills: 3 | Status: SHIPPED | OUTPATIENT
Start: 2023-06-01 | End: 2024-02-15

## 2023-06-01 ASSESSMENT — ASTHMA QUESTIONNAIRES
ACT_TOTALSCORE: 18
ACT_TOTALSCORE: 18
QUESTION_2 LAST FOUR WEEKS HOW OFTEN HAVE YOU HAD SHORTNESS OF BREATH: ONCE OR TWICE A WEEK
QUESTION_1 LAST FOUR WEEKS HOW MUCH OF THE TIME DID YOUR ASTHMA KEEP YOU FROM GETTING AS MUCH DONE AT WORK, SCHOOL OR AT HOME: A LITTLE OF THE TIME
QUESTION_4 LAST FOUR WEEKS HOW OFTEN HAVE YOU USED YOUR RESCUE INHALER OR NEBULIZER MEDICATION (SUCH AS ALBUTEROL): TWO OR THREE TIMES PER WEEK
QUESTION_3 LAST FOUR WEEKS HOW OFTEN DID YOUR ASTHMA SYMPTOMS (WHEEZING, COUGHING, SHORTNESS OF BREATH, CHEST TIGHTNESS OR PAIN) WAKE YOU UP AT NIGHT OR EARLIER THAN USUAL IN THE MORNING: ONCE OR TWICE
QUESTION_5 LAST FOUR WEEKS HOW WOULD YOU RATE YOUR ASTHMA CONTROL: SOMEWHAT CONTROLLED

## 2023-06-01 NOTE — LETTER
My Asthma Action Plan    Name: Katie Aponte   YOB: 1965  Date: 6/1/2023   My doctor: TREVER Kim CNP   My clinic: [facility]        My Rescue Medicine:   Albuterol inhaler (Proair/Ventolin/Proventil HFA)  2-4 puffs EVERY 4 HOURS as needed. Use a spacer if recommended by your provider.   My Asthma Severity:   Intermittent / Exercise Induced  Know your asthma triggers:     smoke  upper respiratory infections          GREEN ZONE   Good Control  I feel good  No cough or wheeze  Can work, sleep and play without asthma symptoms       Take your asthma control medicine every day.     If exercise triggers your asthma, take your rescue medication  15 minutes before exercise or sports, and  During exercise if you have asthma symptoms  Spacer to use with inhaler: If you have a spacer, make sure to use it with your inhaler             YELLOW ZONE Getting Worse  I have ANY of these:  I do not feel good  Cough or wheeze  Chest feels tight  Wake up at night   Keep taking your Green Zone medications  Start taking your rescue medicine:  every 20 minutes for up to 1 hour. Then every 4 hours for 24-48 hours.  If you stay in the Yellow Zone for more than 12-24 hours, contact your doctor.  If you do not return to the Green Zone in 12-24 hours or you get worse, start taking your oral steroid medicine if prescribed by your provider.           RED ZONE Medical Alert - Get Help  I have ANY of these:  I feel awful  Medicine is not helping  Breathing getting harder  Trouble walking or talking  Nose opens wide to breathe       Take your rescue medicine NOW  If your provider has prescribed an oral steroid medicine, start taking it NOW  Call your doctor NOW  If you are still in the Red Zone after 20 minutes and you have not reached your doctor:  Take your rescue medicine again and  Call 911 or go to the emergency room right away    See your regular doctor within 2 weeks of an Emergency  Room or Urgent Care visit for follow-up treatment.          Annual Reminders:  Meet with Asthma Educator,  Flu Shot in the Fall, consider Pneumonia Vaccination for patients with asthma (aged 19 and older).    Pharmacy:    [id number] - [place], [place] - [address] [facility] [facility] - [place], [place] - [address] [facility] [place], [place] - [address]    Electronically signed by TREVER Kim CNP   Date: 06/01/23                    Asthma Triggers  How To Control Things That Make Your Asthma Worse    Triggers are things that make your asthma worse.  Look at the list below to help you find your triggers and   what you can do about them. You can help prevent asthma flare-ups by staying away from your triggers.      Trigger                                                          What you can do   Cigarette Smoke  Tobacco smoke can make asthma worse. Do not allow smoking in your home, car or around you.  Be sure no one smokes at a child s day care or school.  If you smoke, ask your health care provider for ways to help you quit.  Ask family members to quit too.  Ask your health care provider for a referral to Quit Plan to help you quit smoking, or call 9-435-671-PLAN.     Colds, Flu, Bronchitis  These are common triggers of asthma. Wash your hands often.  Don t touch your eyes, nose or mouth.  Get a flu shot every year.     Dust Mites  These are tiny bugs that live in cloth or carpet. They are too small to see. Wash sheets and blankets in hot water every week.   Encase pillows and mattress in dust mite proof covers.  Avoid having carpet if you can. If you have carpet, vacuum weekly.   Use a dust mask and HEPA vacuum.   Pollen and Outdoor Mold  Some people are allergic to trees, grass, or weed pollen, or molds. Try to keep your windows closed.  Limit time out doors when pollen count is high.   Ask you health  care provider about taking medicine during allergy season.     Animal Dander  Some people are allergic to skin flakes, urine or saliva from pets with fur or feathers. Keep pets with fur or feathers out of your home.    If you can t keep the pet outdoors, then keep the pet out of your bedroom.  Keep the bedroom door closed.  Keep pets off cloth furniture and away from stuffed toys.     Mice, Rats, and Cockroaches  Some people are allergic to the waste from these pests.   Cover food and garbage.  Clean up spills and food crumbs.  Store grease in the refrigerator.   Keep food out of the bedroom.   Indoor Mold  This can be a trigger if your home has high moisture. Fix leaking faucets, pipes, or other sources of water.   Clean moldy surfaces.  Dehumidify basement if it is damp and smelly.   Smoke, Strong Odors, and Sprays  These can reduce air quality. Stay away from strong odors and sprays, such as perfume, powder, hair spray, paints, smoke incense, paint, cleaning products, candles and new carpet.   Exercise or Sports  Some people with asthma have this trigger. Be active!  Ask your doctor about taking medicine before sports or exercise to prevent symptoms.    Warm up for 5-10 minutes before and after sports or exercise.     Other Triggers of Asthma  Cold air:  Cover your nose and mouth with a scarf.  Sometimes laughing or crying can be a trigger.  Some medicines and food can trigger asthma.

## 2023-06-01 NOTE — PROGRESS NOTES
"Katie is a 57 year old who is being evaluated via a billable video visit.      How would you like to obtain your AVS? MyChart both   If the video visit is dropped, the invitation should be resent by: Text to cell phone: [phone]  Will anyone else be joining your video visit? No         Assessment & Plan     Mild persistent asthma with acute exacerbation  Refilled Albuterol which she uses prn. Symptoms have been worse during pollen season.  - REVIEW OF HEALTH MAINTENANCE PROTOCOL ORDERS  - albuterol (PROAIR HFA/PROVENTIL HFA/VENTOLIN HFA) 108 (90 Base) MCG/ACT inhaler; INHALE 2 PUFFS INTO THE LUNGS EVERY 6 HOURS AS NEEDED FOR SHORTNESS OF BREATH OR DIFFICULT BREATHING OR WHEEZING    Prescription drug management  15 minutes spent by me on the date of the encounter doing chart review, history and exam, documentation and further activities per the note       BMI:   Estimated body mass index is 27.12 kg/m  as calculated from the following:    Height as of 7/12/22: 1.676 m (5' 6\").    Weight as of 2/16/23: 76.2 kg (168 lb).       See Patient Instructions    TREVER Kim [facility]    Neida Wilson is a 57 year old, presenting for the following health issues:  No chief complaint on file.         View : No data to display.              History of Present Illness       She eats 2-3 servings of fruits and vegetables daily.She consumes 2 sweetened beverage(s) daily.She exercises with enough effort to increase her heart rate 30 to 60 minutes per day.  She exercises with enough effort to increase her heart rate 3 or less days per week.   She is taking medications regularly.       Asthma Follow-Up    Was ACT completed today?  Yes        6/1/2023     2:15 PM   ACT Total Scores   ACT TOTAL SCORE (Goal Greater than or Equal to 20) 18   In the past 12 months, how many times did you visit the emergency room for your asthma without being admitted to the hospital? 0   In the past 12 " months, how many times were you hospitalized overnight because of your asthma? 0       How many days per week do you miss taking your asthma controller medication?  I do not have an asthma controller medication    Please describe any recent triggers for your asthma: pollens and animal dander    Have you had any Emergency Room Visits, Urgent Care Visits, or Hospital Admissions since your last office visit?  No    Symptoms are worsened during pollen season, has been using Albuterol almost daily recently. Once pollen season is over, her symptoms improve substantially and she rarely uses the Albuterol.    Review of Systems   Constitutional, HEENT, cardiovascular, pulmonary, gi and gu systems are negative, except as otherwise noted.      Objective           Vitals:  No vitals were obtained today due to virtual visit.    Physical Exam   GENERAL: Healthy, alert and no distress  EYES: Eyes grossly normal to inspection.  No discharge or erythema, or obvious scleral/conjunctival abnormalities.  HENT: Normal cephalic/atraumatic.  External ears, nose and mouth without ulcers or lesions.  No nasal drainage visible.  NECK: No asymmetry, visible masses or scars  RESP: No audible wheeze, cough, or visible cyanosis.  No visible retractions or increased work of breathing.    SKIN: Visible skin clear. No significant rash, abnormal pigmentation or lesions.  NEURO: Cranial nerves grossly intact.  Mentation and speech appropriate for age.  PSYCH: Mentation appears normal, affect normal/bright, judgement and insight intact, normal speech and appearance well-groomed.            Video-Visit Details    Type of service:  Video Visit     Originating Location (pt. Location): Other car  Distant Location (provider location):  Off-site  Platform used for Video Visit: Nancie
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
## 2023-06-06 ENCOUNTER — OFFICE VISIT (OUTPATIENT)
Dept: PHYSICAL MEDICINE AND REHAB | Facility: CLINIC | Age: 58
End: 2023-06-06
Payer: COMMERCIAL

## 2023-06-06 DIAGNOSIS — S06.0XAA CONCUSSION WITH UNKNOWN LOSS OF CONSCIOUSNESS STATUS, INITIAL ENCOUNTER: Primary | ICD-10-CM

## 2023-06-06 PROCEDURE — 99205 OFFICE O/P NEW HI 60 MIN: CPT | Performed by: PHYSICAL MEDICINE & REHABILITATION

## 2023-06-06 NOTE — NURSING NOTE
Chief Complaint   Patient presents with     Consult For     Transfer of care from Yessi   Getting headaches. Worsens throughout the day. Not taking anything for pain relief.
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
## 2023-06-10 ENCOUNTER — HEALTH MAINTENANCE LETTER (OUTPATIENT)
Age: 58
End: 2023-06-10

## 2023-08-17 ENCOUNTER — OFFICE VISIT (OUTPATIENT)
Dept: RHEUMATOLOGY | Facility: CLINIC | Age: 58
End: 2023-08-17
Payer: COMMERCIAL

## 2023-08-17 VITALS
WEIGHT: 160.4 LBS | TEMPERATURE: 98.6 F | HEART RATE: 99 BPM | SYSTOLIC BLOOD PRESSURE: 109 MMHG | RESPIRATION RATE: 16 BRPM | OXYGEN SATURATION: 99 % | DIASTOLIC BLOOD PRESSURE: 70 MMHG | BODY MASS INDEX: 25.89 KG/M2

## 2023-08-17 DIAGNOSIS — Z13.820 OSTEOPOROSIS SCREENING: ICD-10-CM

## 2023-08-17 DIAGNOSIS — M05.9 SEROPOSITIVE RHEUMATOID ARTHRITIS (H): Primary | ICD-10-CM

## 2023-08-17 DIAGNOSIS — Z78.0 POST-MENOPAUSAL: ICD-10-CM

## 2023-08-17 PROCEDURE — 99214 OFFICE O/P EST MOD 30 MIN: CPT | Performed by: INTERNAL MEDICINE

## 2023-08-17 RX ORDER — HYDROXYCHLOROQUINE SULFATE 200 MG/1
200 TABLET, FILM COATED ORAL DAILY
Qty: 90 TABLET | Refills: 2 | Status: SHIPPED | OUTPATIENT
Start: 2023-08-17 | End: 2024-02-19

## 2023-08-17 RX ORDER — MEDROXYPROGESTERONE ACETATE 150 MG/ML
50 INJECTION, SUSPENSION INTRAMUSCULAR WEEKLY
Qty: 4 ML | Refills: 6 | Status: SHIPPED | OUTPATIENT
Start: 2023-08-17 | End: 2024-02-19

## 2023-08-17 NOTE — NURSING NOTE
RAPID3 (0-30) Cumulative Score  10.7          RAPID3 Weighted Score (divide #4 by 3 and that is the weighted score)  3.5

## 2023-08-17 NOTE — PATIENT INSTRUCTIONS
RHEUMATOLOGY    [facility]  [address] [place] [address]  [place] [address]    Phone number: [phone]  Fax number: [phone]    If you need a medication refill, please contact us as you may need lab work and/or a follow up visit prior to your refill.      Thank you for choosing [facility]!    Noy Burt CMA Rheumatology    ----------------    Thumb spica splint      DEXA Scan (Bone scan) Please call and schedule an appointment at:    [facility]  [phone]
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
## 2023-09-15 ENCOUNTER — NURSE TRIAGE (OUTPATIENT)
Dept: FAMILY MEDICINE | Facility: CLINIC | Age: 58
End: 2023-09-15
Payer: COMMERCIAL

## 2023-09-15 ENCOUNTER — OFFICE VISIT (OUTPATIENT)
Dept: URGENT CARE | Facility: URGENT CARE | Age: 58
End: 2023-09-15
Payer: COMMERCIAL

## 2023-09-15 VITALS
HEART RATE: 82 BPM | TEMPERATURE: 98 F | SYSTOLIC BLOOD PRESSURE: 126 MMHG | OXYGEN SATURATION: 98 % | WEIGHT: 160 LBS | DIASTOLIC BLOOD PRESSURE: 80 MMHG | BODY MASS INDEX: 25.82 KG/M2 | RESPIRATION RATE: 15 BRPM

## 2023-09-15 DIAGNOSIS — J02.9 SORE THROAT: ICD-10-CM

## 2023-09-15 DIAGNOSIS — B34.9 VIRAL SYNDROME: Primary | ICD-10-CM

## 2023-09-15 DIAGNOSIS — J45.901 EXACERBATION OF ASTHMA, UNSPECIFIED ASTHMA SEVERITY, UNSPECIFIED WHETHER PERSISTENT: ICD-10-CM

## 2023-09-15 LAB
DEPRECATED S PYO AG THROAT QL EIA: NEGATIVE
GROUP A STREP BY PCR: NOT DETECTED

## 2023-09-15 PROCEDURE — 99214 OFFICE O/P EST MOD 30 MIN: CPT | Performed by: PHYSICIAN ASSISTANT

## 2023-09-15 PROCEDURE — 87651 STREP A DNA AMP PROBE: CPT | Performed by: PHYSICIAN ASSISTANT

## 2023-09-15 RX ORDER — PREDNISONE 20 MG/1
TABLET ORAL
Qty: 10 TABLET | Refills: 0 | Status: SHIPPED | OUTPATIENT
Start: 2023-09-15 | End: 2024-04-18

## 2023-09-15 NOTE — TELEPHONE ENCOUNTER
"Patient calling with a sore throat that started Wednesday night. Pain is 3 to 4,has drainage since her allergies have been acting up. No fever, states she has melanie wheezing since she has asthma.  Also has ear ache with her sore throat.    Disposition states to be seen in office today (9/15/23). No office visits available for [facility] or [place] location. Writer stated patient should be evaluated in urgent care. Patient verbalized understanding and intends to follow plan of care and head to [place] location.    Srinivas Bustos RN        Reason for Disposition   Earache also present    Additional Information   Negative: SEVERE difficulty breathing (e.g., struggling for each breath, speaks in single words)   Negative: Sounds like a life-threatening emergency to the triager   Negative: Throat culture results, call about   Negative: Productive cough is main symptom   Negative: Runny nose is main symptom   Negative: Drooling or spitting out saliva (because can't swallow)   Negative: Unable to open mouth completely   Negative: Drinking very little and has signs of dehydration (e.g., no urine > 12 hours, very dry mouth, very lightheaded)   Negative: Patient sounds very sick or weak to the triager   Negative: Difficulty breathing (per caller) but not severe   Negative: Fever > 103 F (39.4 C)   Negative: Refuses to drink anything for > 12 hours   Negative: SEVERE sore throat pain   Negative: Pus on tonsils (back of throat) and swollen neck lymph nodes ('glands')    Answer Assessment - Initial Assessment Questions  1. ONSET: \"When did the throat start hurting?\" (Hours or days ago)       Late Wednesday, more on Thursday,   2. SEVERITY: \"How bad is the sore throat?\" (Scale 1-10; mild, moderate or severe)    - MILD (1-3):  Doesn't interfere with eating or normal activities.    - MODERATE (4-7): Interferes with eating some solids and normal activities.    - SEVERE (8-10):  Excruciating pain, interferes with most " "normal activities.    - SEVERE WITH DYSPHAGIA (10): Can't swallow liquids, drooling.      3 or 4  3. STREP EXPOSURE: \"Has there been any exposure to strep within the past week?\" If Yes, ask: \"What type of contact occurred?\"       No  4.  VIRAL SYMPTOMS: \"Are there any symptoms of a cold, such as a runny nose, cough, hoarse voice or red eyes?\"       Allergies are bad, drainage  5. FEVER: \"Do you have a fever?\" If Yes, ask: \"What is your temperature, how was it measured, and when did it start?\"      no  6. PUS ON THE TONSILS: \"Is there pus on the tonsils in the back of your throat?\"     Havent looked  7. OTHER SYMPTOMS: \"Do you have any other symptoms?\" (e.g., difficulty breathing, headache, rash)      Has asthma, wheezing, uses nebulizer    Protocols used: Sore Throat-A-OH    "

## 2023-09-15 NOTE — PROGRESS NOTES
Chief Complaint   Patient presents with    Pharyngitis     Onset: 9/13/2023; Wednesday started sore throat, swollen glands, coughing, Rhinorrhea, and chest congestion.              Results for orders placed or performed in visit on 09/15/23   Streptococcus A Rapid Screen w/Reflex to PCR - Clinic Collect     Status: Normal    Specimen: Throat; Swab   Result Value Ref Range    Group A Strep antigen Negative Negative           ASSESSMENT:     ICD-10-CM    1. Viral syndrome  B34.9       2. Sore throat  J02.9 Streptococcus A Rapid Screen w/Reflex to PCR - Clinic Collect     Group A Streptococcus PCR Throat Swab     predniSONE (DELTASONE) 20 MG tablet      3. Exacerbation of asthma, unspecified asthma severity, unspecified whether persistent  J45.901 predniSONE (DELTASONE) 20 MG tablet            PLAN: Likely viral sore throat.  Further strep test pending.  Did to home COVID test which were negative.  Asthma flare, add oral prednisone.  I have discussed clinical findings with patient.  Side effects of medications discussed.  Symptomatic care is discussed.  I have discussed the possibility of  worsening symptoms and indication to RTC or go to the ER if they occur.  All questions are answered, patient indicates understanding of these issues and is in agreement with plan.   Patient care instructions are discussed/given at the end of visit.   Lots of rest and fluids.      Karlee Schmidt PA-C      SUBJECTIVE:  57-year-old female presents with sore throat for 2 days.  Glands swollen.  Coughing, runny nose, chest congestion.  Asthma flare with it.  No fever.      Allergies   Allergen Reactions    Droperidol Itching     Feels jumpy    Morphine Sulfate (Concentrate) Nausea and Nausea and Vomiting     Other reaction(s): Vomiting    Seasonal Allergies        Past Medical History:   Diagnosis Date    Arthritis     Herpes     Under eye    Joint pain     Seasonal allergies     Thrombocytopenia (H) 09/25/2015    Uncomplicated  asthma     very mild       albuterol (PROAIR HFA/PROVENTIL HFA/VENTOLIN HFA) 108 (90 Base) MCG/ACT inhaler, INHALE 2 PUFFS INTO THE LUNGS EVERY 6 HOURS AS NEEDED FOR SHORTNESS OF BREATH OR DIFFICULT BREATHING OR WHEEZING  albuterol (PROVENTIL) (2.5 MG/3ML) 0.083% neb solution, USE 1 VIAL VIA NEBULIZER FOUR TIMES DAILY AS NEEDED FOR SHORTNESS OF BREATH/DYSPNEA OR WHEEZING  desonide (DESOWEN) 0.05 % ointment, Apply topically 2 times daily  diclofenac (VOLTAREN) 1 % topical gel, Apply up to 2 grams of 1% gel to right wrist up to 4 times daily as needed for joint pain (maximum: 8 g per upper extremity per day)  etanercept (ENBREL SURECLICK) 50 MG/ML autoinjector, Inject 50 mg Subcutaneous once a week . Hold for signs of infection, and seek medical attention.  Fluocinolone Acetonide Scalp 0.01 % OIL oil, Apply topically 2 times daily as needed  hydroxychloroquine (PLAQUENIL) 200 MG tablet, Take 1 tablet (200 mg) by mouth daily .  Yearly eye exam, including 10-2 VF and SD-OCT, is required.  ipratropium - albuterol 0.5 mg/2.5 mg/3 mL (DUONEB) 0.5-2.5 (3) MG/3ML neb solution, Take 1 vial (3 mLs) by nebulization every 6 hours as needed for shortness of breath / dyspnea or wheezing  order for DME, Equipment being ordered: Aerosol mask. Use with nebulizer.  order for DME, Equipment being ordered: Nebulizer with tubing and instructions  PREMARIN 1.25 MG tablet,   valACYclovir (VALTREX) 500 MG tablet, Take 500 mg by mouth as needed Reported on 4/11/2017    No current facility-administered medications on file prior to visit.      Social History     Tobacco Use    Smoking status: Never    Smokeless tobacco: Never   Substance Use Topics    Alcohol use: Yes     Alcohol/week: 0.0 standard drinks of alcohol     Comment: SOCIAL - 1 glass of wine twice a week; 2 drinks on the weekend       ROS:  CONSTITUTIONAL: Negative for fatigue or fever.  EYES: Negative for eye problems.  ENT: As above.  RESP: As above.  CV: Negative for chest  pains.  GI: Negative for vomiting.  MUSCULOSKELETAL:  Negative for significant muscle or joint pains.  NEUROLOGIC: Negative for headaches.  SKIN: Negative for rash.  PSYCH: Normal mentation for age.    OBJECTIVE:  /80 (BP Location: Left arm, Patient Position: Sitting, Cuff Size: Adult Regular)   Pulse 82   Temp 98  F (36.7  C) (Tympanic)   Resp 15   Wt 72.6 kg (160 lb)   LMP 03/30/2014   SpO2 98%   BMI 25.82 kg/m    GENERAL APPEARANCE: Healthy, alert and no distress.  EYES:Conjunctiva/sclera clear.  EARS: No cerumen.   Ear canals w/o erythema.  TM's intact w/o erythema.    NOSE/MOUTH: Nose without ulcers, erythema or lesions.  SINUSES: No maxillary sinus tenderness.  THROAT: Moderate  erythema w/o tonsillar enlargement . No exudates.  NECK: Supple, mild bilateral submandibular lymphadenopathy   RESP: Lungs with expiratory wheezes throughout, some decreased air movement.   Regular rate and rhythm, normal S1 S2, no murmur noted.  NEURO: Awake, alert    SKIN: No rashes        Karlee Schmidt PA-C

## 2023-12-13 ENCOUNTER — TELEPHONE (OUTPATIENT)
Dept: RHEUMATOLOGY | Facility: CLINIC | Age: 58
End: 2023-12-13
Payer: COMMERCIAL

## 2023-12-13 NOTE — TELEPHONE ENCOUNTER
PA Initiation    Medication: ETANERCEPT 50 MG/ML SC SOAJ AUTOINJECTOR  Insurance Company: [employer] ([facility]) - Phone [phone] Fax [phone]  Pharmacy Filling the Rx:    Filling Pharmacy Phone:    Filling Pharmacy Fax:    Start Date: 12/13/2023  Appling Leyva: [email or web address]          Thank You,     Rhea Braden [facility]  Specialty Pharmacy Clinic [facility] Specialty  [email or web address]  [email or web address]  Phone: [phone]  Fax: [phone]

## 2023-12-14 NOTE — TELEPHONE ENCOUNTER
Prior Authorization Approval    Medication: ETANERCEPT 50 MG/ML SC SOAJ AUTOINJECTOR  Authorization Effective Date: 12/13/2023  Authorization Expiration Date: 12/13/2024  Approved Dose/Quantity: 4 / 28  Reference #: [id number] Key: [id number]   Insurance Company: [employer] ([facility]) - Phone [phone] Fax [phone]  Expected CoPay: $ 0  CoPay Card Available: Other (see comments)    Financial Assistance Needed: [email or web address]  Which Pharmacy is filling the prescription: [facility] - [address]  Pharmacy Notified: renewal  Patient Notified: renewal          Thank You,     Rhea Braden [facility]  Specialty Pharmacy Clinic [facility] Specialty  [email or web address]  [email or web address]  Phone: [phone]  Fax: [phone]

## 2024-01-09 ENCOUNTER — TELEPHONE (OUTPATIENT)
Dept: RHEUMATOLOGY | Facility: CLINIC | Age: 59
End: 2024-01-09
Payer: COMMERCIAL

## 2024-01-09 NOTE — TELEPHONE ENCOUNTER
Reason for Call:  Form, our goal is to have forms completed with 72 hours, however, some forms may require a visit or additional information.    Type of letter, form or note:  FMLA    Who is the form from?: Patient    Where did the form come from: Patient or family brought in       What clinic location was the form placed at?: [facility]    Where the form was placed:  Put in doctors mail box  Box/Folder    What number is listed as a contact on the form?: [phone]       Additional comments: Please fax to [facility] office [phone], also please mail the orginal copy to me please.    Call taken on 1/9/2024 at 11:50 AM by Celine Gamez

## 2024-01-29 ENCOUNTER — TELEPHONE (OUTPATIENT)
Dept: FAMILY MEDICINE | Facility: CLINIC | Age: 59
End: 2024-01-29
Payer: COMMERCIAL

## 2024-01-29 NOTE — TELEPHONE ENCOUNTER
Patient Quality Outreach    Patient is due for the following:   Asthma  -  ACT needed  Physical Preventive Adult Physical  Urine Drug Screen      Topic Date Due    Hepatitis B Vaccine (1 of 3 - 3-dose series) Never done    Pneumococcal Vaccine (1 of 2 - PCV) Never done    Zoster (Shingles) Vaccine (1 of 2) Never done    Diptheria Tetanus Pertussis (DTAP/TDAP/TD) Vaccine (2 - Td or Tdap) 01/25/2020    Flu Vaccine (1) 09/01/2023    COVID-19 Vaccine (4 - 2023-24 season) 09/01/2023       Next Steps:   Schedule a Adult Preventative    Type of outreach:    Sent [employer] message.      Questions for provider review:    None           Cecilia Sloan

## 2024-02-13 ENCOUNTER — LAB (OUTPATIENT)
Dept: LAB | Facility: CLINIC | Age: 59
End: 2024-02-13
Payer: COMMERCIAL

## 2024-02-13 DIAGNOSIS — Z13.1 SCREENING FOR DIABETES MELLITUS: ICD-10-CM

## 2024-02-13 DIAGNOSIS — Z13.6 CARDIOVASCULAR SCREENING; LDL GOAL LESS THAN 160: ICD-10-CM

## 2024-02-13 DIAGNOSIS — M05.9 SEROPOSITIVE RHEUMATOID ARTHRITIS (H): ICD-10-CM

## 2024-02-13 LAB
ALBUMIN SERPL BCG-MCNC: 3.5 G/DL (ref 3.5–5.2)
ALP SERPL-CCNC: 71 U/L (ref 40–150)
ALT SERPL W P-5'-P-CCNC: 12 U/L (ref 0–50)
AST SERPL W P-5'-P-CCNC: 20 U/L (ref 0–45)
BASOPHILS # BLD AUTO: 0 10E3/UL (ref 0–0.2)
BASOPHILS NFR BLD AUTO: 1 %
BILIRUB DIRECT SERPL-MCNC: <0.2 MG/DL (ref 0–0.3)
BILIRUB SERPL-MCNC: 0.3 MG/DL
CREAT SERPL-MCNC: 0.73 MG/DL (ref 0.51–0.95)
CRP SERPL-MCNC: 7.06 MG/L
EGFRCR SERPLBLD CKD-EPI 2021: >90 ML/MIN/1.73M2
EOSINOPHIL # BLD AUTO: 0.2 10E3/UL (ref 0–0.7)
EOSINOPHIL NFR BLD AUTO: 5 %
ERYTHROCYTE [DISTWIDTH] IN BLOOD BY AUTOMATED COUNT: 13 % (ref 10–15)
ERYTHROCYTE [SEDIMENTATION RATE] IN BLOOD BY WESTERGREN METHOD: 46 MM/HR (ref 0–30)
HCT VFR BLD AUTO: 38 % (ref 35–47)
HGB BLD-MCNC: 12.5 G/DL (ref 11.7–15.7)
IMM GRANULOCYTES # BLD: 0 10E3/UL
IMM GRANULOCYTES NFR BLD: 0 %
LYMPHOCYTES # BLD AUTO: 1.5 10E3/UL (ref 0.8–5.3)
LYMPHOCYTES NFR BLD AUTO: 41 %
MCH RBC QN AUTO: 28.3 PG (ref 26.5–33)
MCHC RBC AUTO-ENTMCNC: 32.9 G/DL (ref 31.5–36.5)
MCV RBC AUTO: 86 FL (ref 78–100)
MONOCYTES # BLD AUTO: 0.5 10E3/UL (ref 0–1.3)
MONOCYTES NFR BLD AUTO: 14 %
NEUTROPHILS # BLD AUTO: 1.4 10E3/UL (ref 1.6–8.3)
NEUTROPHILS NFR BLD AUTO: 39 %
PLATELET # BLD AUTO: 221 10E3/UL (ref 150–450)
PROT SERPL-MCNC: 7.1 G/DL (ref 6.4–8.3)
RBC # BLD AUTO: 4.42 10E6/UL (ref 3.8–5.2)
WBC # BLD AUTO: 3.6 10E3/UL (ref 4–11)

## 2024-02-13 PROCEDURE — 80076 HEPATIC FUNCTION PANEL: CPT

## 2024-02-13 PROCEDURE — 80061 LIPID PANEL: CPT

## 2024-02-13 PROCEDURE — 82565 ASSAY OF CREATININE: CPT

## 2024-02-13 PROCEDURE — 86140 C-REACTIVE PROTEIN: CPT

## 2024-02-13 PROCEDURE — 85025 COMPLETE CBC W/AUTO DIFF WBC: CPT

## 2024-02-13 PROCEDURE — 36415 COLL VENOUS BLD VENIPUNCTURE: CPT

## 2024-02-13 PROCEDURE — 82947 ASSAY GLUCOSE BLOOD QUANT: CPT

## 2024-02-13 PROCEDURE — 85652 RBC SED RATE AUTOMATED: CPT

## 2024-02-15 ENCOUNTER — OFFICE VISIT (OUTPATIENT)
Dept: FAMILY MEDICINE | Facility: CLINIC | Age: 59
End: 2024-02-15
Payer: COMMERCIAL

## 2024-02-15 VITALS
WEIGHT: 157.6 LBS | RESPIRATION RATE: 18 BRPM | OXYGEN SATURATION: 100 % | SYSTOLIC BLOOD PRESSURE: 134 MMHG | HEIGHT: 66 IN | BODY MASS INDEX: 25.33 KG/M2 | DIASTOLIC BLOOD PRESSURE: 82 MMHG | HEART RATE: 99 BPM | TEMPERATURE: 99.5 F

## 2024-02-15 DIAGNOSIS — H10.31 ACUTE CONJUNCTIVITIS OF RIGHT EYE, UNSPECIFIED ACUTE CONJUNCTIVITIS TYPE: ICD-10-CM

## 2024-02-15 DIAGNOSIS — Z13.1 SCREENING FOR DIABETES MELLITUS: ICD-10-CM

## 2024-02-15 DIAGNOSIS — Z13.6 CARDIOVASCULAR SCREENING; LDL GOAL LESS THAN 160: ICD-10-CM

## 2024-02-15 DIAGNOSIS — M05.9 SEROPOSITIVE RHEUMATOID ARTHRITIS (H): ICD-10-CM

## 2024-02-15 DIAGNOSIS — J45.31 MILD PERSISTENT ASTHMA WITH ACUTE EXACERBATION: ICD-10-CM

## 2024-02-15 DIAGNOSIS — J06.9 VIRAL URI WITH COUGH: Primary | ICD-10-CM

## 2024-02-15 LAB
CHOLEST SERPL-MCNC: 186 MG/DL
DEPRECATED S PYO AG THROAT QL EIA: NEGATIVE
GLUCOSE SERPL-MCNC: 103 MG/DL (ref 70–99)
GROUP A STREP BY PCR: NOT DETECTED
HDLC SERPL-MCNC: 78 MG/DL
LDLC SERPL CALC-MCNC: 95 MG/DL
NONHDLC SERPL-MCNC: 108 MG/DL
TRIGL SERPL-MCNC: 67 MG/DL

## 2024-02-15 PROCEDURE — 87651 STREP A DNA AMP PROBE: CPT | Performed by: NURSE PRACTITIONER

## 2024-02-15 PROCEDURE — 99214 OFFICE O/P EST MOD 30 MIN: CPT | Performed by: NURSE PRACTITIONER

## 2024-02-15 RX ORDER — BENZONATATE 200 MG/1
200 CAPSULE ORAL 3 TIMES DAILY PRN
Qty: 30 CAPSULE | Refills: 1 | Status: SHIPPED | OUTPATIENT
Start: 2024-02-15 | End: 2024-06-12

## 2024-02-15 RX ORDER — ALBUTEROL SULFATE 90 UG/1
AEROSOL, METERED RESPIRATORY (INHALATION)
Qty: 18 G | Refills: 3 | Status: SHIPPED | OUTPATIENT
Start: 2024-02-15 | End: 2024-06-06

## 2024-02-15 RX ORDER — POLYMYXIN B SULFATE AND TRIMETHOPRIM 1; 10000 MG/ML; [USP'U]/ML
1-2 SOLUTION OPHTHALMIC EVERY 4 HOURS
Qty: 10 ML | Refills: 0 | Status: SHIPPED | OUTPATIENT
Start: 2024-02-15 | End: 2024-06-12

## 2024-02-15 ASSESSMENT — ASTHMA QUESTIONNAIRES
QUESTION_2 LAST FOUR WEEKS HOW OFTEN HAVE YOU HAD SHORTNESS OF BREATH: NOT AT ALL
ACT_TOTALSCORE: 24
QUESTION_3 LAST FOUR WEEKS HOW OFTEN DID YOUR ASTHMA SYMPTOMS (WHEEZING, COUGHING, SHORTNESS OF BREATH, CHEST TIGHTNESS OR PAIN) WAKE YOU UP AT NIGHT OR EARLIER THAN USUAL IN THE MORNING: NOT AT ALL
QUESTION_5 LAST FOUR WEEKS HOW WOULD YOU RATE YOUR ASTHMA CONTROL: WELL CONTROLLED
ACT_TOTALSCORE: 24
QUESTION_4 LAST FOUR WEEKS HOW OFTEN HAVE YOU USED YOUR RESCUE INHALER OR NEBULIZER MEDICATION (SUCH AS ALBUTEROL): NOT AT ALL
QUESTION_1 LAST FOUR WEEKS HOW MUCH OF THE TIME DID YOUR ASTHMA KEEP YOU FROM GETTING AS MUCH DONE AT WORK, SCHOOL OR AT HOME: NONE OF THE TIME

## 2024-02-15 ASSESSMENT — PAIN SCALES - GENERAL: PAINLEVEL: SEVERE PAIN (6)

## 2024-02-15 NOTE — PROGRESS NOTES
"  Assessment & Plan     Viral URI with cough  Checking for strep.  She is outside the window for antiviral for antivirals so  declined to screen, symptomatic treatment for now. Symptomatic treatment of uri with frequent fluids, nasal saline/bulb syringe, tylenol prn fever, humidified air.  Avoid OTC cough and cold remedies.  Indications for return to clinic reviewed in detail.  Follow back if not improved, new, or worsening symptoms.   - Streptococcus A Rapid Screen w/Reflex to PCR - Clinic Collect  - benzonatate (TESSALON) 200 MG capsule  Dispense: 30 capsule; Refill: 1  - Group A Streptococcus PCR Throat Swab    Acute conjunctivitis of right eye, unspecified acute conjunctivitis type  Treating with Polytrim, return to clinic if not improved, new, or worsening symptoms.   - polymixin b-trimethoprim (POLYTRIM) 45792-7.1 UNIT/ML-% ophthalmic solution  Dispense: 10 mL; Refill: 0    Mild persistent asthma with acute exacerbation  Refilled Albuterol, stable   - albuterol (PROAIR HFA/PROVENTIL HFA/VENTOLIN HFA) 108 (90 Base) MCG/ACT inhaler  Dispense: 18 g; Refill: 3    Seropositive rheumatoid arthritis (H)  Due for UDS  - [id number] - Urine Drug Confirmation Panel (Comprehensive)    Screening for diabetes mellitus  Screening as she has been taking Prednisone several times int the week for about a year.  - Glucose    CARDIOVASCULAR SCREENING; LDL GOAL LESS THAN 160  Low chol diet reviewed, benefits of regular exercise discussed.  - Lipid panel reflex to direct LDL Non-fasting      Ordering of each unique test  Prescription drug management        BMI  Estimated body mass index is 25.44 kg/m  as calculated from the following:    Height as of this encounter: 1.676 m (5' 6\").    Weight as of this encounter: 71.5 kg (157 lb 9.6 oz).   Weight management plan: Discussed healthy diet and exercise guidelines      Work on weight loss  Regular exercise  See Patient Instructions    Subjective   Katie is a 58 year old, presenting for " "the following health issues:  Conjunctivitis, Headache, and Neb supplies (Facemask needed for neb supply)    Conjunctivitis    History of Present Illness       Headaches:   Since the patient's last clinic visit, headaches are: no change  The patient is getting headaches:  Daily with these systoms  She is able to do normal daily activities when she has a migraine.  The patient is taking the following rescue/relief medications:  Ibuprofen (Advil, Motrin)   Patient states \"The relief is inconsistent\" from the rescue/relief medications.   The patient is taking the following medications to prevent migraines:  No medications to prevent migraines  In the past 4 weeks, the patient has gone to an Urgent Care or Emergency Room 0 times times due to headaches.    Reason for visit:  Headache thick mucus sticky right eye tired  Symptom onset:  1-3 days ago    She eats 2-3 servings of fruits and vegetables daily.She consumes 2 sweetened beverage(s) daily.She exercises with enough effort to increase her heart rate 20 to 29 minutes per day.  She exercises with enough effort to increase her heart rate 3 or less days per week.   She is taking medications regularly.       Acute Illness  Acute illness concerns: Red eye in right eye, mucus coughing up, congestion, headache.  Onset/Duration: 3 days  Symptoms:  Fever: No  Chills/Sweats: No  Headache (location?): YES- frontal  Sinus Pressure: YES-frontal and maxillary  Conjunctivitis:  YES- right  Ear Pain: YES: right  Rhinorrhea: YES- thick yellow green  Congestion: YES  Sore Throat: YES  Cough: YES-productive of yellow sputum, productive of green sputum  Wheeze: No  Decreased Appetite: YES  Nausea: No  Vomiting: No  Diarrhea: No  Dysuria/Freq.: No  Dysuria or Hematuria: No  Fatigue/Achiness: YES  Sick/Strep Exposure: YES- daughter- eye is red as well  Therapies tried and outcome: mucinex, Robitussin, advil- temporarily helps Home COVID test 22/14/24 was negative      Review of " "Systems  Constitutional, HEENT, cardiovascular, pulmonary, gi and gu systems are negative, except as otherwise noted.      Objective    /82 (BP Location: Left arm, Patient Position: Sitting, Cuff Size: Adult Regular)   Pulse 99   Temp 99.5  F (37.5  C) (Tympanic)   Resp 18   Ht 1.676 m (5' 6\")   Wt 71.5 kg (157 lb 9.6 oz)   LMP 03/30/2014   SpO2 100%   BMI 25.44 kg/m    Body mass index is 25.44 kg/m .  Physical Exam   GENERAL: alert and no distress  EYES: Eyes grossly normal to inspection, PERRL, EOMI, and conjunctiva/corneas- conjunctival injection OD  HENT: ear canals and TM's normal, nose and mouth without ulcers or lesions  NECK:shotty tonsillar nodes bilaterally, adenopathy, no asymmetry, masses, or scars  RESP: lungs clear to auscultation - no rales, rhonchi or wheezes  CV: regular rate and rhythm, normal S1 S2, no S3 or S4, no murmur, click or rub, no peripheral edema  ABDOMEN: soft, nontender, no hepatosplenomegaly, no masses and bowel sounds normal  MS: no gross musculoskeletal defects noted, no edema  SKIN: no suspicious lesions or rashes  NEURO: Normal strength and tone, mentation intact and speech normal  PSYCH: mentation appears normal, affect normal/bright  LYMPH:  shotty tonsillar nodes bilaterally    Results for orders placed or performed in visit on 02/15/24 (from the past 24 hour(s))   Streptococcus A Rapid Screen w/Reflex to PCR - Clinic Collect    Specimen: Throat; Swab   Result Value Ref Range    Group A Strep antigen Negative Negative           Signed Electronically by: TREVER Kim CNP    "

## 2024-02-15 NOTE — LETTER
February 15, 2024      Katie Aponte  [address]  [place] [address]        To Whom It May Concern:    Katie Aponte  was seen on 2/15/24.  Please excuse her  until 2/19/24 due to illness.        Sincerely,        Karlee Birmingham, TREVER CNP

## 2024-02-19 ENCOUNTER — OFFICE VISIT (OUTPATIENT)
Dept: RHEUMATOLOGY | Facility: CLINIC | Age: 59
End: 2024-02-19
Payer: COMMERCIAL

## 2024-02-19 VITALS
BODY MASS INDEX: 25.57 KG/M2 | WEIGHT: 158.4 LBS | HEART RATE: 94 BPM | OXYGEN SATURATION: 97 % | DIASTOLIC BLOOD PRESSURE: 74 MMHG | SYSTOLIC BLOOD PRESSURE: 113 MMHG

## 2024-02-19 DIAGNOSIS — M16.0 OSTEOARTHRITIS OF BOTH HIPS, UNSPECIFIED OSTEOARTHRITIS TYPE: ICD-10-CM

## 2024-02-19 DIAGNOSIS — G89.29 CHRONIC PAIN OF RIGHT KNEE: ICD-10-CM

## 2024-02-19 DIAGNOSIS — Z13.820 OSTEOPOROSIS SCREENING: ICD-10-CM

## 2024-02-19 DIAGNOSIS — M05.9 SEROPOSITIVE RHEUMATOID ARTHRITIS (H): Primary | ICD-10-CM

## 2024-02-19 DIAGNOSIS — M25.561 CHRONIC PAIN OF RIGHT KNEE: ICD-10-CM

## 2024-02-19 PROCEDURE — 99214 OFFICE O/P EST MOD 30 MIN: CPT | Performed by: INTERNAL MEDICINE

## 2024-02-19 PROCEDURE — G2211 COMPLEX E/M VISIT ADD ON: HCPCS | Performed by: INTERNAL MEDICINE

## 2024-02-19 RX ORDER — MEDROXYPROGESTERONE ACETATE 150 MG/ML
50 INJECTION, SUSPENSION INTRAMUSCULAR
Qty: 4 ML | Refills: 7 | Status: SHIPPED | OUTPATIENT
Start: 2024-02-19 | End: 2024-08-29

## 2024-02-19 RX ORDER — HYDROXYCHLOROQUINE SULFATE 200 MG/1
200 TABLET, FILM COATED ORAL DAILY
Qty: 90 TABLET | Refills: 2 | Status: SHIPPED | OUTPATIENT
Start: 2024-02-19 | End: 2024-08-29

## 2024-02-19 NOTE — NURSING NOTE
RAPID 3    Cumulative Score  8.7       Weighted Score   2.9     Becky HO, Specialty RN 2/19/2024 8:20 AM

## 2024-02-19 NOTE — PROGRESS NOTES
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     Tuberculosis Screening  Recent Labs   Lab Test 02/16/23  0900 09/16/21  0841 06/18/19  1453   TBRES Negative Negative Negative       Immunization History     Immunization History   Administered Date(s) Administered    COVID-19 MONOVALENT 12+ (Pfizer) 04/13/2021, 05/04/2021, 09/16/2021    Influenza (IIV3) PF 10/20/2009    TDAP (Adacel,Boostrix) 01/25/2010          Chart documentation done in part with Dragon Voice recognition Software. Although reviewed after completion, some word and grammatical error may remain.    Richard Mcfarlane MD

## 2024-02-19 NOTE — PATIENT INSTRUCTIONS
Dexa Scan  (Bone Scan)  [facility]       [phone]   [facility]     [phone]     [facility]      [phone]   [facility]        [phone]     [facility]                                                  [phone]                                                                  [facility]                                                               [phone]                                                                     [facility]                                          [phone]                                                                  [facility]                               [phone]                                                                     [facility]      [phone]

## 2024-04-18 ENCOUNTER — OFFICE VISIT (OUTPATIENT)
Dept: FAMILY MEDICINE | Facility: CLINIC | Age: 59
End: 2024-04-18
Payer: COMMERCIAL

## 2024-04-18 ENCOUNTER — TELEPHONE (OUTPATIENT)
Dept: FAMILY MEDICINE | Facility: CLINIC | Age: 59
End: 2024-04-18

## 2024-04-18 VITALS
HEART RATE: 86 BPM | SYSTOLIC BLOOD PRESSURE: 137 MMHG | RESPIRATION RATE: 20 BRPM | OXYGEN SATURATION: 100 % | WEIGHT: 160.2 LBS | HEIGHT: 66 IN | BODY MASS INDEX: 25.75 KG/M2 | DIASTOLIC BLOOD PRESSURE: 79 MMHG

## 2024-04-18 DIAGNOSIS — J40 BRONCHITIS: ICD-10-CM

## 2024-04-18 DIAGNOSIS — J45.41 MODERATE PERSISTENT ASTHMA WITH EXACERBATION: Primary | ICD-10-CM

## 2024-04-18 PROCEDURE — 99214 OFFICE O/P EST MOD 30 MIN: CPT | Performed by: INTERNAL MEDICINE

## 2024-04-18 RX ORDER — DOXYCYCLINE HYCLATE 100 MG
100 TABLET ORAL 2 TIMES DAILY
Qty: 14 TABLET | Refills: 0 | Status: SHIPPED | OUTPATIENT
Start: 2024-04-18 | End: 2024-06-12

## 2024-04-18 RX ORDER — FLUTICASONE PROPIONATE AND SALMETEROL 250; 50 UG/1; UG/1
1 POWDER RESPIRATORY (INHALATION) EVERY 12 HOURS
Qty: 60 EACH | Refills: 5 | Status: SHIPPED | OUTPATIENT
Start: 2024-04-18 | End: 2024-06-12

## 2024-04-18 RX ORDER — INHALER, ASSIST DEVICES
SPACER (EA) MISCELLANEOUS
Qty: 1 EACH | Refills: 0 | Status: SHIPPED | OUTPATIENT
Start: 2024-04-18

## 2024-04-18 RX ORDER — BUDESONIDE 0.5 MG/2ML
0.5 INHALANT ORAL 2 TIMES DAILY
Qty: 28 ML | Refills: 0 | Status: SHIPPED | OUTPATIENT
Start: 2024-04-18 | End: 2024-06-12

## 2024-04-18 RX ORDER — CODEINE PHOSPHATE AND GUAIFENESIN 10; 100 MG/5ML; MG/5ML
1-2 SOLUTION ORAL EVERY 4 HOURS PRN
Qty: 237 ML | Refills: 0 | Status: SHIPPED | OUTPATIENT
Start: 2024-04-18 | End: 2024-06-12

## 2024-04-18 ASSESSMENT — PAIN SCALES - GENERAL: PAINLEVEL: NO PAIN (0)

## 2024-04-18 ASSESSMENT — ENCOUNTER SYMPTOMS: WHEEZING: 1

## 2024-04-18 NOTE — TELEPHONE ENCOUNTER
Patient Quality Outreach    Patient is due for the following:   Breast Cancer Screening - Mammogram    Next Steps:   Patient was scheduled for mammo    Type of outreach:    Phone, spoke to patient/parent. pt    Next Steps:  Reach out within 90 days via Phone.    Max number of attempts reached: Yes. Will try again in 90 days if patient still on fail list.    Questions for provider review:    None           Krystal Gonsalez MA

## 2024-04-18 NOTE — PROGRESS NOTES
Assessment & Plan     Moderate persistent asthma with exacerbation  She was diagnosed with asthma 2 years ago  Her asthma has been reasonably on under good control but for the last couple of weeks she has been having some upper respiratory symptoms after that started developing cough  Cough is now incessant  She is also very wheezy  She is taking albuterol inhaler as needed and also she tells me to take another inhaler at night but I am not clear what this is but it looks like on reviewing the chart that she is just on Flovent  We will change to Advair today  At Pulmicort nebulizers for 1 week  I discussed with her about taking oral steroids but she had side effects from them in the past and would not like to take those  - spacer (OPTICHAMBER SHEREE) holding chamber; Use with Inhalers  - budesonide (PULMICORT) 0.5 MG/2ML neb solution; Take 2 mLs (0.5 mg) by nebulization 2 times daily for 7 days  - fluticasone-salmeterol (ADVAIR) 250-50 MCG/ACT inhaler; Inhale 1 puff into the lungs every 12 hours  - guaiFENesin-codeine (ROBITUSSIN AC) 100-10 MG/5ML solution; Take 5-10 mLs by mouth every 4 hours as needed for cough    Bronchitis  She certainly has bad cough and would benefit from some antibiotics  Cannot do Z-Keith because of the interaction with hydroxychloroquine  - doxycycline hyclate (VIBRA-TABS) 100 MG tablet; Take 1 tablet (100 mg) by mouth 2 times daily      30 minutes spent by me on the date of the encounter doing chart review, history and exam, documentation and further activities per the note            Subjective   Katie is a 58 year old, presenting for the following health issues:  Allergies      4/18/2024     2:33 PM   Additional Questions   Roomed by DM     Allergies    History of Present Illness     Asthma:  She presents for follow up of asthma.  She has some cough, some wheezing, and no shortness of breath.  She is using a relief medication 2-3 times per day. She does not have a controller  "medication. Patient is aware of the following triggers: animal dander. The patient has not had a visit to the Emergency Room, Urgent Care or Hospital due to asthma since the last clinic visit.     She eats 0-1 servings of fruits and vegetables daily.She consumes 2 sweetened beverage(s) daily.She exercises with enough effort to increase her heart rate 10 to 19 minutes per day.  She exercises with enough effort to increase her heart rate 5 days per week.   She is taking medications regularly.                 Review of Systems  Constitutional, HEENT, cardiovascular, pulmonary, gi and gu systems are negative, except as otherwise noted.      Objective    /79   Pulse 86   Resp 20   Ht 1.676 m (5' 6\")   Wt 72.7 kg (160 lb 3.2 oz)   LMP 03/30/2014   SpO2 100%   BMI 25.86 kg/m    Body mass index is 25.86 kg/m .  Physical Exam   GENERAL: alert and no distress  NECK: no adenopathy, no asymmetry, masses, or scars  RESP: Diffuse wheeze  CV: regular rate and rhythm, normal S1 S2, no S3 or S4, no murmur, click or rub, no peripheral edema  ABDOMEN: soft, nontender, no hepatosplenomegaly, no masses and bowel sounds normal  MS: no gross musculoskeletal defects noted, no edema            Signed Electronically by: Syd Tapia MD    "

## 2024-04-30 DIAGNOSIS — J45.41 MODERATE PERSISTENT ASTHMA WITH EXACERBATION: ICD-10-CM

## 2024-04-30 RX ORDER — BUDESONIDE 0.5 MG/2ML
INHALANT ORAL
Qty: 60 ML | OUTPATIENT
Start: 2024-04-30

## 2024-05-21 ENCOUNTER — MYC MEDICAL ADVICE (OUTPATIENT)
Dept: FAMILY MEDICINE | Facility: CLINIC | Age: 59
End: 2024-05-21

## 2024-06-03 NOTE — TELEPHONE ENCOUNTER
Called and spoke to the patient and scheduled a Telephone visit for 6/4/2024 at 11:30 ok per Vidya Birmingham.  Kristen Ruiz [facility]   Primary Care

## 2024-06-04 ENCOUNTER — VIRTUAL VISIT (OUTPATIENT)
Dept: FAMILY MEDICINE | Facility: CLINIC | Age: 59
End: 2024-06-04
Payer: COMMERCIAL

## 2024-06-04 DIAGNOSIS — N64.4 BREAST PAIN: Primary | ICD-10-CM

## 2024-06-04 DIAGNOSIS — Z01.00 VISIT FOR EYE AND VISION EXAM: ICD-10-CM

## 2024-06-04 PROCEDURE — 99442 PR PHYSICIAN TELEPHONE EVALUATION 11-20 MIN: CPT | Performed by: NURSE PRACTITIONER

## 2024-06-04 NOTE — PROGRESS NOTES
Katie is a 58 year old who is being evaluated via a billable telephone visit.    What phone number would you like to be contacted at? [phone]   How would you like to obtain your AVS? Dominga  Originating Location (pt. Location): Home    Distant Location (provider location):  On-site    Assessment & Plan     Breast pain  Intermittent right breast pain at [phone], wears an underwire bra, denies masses but endorses dense breasts. Getting diagnostic mammo.  - REVIEW OF HEALTH MAINTENANCE PROTOCOL ORDERS  - MA Diagnostic Digital Right  - US Breast Right Limited 1-3 Quadrants    Visit for eye and vision exam    - Adult Eye  Referral            Regular exercise  See Patient Instructions    Subjective   Katie is a 58 year old, presenting for the following health issues:  Breast Mass      6/4/2024    11:01 AM   Additional Questions   Roomed by Fara         6/4/2024    11:01 AM   Patient Reported Additional Medications   Patient reports taking the following new medications none     History of Present Illness       Reason for visit:  Breast lump, need specialist mammo    She eats 0-1 servings of fruits and vegetables daily.She consumes 0 sweetened beverage(s) daily.She exercises with enough effort to increase her heart rate 20 to 29 minutes per day.  She exercises with enough effort to increase her heart rate 3 or less days per week.   She is taking medications regularly.     Patient was scheduled to have a mammogram done roughly 3 weeks ago, she explained the lump and was told that she needed to be seen by her PCP and get referred out for a more intensive mammogram at the [facility].   Patient was scheduled for screening mammo but told the screener she had intermittent right breast pain at [phone] axis.  She wears an underwire bra, denies pain with movement./stretching, no change in skin, nipple discharge, no masses per patient.  She endorses dense breasts.  Last mammo 4/21/22 as below:    MA  SCREENING BILATERAL W/ JW  Order: [id number]  Impression    : NEGATIVE  There is no mammographic evidence of malignancy.      RECOMMENDATION:         - A screening mammogram in 1 year.    BI-RADS: Overall: 1 - Negative    The patient will be notified of the results.    REPORT SIGNED BY Chandu Issa MD  Narrative    EXAM: MAMMO JW SCREENING BI    REASON FOR EXAM:  Routine screening mammogram    COMPARISON:  Compared to: 03/10/2021 MAMMO JW SCREENING BI, 09/18/2020  MAMMO JW DIAG UNILAT RT, 03/05/2020 MAMMO JW DIAG BILAT, and  06/12/2018 MAMMO DIGITAL SCREENING BI    TECHNIQUE:  Craniocaudal and oblique digital views were obtained.  Tomosynthesis  technique was also utilized. Current study was evaluated with Computer  Aided Detection (CAD) system.    FINDINGS:  The breasts are heterogeneously dense.  No significant masses,  calcifications, or other findings are seen in either breast.  There has  been no significant interval change.    Exam End: 04/21/22  7:40 AM Last Resulted: 04/21/22  7:59 AM   Received From: [facility]  Result Received: 05/31/23  1:46 PM           Review of Systems  Constitutional, HEENT, cardiovascular, pulmonary, gi and gu systems are negative, except as otherwise noted.      Objective           Vitals:  No vitals were obtained today due to virtual visit.    Physical Exam   General: Alert and no distress //Respiratory: No audible wheeze, cough, or shortness of breath // Psychiatric:  Appropriate affect, tone, and pace of words      No results found for this visit on 06/04/24.      Phone call duration: 11 minutes  Signed Electronically by: TREVER Kim CNP

## 2024-06-06 DIAGNOSIS — J45.31 MILD PERSISTENT ASTHMA WITH ACUTE EXACERBATION: ICD-10-CM

## 2024-06-06 RX ORDER — ALBUTEROL SULFATE 90 UG/1
AEROSOL, METERED RESPIRATORY (INHALATION)
Qty: 8.5 G | Refills: 3 | Status: SHIPPED | OUTPATIENT
Start: 2024-06-06 | End: 2024-08-15

## 2024-06-12 ENCOUNTER — OFFICE VISIT (OUTPATIENT)
Dept: FAMILY MEDICINE | Facility: CLINIC | Age: 59
End: 2024-06-12
Payer: COMMERCIAL

## 2024-06-12 ENCOUNTER — TRANSFERRED RECORDS (OUTPATIENT)
Dept: HEALTH INFORMATION MANAGEMENT | Facility: CLINIC | Age: 59
End: 2024-06-12

## 2024-06-12 VITALS
HEIGHT: 65 IN | HEART RATE: 79 BPM | DIASTOLIC BLOOD PRESSURE: 71 MMHG | OXYGEN SATURATION: 97 % | SYSTOLIC BLOOD PRESSURE: 119 MMHG | RESPIRATION RATE: 18 BRPM | TEMPERATURE: 98.6 F | BODY MASS INDEX: 27.59 KG/M2 | WEIGHT: 165.6 LBS

## 2024-06-12 DIAGNOSIS — D69.6 THROMBOCYTOPENIA (H): ICD-10-CM

## 2024-06-12 DIAGNOSIS — Z01.818 PREOP GENERAL PHYSICAL EXAM: Primary | ICD-10-CM

## 2024-06-12 DIAGNOSIS — M05.9 SEROPOSITIVE RHEUMATOID ARTHRITIS (H): ICD-10-CM

## 2024-06-12 DIAGNOSIS — M16.11 PRIMARY OSTEOARTHRITIS OF RIGHT HIP: ICD-10-CM

## 2024-06-12 DIAGNOSIS — J45.31 MILD PERSISTENT ASTHMA WITH ACUTE EXACERBATION: ICD-10-CM

## 2024-06-12 PROBLEM — E66.09 CLASS 1 OBESITY DUE TO EXCESS CALORIES WITHOUT SERIOUS COMORBIDITY WITH BODY MASS INDEX (BMI) OF 30.0 TO 30.9 IN ADULT: Status: RESOLVED | Noted: 2018-02-26 | Resolved: 2024-06-12

## 2024-06-12 PROBLEM — E66.811 CLASS 1 OBESITY DUE TO EXCESS CALORIES WITHOUT SERIOUS COMORBIDITY WITH BODY MASS INDEX (BMI) OF 30.0 TO 30.9 IN ADULT: Status: RESOLVED | Noted: 2018-02-26 | Resolved: 2024-06-12

## 2024-06-12 LAB
ERYTHROCYTE [DISTWIDTH] IN BLOOD BY AUTOMATED COUNT: 13.5 % (ref 10–15)
HCT VFR BLD AUTO: 40.1 % (ref 35–47)
HGB BLD-MCNC: 12.9 G/DL (ref 11.7–15.7)
MCH RBC QN AUTO: 27.2 PG (ref 26.5–33)
MCHC RBC AUTO-ENTMCNC: 32.2 G/DL (ref 31.5–36.5)
MCV RBC AUTO: 84 FL (ref 78–100)
PLATELET # BLD AUTO: 127 10E3/UL (ref 150–450)
RBC # BLD AUTO: 4.75 10E6/UL (ref 3.8–5.2)
WBC # BLD AUTO: 3.3 10E3/UL (ref 4–11)

## 2024-06-12 PROCEDURE — 80048 BASIC METABOLIC PNL TOTAL CA: CPT

## 2024-06-12 PROCEDURE — 36415 COLL VENOUS BLD VENIPUNCTURE: CPT

## 2024-06-12 PROCEDURE — 99214 OFFICE O/P EST MOD 30 MIN: CPT

## 2024-06-12 PROCEDURE — G2211 COMPLEX E/M VISIT ADD ON: HCPCS

## 2024-06-12 PROCEDURE — 85027 COMPLETE CBC AUTOMATED: CPT

## 2024-06-12 ASSESSMENT — PAIN SCALES - GENERAL: PAINLEVEL: EXTREME PAIN (9)

## 2024-06-12 NOTE — PATIENT INSTRUCTIONS
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
## 2024-06-13 LAB
ANION GAP SERPL CALCULATED.3IONS-SCNC: 9 MMOL/L (ref 7–15)
BUN SERPL-MCNC: 9.4 MG/DL (ref 6–20)
CALCIUM SERPL-MCNC: 9.3 MG/DL (ref 8.6–10)
CHLORIDE SERPL-SCNC: 103 MMOL/L (ref 98–107)
CREAT SERPL-MCNC: 0.86 MG/DL (ref 0.51–0.95)
DEPRECATED HCO3 PLAS-SCNC: 27 MMOL/L (ref 22–29)
EGFRCR SERPLBLD CKD-EPI 2021: 78 ML/MIN/1.73M2
GLUCOSE SERPL-MCNC: 99 MG/DL (ref 70–99)
POTASSIUM SERPL-SCNC: 4.9 MMOL/L (ref 3.4–5.3)
SODIUM SERPL-SCNC: 139 MMOL/L (ref 135–145)

## 2024-06-14 ENCOUNTER — TELEPHONE (OUTPATIENT)
Dept: FAMILY MEDICINE | Facility: CLINIC | Age: 59
End: 2024-06-14

## 2024-06-14 ENCOUNTER — ANCILLARY PROCEDURE (OUTPATIENT)
Dept: ULTRASOUND IMAGING | Facility: CLINIC | Age: 59
End: 2024-06-14
Attending: NURSE PRACTITIONER
Payer: COMMERCIAL

## 2024-06-14 ENCOUNTER — ANCILLARY PROCEDURE (OUTPATIENT)
Dept: MAMMOGRAPHY | Facility: CLINIC | Age: 59
End: 2024-06-14
Attending: NURSE PRACTITIONER
Payer: COMMERCIAL

## 2024-06-14 ENCOUNTER — TRANSCRIBE ORDERS (OUTPATIENT)
Dept: ONCOLOGY | Facility: CLINIC | Age: 59
End: 2024-06-14

## 2024-06-14 DIAGNOSIS — D69.6 THROMBOCYTOPENIA (H): Primary | ICD-10-CM

## 2024-06-14 DIAGNOSIS — N64.4 BREAST PAIN: ICD-10-CM

## 2024-06-14 DIAGNOSIS — D69.6 LOW PLATELET COUNT (H): Primary | ICD-10-CM

## 2024-06-14 PROCEDURE — 76642 ULTRASOUND BREAST LIMITED: CPT | Mod: RT | Performed by: RADIOLOGY

## 2024-06-14 PROCEDURE — G0279 TOMOSYNTHESIS, MAMMO: HCPCS | Performed by: RADIOLOGY

## 2024-06-14 PROCEDURE — 77066 DX MAMMO INCL CAD BI: CPT | Performed by: RADIOLOGY

## 2024-06-14 NOTE — TELEPHONE ENCOUNTER
Called and spoke with patient. Relayed below provider message as written. Answered patient's questions appropriately. Pt will be calling hematology and see when she can schedule an appointment.     Pt understands that may need another pre-op if surgery at or greater than 30 days from 6/12 pre-op with Isabel.     No further questions/concerns.       Vidya Mahoney, RN    [facility]             Isabel Renteria APRN CNP  [facility] - Primary Care19 minutes ago (8:48 AM)     KW  Order placed for hematology.  Depending on when she is able to be seen by hematology and her surgery date, we may be able to use the same preop note and just update labs. However, for most surgeries, a preop needs to be done within 30 days, so her surgeon may require another preop appointment based on timing.    TREVER Zurita CNP

## 2024-06-14 NOTE — TELEPHONE ENCOUNTER
RN spoke with pt and relayed provider message. Pt will schedule appointment with hematology, needs referral. Per pt, she last saw hematology 5-10 years ago.     Pt asking what next steps would be after she meets with hematology. Pt asking if she needs appointment with primary care or can she just come in for lab appointment for repeat CBC to recheck after following advice of hematologist?     Routing to provider to advise.     Keya Huffman RN    ----- Message from TREVER Zurita CNP sent at 6/14/2024  7:37 AM CDT -----  Please call patient.     Luis Enrique Wilson,     I have reviewed your results. Unfortunately, your platelet count was mildly low, which is a big deal for joint replacement surgery because it increases your risk of bleeding during the procedure. I see that you saw a hematologist for a similar problem before your knee replacement. I am not able to clear you for surgery until cleared by hematology. Do you prefer to see them in-person, or I can place an e-consult, which involves me sending your chart and labs to the hematology department for their input and advice. These are usually a little quicker than getting in for an appointment, but can cost up to $135. Some people prefer a traditional appointment so they have a chance to ask their questions.     Please let me know which you prefer and if you have any questions or concerns. Have a great day!    TREVER Zurita CNP

## 2024-06-19 ENCOUNTER — PATIENT OUTREACH (OUTPATIENT)
Dept: ONCOLOGY | Facility: CLINIC | Age: 59
End: 2024-06-19
Payer: COMMERCIAL

## 2024-06-19 NOTE — TELEPHONE ENCOUNTER
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  Thrombocytopenia present as well     platelets dipped to 127 this week  in  Feb were  221     At Preop exam they declined to clear her until cleared by hematology due to platelets of 127     CBC RESULTS: Lab Test        06/12/24                       1519          WBC          3.3*          RBC          4.75          HGB          12.9          HCT          40.1          MCV          84            MCH          27.2          MCHC         32.2          RDW          13.5          PLT          127*             Referral updates and Plan:   Chart reviewed with Dr.Jacob Cogan,   agreed due to history of having a hematologic work up already and platelets are >100, an e-consult would be appropriate to provide hematology clearance prior to surgery     LVM with patient,  Message sent to referring provider  Patient also is having labs rechecked again on  6/21/24     Update 6.24.24  platelets on 6.21.24 136, spoke to patient about her past and recommendations for e-consult,  sent a message to the referring provider and her PCP.  E-consult order was placed    Kayy Castrejon  Hematology Nurse Navigation   [phone]    [facility] / Hematology   [phone]   [email or web address]

## 2024-06-21 ENCOUNTER — LAB (OUTPATIENT)
Dept: LAB | Facility: CLINIC | Age: 59
End: 2024-06-21
Payer: COMMERCIAL

## 2024-06-21 DIAGNOSIS — D69.6 THROMBOCYTOPENIA (H): ICD-10-CM

## 2024-06-21 LAB — PLATELET # BLD AUTO: 136 10E3/UL (ref 150–450)

## 2024-06-21 PROCEDURE — 85049 AUTOMATED PLATELET COUNT: CPT

## 2024-06-21 PROCEDURE — 36415 COLL VENOUS BLD VENIPUNCTURE: CPT

## 2024-06-24 ENCOUNTER — E-CONSULT (OUTPATIENT)
Dept: TRANSPLANT | Facility: CLINIC | Age: 59
End: 2024-06-24

## 2024-06-24 DIAGNOSIS — D69.6 THROMBOCYTOPENIA (H): Primary | ICD-10-CM

## 2024-06-24 PROCEDURE — 99207 E-CONSULT TO HEMATOLOGY (ADULT OUTPT PROVIDER TO SPECIALIST WRITTEN QUESTION & RESPONSE): CPT

## 2024-06-24 PROCEDURE — 99451 NTRPROF PH1/NTRNET/EHR 5/>: CPT | Performed by: INTERNAL MEDICINE

## 2024-06-25 ENCOUNTER — TELEPHONE (OUTPATIENT)
Dept: FAMILY MEDICINE | Facility: CLINIC | Age: 59
End: 2024-06-25
Payer: COMMERCIAL

## 2024-06-25 NOTE — PROGRESS NOTES
6/24/2024     E-Consult has been accepted.    Interprofessional consultation requested by:  Isabel Renteria APRN CNP      Clinical Question/Purpose: MY CLINICAL QUESTION IS: Preoperative clearance for right total hip arthoplasty, mild thrombocytopenia with platelet count of 127 K. Improved to 136 K at 1 week recheck. Previously seen by hematology in 2015 for leukopenia and thrombocytopenia that resolved without intervention. No cause was identified at that time and platelets have been normal since. Is it ok to clear for surgery? Do you recommend any work up at this time? No current signs of bleeding.    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 B12 folate normal.  Saw Edilia Sanchez in 2015. Asymptomatic neutropenia and thrombocytopenia. Social EtOH. No bleeding/clotting with hysterectomy/oopherectomy. 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 Hep B, C, HIV, TSH, coags, LDH were normal         Recent BMP normal, LFTs nl in 2/2024.     1. Mild asymptomatic thrombocytopenia  Differential diagnosis includes a transient thrombocytopenia from any number of causes, pseudothrombocytopenia, a mild ITP, normal variation, congenital or inherited thrombocytopenia, chronic viral infection (Hep B, C, HIV, EBV, CMV), nutritional deficiency (B12, folate, copper), and lupus or other autoimmunity. She does not have evidence of liver failure, hypersplenism, or chronic alcoholism. It is also possible that it signals the beginnings of a bone marrow failure or bone marrow infiltrative process    Recommendations:   - OK for surgery. She has had these kinds of counts for surgery before and has tolerated surgery well.   - in about 2-3 months check repeat CBC with diff and retic count, smear, HIV, Hep B,  HCV Abs, B12, folate, copper, IRIS, SPEP, serum immunofixation, kappa lambda light chain ratio.  - Let me know about results, I can help interpret. May need a bone marrow biopsy.     The recommendations provided in this E-Consult are based on a review of clinical data pertinent to the clinical question presented, without a review of the patient's complete medical record or, the benefit of a comprehensive in-person or virtual patient evaluation. This consultation should not replace the clinical judgement and evaluation of the provider ordering this E-Consult. Any new clinical issues, or changes in patient status since the filing of this E-Consult will need to be taken into account when assessing these recommendations. Please contact me if you have further questions.    My total time spent reviewing clinical information and formulating assessment was 20 minutes.    Josse Chandler MD

## 2024-06-25 NOTE — TELEPHONE ENCOUNTER
FYI - Status Update    Who is Calling: Carli from [facility]    Update: [facility] needs an updated Pre-Op that states the pt has seen Hematology and is cleared for surgery.  Would like amended Pre-Op faxed to [phone]    Does caller want a call/response back: No

## 2024-06-29 DIAGNOSIS — J45.31 MILD PERSISTENT ASTHMA WITH ACUTE EXACERBATION: ICD-10-CM

## 2024-07-01 RX ORDER — ALBUTEROL SULFATE 0.83 MG/ML
SOLUTION RESPIRATORY (INHALATION)
Qty: 150 ML | Refills: 1 | Status: SHIPPED | OUTPATIENT
Start: 2024-07-01

## 2024-07-17 ENCOUNTER — TRANSFERRED RECORDS (OUTPATIENT)
Dept: HEALTH INFORMATION MANAGEMENT | Facility: CLINIC | Age: 59
End: 2024-07-17
Payer: COMMERCIAL

## 2024-08-03 ENCOUNTER — HEALTH MAINTENANCE LETTER (OUTPATIENT)
Age: 59
End: 2024-08-03

## 2024-08-15 ENCOUNTER — OFFICE VISIT (OUTPATIENT)
Dept: FAMILY MEDICINE | Facility: CLINIC | Age: 59
End: 2024-08-15
Payer: COMMERCIAL

## 2024-08-15 VITALS
BODY MASS INDEX: 28.96 KG/M2 | RESPIRATION RATE: 16 BRPM | DIASTOLIC BLOOD PRESSURE: 85 MMHG | HEIGHT: 65 IN | SYSTOLIC BLOOD PRESSURE: 134 MMHG | WEIGHT: 173.8 LBS | OXYGEN SATURATION: 97 % | HEART RATE: 78 BPM | TEMPERATURE: 98.3 F

## 2024-08-15 DIAGNOSIS — J45.31 MILD PERSISTENT ASTHMA WITH ACUTE EXACERBATION: Primary | ICD-10-CM

## 2024-08-15 PROCEDURE — 99214 OFFICE O/P EST MOD 30 MIN: CPT | Performed by: FAMILY MEDICINE

## 2024-08-15 RX ORDER — PREDNISONE 20 MG/1
TABLET ORAL
Qty: 20 TABLET | Refills: 0 | Status: SHIPPED | OUTPATIENT
Start: 2024-08-15 | End: 2024-08-29

## 2024-08-15 RX ORDER — ALBUTEROL SULFATE 90 UG/1
2 AEROSOL, METERED RESPIRATORY (INHALATION) EVERY 4 HOURS PRN
Qty: 18 G | Refills: 3 | Status: SHIPPED | OUTPATIENT
Start: 2024-08-15

## 2024-08-15 ASSESSMENT — ASTHMA QUESTIONNAIRES
QUESTION_4 LAST FOUR WEEKS HOW OFTEN HAVE YOU USED YOUR RESCUE INHALER OR NEBULIZER MEDICATION (SUCH AS ALBUTEROL): ONE OR TWO TIMES PER DAY
QUESTION_3 LAST FOUR WEEKS HOW OFTEN DID YOUR ASTHMA SYMPTOMS (WHEEZING, COUGHING, SHORTNESS OF BREATH, CHEST TIGHTNESS OR PAIN) WAKE YOU UP AT NIGHT OR EARLIER THAN USUAL IN THE MORNING: TWO OR THREE NIGHTS A WEEK
ACT_TOTALSCORE: 12
ACT_TOTALSCORE: 12
QUESTION_1 LAST FOUR WEEKS HOW MUCH OF THE TIME DID YOUR ASTHMA KEEP YOU FROM GETTING AS MUCH DONE AT WORK, SCHOOL OR AT HOME: SOME OF THE TIME
QUESTION_5 LAST FOUR WEEKS HOW WOULD YOU RATE YOUR ASTHMA CONTROL: POORLY CONTROLLED
QUESTION_2 LAST FOUR WEEKS HOW OFTEN HAVE YOU HAD SHORTNESS OF BREATH: THREE TO SIX TIMES A WEEK

## 2024-08-15 ASSESSMENT — PAIN SCALES - GENERAL: PAINLEVEL: NO PAIN (0)

## 2024-08-15 NOTE — PROGRESS NOTES
Neida Wilson is a 58 year old, presenting for the following health issues:  Asthma      8/15/2024    11:32 AM   Additional Questions   Roomed by Kelvin     History of Present Illness     Asthma:  She presents for follow up of asthma.  She has some cough, some wheezing, and some shortness of breath.  She is using a relief medication 2-3 times per day. She typically misses taking her controller medication 2 time(s) per week. Patient is aware of the following triggers: animal dander, pollens and upper respiratory infections. The patient has had a visit to the Emergency Room, Urgent Care or Hospital due to asthma since the last clinic visit. She has been to the Emergency Room or Urgent Care 1 time.She has had a Hospitalization 1 time.    She eats 0-1 servings of fruits and vegetables daily.She consumes 2 sweetened beverage(s) daily.She exercises with enough effort to increase her heart rate 9 or less minutes per day.  She exercises with enough effort to increase her heart rate 3 or less days per week.   She is taking medications regularly.           Review of Systems  Constitutional, HEENT, cardiovascular, pulmonary, GI, , musculoskeletal, neuro, skin, endocrine and psych systems are negative, except as otherwise noted.      Objective    LMP 03/30/2014   Body mass index is 28.61 kg/m .  Physical Exam   GENERAL: alert and no distress  NECK: no adenopathy, no asymmetry, masses, or scars  RESP: diffuse wheezing  CV: regular rate and rhythm, normal S1 S2, no S3 or S4, no murmur, click or rub, no peripheral edema  ABDOMEN: soft, nontender, no hepatosplenomegaly, no masses and bowel sounds normal    A/P:  (J45.31) Mild persistent asthma with acute exacerbation  (primary encounter diagnosis)  Comment:   Plan: predniSONE (DELTASONE) 20 MG tablet, albuterol         (PROAIR HFA/PROVENTIL HFA/VENTOLIN HFA) 108 (90        Base) MCG/ACT inhaler        Lungs sounded tight elected to treat with tapering dose of  prednisone instead of burst. Continue with albuterol as needed.          Signed Electronically by: Rolf Scott MD, MD

## 2024-08-29 ENCOUNTER — OFFICE VISIT (OUTPATIENT)
Dept: RHEUMATOLOGY | Facility: CLINIC | Age: 59
End: 2024-08-29
Payer: COMMERCIAL

## 2024-08-29 ENCOUNTER — ANCILLARY PROCEDURE (OUTPATIENT)
Dept: GENERAL RADIOLOGY | Facility: CLINIC | Age: 59
End: 2024-08-29
Attending: INTERNAL MEDICINE
Payer: COMMERCIAL

## 2024-08-29 VITALS
WEIGHT: 171 LBS | OXYGEN SATURATION: 97 % | BODY MASS INDEX: 28.15 KG/M2 | SYSTOLIC BLOOD PRESSURE: 119 MMHG | HEART RATE: 83 BPM | RESPIRATION RATE: 16 BRPM | DIASTOLIC BLOOD PRESSURE: 78 MMHG

## 2024-08-29 DIAGNOSIS — M19.042 PRIMARY OSTEOARTHRITIS OF BOTH HANDS: ICD-10-CM

## 2024-08-29 DIAGNOSIS — M05.9 SEROPOSITIVE RHEUMATOID ARTHRITIS (H): ICD-10-CM

## 2024-08-29 DIAGNOSIS — M05.9 SEROPOSITIVE RHEUMATOID ARTHRITIS (H): Primary | ICD-10-CM

## 2024-08-29 DIAGNOSIS — Z79.899 LONG-TERM USE OF HYDROXYCHLOROQUINE: ICD-10-CM

## 2024-08-29 DIAGNOSIS — M19.041 PRIMARY OSTEOARTHRITIS OF BOTH HANDS: ICD-10-CM

## 2024-08-29 DIAGNOSIS — M19.031 PRIMARY OSTEOARTHRITIS OF RIGHT WRIST: ICD-10-CM

## 2024-08-29 PROCEDURE — 73130 X-RAY EXAM OF HAND: CPT | Mod: LT | Performed by: INTERNAL MEDICINE

## 2024-08-29 PROCEDURE — 73630 X-RAY EXAM OF FOOT: CPT | Mod: LT | Performed by: INTERNAL MEDICINE

## 2024-08-29 PROCEDURE — 99214 OFFICE O/P EST MOD 30 MIN: CPT | Performed by: INTERNAL MEDICINE

## 2024-08-29 PROCEDURE — G2211 COMPLEX E/M VISIT ADD ON: HCPCS | Performed by: INTERNAL MEDICINE

## 2024-08-29 RX ORDER — HYDROXYCHLOROQUINE SULFATE 200 MG/1
200 TABLET, FILM COATED ORAL DAILY
Qty: 90 TABLET | Refills: 2 | Status: SHIPPED | OUTPATIENT
Start: 2024-08-29

## 2024-08-29 RX ORDER — MEDROXYPROGESTERONE ACETATE 150 MG/ML
50 INJECTION, SUSPENSION INTRAMUSCULAR
Qty: 4 ML | Refills: 7 | Status: SHIPPED | OUTPATIENT
Start: 2024-08-29

## 2024-08-29 NOTE — PATIENT INSTRUCTIONS
RHEUMATOLOGY    [facility]  [address] [place] [address]  [place] [address]    Phone number: [phone]  Fax number: [phone]    If you need a medication refill, please contact us as you may need lab work and/or a follow up visit prior to your refill.      Thank you for choosing [facility]!    Noy Burt CMA Rheumatology

## 2024-08-29 NOTE — PROGRESS NOTES
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 Labs   Lab Test 06/12/24  1519 02/13/24  0720 02/16/23  0900 04/28/22  0822 09/16/21  0841 03/05/21  1049 04/29/20  1343 10/15/19  1552     --   --   --   --   --   --   --    POTASSIUM 4.9  --   --   --   --   --   --   --    CHLORIDE 103  --   --   --   --   --   --   --    CO2 27  --   --   --   --   --   --   --    ANIONGAP 9  --   --   --   --   --   --   --    GLC 99 103*  --   --   --   --   --   --    BUN 9.4  --   --   --   --   --   --   --    CR 0.86 0.73 0.70 0.88   < > 0.79 0.76 0.85   GFRESTIMATED 78 >90 >90 77   < > 84 88 78   GFRESTBLACK  --   --   --   --   --  >90 >90 90   NATALYA 9.3  --   --   --   --  9.3  --   --    BILITOTAL  --  0.3 0.6 0.8   < > 0.6 0.5 0.4   ALBUMIN  --  3.5 3.5 3.6   < > 3.6 3.4 3.4   PROTTOTAL  --  7.1 7.5 7.7   < > 7.7 7.7 7.6   ALKPHOS  --  71 52 47   < > 51 52 63   AST  --  20 15 17   < > 11 17 10   ALT  --  12 18 18   < > 16 18 16    < > = values in this interval not displayed.     Calcium/VitaminD  Recent Labs   Lab Test 06/12/24  1519 03/05/21  1049 06/18/19  1453   NATALYA 9.3 9.3  --    VITDT  --  55 33     ESR/CRP  Recent Labs   Lab Test 02/13/24  0720 02/16/23  0900 04/28/22  0822 09/16/21  0841   SED 46* 15 16 29   CRP  --  <2.9 <2.9 <2.9   CRPI 7.06*  --   --    --      Lipid Panel  Recent Labs   Lab Test 02/13/24  0720 03/29/18  0913   CHOL 186 227*   TRIG 67 82   HDL 78 80   LDL 95 131*   NHDL 108 147*     Hepatitis B  Recent Labs   Lab Test 08/31/18  1220   HBCAB Nonreactive   HEPBANG Nonreactive     Hepatitis C  Recent Labs   Lab Test 08/31/18  1220 03/29/18  0913   HCVAB Nonreactive Nonreactive     Lyme ab screening  Recent Labs   Lab Test 06/29/18  1301   LYMEGM 0.15       Tuberculosis Screening  Recent Labs   Lab Test 02/16/23  0900 09/16/21  0841 06/18/19  1453   TBRES Negative Negative Negative     Immunization History     Immunization History   Administered Date(s) Administered    COVID-19 MONOVALENT 12+ (Pfizer) 04/13/2021, 05/04/2021, 09/16/2021    Influenza (IIV3) PF 10/20/2009    TDAP (Adacel,Boostrix) 01/25/2010          Chart documentation done in part with Dragon Voice recognition Software. Although reviewed after completion, some word and grammatical error may remain.    Richard Mcfarlane MD

## 2024-08-29 NOTE — NURSING NOTE
RAPID3 (0-30) Cumulative Score  11.2          RAPID3 Weighted Score (divide #4 by 3 and that is the weighted score)  3.7

## 2024-09-22 ENCOUNTER — TRANSFERRED RECORDS (OUTPATIENT)
Dept: HEALTH INFORMATION MANAGEMENT | Facility: CLINIC | Age: 59
End: 2024-09-22
Payer: COMMERCIAL

## 2024-10-29 ENCOUNTER — PATIENT OUTREACH (OUTPATIENT)
Dept: CARE COORDINATION | Facility: CLINIC | Age: 59
End: 2024-10-29
Payer: COMMERCIAL

## 2024-11-08 ENCOUNTER — OFFICE VISIT (OUTPATIENT)
Dept: OPHTHALMOLOGY | Facility: CLINIC | Age: 59
End: 2024-11-08
Payer: COMMERCIAL

## 2024-11-08 DIAGNOSIS — H04.123 DRY EYE SYNDROME, BILATERAL: ICD-10-CM

## 2024-11-08 DIAGNOSIS — Z79.899 HIGH RISK MEDICATIONS (NOT ANTICOAGULANTS) LONG-TERM USE: Primary | ICD-10-CM

## 2024-11-08 DIAGNOSIS — M05.9 SEROPOSITIVE RHEUMATOID ARTHRITIS (H): ICD-10-CM

## 2024-11-08 PROCEDURE — 92134 CPTRZ OPH DX IMG PST SGM RTA: CPT | Performed by: STUDENT IN AN ORGANIZED HEALTH CARE EDUCATION/TRAINING PROGRAM

## 2024-11-08 PROCEDURE — 92083 EXTENDED VISUAL FIELD XM: CPT | Performed by: STUDENT IN AN ORGANIZED HEALTH CARE EDUCATION/TRAINING PROGRAM

## 2024-11-08 PROCEDURE — 92012 INTRM OPH EXAM EST PATIENT: CPT | Performed by: STUDENT IN AN ORGANIZED HEALTH CARE EDUCATION/TRAINING PROGRAM

## 2024-11-08 ASSESSMENT — REFRACTION_WEARINGRX
SPECS_TYPE: OTC READERS
OS_SPHERE: +2.50
OD_CYLINDER: SPHERE
OD_SPHERE: +2.50
OS_CYLINDER: SPHERE

## 2024-11-08 ASSESSMENT — VISUAL ACUITY
OS_SC+: -1
OS_SC: 20/25
METHOD: SNELLEN - LINEAR
OD_SC: 20/25

## 2024-11-08 ASSESSMENT — SLIT LAMP EXAM - LIDS
COMMENTS: NORMAL
COMMENTS: NORMAL

## 2024-11-08 ASSESSMENT — EXTERNAL EXAM - LEFT EYE: OS_EXAM: NORMAL

## 2024-11-08 ASSESSMENT — EXTERNAL EXAM - RIGHT EYE: OD_EXAM: NORMAL

## 2024-11-08 NOTE — LETTER
11/8/2024      Katie Aponte  [address]  [place] [address]      Dear Colleague,    Thank you for referring your patient, Katie Aponte, to the [facility]. Please see a copy of my visit note below.     Current Eye Medications:  no drops.      Subjective:  here for HVF/OCT for plaquenil use. Has been taking 200 mg daily since 2018 for her arthritis. Having to wear cheaters all the time now, cannot read without them. Hasn't had a complete eye exam for a while.     Objective:  See Ophthalmology Exam.       Assessment:  Katie Aponte is a 58 year old female who presents with:   Encounter Diagnoses   Name Primary?     High risk medications (not anticoagulants) long-term use Plaquenil since Nov 2018     Macular SD-OCT within normal limits both eyes.    Bhatt visual field (HVF) 10-2 within normal limits both eyes.    No signs of Plaquenil retinal toxicity at present.        Seropositive rheumatoid arthritis (H)      Dry eye syndrome, bilateral        Plan:  Normal Plaquenil eye tests today.    Jacinta Vides MD  [phone]     Again, thank you for allowing me to participate in the care of your patient.        Sincerely,        Jacinta Vides MD

## 2024-11-08 NOTE — PROGRESS NOTES
Current Eye Medications:  no drops.      Subjective:  here for HVF/OCT for plaquenil use. Has been taking 200 mg daily since 2018 for her arthritis. Having to wear cheaters all the time now, cannot read without them. Hasn't had a complete eye exam for a while.     Objective:  See Ophthalmology Exam.       Assessment:  Katie Aponte is a 58 year old female who presents with:   Encounter Diagnoses   Name Primary?    High risk medications (not anticoagulants) long-term use Plaquenil since Nov 2018     Macular SD-OCT within normal limits both eyes.    Bhatt visual field (HVF) 10-2 within normal limits both eyes.    No signs of Plaquenil retinal toxicity at present.       Seropositive rheumatoid arthritis (H)     Dry eye syndrome, bilateral        Plan:  Normal Plaquenil eye tests today.    Jacinta Vides MD  [phone]

## 2024-11-21 NOTE — TELEPHONE ENCOUNTER
"6/13/2024 faxed pre op notes to [facility], [phone], right fax confirmed \"transmission error\".  6/14/2024 faxed pre op notes to [facility], [phone], right fax confirmed at 6:51 am today, 6/14/2024.    Isabel Renteria APRN CNP  [facility]  I had to addend my initial pre op note due to labs results. She is not cleared until seen by hematology.  Please fax to [facility] at: [phone]    TREVER Zurita CNP      Faxed addended notes to [facility], [phone], right fax confirmed at 7:50 am today, 6/14/2024.  (Cover sheet did note that these were addended notes and not cleared for Surgery)  Placed in writer's copy basket.  Kristen Ruiz MA  [facility]   Primary Care    " negative...

## 2024-12-21 ENCOUNTER — E-VISIT (OUTPATIENT)
Dept: URGENT CARE | Facility: CLINIC | Age: 59
End: 2024-12-21
Payer: COMMERCIAL

## 2024-12-21 DIAGNOSIS — J01.90 ACUTE SINUSITIS WITH SYMPTOMS > 10 DAYS: Primary | ICD-10-CM

## 2024-12-21 NOTE — PATIENT INSTRUCTIONS
Acute Sinusitis: Care Instructions  Overview     Acute sinusitis is an inflammation of the mucous membranes inside the nose and sinuses. Sinuses are the hollow spaces in your skull around the eyes and nose. Acute sinusitis often follows a cold. Acute sinusitis causes thick, discolored mucus that drains from the nose or down the back of the throat. It also can cause pain and pressure in your head and face along with a stuffy or blocked nose.  In most cases, sinusitis gets better on its own in 1 to 2 weeks. But some mild symptoms may last for several weeks. Sometimes antibiotics are needed if there is a bacterial infection.  Follow-up care is a key part of your treatment and safety. Be sure to make and go to all appointments, and call your doctor if you are having problems. It's also a good idea to know your test results and keep a list of the medicines you take.  How can you care for yourself at home?  Use saline (saltwater) nasal washes. This can help keep your nasal passages open and wash out mucus and allergens.  You can buy saline nose washes at a grocery store or drugstore. Follow the instructions on the package.  You can make your own at home. Add 1 teaspoon of non-iodized salt and 1 teaspoon of baking soda to 2 cups of distilled or boiled and cooled water. Fill a squeeze bottle or a nasal cleansing pot (such as a neti pot) with the nasal wash. Then put the tip into your nostril, and lean over the sink. With your mouth open, gently squirt the liquid. Repeat on the other side.  Try a decongestant nasal spray like oxymetazoline (Afrin). Do not use it for more than 3 days in a row. Using it for more than 3 days can make your congestion worse.  If needed, take an over-the-counter pain medicine, such as acetaminophen (Tylenol), ibuprofen (Advil, Motrin), or naproxen (Aleve). Read and follow all instructions on the label.  If the doctor prescribed antibiotics, take them as directed. Do not stop taking them just  "because you feel better. You need to take the full course of antibiotics.  Be careful when taking over-the-counter cold or flu medicines and Tylenol at the same time. Many of these medicines have acetaminophen, which is Tylenol. Read the labels to make sure that you are not taking more than the recommended dose. Too much acetaminophen (Tylenol) can be harmful.  Try a steroid nasal spray. It may help with your symptoms.  Breathe warm, moist air. You can use a steamy shower, a hot bath, or a sink filled with hot water. Avoid cold, dry air. Using a humidifier in your home may help. Follow the directions for cleaning the machine.  When should you call for help?   Call your doctor now or seek immediate medical care if:    You have new or worse swelling, redness, or pain in your face or around one or both of your eyes.     You have double vision or a change in your vision.     You have a high fever.     You have a severe headache and a stiff neck.     You have mental changes, such as feeling confused or much less alert.   Watch closely for changes in your health, and be sure to contact your doctor if:    You are not getting better as expected.   Where can you learn more?  Go to https://www.[employer].net/patiented  Enter I933 in the search box to learn more about \"Acute Sinusitis: Care Instructions.\"  Current as of: September 27, 2023  Content Version: 14.3    2024 [employer].   Care instructions adapted under license by your healthcare professional. If you have questions about a medical condition or this instruction, always ask your healthcare professional. [employer] disclaims any warranty or liability for your use of this information.    You may want to try a nasal lavage (also known as nasal irrigation). You can find over-the-counter products, such as Neti-Pot, at retail locations or make your own at home. Instructions for homemade nasal lavage and more information on the process are available " online at http://www.aafp.org/afp/2009/1115/p1121.html.    Dear Katie Aponte    After reviewing your responses, I've been able to diagnose you with Acute sinusitis with symptoms > 10 days.      Based on your responses and diagnosis, I have prescribed   Orders Placed This Encounter   Medications     amoxicillin-clavulanate (AUGMENTIN) 875-125 MG tablet     Sig: Take 1 tablet by mouth 2 times daily for 7 days.     Dispense:  14 tablet     Refill:  0      to treat your symptoms. I have sent this to your pharmacy.?     It is also important to stay well hydrated, get lots of rest and take over-the-counter decongestants,?tylenol?or ibuprofen if you?are able to?take those medications per your primary care provider to help relieve discomfort.?     It is important that you take?all of?your prescribed medication even if your symptoms are improving after a few doses.? Taking?all of?your medicine helps prevent the symptoms from returning.?     If your symptoms worsen, you develop severe headache, vomiting, high fever (>102), or are not improving in 7 days, please contact your primary care provider for an appointment or visit any of our convenient Walk-in Care or Urgent Care Centers to be seen which can be found on our website?here.?     Thanks again for choosing?us?as your health care partner,?   ?  Melba Rendon PA-C?   Thank you for choosing us for your care. I have placed an order for a prescription so that you can start treatment. View your full visit summary for details by clicking on the link below. Your pharmacist will able to address any questions you may have about the medication.     If you're not feeling better within 5-7 days, please schedule an appointment.  You can schedule an appointment right here in [facility], or call [phone]  If the visit is for the same symptoms as your eVisit, we'll refund the cost of your eVisit if seen within seven days.

## 2025-01-28 DIAGNOSIS — J45.31 MILD PERSISTENT ASTHMA WITH ACUTE EXACERBATION: ICD-10-CM

## 2025-01-28 RX ORDER — ALBUTEROL SULFATE 0.83 MG/ML
SOLUTION RESPIRATORY (INHALATION)
Qty: 150 ML | Refills: 1 | Status: SHIPPED | OUTPATIENT
Start: 2025-01-28

## 2025-02-24 ENCOUNTER — LAB (OUTPATIENT)
Dept: LAB | Facility: CLINIC | Age: 60
End: 2025-02-24
Payer: COMMERCIAL

## 2025-02-24 DIAGNOSIS — M05.9 SEROPOSITIVE RHEUMATOID ARTHRITIS (H): ICD-10-CM

## 2025-02-24 LAB
ALBUMIN SERPL BCG-MCNC: 3.9 G/DL (ref 3.5–5.2)
ALP SERPL-CCNC: 51 U/L (ref 40–150)
ALT SERPL W P-5'-P-CCNC: 11 U/L (ref 0–50)
AST SERPL W P-5'-P-CCNC: 20 U/L (ref 0–45)
BILIRUB DIRECT SERPL-MCNC: 0.18 MG/DL (ref 0–0.3)
BILIRUB SERPL-MCNC: 0.4 MG/DL
CREAT SERPL-MCNC: 0.83 MG/DL (ref 0.51–0.95)
CRP SERPL-MCNC: <3 MG/L
EGFRCR SERPLBLD CKD-EPI 2021: 81 ML/MIN/1.73M2
ERYTHROCYTE [SEDIMENTATION RATE] IN BLOOD BY WESTERGREN METHOD: 14 MM/HR (ref 0–30)
PROT SERPL-MCNC: 7.2 G/DL (ref 6.4–8.3)

## 2025-02-24 PROCEDURE — 36415 COLL VENOUS BLD VENIPUNCTURE: CPT

## 2025-02-24 PROCEDURE — 86140 C-REACTIVE PROTEIN: CPT

## 2025-02-24 PROCEDURE — 80076 HEPATIC FUNCTION PANEL: CPT

## 2025-02-24 PROCEDURE — 85652 RBC SED RATE AUTOMATED: CPT

## 2025-02-24 PROCEDURE — 82565 ASSAY OF CREATININE: CPT

## 2025-02-26 ENCOUNTER — OFFICE VISIT (OUTPATIENT)
Dept: FAMILY MEDICINE | Facility: CLINIC | Age: 60
End: 2025-02-26
Payer: COMMERCIAL

## 2025-02-26 ENCOUNTER — OFFICE VISIT (OUTPATIENT)
Dept: RHEUMATOLOGY | Facility: CLINIC | Age: 60
End: 2025-02-26
Payer: COMMERCIAL

## 2025-02-26 VITALS
HEART RATE: 74 BPM | SYSTOLIC BLOOD PRESSURE: 108 MMHG | BODY MASS INDEX: 29.14 KG/M2 | OXYGEN SATURATION: 100 % | TEMPERATURE: 97.3 F | RESPIRATION RATE: 21 BRPM | DIASTOLIC BLOOD PRESSURE: 64 MMHG | WEIGHT: 177 LBS

## 2025-02-26 VITALS
HEART RATE: 84 BPM | WEIGHT: 174.6 LBS | SYSTOLIC BLOOD PRESSURE: 114 MMHG | BODY MASS INDEX: 28.74 KG/M2 | OXYGEN SATURATION: 97 % | DIASTOLIC BLOOD PRESSURE: 77 MMHG | RESPIRATION RATE: 16 BRPM

## 2025-02-26 DIAGNOSIS — Z96.651 HISTORY OF TOTAL KNEE ARTHROPLASTY, RIGHT: ICD-10-CM

## 2025-02-26 DIAGNOSIS — M70.50 PES ANSERINE BURSITIS: ICD-10-CM

## 2025-02-26 DIAGNOSIS — J45.20 MILD INTERMITTENT ASTHMA WITHOUT COMPLICATION: ICD-10-CM

## 2025-02-26 DIAGNOSIS — R39.9 UTI SYMPTOMS: Primary | ICD-10-CM

## 2025-02-26 DIAGNOSIS — M70.61 GREATER TROCHANTERIC BURSITIS OF RIGHT HIP: ICD-10-CM

## 2025-02-26 DIAGNOSIS — M76.31 ILIOTIBIAL BAND SYNDROME OF RIGHT SIDE: ICD-10-CM

## 2025-02-26 DIAGNOSIS — Z96.641 HISTORY OF TOTAL RIGHT HIP REPLACEMENT: ICD-10-CM

## 2025-02-26 DIAGNOSIS — M05.9 SEROPOSITIVE RHEUMATOID ARTHRITIS (H): Primary | ICD-10-CM

## 2025-02-26 DIAGNOSIS — M18.0 PRIMARY OSTEOARTHRITIS OF BOTH FIRST CARPOMETACARPAL JOINTS: ICD-10-CM

## 2025-02-26 DIAGNOSIS — B37.31 YEAST VAGINITIS: ICD-10-CM

## 2025-02-26 LAB
ALBUMIN UR-MCNC: 100 MG/DL
APPEARANCE UR: ABNORMAL
BACTERIA #/AREA URNS HPF: ABNORMAL /HPF
BACTERIAL VAGINOSIS VAG-IMP: NEGATIVE
BASOPHILS # BLD AUTO: 0 10E3/UL (ref 0–0.2)
BASOPHILS NFR BLD AUTO: 0 %
BILIRUB UR QL STRIP: NEGATIVE
C TRACH DNA SPEC QL PROBE+SIG AMP: NEGATIVE
CANDIDA DNA VAG QL NAA+PROBE: DETECTED
CANDIDA GLABRATA / CANDIDA KRUSEI DNA: NOT DETECTED
COLOR UR AUTO: YELLOW
EOSINOPHIL # BLD AUTO: 0.2 10E3/UL (ref 0–0.7)
EOSINOPHIL NFR BLD AUTO: 5 %
ERYTHROCYTE [DISTWIDTH] IN BLOOD BY AUTOMATED COUNT: 13.1 % (ref 10–15)
GLUCOSE UR STRIP-MCNC: NEGATIVE MG/DL
HCT VFR BLD AUTO: 39.4 % (ref 35–47)
HGB BLD-MCNC: 12.8 G/DL (ref 11.7–15.7)
HGB UR QL STRIP: ABNORMAL
IMM GRANULOCYTES # BLD: 0 10E3/UL
IMM GRANULOCYTES NFR BLD: 0 %
KETONES UR STRIP-MCNC: 15 MG/DL
LEUKOCYTE ESTERASE UR QL STRIP: ABNORMAL
LYMPHOCYTES # BLD AUTO: 1.9 10E3/UL (ref 0.8–5.3)
LYMPHOCYTES NFR BLD AUTO: 38 %
MCH RBC QN AUTO: 27.4 PG (ref 26.5–33)
MCHC RBC AUTO-ENTMCNC: 32.5 G/DL (ref 31.5–36.5)
MCV RBC AUTO: 84 FL (ref 78–100)
MONOCYTES # BLD AUTO: 0.6 10E3/UL (ref 0–1.3)
MONOCYTES NFR BLD AUTO: 13 %
N GONORRHOEA DNA SPEC QL NAA+PROBE: NEGATIVE
NEUTROPHILS # BLD AUTO: 2.2 10E3/UL (ref 1.6–8.3)
NEUTROPHILS NFR BLD AUTO: 44 %
NITRATE UR QL: POSITIVE
PH UR STRIP: 5.5 [PH] (ref 5–7)
PLATELET # BLD AUTO: 170 10E3/UL (ref 150–450)
RBC # BLD AUTO: 4.67 10E6/UL (ref 3.8–5.2)
RBC #/AREA URNS AUTO: ABNORMAL /HPF
SP GR UR STRIP: >=1.03 (ref 1–1.03)
SPECIMEN TYPE: NORMAL
SQUAMOUS #/AREA URNS AUTO: ABNORMAL /LPF
T VAGINALIS DNA VAG QL NAA+PROBE: NOT DETECTED
UROBILINOGEN UR STRIP-ACNC: 0.2 E.U./DL
WBC # BLD AUTO: 4.9 10E3/UL (ref 4–11)
WBC #/AREA URNS AUTO: ABNORMAL /HPF

## 2025-02-26 PROCEDURE — 3074F SYST BP LT 130 MM HG: CPT | Performed by: NURSE PRACTITIONER

## 2025-02-26 PROCEDURE — 1125F AMNT PAIN NOTED PAIN PRSNT: CPT | Performed by: NURSE PRACTITIONER

## 2025-02-26 PROCEDURE — 87086 URINE CULTURE/COLONY COUNT: CPT | Performed by: NURSE PRACTITIONER

## 2025-02-26 PROCEDURE — 81515 NFCT DS BV&VAGINITIS DNA ALG: CPT | Performed by: NURSE PRACTITIONER

## 2025-02-26 PROCEDURE — 81001 URINALYSIS AUTO W/SCOPE: CPT | Performed by: NURSE PRACTITIONER

## 2025-02-26 PROCEDURE — 87186 SC STD MICRODIL/AGAR DIL: CPT | Performed by: NURSE PRACTITIONER

## 2025-02-26 PROCEDURE — 87591 N.GONORRHOEAE DNA AMP PROB: CPT | Performed by: NURSE PRACTITIONER

## 2025-02-26 PROCEDURE — 99214 OFFICE O/P EST MOD 30 MIN: CPT | Performed by: NURSE PRACTITIONER

## 2025-02-26 PROCEDURE — 87491 CHLMYD TRACH DNA AMP PROBE: CPT | Performed by: NURSE PRACTITIONER

## 2025-02-26 PROCEDURE — 85025 COMPLETE CBC W/AUTO DIFF WBC: CPT | Performed by: INTERNAL MEDICINE

## 2025-02-26 PROCEDURE — 3078F DIAST BP <80 MM HG: CPT | Performed by: NURSE PRACTITIONER

## 2025-02-26 PROCEDURE — 36415 COLL VENOUS BLD VENIPUNCTURE: CPT | Performed by: INTERNAL MEDICINE

## 2025-02-26 RX ORDER — FLUCONAZOLE 150 MG/1
150 TABLET ORAL ONCE
Qty: 1 TABLET | Refills: 0 | Status: SHIPPED | OUTPATIENT
Start: 2025-02-26 | End: 2025-02-26

## 2025-02-26 RX ORDER — NITROFURANTOIN 25; 75 MG/1; MG/1
100 CAPSULE ORAL 2 TIMES DAILY
Qty: 10 CAPSULE | Refills: 0 | Status: SHIPPED | OUTPATIENT
Start: 2025-02-26 | End: 2025-03-03

## 2025-02-26 RX ORDER — HYDROXYCHLOROQUINE SULFATE 200 MG/1
200 TABLET, FILM COATED ORAL DAILY
Qty: 90 TABLET | Refills: 2 | Status: SHIPPED | OUTPATIENT
Start: 2025-02-26

## 2025-02-26 RX ORDER — MEDROXYPROGESTERONE ACETATE 150 MG/ML
50 INJECTION, SUSPENSION INTRAMUSCULAR
Qty: 4 ML | Refills: 7 | Status: SHIPPED | OUTPATIENT
Start: 2025-02-26

## 2025-02-26 ASSESSMENT — PAIN SCALES - GENERAL: PAINLEVEL_OUTOF10: SEVERE PAIN (7)

## 2025-02-26 ASSESSMENT — ASTHMA QUESTIONNAIRES
ACT_TOTALSCORE: 22
QUESTION_2 LAST FOUR WEEKS HOW OFTEN HAVE YOU HAD SHORTNESS OF BREATH: ONCE OR TWICE A WEEK
QUESTION_4 LAST FOUR WEEKS HOW OFTEN HAVE YOU USED YOUR RESCUE INHALER OR NEBULIZER MEDICATION (SUCH AS ALBUTEROL): ONCE A WEEK OR LESS
QUESTION_3 LAST FOUR WEEKS HOW OFTEN DID YOUR ASTHMA SYMPTOMS (WHEEZING, COUGHING, SHORTNESS OF BREATH, CHEST TIGHTNESS OR PAIN) WAKE YOU UP AT NIGHT OR EARLIER THAN USUAL IN THE MORNING: NOT AT ALL
QUESTION_1 LAST FOUR WEEKS HOW MUCH OF THE TIME DID YOUR ASTHMA KEEP YOU FROM GETTING AS MUCH DONE AT WORK, SCHOOL OR AT HOME: NONE OF THE TIME
QUESTION_5 LAST FOUR WEEKS HOW WOULD YOU RATE YOUR ASTHMA CONTROL: WELL CONTROLLED
ACT_TOTALSCORE: 22

## 2025-02-26 ASSESSMENT — ENCOUNTER SYMPTOMS
FEVER: 0
FLANK PAIN: 0
VOMITING: 0
NAUSEA: 0
CHILLS: 0

## 2025-02-26 NOTE — PROGRESS NOTES
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   < > 3.6 3.4 3.4   PROTTOTAL 7.2  --  7.1 7.5   < > 7.7 7.7 7.6   ALKPHOS 51  --  71 52   < > 51 52 63   AST 20  --  20 15   < > 11 17 10   ALT 11  --  12 18   < > 16 18 16    < > = values in this interval not displayed.     Calcium/VitaminD  Recent Labs   Lab Test 06/12/24  1519 03/05/21  1049 06/18/19  1453   NATALYA 9.3 9.3  --    VITDT  --  55 33     ESR/CRP  Recent Labs   Lab Test 02/24/25  0928 02/13/24  0720 02/16/23  0900 04/28/22  0822 09/16/21  0841   SED 14 46* 15 16 29   CRP  --   --  <2.9 <2.9 <2.9   CRPI <3.00 7.06*  --   --   --      Lipid Panel  Recent Labs   Lab Test 02/13/24  0720 03/29/18  0913   CHOL 186 227*   TRIG 67 82   HDL 78 80   LDL 95 131*   NHDL 108 147*     Hepatitis B  Recent Labs   Lab Test 08/31/18  1220   HBCAB Nonreactive   HEPBANG Nonreactive     Hepatitis C  Recent Labs   Lab Test 08/31/18  1220 03/29/18  0913   HCVAB Nonreactive Nonreactive     Lyme ab screening  Recent Labs   Lab Test 06/29/18  1301   LYMEGM 0.15     Tuberculosis Screening  Recent Labs   Lab Test 02/16/23  0900 09/16/21  0841 06/18/19  1453   TBRES Negative Negative Negative     Immunization History     Immunization History   Administered Date(s) Administered    COVID-19 MONOVALENT 12+ (Pfizer) 04/13/2021, 05/04/2021, 09/16/2021    Influenza (IIV3) PF  10/20/2009    TDAP (Adacel,Boostrix) 01/25/2010          Chart documentation done in part with Dragon Voice recognition Software. Although reviewed after completion, some word and grammatical error may remain.    Richard Mcfarlane MD

## 2025-02-26 NOTE — PROGRESS NOTES
Assessment & Plan     1. UTI symptoms (Primary)    Patient is afebrile and in no acute distress with clear lung sounds. Ab soft and nontender. No CVA tenderness bilaterally.    Reviewed UA. Start macrobid and follow culture. Follow-up if symptoms worsen/fail to improve.        - Urine Microscopic Exam  - Urine Culture  - nitroFURantoin macrocrystal-monohydrate (MACROBID) 100 MG capsule; Take 1 capsule (100 mg) by mouth 2 times daily for 5 days.  Dispense: 10 capsule; Refill: 0  - Chlamydia trachomatis/Neisseria gonorrhoeae by PCR - Clinic Collect  - Multiplex Vaginal Panel by PCR; Future    2. Mild intermittent asthma without complication  ACT 22, lungs clear. Continue albuterol prn.      Follow-up if symptoms worsen/fail to improve.    All questions/concerns addressed. Patient stated understanding/agreement to plan of care.        Note: Chart documentation was done in part with Dragon Voice Recognition software.  Although reviewed after completion, some word and grammatical errors may remain. Please contact author for any clarification or concerns.          Patient Instructions   Please return if you develop worsening symptoms such as increased pain, fevers, chills, vaginal discharge.    Neida Wilson is a 59 year old, presenting for the following health issues:  UTI (Cloudy urine abdomen pain and hurt once bladder is empty)      2/26/2025    10:35 AM   Additional Questions   Roomed by Caro   Accompanied by self         2/26/2025    10:35 AM   Patient Reported Additional Medications   Patient reports taking the following new medications none     History of Present Illness       Reason for visit:  Bladder infection   She is taking medications regularly.           Genitourinary - Female  Onset/Duration: 2 days ago  Description:   Painful urination (Dysuria): YES- after voiding           Frequency: No  Blood in urine (Hematuria): No  Delay in urine (Hesitency): No  Intensity: moderate, 7/10  Progression of  Symptoms:  worsening  Accompanying Signs & Symptoms:  Fever/chills: No  Flank pain: No  Nausea and vomiting: No  Vaginal symptoms: none  Abdominal/Pelvic Pain: YES  History:   History of frequent UTI s: No  History of kidney stones: No  Sexually Active: Yes  Possibility of pregnancy: No  Precipitating or alleviating factors: None  Therapies tried and outcome:  none   Reports ab pain near bladder. Has pain after voiding.  No other acute concerns/symptoms at time of exam.      Review of Systems   Constitutional:  Negative for chills and fever.   Gastrointestinal:  Negative for nausea and vomiting.   Genitourinary:  Negative for flank pain and vaginal discharge.   Constitutional, HEENT, cardiovascular, pulmonary, gi and gu systems are negative, except as otherwise noted.    Past Medical History:   Diagnosis Date    Arthritis     Class 1 obesity due to excess calories without serious comorbidity with body mass index (BMI) of 30.0 to 30.9 in adult 2018    Herpes     Under eye    Joint pain     Seasonal allergies     Thrombocytopenia 2015    Uncomplicated asthma     very mild       Past Surgical History:   Procedure Laterality Date     SECTION      COLONOSCOPY      COLONOSCOPY WITH CO2 INSUFFLATION N/A 2022    Procedure: COLONOSCOPY, WITH CO2 INSUFFLATION;  Surgeon: Zeina Camilo DO;  Location:  OR    CYSTOSCOPY, SLING TRANSVAGINAL N/A 2017    Procedure: CYSTOSCOPY, SLING TRANSVAGINAL;  TRANSVAGINAL TAPING, CYSTOSCOPY, MID URETHRAL SLING;  Surgeon: Jett Montes MD;  Location:  OR    DILATE CERVIX, ABLATE ENDOMETRIUM THERMACHOICE, COMBINED  04/10/2014    Procedure: COMBINED DILATE CERVIX, ABLATE ENDOMETRIUM THERMACHOICE;;  Surgeon: Jett Montes MD;  Location:  [facility]    DILATION AND CURETTAGE, HYSTEROSCOPY, ABLATE ENDOMETRIUM NOVASURE, COMBINED  04/10/2014    Procedure: COMBINED DILATION AND CURETTAGE, HYSTEROSCOPY, ABLATE ENDOMETRIUM NOVASURE;  HYSTEROSCOPY, DILATION AND  CURETTAGE, NOVASURE ABLATION ATTEMPTED, THERMACHOICE ABLATION ATTEMPTED;  Surgeon: Jett Montes MD;  Location: [facility]    LAPAROSCOPIC ASSISTED HYSTERECTOMY VAGINAL, BILATERAL SALPINGO-OOPHORECTOMY, COMBINED  07/31/2014    Procedure: COMBINED LAPAROSCOPIC ASSISTED HYSTERECTOMY VAGINAL, SALPINGO-OOPHORECTOMY;  Surgeon: Jett Montes MD;  Location: [facility]    ORTHOPEDIC SURGERY      L KNEE MENISCUS,ankle surgery, left knee replacement    Right knee replacement Right        Family History   Problem Relation Age of Onset    Cancer Mother         patient is unsure of what kind       Social History     Tobacco Use    Smoking status: Never     Passive exposure: Never    Smokeless tobacco: Never   Substance Use Topics    Alcohol use: Yes     Alcohol/week: 0.0 standard drinks of alcohol     Comment: SOCIAL - 1 glass of wine twice a week; 2 drinks on the weekend     Current Outpatient Medications   Medication Sig Dispense Refill    albuterol (PROAIR HFA/PROVENTIL HFA/VENTOLIN HFA) 108 (90 Base) MCG/ACT inhaler Inhale 2 puffs into the lungs every 4 hours as needed for shortness of breath, wheezing or cough 18 g 3    albuterol (PROVENTIL) (2.5 MG/3ML) 0.083% neb solution USE 1 VIAL VIA NEBULIZER FOUR TIMES DAILY AS NEEDED FOR SHORTNESS OF BREATH OR DIFFICULT BREATHING OR WHEEZING 150 mL 1    etanercept (ENBREL SURECLICK) 50 MG/ML autoinjector Inject 50 mg subcutaneously every 7 days. Hold for signs of infection, and seek medical attention. 4 mL 7    hydroxychloroquine (PLAQUENIL) 200 MG tablet Take 1 tablet (200 mg) by mouth daily. .  Yearly eye exam, including 10-2 VF and SD-OCT, is required. 90 tablet 2           order for DME Equipment being ordered: Aerosol mask. Use with nebulizer. 1 each 0    order for DME Equipment being ordered: Nebulizer with tubing and instructions 1 Device 0    PREMARIN 1.25 MG tablet       spacer (OPTICHAMBER SHEREE) holding chamber Use with Inhalers 1 each 0    valACYclovir (VALTREX) 500 MG  tablet Take 500 mg by mouth as needed Reported on 4/11/2017      diclofenac (VOLTAREN) 1 % topical gel Apply up to 2 grams of 1% gel to right wrist up to 4 times daily as needed for joint pain (maximum: 8 g per upper extremity per day) (Patient not taking: Reported on 2/26/2025) 200 g 2     No current facility-administered medications for this visit.                 Objective      /64 (BP Location: Right arm, Patient Position: Sitting, Cuff Size: Adult Regular)   Pulse 74   Temp 97.3  F (36.3  C) (Temporal)   Resp 21   Wt 80.3 kg (177 lb)   LMP 03/30/2014   SpO2 100%   BMI 29.14 kg/m        Physical Exam  Constitutional:       General: She is not in acute distress.     Appearance: She is not ill-appearing.   Cardiovascular:      Rate and Rhythm: Normal rate and regular rhythm.      Heart sounds: Normal heart sounds. No murmur heard.  Pulmonary:      Effort: Pulmonary effort is normal. No respiratory distress.      Breath sounds: Normal breath sounds. No wheezing or rales.   Abdominal:      General: Bowel sounds are normal.      Palpations: Abdomen is soft.      Tenderness: There is no abdominal tenderness. There is no right CVA tenderness or left CVA tenderness.   Genitourinary:     Comments: Exam deferred by patient.  Musculoskeletal:      Cervical back: Neck supple.      Right lower leg: No edema.      Left lower leg: No edema.   Lymphadenopathy:      Cervical: No cervical adenopathy.   Neurological:      General: No focal deficit present.      Mental Status: She is alert.   Psychiatric:         Thought Content: Thought content normal.         Judgment: Judgment normal.        Recent Results (from the past 2 hours)   UA with Microscopic reflex to Culture - lab collect    Collection Time: 02/26/25 10:34 AM    Specimen: Urine, Midstream   Result Value Ref Range    Color Urine Yellow Colorless, Straw, Light Yellow, Yellow    Appearance Urine Cloudy (A) Clear    Glucose Urine Negative Negative mg/dL     Bilirubin Urine Negative Negative    Ketones Urine 15 (A) Negative mg/dL    Specific Gravity Urine >=1.030 1.003 - 1.035    Blood Urine Moderate (A) Negative    pH Urine 5.5 5.0 - 7.0    Protein Albumin Urine 100 (A) Negative mg/dL    Urobilinogen Urine 0.2 0.2, 1.0 E.U./dL    Nitrite Urine Positive (A) Negative    Leukocyte Esterase Urine Small (A) Negative   Urine Microscopic Exam    Collection Time: 02/26/25 10:34 AM   Result Value Ref Range    Bacteria Urine Many (A) None Seen /HPF    RBC Urine 25-50 (A) 0-2 /HPF /HPF    WBC Urine  (A) 0-5 /HPF /HPF    Squamous Epithelials Urine Moderate (A) None Seen /LPF           Signed Electronically by: TREVER Cat CNP

## 2025-02-26 NOTE — LETTER
February 26, 2025      Katie Aponte  [address]  [place] [address]        To Whom It May Concern:    Katie Aponte  was seen on 02/26/25.  Please excuse her until March 3, 2025 due to illness. She may return sooner if her symptoms improve.        Sincerely,        TREVER Cat CNP    Electronically signed

## 2025-02-26 NOTE — NURSING NOTE
RAPID3 (0-30) Cumulative Score  16.3          RAPID3 Weighted Score (divide #4 by 3 and that is the weighted score)  5.4

## 2025-02-26 NOTE — PATIENT INSTRUCTIONS
RHEUMATOLOGY    [facility]  [address] [place] [address]  [place] [address]    Phone number: [phone]  Fax number: [phone]    If you need a medication refill, please contact us as you may need lab work and/or a follow up visit prior to your refill.      Thank you for choosing [facility]!    Noy Burt CMA Rheumatology

## 2025-02-26 NOTE — PATIENT INSTRUCTIONS
Please return if you develop worsening symptoms such as increased pain, fevers, chills, vaginal discharge.

## 2025-02-27 LAB
BACTERIA UR CULT: ABNORMAL
GAMMA INTERFERON BACKGROUND BLD IA-ACNC: 0.03 IU/ML
M TB IFN-G BLD-IMP: NEGATIVE
M TB IFN-G CD4+ BCKGRND COR BLD-ACNC: 3.36 IU/ML
MITOGEN IGNF BCKGRD COR BLD-ACNC: 0 IU/ML
MITOGEN IGNF BCKGRD COR BLD-ACNC: 0.01 IU/ML
QUANTIFERON MITOGEN: 3.39 IU/ML
QUANTIFERON NIL TUBE: 0.03 IU/ML
QUANTIFERON TB1 TUBE: 0.04 IU/ML
QUANTIFERON TB2 TUBE: 0.03

## 2025-03-05 ENCOUNTER — TELEPHONE (OUTPATIENT)
Dept: FAMILY MEDICINE | Facility: CLINIC | Age: 60
End: 2025-03-05
Payer: COMMERCIAL

## 2025-04-02 ENCOUNTER — TRANSFERRED RECORDS (OUTPATIENT)
Dept: HEALTH INFORMATION MANAGEMENT | Facility: CLINIC | Age: 60
End: 2025-04-02

## 2025-05-07 ENCOUNTER — MYC REFILL (OUTPATIENT)
Dept: RHEUMATOLOGY | Facility: CLINIC | Age: 60
End: 2025-05-07
Payer: COMMERCIAL

## 2025-05-07 DIAGNOSIS — M05.9 SEROPOSITIVE RHEUMATOID ARTHRITIS (H): ICD-10-CM

## 2025-05-08 RX ORDER — HYDROXYCHLOROQUINE SULFATE 200 MG/1
200 TABLET, FILM COATED ORAL DAILY
Qty: 90 TABLET | Refills: 0 | Status: SHIPPED | OUTPATIENT
Start: 2025-05-08

## 2025-05-08 NOTE — TELEPHONE ENCOUNTER
After chart review and based on prior discussion with MD it was noted that this is appropriate for a refill.    PABLO LainezN RN Specialty Triage 5/8/2025 4:54 PM

## 2025-07-30 DIAGNOSIS — M05.9 SEROPOSITIVE RHEUMATOID ARTHRITIS (H): ICD-10-CM

## 2025-07-30 RX ORDER — HYDROXYCHLOROQUINE SULFATE 200 MG/1
200 TABLET, FILM COATED ORAL DAILY
Qty: 90 TABLET | Refills: 0 | Status: SHIPPED | OUTPATIENT
Start: 2025-07-30

## 2025-07-30 NOTE — TELEPHONE ENCOUNTER
Medication:   Hydroxychloroquine 200mg  Last written on:   05/08/2025  Quantity:   90    Refills:   0    Last office visit:   02/26/2025  Next office visit:   08/27/2025  Last labs:   02/24/2025  Next lab:  08/25/2025  Last eye exam:  11/08/2024

## 2025-07-30 NOTE — TELEPHONE ENCOUNTER
After chart review and based on prior discussion with MD it was noted that this is appropriate for a refill.    PABLO LainezN RN Specialty Triage 7/30/2025 4:33 PM

## 2025-08-16 ENCOUNTER — HEALTH MAINTENANCE LETTER (OUTPATIENT)
Age: 60
End: 2025-08-16

## 2025-08-25 ENCOUNTER — LAB (OUTPATIENT)
Dept: LAB | Facility: CLINIC | Age: 60
End: 2025-08-25
Payer: COMMERCIAL

## 2025-08-25 DIAGNOSIS — M05.9 SEROPOSITIVE RHEUMATOID ARTHRITIS (H): ICD-10-CM

## 2025-08-25 LAB
ALBUMIN SERPL BCG-MCNC: 3.9 G/DL (ref 3.5–5.2)
ALP SERPL-CCNC: 55 U/L (ref 40–150)
ALT SERPL W P-5'-P-CCNC: 8 U/L (ref 0–50)
AST SERPL W P-5'-P-CCNC: 21 U/L (ref 0–45)
BASOPHILS # BLD AUTO: 0.05 10E3/UL (ref 0–0.2)
BASOPHILS NFR BLD AUTO: 1.6 %
BILIRUB SERPL-MCNC: 0.4 MG/DL
BILIRUBIN DIRECT (ROCHE PRO & PURE): 0.15 MG/DL (ref 0–0.45)
CREAT SERPL-MCNC: 0.82 MG/DL (ref 0.51–0.95)
CRP SERPL-MCNC: <3 MG/L
EGFRCR SERPLBLD CKD-EPI 2021: 82 ML/MIN/1.73M2
EOSINOPHIL # BLD AUTO: 0.3 10E3/UL (ref 0–0.7)
EOSINOPHIL NFR BLD AUTO: 9.5 %
ERYTHROCYTE [DISTWIDTH] IN BLOOD BY AUTOMATED COUNT: 12.9 % (ref 10–15)
ERYTHROCYTE [SEDIMENTATION RATE] IN BLOOD BY WESTERGREN METHOD: 16 MM/HR (ref 0–30)
HCT VFR BLD AUTO: 43.4 % (ref 35–47)
HGB BLD-MCNC: 14 G/DL (ref 11.7–15.7)
IMM GRANULOCYTES # BLD: <0.04 10E3/UL
IMM GRANULOCYTES NFR BLD: 0 %
LYMPHOCYTES # BLD AUTO: 1.68 10E3/UL (ref 0.8–5.3)
LYMPHOCYTES NFR BLD AUTO: 53 %
MCH RBC QN AUTO: 27.5 PG (ref 26.5–33)
MCHC RBC AUTO-ENTMCNC: 32.3 G/DL (ref 31.5–36.5)
MCV RBC AUTO: 85.3 FL (ref 78–100)
MONOCYTES # BLD AUTO: 0.42 10E3/UL (ref 0–1.3)
MONOCYTES NFR BLD AUTO: 13.2 %
NEUTROPHILS # BLD AUTO: 0.72 10E3/UL (ref 1.6–8.3)
NEUTROPHILS NFR BLD AUTO: 22.7 %
PLATELET # BLD AUTO: 178 10E3/UL (ref 150–450)
PROT SERPL-MCNC: 7.5 G/DL (ref 6.4–8.3)
RBC # BLD AUTO: 5.09 10E6/UL (ref 3.8–5.2)
WBC # BLD AUTO: 3.17 10E3/UL (ref 4–11)

## 2025-08-25 PROCEDURE — 85652 RBC SED RATE AUTOMATED: CPT

## 2025-08-25 PROCEDURE — 36415 COLL VENOUS BLD VENIPUNCTURE: CPT

## 2025-08-25 PROCEDURE — 80076 HEPATIC FUNCTION PANEL: CPT

## 2025-08-25 PROCEDURE — 85025 COMPLETE CBC W/AUTO DIFF WBC: CPT

## 2025-08-25 PROCEDURE — 82565 ASSAY OF CREATININE: CPT

## 2025-08-25 PROCEDURE — 86140 C-REACTIVE PROTEIN: CPT

## 2025-08-27 ENCOUNTER — OFFICE VISIT (OUTPATIENT)
Dept: RHEUMATOLOGY | Facility: CLINIC | Age: 60
End: 2025-08-27
Payer: COMMERCIAL

## 2025-08-27 ENCOUNTER — TELEPHONE (OUTPATIENT)
Dept: RHEUMATOLOGY | Facility: CLINIC | Age: 60
End: 2025-08-27

## 2025-08-27 VITALS
WEIGHT: 178.8 LBS | RESPIRATION RATE: 20 BRPM | SYSTOLIC BLOOD PRESSURE: 127 MMHG | DIASTOLIC BLOOD PRESSURE: 87 MMHG | BODY MASS INDEX: 29.44 KG/M2 | OXYGEN SATURATION: 98 % | HEART RATE: 73 BPM

## 2025-08-27 DIAGNOSIS — M05.9 SEROPOSITIVE RHEUMATOID ARTHRITIS (H): Primary | ICD-10-CM

## 2025-08-27 DIAGNOSIS — D70.9 NEUTROPENIA, UNSPECIFIED TYPE: ICD-10-CM

## 2025-08-27 LAB
BASOPHILS # BLD AUTO: 0.04 10E3/UL (ref 0–0.2)
BASOPHILS NFR BLD AUTO: 1.4 %
EOSINOPHIL # BLD AUTO: 0.28 10E3/UL (ref 0–0.7)
EOSINOPHIL NFR BLD AUTO: 9.6 %
ERYTHROCYTE [DISTWIDTH] IN BLOOD BY AUTOMATED COUNT: 12.9 % (ref 10–15)
HCT VFR BLD AUTO: 42.7 % (ref 35–47)
HGB BLD-MCNC: 13.6 G/DL (ref 11.7–15.7)
IMM GRANULOCYTES # BLD: <0.04 10E3/UL
IMM GRANULOCYTES NFR BLD: 0 %
LYMPHOCYTES # BLD AUTO: 1.52 10E3/UL (ref 0.8–5.3)
LYMPHOCYTES NFR BLD AUTO: 52.2 %
MCH RBC QN AUTO: 27.5 PG (ref 26.5–33)
MCHC RBC AUTO-ENTMCNC: 31.9 G/DL (ref 31.5–36.5)
MCV RBC AUTO: 86.4 FL (ref 78–100)
MONOCYTES # BLD AUTO: 0.4 10E3/UL (ref 0–1.3)
MONOCYTES NFR BLD AUTO: 13.7 %
NEUTROPHILS # BLD AUTO: 0.67 10E3/UL (ref 1.6–8.3)
NEUTROPHILS NFR BLD AUTO: 23.1 %
PLATELET # BLD AUTO: 175 10E3/UL (ref 150–450)
RBC # BLD AUTO: 4.94 10E6/UL (ref 3.8–5.2)
WBC # BLD AUTO: 2.91 10E3/UL (ref 4–11)

## 2025-08-27 PROCEDURE — 99214 OFFICE O/P EST MOD 30 MIN: CPT | Performed by: INTERNAL MEDICINE

## 2025-08-27 PROCEDURE — 99000 SPECIMEN HANDLING OFFICE-LAB: CPT | Performed by: INTERNAL MEDICINE

## 2025-08-27 PROCEDURE — 86038 ANTINUCLEAR ANTIBODIES: CPT | Performed by: INTERNAL MEDICINE

## 2025-08-27 PROCEDURE — 86039 ANTINUCLEAR ANTIBODIES (ANA): CPT | Performed by: INTERNAL MEDICINE

## 2025-08-27 PROCEDURE — 3079F DIAST BP 80-89 MM HG: CPT | Performed by: INTERNAL MEDICINE

## 2025-08-27 PROCEDURE — G2211 COMPLEX E/M VISIT ADD ON: HCPCS | Performed by: INTERNAL MEDICINE

## 2025-08-27 PROCEDURE — 86225 DNA ANTIBODY NATIVE: CPT | Performed by: INTERNAL MEDICINE

## 2025-08-27 PROCEDURE — 83516 IMMUNOASSAY NONANTIBODY: CPT | Mod: 90 | Performed by: INTERNAL MEDICINE

## 2025-08-27 PROCEDURE — 86160 COMPLEMENT ANTIGEN: CPT | Performed by: INTERNAL MEDICINE

## 2025-08-27 PROCEDURE — 3074F SYST BP LT 130 MM HG: CPT | Performed by: INTERNAL MEDICINE

## 2025-08-27 PROCEDURE — 85025 COMPLETE CBC W/AUTO DIFF WBC: CPT | Performed by: INTERNAL MEDICINE

## 2025-08-27 RX ORDER — MEDROXYPROGESTERONE ACETATE 150 MG/ML
50 INJECTION, SUSPENSION INTRAMUSCULAR
Qty: 4 ML | Refills: 7 | Status: SHIPPED | OUTPATIENT
Start: 2025-08-27

## 2025-08-27 RX ORDER — HYDROXYCHLOROQUINE SULFATE 200 MG/1
200 TABLET, FILM COATED ORAL DAILY
Qty: 90 TABLET | Refills: 2 | Status: SHIPPED | OUTPATIENT
Start: 2025-08-27

## 2025-08-28 LAB
ANA PAT SER IF-IMP: ABNORMAL
ANA SER QL IF: POSITIVE
ANA TITR SER IF: ABNORMAL {TITER}
C3 SERPL-MCNC: 126 MG/DL (ref 81–157)
C4 SERPL-MCNC: 24 MG/DL (ref 13–39)
DSDNA AB SER-ACNC: 1.1 IU/ML

## (undated) DEVICE — SU VICRYL 3-0 SH 27" J316H

## (undated) DEVICE — SYR 03ML LL W/O NDL 309657

## (undated) DEVICE — SUCTION CANISTER MEDIVAC LINER 3000ML W/LID 65651-530

## (undated) DEVICE — DRAPE MAYO STAND 23X54 8337

## (undated) DEVICE — Device

## (undated) DEVICE — PAD CHUX UNDERPAD 23X24" 7136

## (undated) DEVICE — SOL NACL 0.9% INJ 250ML BAG 2B1322Q

## (undated) DEVICE — SU VICRYL 5-0 CPS-3 18" J844G

## (undated) DEVICE — CATH FOLEY 18FR 5ML LATEX 0165SI18

## (undated) DEVICE — PREP CHLORAPREP 26ML TINTED ORANGE  260815

## (undated) DEVICE — WIPES FOLEY CARE SURESTEP PROVON DFC100

## (undated) DEVICE — SOL WATER IRRIG 3000ML BAG 2B7117

## (undated) DEVICE — GLOVE PROTEXIS W/NEU-THERA 7.0  2D73TE70

## (undated) DEVICE — NDL 19GA 1.5"

## (undated) DEVICE — DECANTER BAG 2002S

## (undated) DEVICE — LINEN TOWEL PACK X5 5464

## (undated) DEVICE — KIT ENDO FIRST STEP DISINFECTANT 200ML W/POUCH EP-4

## (undated) DEVICE — SYR 10ML LL W/O NDL

## (undated) DEVICE — SOL NACL 0.9% IRRIG 1000ML BOTTLE 07138-09

## (undated) DEVICE — PACK TVT HYSTEROSCOPY SMA15HYFSE

## (undated) DEVICE — DRSG BANDAID 3/4X3"

## (undated) RX ORDER — FENTANYL CITRATE 50 UG/ML
INJECTION, SOLUTION INTRAMUSCULAR; INTRAVENOUS
Status: DISPENSED
Start: 2017-04-20

## (undated) RX ORDER — ONDANSETRON 2 MG/ML
INJECTION INTRAMUSCULAR; INTRAVENOUS
Status: DISPENSED
Start: 2022-07-12

## (undated) RX ORDER — DEXAMETHASONE SODIUM PHOSPHATE 4 MG/ML
INJECTION, SOLUTION INTRA-ARTICULAR; INTRALESIONAL; INTRAMUSCULAR; INTRAVENOUS; SOFT TISSUE
Status: DISPENSED
Start: 2017-04-20

## (undated) RX ORDER — CEFAZOLIN SODIUM 2 G/100ML
INJECTION, SOLUTION INTRAVENOUS
Status: DISPENSED
Start: 2017-04-20

## (undated) RX ORDER — ONDANSETRON 2 MG/ML
INJECTION INTRAMUSCULAR; INTRAVENOUS
Status: DISPENSED
Start: 2017-04-20

## (undated) RX ORDER — LIDOCAINE HYDROCHLORIDE 20 MG/ML
INJECTION, SOLUTION EPIDURAL; INFILTRATION; INTRACAUDAL; PERINEURAL
Status: DISPENSED
Start: 2017-04-20

## (undated) RX ORDER — HYDROCODONE BITARTRATE AND ACETAMINOPHEN 5; 325 MG/1; MG/1
TABLET ORAL
Status: DISPENSED
Start: 2017-04-20

## (undated) RX ORDER — PROPOFOL 10 MG/ML
INJECTION, EMULSION INTRAVENOUS
Status: DISPENSED
Start: 2017-04-20

## (undated) RX ORDER — FENTANYL CITRATE 50 UG/ML
INJECTION, SOLUTION INTRAMUSCULAR; INTRAVENOUS
Status: DISPENSED
Start: 2022-07-12